# Patient Record
Sex: MALE | Race: WHITE | Employment: OTHER | URBAN - METROPOLITAN AREA
[De-identification: names, ages, dates, MRNs, and addresses within clinical notes are randomized per-mention and may not be internally consistent; named-entity substitution may affect disease eponyms.]

---

## 2017-04-13 ENCOUNTER — ANESTHESIA (OUTPATIENT)
Dept: OPERATING ROOM | Age: 67
End: 2017-04-13
Payer: MEDICARE

## 2017-04-13 ENCOUNTER — ANESTHESIA EVENT (OUTPATIENT)
Dept: OPERATING ROOM | Age: 67
End: 2017-04-13
Payer: MEDICARE

## 2017-04-13 ENCOUNTER — HOSPITAL ENCOUNTER (OUTPATIENT)
Age: 67
Setting detail: OUTPATIENT SURGERY
Discharge: HOME | End: 2017-04-13
Attending: SURGERY | Admitting: SURGERY
Payer: MEDICARE

## 2017-04-13 VITALS — SYSTOLIC BLOOD PRESSURE: 149 MMHG | DIASTOLIC BLOOD PRESSURE: 64 MMHG | OXYGEN SATURATION: 98 %

## 2017-04-13 VITALS
HEART RATE: 64 BPM | HEIGHT: 70 IN | OXYGEN SATURATION: 98 % | TEMPERATURE: 98 F | BODY MASS INDEX: 26.63 KG/M2 | WEIGHT: 186 LBS | RESPIRATION RATE: 16 BRPM | DIASTOLIC BLOOD PRESSURE: 86 MMHG | SYSTOLIC BLOOD PRESSURE: 148 MMHG

## 2017-04-13 LAB
ABSOLUTE EOS #: 0.2 K/UL (ref 0–0.4)
ABSOLUTE LYMPH #: 2.3 K/UL (ref 1–4.8)
ABSOLUTE MONO #: 0.8 K/UL (ref 0.1–1.3)
ANION GAP SERPL CALCULATED.3IONS-SCNC: 12 MMOL/L (ref 9–17)
BASOPHILS # BLD: 1 % (ref 0–2)
BASOPHILS ABSOLUTE: 0 K/UL (ref 0–0.2)
BUN BLDV-MCNC: 11 MG/DL (ref 8–23)
BUN BLDV-MCNC: 11 MG/DL (ref 8–23)
BUN/CREAT BLD: ABNORMAL (ref 9–20)
CALCIUM SERPL-MCNC: 8.8 MG/DL (ref 8.6–10.4)
CHLORIDE BLD-SCNC: 103 MMOL/L (ref 98–107)
CO2: 26 MMOL/L (ref 20–31)
CREAT SERPL-MCNC: 0.63 MG/DL (ref 0.7–1.2)
CREAT SERPL-MCNC: 0.67 MG/DL (ref 0.7–1.2)
DIFFERENTIAL TYPE: ABNORMAL
EOSINOPHILS RELATIVE PERCENT: 3 % (ref 0–4)
GFR AFRICAN AMERICAN: >60 ML/MIN
GFR AFRICAN AMERICAN: >60 ML/MIN
GFR NON-AFRICAN AMERICAN: >60 ML/MIN
GFR NON-AFRICAN AMERICAN: >60 ML/MIN
GFR SERPL CREATININE-BSD FRML MDRD: ABNORMAL ML/MIN/{1.73_M2}
GLUCOSE BLD-MCNC: 112 MG/DL (ref 70–99)
GLUCOSE BLD-MCNC: 117 MG/DL (ref 75–110)
HCT VFR BLD CALC: 48.8 % (ref 41–53)
HEMOGLOBIN: 16.6 G/DL (ref 13.5–17.5)
LYMPHOCYTES # BLD: 35 % (ref 24–44)
MCH RBC QN AUTO: 32.2 PG (ref 26–34)
MCHC RBC AUTO-ENTMCNC: 34 G/DL (ref 31–37)
MCV RBC AUTO: 94.9 FL (ref 80–100)
MONOCYTES # BLD: 11 % (ref 1–7)
PDW BLD-RTO: 12.2 % (ref 11.5–14.9)
PLATELET # BLD: 184 K/UL (ref 150–450)
PLATELET ESTIMATE: ABNORMAL
PMV BLD AUTO: 8.5 FL (ref 6–12)
POTASSIUM SERPL-SCNC: 3.9 MMOL/L (ref 3.7–5.3)
RBC # BLD: 5.15 M/UL (ref 4.5–5.9)
RBC # BLD: ABNORMAL 10*6/UL
SEG NEUTROPHILS: 50 % (ref 36–66)
SEGMENTED NEUTROPHILS ABSOLUTE COUNT: 3.3 K/UL (ref 1.3–9.1)
SODIUM BLD-SCNC: 141 MMOL/L (ref 135–144)
WBC # BLD: 6.6 K/UL (ref 3.5–11)
WBC # BLD: ABNORMAL 10*3/UL

## 2017-04-13 PROCEDURE — 82947 ASSAY GLUCOSE BLOOD QUANT: CPT

## 2017-04-13 PROCEDURE — 7100000001 HC PACU RECOVERY - ADDTL 15 MIN: Performed by: SURGERY

## 2017-04-13 PROCEDURE — 3700000000 HC ANESTHESIA ATTENDED CARE: Performed by: SURGERY

## 2017-04-13 PROCEDURE — 82565 ASSAY OF CREATININE: CPT

## 2017-04-13 PROCEDURE — 7100000031 HC ASPR PHASE II RECOVERY - ADDTL 15 MIN: Performed by: SURGERY

## 2017-04-13 PROCEDURE — 3700000001 HC ADD 15 MINUTES (ANESTHESIA): Performed by: SURGERY

## 2017-04-13 PROCEDURE — 80048 BASIC METABOLIC PNL TOTAL CA: CPT

## 2017-04-13 PROCEDURE — 2580000003 HC RX 258: Performed by: SURGERY

## 2017-04-13 PROCEDURE — 7100000000 HC PACU RECOVERY - FIRST 15 MIN: Performed by: SURGERY

## 2017-04-13 PROCEDURE — 7100000030 HC ASPR PHASE II RECOVERY - FIRST 15 MIN: Performed by: SURGERY

## 2017-04-13 PROCEDURE — 84520 ASSAY OF UREA NITROGEN: CPT

## 2017-04-13 PROCEDURE — 3609027000 HC COLONOSCOPY: Performed by: SURGERY

## 2017-04-13 PROCEDURE — 36415 COLL VENOUS BLD VENIPUNCTURE: CPT

## 2017-04-13 PROCEDURE — 85025 COMPLETE CBC W/AUTO DIFF WBC: CPT

## 2017-04-13 PROCEDURE — 6360000002 HC RX W HCPCS: Performed by: NURSE ANESTHETIST, CERTIFIED REGISTERED

## 2017-04-13 RX ORDER — PROPOFOL 10 MG/ML
INJECTION, EMULSION INTRAVENOUS PRN
Status: DISCONTINUED | OUTPATIENT
Start: 2017-04-13 | End: 2017-04-13 | Stop reason: SDUPTHER

## 2017-04-13 RX ORDER — FENTANYL CITRATE 50 UG/ML
INJECTION, SOLUTION INTRAMUSCULAR; INTRAVENOUS PRN
Status: DISCONTINUED | OUTPATIENT
Start: 2017-04-13 | End: 2017-04-13 | Stop reason: SDUPTHER

## 2017-04-13 RX ORDER — SODIUM CHLORIDE, SODIUM LACTATE, POTASSIUM CHLORIDE, CALCIUM CHLORIDE 600; 310; 30; 20 MG/100ML; MG/100ML; MG/100ML; MG/100ML
INJECTION, SOLUTION INTRAVENOUS CONTINUOUS PRN
Status: DISCONTINUED | OUTPATIENT
Start: 2017-04-13 | End: 2017-04-13

## 2017-04-13 RX ORDER — SODIUM CHLORIDE, SODIUM LACTATE, POTASSIUM CHLORIDE, CALCIUM CHLORIDE 600; 310; 30; 20 MG/100ML; MG/100ML; MG/100ML; MG/100ML
INJECTION, SOLUTION INTRAVENOUS CONTINUOUS
Status: DISCONTINUED | OUTPATIENT
Start: 2017-04-13 | End: 2017-04-13 | Stop reason: HOSPADM

## 2017-04-13 RX ORDER — MIDAZOLAM HYDROCHLORIDE 1 MG/ML
INJECTION INTRAMUSCULAR; INTRAVENOUS PRN
Status: DISCONTINUED | OUTPATIENT
Start: 2017-04-13 | End: 2017-04-13 | Stop reason: SDUPTHER

## 2017-04-13 RX ORDER — SODIUM CHLORIDE 9 MG/ML
INJECTION, SOLUTION INTRAVENOUS CONTINUOUS
Status: DISCONTINUED | OUTPATIENT
Start: 2017-04-13 | End: 2017-04-13

## 2017-04-13 RX ORDER — SODIUM CHLORIDE 0.9 % (FLUSH) 0.9 %
10 SYRINGE (ML) INJECTION PRN
Status: DISCONTINUED | OUTPATIENT
Start: 2017-04-13 | End: 2017-04-13 | Stop reason: HOSPADM

## 2017-04-13 RX ORDER — LABETALOL HYDROCHLORIDE 5 MG/ML
5 INJECTION, SOLUTION INTRAVENOUS EVERY 10 MIN PRN
Status: DISCONTINUED | OUTPATIENT
Start: 2017-04-13 | End: 2017-04-13 | Stop reason: HOSPADM

## 2017-04-13 RX ORDER — SODIUM CHLORIDE 0.9 % (FLUSH) 0.9 %
10 SYRINGE (ML) INJECTION EVERY 12 HOURS SCHEDULED
Status: DISCONTINUED | OUTPATIENT
Start: 2017-04-13 | End: 2017-04-13 | Stop reason: HOSPADM

## 2017-04-13 RX ORDER — DIPHENHYDRAMINE HYDROCHLORIDE 50 MG/ML
12.5 INJECTION INTRAMUSCULAR; INTRAVENOUS
Status: DISCONTINUED | OUTPATIENT
Start: 2017-04-13 | End: 2017-04-13 | Stop reason: HOSPADM

## 2017-04-13 RX ORDER — ONDANSETRON 2 MG/ML
4 INJECTION INTRAMUSCULAR; INTRAVENOUS
Status: DISCONTINUED | OUTPATIENT
Start: 2017-04-13 | End: 2017-04-13 | Stop reason: HOSPADM

## 2017-04-13 RX ORDER — LIDOCAINE HYDROCHLORIDE 10 MG/ML
5 INJECTION, SOLUTION INFILTRATION; PERINEURAL ONCE
Status: DISCONTINUED | OUTPATIENT
Start: 2017-04-13 | End: 2017-04-13 | Stop reason: HOSPADM

## 2017-04-13 RX ADMIN — MIDAZOLAM HYDROCHLORIDE 2 MG: 1 INJECTION, SOLUTION INTRAMUSCULAR; INTRAVENOUS at 11:10

## 2017-04-13 RX ADMIN — PROPOFOL 100 MG: 10 INJECTION, EMULSION INTRAVENOUS at 11:32

## 2017-04-13 RX ADMIN — PROPOFOL 50 MG: 10 INJECTION, EMULSION INTRAVENOUS at 11:10

## 2017-04-13 RX ADMIN — SODIUM CHLORIDE, POTASSIUM CHLORIDE, SODIUM LACTATE AND CALCIUM CHLORIDE: 600; 310; 30; 20 INJECTION, SOLUTION INTRAVENOUS at 11:08

## 2017-04-13 RX ADMIN — PROPOFOL 20 MG: 10 INJECTION, EMULSION INTRAVENOUS at 11:20

## 2017-04-13 RX ADMIN — SODIUM CHLORIDE, POTASSIUM CHLORIDE, SODIUM LACTATE AND CALCIUM CHLORIDE: 600; 310; 30; 20 INJECTION, SOLUTION INTRAVENOUS at 09:46

## 2017-04-13 RX ADMIN — FENTANYL CITRATE 100 MCG: 50 INJECTION, SOLUTION INTRAMUSCULAR; INTRAVENOUS at 11:10

## 2017-04-13 RX ADMIN — PROPOFOL 30 MG: 10 INJECTION, EMULSION INTRAVENOUS at 11:15

## 2017-04-13 RX ADMIN — PROPOFOL 20 MG: 10 INJECTION, EMULSION INTRAVENOUS at 11:35

## 2017-04-13 RX ADMIN — PROPOFOL 100 MG: 10 INJECTION, EMULSION INTRAVENOUS at 11:28

## 2017-04-13 ASSESSMENT — PAIN DESCRIPTION - PAIN TYPE: TYPE: SURGICAL PAIN

## 2017-04-13 ASSESSMENT — PAIN DESCRIPTION - DESCRIPTORS: DESCRIPTORS: CRAMPING

## 2017-04-13 ASSESSMENT — PAIN SCALES - GENERAL
PAINLEVEL_OUTOF10: 0
PAINLEVEL_OUTOF10: 5
PAINLEVEL_OUTOF10: 0

## 2017-04-13 ASSESSMENT — PAIN DESCRIPTION - LOCATION: LOCATION: ABDOMEN

## 2017-04-13 ASSESSMENT — PAIN - FUNCTIONAL ASSESSMENT: PAIN_FUNCTIONAL_ASSESSMENT: 0-10

## 2017-05-01 ENCOUNTER — OFFICE VISIT (OUTPATIENT)
Dept: FAMILY MEDICINE CLINIC | Age: 67
End: 2017-05-01
Payer: MEDICARE

## 2017-05-01 VITALS
TEMPERATURE: 98.3 F | HEIGHT: 70 IN | SYSTOLIC BLOOD PRESSURE: 110 MMHG | OXYGEN SATURATION: 97 % | DIASTOLIC BLOOD PRESSURE: 78 MMHG | BODY MASS INDEX: 27.49 KG/M2 | HEART RATE: 68 BPM | WEIGHT: 192 LBS

## 2017-05-01 DIAGNOSIS — E78.5 DYSLIPIDEMIA: ICD-10-CM

## 2017-05-01 DIAGNOSIS — I10 ESSENTIAL HYPERTENSION: Primary | ICD-10-CM

## 2017-05-01 DIAGNOSIS — K21.9 GASTROESOPHAGEAL REFLUX DISEASE WITHOUT ESOPHAGITIS: ICD-10-CM

## 2017-05-01 DIAGNOSIS — R25.1 TREMOR OF BOTH HANDS: ICD-10-CM

## 2017-05-01 PROCEDURE — G8420 CALC BMI NORM PARAMETERS: HCPCS | Performed by: FAMILY MEDICINE

## 2017-05-01 PROCEDURE — 4004F PT TOBACCO SCREEN RCVD TLK: CPT | Performed by: FAMILY MEDICINE

## 2017-05-01 PROCEDURE — G8427 DOCREV CUR MEDS BY ELIG CLIN: HCPCS | Performed by: FAMILY MEDICINE

## 2017-05-01 PROCEDURE — 1123F ACP DISCUSS/DSCN MKR DOCD: CPT | Performed by: FAMILY MEDICINE

## 2017-05-01 PROCEDURE — 4040F PNEUMOC VAC/ADMIN/RCVD: CPT | Performed by: FAMILY MEDICINE

## 2017-05-01 PROCEDURE — 3017F COLORECTAL CA SCREEN DOC REV: CPT | Performed by: FAMILY MEDICINE

## 2017-05-01 PROCEDURE — 99214 OFFICE O/P EST MOD 30 MIN: CPT | Performed by: FAMILY MEDICINE

## 2017-05-01 RX ORDER — AMLODIPINE BESYLATE 10 MG/1
10 TABLET ORAL DAILY
Qty: 30 TABLET | Refills: 3 | Status: SHIPPED | OUTPATIENT
Start: 2017-05-01 | End: 2017-05-16 | Stop reason: ALTCHOICE

## 2017-05-01 ASSESSMENT — ENCOUNTER SYMPTOMS
CONSTIPATION: 0
COUGH: 0
ABDOMINAL PAIN: 0
NAUSEA: 0
SHORTNESS OF BREATH: 0
SORE THROAT: 0

## 2017-05-08 ENCOUNTER — HOSPITAL ENCOUNTER (OUTPATIENT)
Age: 67
Discharge: HOME | End: 2017-05-08
Payer: MEDICARE

## 2017-05-08 DIAGNOSIS — E78.5 DYSLIPIDEMIA: ICD-10-CM

## 2017-05-08 DIAGNOSIS — I10 ESSENTIAL HYPERTENSION: ICD-10-CM

## 2017-05-08 LAB
ALT SERPL-CCNC: 34 U/L (ref 5–41)
ANION GAP SERPL CALCULATED.3IONS-SCNC: 15 MMOL/L (ref 9–17)
AST SERPL-CCNC: 38 U/L
BUN BLDV-MCNC: 13 MG/DL (ref 8–23)
BUN/CREAT BLD: ABNORMAL (ref 9–20)
CALCIUM SERPL-MCNC: 9.2 MG/DL (ref 8.6–10.4)
CHLORIDE BLD-SCNC: 102 MMOL/L (ref 98–107)
CHOLESTEROL/HDL RATIO: 4.4
CHOLESTEROL: 218 MG/DL
CO2: 23 MMOL/L (ref 20–31)
CREAT SERPL-MCNC: 0.66 MG/DL (ref 0.7–1.2)
GFR AFRICAN AMERICAN: >60 ML/MIN
GFR NON-AFRICAN AMERICAN: >60 ML/MIN
GFR SERPL CREATININE-BSD FRML MDRD: ABNORMAL ML/MIN/{1.73_M2}
GFR SERPL CREATININE-BSD FRML MDRD: ABNORMAL ML/MIN/{1.73_M2}
GLUCOSE BLD-MCNC: 103 MG/DL (ref 70–99)
HDLC SERPL-MCNC: 49 MG/DL
LDL CHOLESTEROL: 123 MG/DL (ref 0–130)
POTASSIUM SERPL-SCNC: 4.1 MMOL/L (ref 3.7–5.3)
SODIUM BLD-SCNC: 140 MMOL/L (ref 135–144)
TRIGL SERPL-MCNC: 231 MG/DL
VLDLC SERPL CALC-MCNC: ABNORMAL MG/DL (ref 1–30)

## 2017-05-08 PROCEDURE — 80061 LIPID PANEL: CPT

## 2017-05-08 PROCEDURE — 84450 TRANSFERASE (AST) (SGOT): CPT

## 2017-05-08 PROCEDURE — 36415 COLL VENOUS BLD VENIPUNCTURE: CPT

## 2017-05-08 PROCEDURE — 80048 BASIC METABOLIC PNL TOTAL CA: CPT

## 2017-05-08 PROCEDURE — 84460 ALANINE AMINO (ALT) (SGPT): CPT

## 2017-05-10 ENCOUNTER — TELEPHONE (OUTPATIENT)
Dept: FAMILY MEDICINE CLINIC | Age: 67
End: 2017-05-10

## 2017-05-16 ENCOUNTER — OFFICE VISIT (OUTPATIENT)
Dept: FAMILY MEDICINE CLINIC | Age: 67
End: 2017-05-16
Payer: MEDICARE

## 2017-05-16 VITALS
BODY MASS INDEX: 25.97 KG/M2 | HEART RATE: 89 BPM | WEIGHT: 181.4 LBS | SYSTOLIC BLOOD PRESSURE: 136 MMHG | DIASTOLIC BLOOD PRESSURE: 78 MMHG | HEIGHT: 70 IN | TEMPERATURE: 98.6 F

## 2017-05-16 DIAGNOSIS — G25.0 ESSENTIAL TREMOR: ICD-10-CM

## 2017-05-16 DIAGNOSIS — E78.5 HYPERLIPIDEMIA, UNSPECIFIED HYPERLIPIDEMIA TYPE: ICD-10-CM

## 2017-05-16 DIAGNOSIS — I10 ESSENTIAL HYPERTENSION: Primary | ICD-10-CM

## 2017-05-16 DIAGNOSIS — K21.9 GASTROESOPHAGEAL REFLUX DISEASE WITHOUT ESOPHAGITIS: ICD-10-CM

## 2017-05-16 PROCEDURE — 4040F PNEUMOC VAC/ADMIN/RCVD: CPT | Performed by: FAMILY MEDICINE

## 2017-05-16 PROCEDURE — 3017F COLORECTAL CA SCREEN DOC REV: CPT | Performed by: FAMILY MEDICINE

## 2017-05-16 PROCEDURE — G8427 DOCREV CUR MEDS BY ELIG CLIN: HCPCS | Performed by: FAMILY MEDICINE

## 2017-05-16 PROCEDURE — 99213 OFFICE O/P EST LOW 20 MIN: CPT | Performed by: FAMILY MEDICINE

## 2017-05-16 PROCEDURE — 1123F ACP DISCUSS/DSCN MKR DOCD: CPT | Performed by: FAMILY MEDICINE

## 2017-05-16 PROCEDURE — G8420 CALC BMI NORM PARAMETERS: HCPCS | Performed by: FAMILY MEDICINE

## 2017-05-16 PROCEDURE — 4004F PT TOBACCO SCREEN RCVD TLK: CPT | Performed by: FAMILY MEDICINE

## 2017-05-16 RX ORDER — ATORVASTATIN CALCIUM 20 MG/1
20 TABLET, FILM COATED ORAL DAILY
Qty: 30 TABLET | Refills: 3 | Status: SHIPPED | OUTPATIENT
Start: 2017-05-16 | End: 2017-08-21 | Stop reason: ALTCHOICE

## 2017-05-16 RX ORDER — NEOMYCIN SULFATE, POLYMYXIN B SULFATE, AND DEXAMETHASONE 3.5; 10000; 1 MG/G; [USP'U]/G; MG/G
OINTMENT OPHTHALMIC
Refills: 1 | COMMUNITY
Start: 2017-04-06 | End: 2017-08-21 | Stop reason: ALTCHOICE

## 2017-05-16 RX ORDER — BISACODYL 5 MG
TABLET, DELAYED RELEASE (ENTERIC COATED) ORAL
Refills: 0 | COMMUNITY
Start: 2017-03-14 | End: 2018-02-13 | Stop reason: ALTCHOICE

## 2017-05-16 RX ORDER — PNEUMOCOCCAL 13-VALENT CONJUGATE VACCINE 2.2; 2.2; 2.2; 2.2; 2.2; 4.4; 2.2; 2.2; 2.2; 2.2; 2.2; 2.2; 2.2 UG/.5ML; UG/.5ML; UG/.5ML; UG/.5ML; UG/.5ML; UG/.5ML; UG/.5ML; UG/.5ML; UG/.5ML; UG/.5ML; UG/.5ML; UG/.5ML; UG/.5ML
INJECTION, SUSPENSION INTRAMUSCULAR
Refills: 0 | COMMUNITY
Start: 2017-02-28 | End: 2017-05-16 | Stop reason: ALTCHOICE

## 2017-05-16 RX ORDER — PROPRANOLOL HYDROCHLORIDE 20 MG/1
20 TABLET ORAL DAILY
Qty: 30 TABLET | Refills: 1 | Status: SHIPPED | OUTPATIENT
Start: 2017-05-16 | End: 2017-06-27 | Stop reason: SDUPTHER

## 2017-05-16 ASSESSMENT — PATIENT HEALTH QUESTIONNAIRE - PHQ9
2. FEELING DOWN, DEPRESSED OR HOPELESS: 0
SUM OF ALL RESPONSES TO PHQ9 QUESTIONS 1 & 2: 0
1. LITTLE INTEREST OR PLEASURE IN DOING THINGS: 0
SUM OF ALL RESPONSES TO PHQ QUESTIONS 1-9: 0

## 2017-05-16 ASSESSMENT — ENCOUNTER SYMPTOMS
SHORTNESS OF BREATH: 0
NAUSEA: 0
SORE THROAT: 0
ABDOMINAL PAIN: 0
CONSTIPATION: 0

## 2017-05-26 ENCOUNTER — NURSE ONLY (OUTPATIENT)
Dept: FAMILY MEDICINE CLINIC | Age: 67
End: 2017-05-26

## 2017-05-26 VITALS — DIASTOLIC BLOOD PRESSURE: 66 MMHG | SYSTOLIC BLOOD PRESSURE: 134 MMHG | HEART RATE: 66 BPM

## 2017-06-27 ENCOUNTER — OFFICE VISIT (OUTPATIENT)
Dept: FAMILY MEDICINE CLINIC | Age: 67
End: 2017-06-27
Payer: MEDICARE

## 2017-06-27 VITALS
RESPIRATION RATE: 18 BRPM | SYSTOLIC BLOOD PRESSURE: 124 MMHG | WEIGHT: 180 LBS | OXYGEN SATURATION: 99 % | DIASTOLIC BLOOD PRESSURE: 84 MMHG | HEART RATE: 70 BPM | TEMPERATURE: 98.3 F | BODY MASS INDEX: 26.66 KG/M2 | HEIGHT: 69 IN

## 2017-06-27 DIAGNOSIS — R73.9 HYPERGLYCEMIA: ICD-10-CM

## 2017-06-27 DIAGNOSIS — I10 ESSENTIAL HYPERTENSION: Primary | ICD-10-CM

## 2017-06-27 DIAGNOSIS — E78.5 HYPERLIPIDEMIA, UNSPECIFIED HYPERLIPIDEMIA TYPE: ICD-10-CM

## 2017-06-27 DIAGNOSIS — K21.9 GASTROESOPHAGEAL REFLUX DISEASE WITHOUT ESOPHAGITIS: ICD-10-CM

## 2017-06-27 LAB — HBA1C MFR BLD: 5.3 %

## 2017-06-27 PROCEDURE — 99214 OFFICE O/P EST MOD 30 MIN: CPT | Performed by: FAMILY MEDICINE

## 2017-06-27 PROCEDURE — 1123F ACP DISCUSS/DSCN MKR DOCD: CPT | Performed by: FAMILY MEDICINE

## 2017-06-27 PROCEDURE — 83036 HEMOGLOBIN GLYCOSYLATED A1C: CPT | Performed by: FAMILY MEDICINE

## 2017-06-27 PROCEDURE — 4040F PNEUMOC VAC/ADMIN/RCVD: CPT | Performed by: FAMILY MEDICINE

## 2017-06-27 PROCEDURE — 4004F PT TOBACCO SCREEN RCVD TLK: CPT | Performed by: FAMILY MEDICINE

## 2017-06-27 PROCEDURE — G8419 CALC BMI OUT NRM PARAM NOF/U: HCPCS | Performed by: FAMILY MEDICINE

## 2017-06-27 PROCEDURE — 3017F COLORECTAL CA SCREEN DOC REV: CPT | Performed by: FAMILY MEDICINE

## 2017-06-27 PROCEDURE — G8427 DOCREV CUR MEDS BY ELIG CLIN: HCPCS | Performed by: FAMILY MEDICINE

## 2017-06-27 RX ORDER — PROPRANOLOL HYDROCHLORIDE 20 MG/1
20 TABLET ORAL DAILY
Qty: 90 TABLET | Refills: 1 | Status: SHIPPED | OUTPATIENT
Start: 2017-06-27 | End: 2017-08-21 | Stop reason: ALTCHOICE

## 2017-06-27 RX ORDER — M-VIT,TX,IRON,MINS/CALC/FOLIC 27MG-0.4MG
1 TABLET ORAL DAILY
COMMUNITY
End: 2018-03-16 | Stop reason: SDUPTHER

## 2017-06-27 RX ORDER — CLONAZEPAM 0.5 MG/1
TABLET ORAL
Refills: 3 | COMMUNITY
Start: 2017-05-19 | End: 2017-09-21 | Stop reason: ALTCHOICE

## 2017-06-27 ASSESSMENT — ENCOUNTER SYMPTOMS
ABDOMINAL PAIN: 0
SORE THROAT: 0
SHORTNESS OF BREATH: 0
NAUSEA: 0

## 2017-08-17 ENCOUNTER — HOSPITAL ENCOUNTER (OUTPATIENT)
Age: 67
Discharge: HOME | End: 2017-08-17
Payer: MEDICARE

## 2017-08-17 LAB
THYROXINE, FREE: 1.18 NG/DL (ref 0.93–1.7)
TSH SERPL DL<=0.05 MIU/L-ACNC: 1.22 MIU/L (ref 0.3–5)

## 2017-08-17 PROCEDURE — 93005 ELECTROCARDIOGRAM TRACING: CPT

## 2017-08-17 PROCEDURE — 84439 ASSAY OF FREE THYROXINE: CPT

## 2017-08-17 PROCEDURE — 84443 ASSAY THYROID STIM HORMONE: CPT

## 2017-08-17 PROCEDURE — 36415 COLL VENOUS BLD VENIPUNCTURE: CPT

## 2017-08-21 ENCOUNTER — OFFICE VISIT (OUTPATIENT)
Dept: FAMILY MEDICINE CLINIC | Age: 67
End: 2017-08-21
Payer: MEDICARE

## 2017-08-21 VITALS
HEIGHT: 69 IN | HEART RATE: 62 BPM | SYSTOLIC BLOOD PRESSURE: 162 MMHG | BODY MASS INDEX: 27.25 KG/M2 | WEIGHT: 184 LBS | DIASTOLIC BLOOD PRESSURE: 88 MMHG | OXYGEN SATURATION: 98 % | TEMPERATURE: 98.5 F

## 2017-08-21 DIAGNOSIS — I10 ESSENTIAL HYPERTENSION: Primary | ICD-10-CM

## 2017-08-21 DIAGNOSIS — R10.32 LEFT LOWER QUADRANT PAIN: ICD-10-CM

## 2017-08-21 PROCEDURE — 99213 OFFICE O/P EST LOW 20 MIN: CPT | Performed by: FAMILY MEDICINE

## 2017-08-21 PROCEDURE — 3017F COLORECTAL CA SCREEN DOC REV: CPT | Performed by: FAMILY MEDICINE

## 2017-08-21 PROCEDURE — 4040F PNEUMOC VAC/ADMIN/RCVD: CPT | Performed by: FAMILY MEDICINE

## 2017-08-21 PROCEDURE — 1123F ACP DISCUSS/DSCN MKR DOCD: CPT | Performed by: FAMILY MEDICINE

## 2017-08-21 PROCEDURE — 4004F PT TOBACCO SCREEN RCVD TLK: CPT | Performed by: FAMILY MEDICINE

## 2017-08-21 PROCEDURE — G8419 CALC BMI OUT NRM PARAM NOF/U: HCPCS | Performed by: FAMILY MEDICINE

## 2017-08-21 PROCEDURE — G8427 DOCREV CUR MEDS BY ELIG CLIN: HCPCS | Performed by: FAMILY MEDICINE

## 2017-08-21 RX ORDER — LISINOPRIL 10 MG/1
10 TABLET ORAL DAILY
Qty: 30 TABLET | Refills: 3 | Status: SHIPPED | OUTPATIENT
Start: 2017-08-21 | End: 2017-09-21 | Stop reason: SDUPTHER

## 2017-08-21 ASSESSMENT — ENCOUNTER SYMPTOMS
CONSTIPATION: 0
ABDOMINAL PAIN: 1
NAUSEA: 0
SHORTNESS OF BREATH: 0
SORE THROAT: 0

## 2017-08-23 LAB
EKG ATRIAL RATE: 53 BPM
EKG P AXIS: 45 DEGREES
EKG P-R INTERVAL: 162 MS
EKG Q-T INTERVAL: 422 MS
EKG QRS DURATION: 80 MS
EKG QTC CALCULATION (BAZETT): 395 MS
EKG R AXIS: 61 DEGREES
EKG T AXIS: 52 DEGREES
EKG VENTRICULAR RATE: 53 BPM

## 2017-09-14 ENCOUNTER — ANESTHESIA EVENT (OUTPATIENT)
Dept: OPERATING ROOM | Age: 67
End: 2017-09-14
Payer: MEDICARE

## 2017-09-15 ENCOUNTER — ANESTHESIA (OUTPATIENT)
Dept: OPERATING ROOM | Age: 67
End: 2017-09-15
Payer: MEDICARE

## 2017-09-15 ENCOUNTER — HOSPITAL ENCOUNTER (OUTPATIENT)
Age: 67
Setting detail: OUTPATIENT SURGERY
Discharge: HOME | End: 2017-09-15
Attending: SURGERY | Admitting: SURGERY
Payer: MEDICARE

## 2017-09-15 VITALS
HEIGHT: 69 IN | DIASTOLIC BLOOD PRESSURE: 84 MMHG | RESPIRATION RATE: 20 BRPM | WEIGHT: 184 LBS | SYSTOLIC BLOOD PRESSURE: 143 MMHG | BODY MASS INDEX: 27.25 KG/M2 | OXYGEN SATURATION: 96 % | HEART RATE: 54 BPM | TEMPERATURE: 97.7 F

## 2017-09-15 VITALS — SYSTOLIC BLOOD PRESSURE: 130 MMHG | DIASTOLIC BLOOD PRESSURE: 69 MMHG | OXYGEN SATURATION: 97 %

## 2017-09-15 PROCEDURE — 2500000003 HC RX 250 WO HCPCS

## 2017-09-15 PROCEDURE — 3600000002 HC SURGERY LEVEL 2 BASE: Performed by: SURGERY

## 2017-09-15 PROCEDURE — 7100000001 HC PACU RECOVERY - ADDTL 15 MIN: Performed by: SURGERY

## 2017-09-15 PROCEDURE — 6360000002 HC RX W HCPCS

## 2017-09-15 PROCEDURE — 3700000000 HC ANESTHESIA ATTENDED CARE: Performed by: SURGERY

## 2017-09-15 PROCEDURE — 3600000012 HC SURGERY LEVEL 2 ADDTL 15MIN: Performed by: SURGERY

## 2017-09-15 PROCEDURE — 88304 TISSUE EXAM BY PATHOLOGIST: CPT

## 2017-09-15 PROCEDURE — 7100000030 HC ASPR PHASE II RECOVERY - FIRST 15 MIN: Performed by: SURGERY

## 2017-09-15 PROCEDURE — 3700000001 HC ADD 15 MINUTES (ANESTHESIA): Performed by: SURGERY

## 2017-09-15 PROCEDURE — 2580000003 HC RX 258: Performed by: ANESTHESIOLOGY

## 2017-09-15 PROCEDURE — 2500000003 HC RX 250 WO HCPCS: Performed by: SURGERY

## 2017-09-15 PROCEDURE — 88305 TISSUE EXAM BY PATHOLOGIST: CPT

## 2017-09-15 PROCEDURE — 7100000000 HC PACU RECOVERY - FIRST 15 MIN: Performed by: SURGERY

## 2017-09-15 PROCEDURE — A6257 TRANSPARENT FILM <= 16 SQ IN: HCPCS | Performed by: SURGERY

## 2017-09-15 PROCEDURE — 7100000010 HC PHASE II RECOVERY - FIRST 15 MIN: Performed by: SURGERY

## 2017-09-15 PROCEDURE — 7100000011 HC PHASE II RECOVERY - ADDTL 15 MIN: Performed by: SURGERY

## 2017-09-15 PROCEDURE — 7100000031 HC ASPR PHASE II RECOVERY - ADDTL 15 MIN: Performed by: SURGERY

## 2017-09-15 RX ORDER — LIDOCAINE HYDROCHLORIDE AND EPINEPHRINE 10; 10 MG/ML; UG/ML
INJECTION, SOLUTION INFILTRATION; PERINEURAL PRN
Status: DISCONTINUED | OUTPATIENT
Start: 2017-09-15 | End: 2017-09-15 | Stop reason: HOSPADM

## 2017-09-15 RX ORDER — KETAMINE HYDROCHLORIDE 50 MG/ML
INJECTION, SOLUTION, CONCENTRATE INTRAMUSCULAR; INTRAVENOUS PRN
Status: DISCONTINUED | OUTPATIENT
Start: 2017-09-15 | End: 2017-09-15 | Stop reason: SDUPTHER

## 2017-09-15 RX ORDER — MIDAZOLAM HYDROCHLORIDE 1 MG/ML
INJECTION INTRAMUSCULAR; INTRAVENOUS PRN
Status: DISCONTINUED | OUTPATIENT
Start: 2017-09-15 | End: 2017-09-15 | Stop reason: SDUPTHER

## 2017-09-15 RX ORDER — OXYCODONE HYDROCHLORIDE AND ACETAMINOPHEN 5; 325 MG/1; MG/1
1 TABLET ORAL EVERY 6 HOURS PRN
Qty: 30 TABLET | Refills: 0 | Status: SHIPPED | OUTPATIENT
Start: 2017-09-15 | End: 2017-09-21

## 2017-09-15 RX ORDER — SODIUM CHLORIDE, SODIUM LACTATE, POTASSIUM CHLORIDE, CALCIUM CHLORIDE 600; 310; 30; 20 MG/100ML; MG/100ML; MG/100ML; MG/100ML
INJECTION, SOLUTION INTRAVENOUS CONTINUOUS
Status: DISCONTINUED | OUTPATIENT
Start: 2017-09-15 | End: 2017-09-15 | Stop reason: HOSPADM

## 2017-09-15 RX ORDER — PROMETHAZINE HYDROCHLORIDE 25 MG/ML
6.25 INJECTION, SOLUTION INTRAMUSCULAR; INTRAVENOUS
Status: DISCONTINUED | OUTPATIENT
Start: 2017-09-15 | End: 2017-09-15 | Stop reason: HOSPADM

## 2017-09-15 RX ORDER — OXYCODONE HYDROCHLORIDE AND ACETAMINOPHEN 5; 325 MG/1; MG/1
1 TABLET ORAL PRN
Status: DISCONTINUED | OUTPATIENT
Start: 2017-09-15 | End: 2017-09-15 | Stop reason: HOSPADM

## 2017-09-15 RX ORDER — MEPERIDINE HYDROCHLORIDE 25 MG/ML
12.5 INJECTION INTRAMUSCULAR; INTRAVENOUS; SUBCUTANEOUS EVERY 5 MIN PRN
Status: DISCONTINUED | OUTPATIENT
Start: 2017-09-15 | End: 2017-09-15 | Stop reason: HOSPADM

## 2017-09-15 RX ORDER — FENTANYL CITRATE 50 UG/ML
25 INJECTION, SOLUTION INTRAMUSCULAR; INTRAVENOUS EVERY 5 MIN PRN
Status: DISCONTINUED | OUTPATIENT
Start: 2017-09-15 | End: 2017-09-15 | Stop reason: HOSPADM

## 2017-09-15 RX ORDER — SODIUM CHLORIDE 0.9 % (FLUSH) 0.9 %
10 SYRINGE (ML) INJECTION PRN
Status: DISCONTINUED | OUTPATIENT
Start: 2017-09-15 | End: 2017-09-15 | Stop reason: HOSPADM

## 2017-09-15 RX ORDER — PROPOFOL 10 MG/ML
INJECTION, EMULSION INTRAVENOUS CONTINUOUS PRN
Status: DISCONTINUED | OUTPATIENT
Start: 2017-09-15 | End: 2017-09-15 | Stop reason: SDUPTHER

## 2017-09-15 RX ORDER — SODIUM CHLORIDE 0.9 % (FLUSH) 0.9 %
10 SYRINGE (ML) INJECTION EVERY 12 HOURS SCHEDULED
Status: DISCONTINUED | OUTPATIENT
Start: 2017-09-15 | End: 2017-09-15 | Stop reason: HOSPADM

## 2017-09-15 RX ORDER — LIDOCAINE HYDROCHLORIDE 10 MG/ML
1 INJECTION, SOLUTION EPIDURAL; INFILTRATION; INTRACAUDAL; PERINEURAL
Status: DISCONTINUED | OUTPATIENT
Start: 2017-09-15 | End: 2017-09-15 | Stop reason: HOSPADM

## 2017-09-15 RX ORDER — OXYCODONE HYDROCHLORIDE AND ACETAMINOPHEN 5; 325 MG/1; MG/1
2 TABLET ORAL PRN
Status: DISCONTINUED | OUTPATIENT
Start: 2017-09-15 | End: 2017-09-15 | Stop reason: HOSPADM

## 2017-09-15 RX ORDER — DIPHENHYDRAMINE HYDROCHLORIDE 50 MG/ML
12.5 INJECTION INTRAMUSCULAR; INTRAVENOUS
Status: DISCONTINUED | OUTPATIENT
Start: 2017-09-15 | End: 2017-09-15 | Stop reason: HOSPADM

## 2017-09-15 RX ADMIN — SODIUM CHLORIDE, POTASSIUM CHLORIDE, SODIUM LACTATE AND CALCIUM CHLORIDE: 600; 310; 30; 20 INJECTION, SOLUTION INTRAVENOUS at 10:35

## 2017-09-15 RX ADMIN — KETAMINE HYDROCHLORIDE 30 MG: 50 INJECTION, SOLUTION INTRAMUSCULAR; INTRAVENOUS at 13:23

## 2017-09-15 RX ADMIN — MIDAZOLAM 4 MG: 1 INJECTION INTRAMUSCULAR; INTRAVENOUS at 13:05

## 2017-09-15 RX ADMIN — PROPOFOL 150 MCG/KG/MIN: 10 INJECTION, EMULSION INTRAVENOUS at 13:12

## 2017-09-15 ASSESSMENT — PAIN SCALES - GENERAL
PAINLEVEL_OUTOF10: 0
PAINLEVEL_OUTOF10: 0

## 2017-09-15 ASSESSMENT — COPD QUESTIONNAIRES: CAT_SEVERITY: NO INTERVAL CHANGE

## 2017-09-15 ASSESSMENT — PAIN - FUNCTIONAL ASSESSMENT: PAIN_FUNCTIONAL_ASSESSMENT: 0-10

## 2017-09-19 LAB — SURGICAL PATHOLOGY REPORT: NORMAL

## 2017-09-21 ENCOUNTER — OFFICE VISIT (OUTPATIENT)
Dept: FAMILY MEDICINE CLINIC | Age: 67
End: 2017-09-21
Payer: MEDICARE

## 2017-09-21 VITALS
HEART RATE: 67 BPM | DIASTOLIC BLOOD PRESSURE: 82 MMHG | OXYGEN SATURATION: 98 % | TEMPERATURE: 98.5 F | WEIGHT: 188.2 LBS | HEIGHT: 69 IN | SYSTOLIC BLOOD PRESSURE: 128 MMHG | BODY MASS INDEX: 27.88 KG/M2

## 2017-09-21 DIAGNOSIS — Z23 NEED FOR INFLUENZA VACCINATION: ICD-10-CM

## 2017-09-21 DIAGNOSIS — I10 ESSENTIAL HYPERTENSION: Primary | ICD-10-CM

## 2017-09-21 DIAGNOSIS — E78.5 HYPERLIPIDEMIA, UNSPECIFIED HYPERLIPIDEMIA TYPE: ICD-10-CM

## 2017-09-21 DIAGNOSIS — F41.9 ANXIETY: ICD-10-CM

## 2017-09-21 PROCEDURE — 99214 OFFICE O/P EST MOD 30 MIN: CPT | Performed by: FAMILY MEDICINE

## 2017-09-21 PROCEDURE — G8417 CALC BMI ABV UP PARAM F/U: HCPCS | Performed by: FAMILY MEDICINE

## 2017-09-21 PROCEDURE — 3017F COLORECTAL CA SCREEN DOC REV: CPT | Performed by: FAMILY MEDICINE

## 2017-09-21 PROCEDURE — 90688 IIV4 VACCINE SPLT 0.5 ML IM: CPT | Performed by: FAMILY MEDICINE

## 2017-09-21 PROCEDURE — G0008 ADMIN INFLUENZA VIRUS VAC: HCPCS | Performed by: FAMILY MEDICINE

## 2017-09-21 PROCEDURE — 4004F PT TOBACCO SCREEN RCVD TLK: CPT | Performed by: FAMILY MEDICINE

## 2017-09-21 PROCEDURE — 4040F PNEUMOC VAC/ADMIN/RCVD: CPT | Performed by: FAMILY MEDICINE

## 2017-09-21 PROCEDURE — 1123F ACP DISCUSS/DSCN MKR DOCD: CPT | Performed by: FAMILY MEDICINE

## 2017-09-21 PROCEDURE — G8427 DOCREV CUR MEDS BY ELIG CLIN: HCPCS | Performed by: FAMILY MEDICINE

## 2017-09-21 RX ORDER — BUSPIRONE HYDROCHLORIDE 5 MG/1
5 TABLET ORAL 2 TIMES DAILY
Qty: 30 TABLET | Refills: 1 | Status: SHIPPED | OUTPATIENT
Start: 2017-09-21 | End: 2017-10-02 | Stop reason: ALTCHOICE

## 2017-09-21 RX ORDER — ATORVASTATIN CALCIUM 20 MG/1
20 TABLET, FILM COATED ORAL DAILY
Qty: 90 TABLET | Refills: 1 | Status: SHIPPED | OUTPATIENT
Start: 2017-09-21 | End: 2018-02-13 | Stop reason: ALTCHOICE

## 2017-09-21 RX ORDER — LISINOPRIL 10 MG/1
10 TABLET ORAL DAILY
Qty: 90 TABLET | Refills: 1 | Status: SHIPPED | OUTPATIENT
Start: 2017-09-21 | End: 2017-10-06 | Stop reason: DRUGHIGH

## 2017-09-21 RX ORDER — BUSPIRONE HYDROCHLORIDE 5 MG/1
5 TABLET ORAL 2 TIMES DAILY
Qty: 30 TABLET | Refills: 0 | Status: SHIPPED | OUTPATIENT
Start: 2017-09-21 | End: 2017-09-21 | Stop reason: SDUPTHER

## 2017-09-21 ASSESSMENT — ENCOUNTER SYMPTOMS
SORE THROAT: 0
NAUSEA: 0
SHORTNESS OF BREATH: 0
ABDOMINAL PAIN: 0
CONSTIPATION: 0

## 2017-10-02 ENCOUNTER — OFFICE VISIT (OUTPATIENT)
Dept: FAMILY MEDICINE CLINIC | Age: 67
End: 2017-10-02
Payer: MEDICARE

## 2017-10-02 VITALS
HEART RATE: 66 BPM | BODY MASS INDEX: 27.37 KG/M2 | SYSTOLIC BLOOD PRESSURE: 142 MMHG | TEMPERATURE: 97.6 F | OXYGEN SATURATION: 89 % | DIASTOLIC BLOOD PRESSURE: 88 MMHG | WEIGHT: 184.8 LBS | HEIGHT: 69 IN

## 2017-10-02 DIAGNOSIS — R00.1 BRADYCARDIA: ICD-10-CM

## 2017-10-02 DIAGNOSIS — I10 ESSENTIAL HYPERTENSION: ICD-10-CM

## 2017-10-02 DIAGNOSIS — J02.9 ACUTE PHARYNGITIS, UNSPECIFIED ETIOLOGY: Primary | ICD-10-CM

## 2017-10-02 PROCEDURE — 4040F PNEUMOC VAC/ADMIN/RCVD: CPT | Performed by: FAMILY MEDICINE

## 2017-10-02 PROCEDURE — 99213 OFFICE O/P EST LOW 20 MIN: CPT | Performed by: FAMILY MEDICINE

## 2017-10-02 PROCEDURE — 1123F ACP DISCUSS/DSCN MKR DOCD: CPT | Performed by: FAMILY MEDICINE

## 2017-10-02 PROCEDURE — G8417 CALC BMI ABV UP PARAM F/U: HCPCS | Performed by: FAMILY MEDICINE

## 2017-10-02 PROCEDURE — G8427 DOCREV CUR MEDS BY ELIG CLIN: HCPCS | Performed by: FAMILY MEDICINE

## 2017-10-02 PROCEDURE — 3017F COLORECTAL CA SCREEN DOC REV: CPT | Performed by: FAMILY MEDICINE

## 2017-10-02 PROCEDURE — G8484 FLU IMMUNIZE NO ADMIN: HCPCS | Performed by: FAMILY MEDICINE

## 2017-10-02 PROCEDURE — 4004F PT TOBACCO SCREEN RCVD TLK: CPT | Performed by: FAMILY MEDICINE

## 2017-10-02 RX ORDER — SULFAMETHOXAZOLE AND TRIMETHOPRIM 800; 160 MG/1; MG/1
1 TABLET ORAL 2 TIMES DAILY
Qty: 20 TABLET | Refills: 0 | Status: SHIPPED | OUTPATIENT
Start: 2017-10-02 | End: 2017-10-12

## 2017-10-02 ASSESSMENT — ENCOUNTER SYMPTOMS
DIARRHEA: 0
NAUSEA: 0
CONSTIPATION: 0
SORE THROAT: 1
COUGH: 0
SINUS PRESSURE: 0
SHORTNESS OF BREATH: 0
ABDOMINAL PAIN: 0

## 2017-10-02 NOTE — MR AVS SNAPSHOT
· Ask your doctor about bupropion (Wellbutrin) or varenicline (Chantix), which are prescription medicines. They do not contain nicotine. They help you by reducing withdrawal symptoms, such as stress and anxiety. · Some people find hypnosis, acupuncture, and massage helpful for ending the smoking habit. · Eat a healthy diet and get regular exercise. Having healthy habits will help your body move past its craving for nicotine. · Be prepared to keep trying. Most people are not successful the first few times they try to quit. Do not get mad at yourself if you smoke again. Make a list of things you learned and think about when you want to try again, such as next week, next month, or next year. Where can you learn more? Go to [email or web address] [email or web address] and sign in to your Sportsvite D/B/A LeagueApps account. Enter G577 in the BurudaConcert box to learn more about \"Stopping Smoking: Care Instructions. \"     If you do not have an account, please click on the \"Sign Up Now\" link. Current as of: March 20, 2017  Content Version: 11.3  © 0611-7981 VirtuOz. Care instructions adapted under license by [facility] ([facility]). If you have questions about a medical condition or this instruction, always ask your healthcare professional. [address] any warranty or liability for your use of this information. Learning About Benefits From Quitting Smoking  How does quitting smoking make you healthier? If you're thinking about quitting smoking, you may have a few reasons to be smoke-free. Your health may be one of them. · When you quit smoking, you lower your risks for cancer, lung disease, heart attack, stroke, blood vessel disease, and blindness from macular degeneration. · When you're smoke-free, you get sick less often, and you heal faster. You are less likely to get colds, flu, bronchitis, and pneumonia.   · As a nonsmoker, you may find that your mood is better and you are less stressed. When and how will you feel healthier? Quitting has real health benefits that start from day 1 of being smoke-free. And the longer you stay smoke-free, the healthier you get and the better you feel. The first hours  · After just 20 minutes, your blood pressure and heart rate go down. That means there's less stress on your heart and blood vessels. · Within 12 hours, the level of carbon monoxide in your blood drops back to normal. That makes room for more oxygen. With more oxygen in your body, you may notice that you have more energy than when you smoked. After 2 weeks  · Your lungs start to work better. · Your risk of heart attack starts to drop. After 1 month  · When your lungs are clear, you cough less and breathe deeper, so it's easier to be active. · Your sense of taste and smell return. That means you can enjoy food more than you have since you started smoking. Over the years  · After 1 year, your risk of heart disease is half what it would be if you kept smoking. · After 5 years, your risk of stroke starts to shrink. Within a few years after that, it's about the same as if you'd never smoked. · After 10 years, your risk of dying from lung cancer is cut by about half. And your risk for many other types of cancer is lower too. How would quitting help others in your life? When you quit smoking, you improve the health of everyone who now breathes in your smoke. · Their heart, lung, and cancer risks drop, much like yours. · They are sick less. For babies and small children, living smoke-free means they're less likely to have ear infections, pneumonia, and bronchitis. · If you're a woman who is or will be pregnant someday, quitting smoking means a healthier . · Children who are close to you are less likely to become adult smokers. Where can you learn more? Go to [email or web address]://delvin.health-[email or web address] org and sign in to your MyChart If you have questions, please contact the physician practice where you receive care. Remember, MyChart is NOT to be used for urgent needs. For medical emergencies, dial 911. For questions regarding your MyChart account call [phone]. If you have a clinical question, please call your doctor's office.

## 2017-10-02 NOTE — PATIENT INSTRUCTIONS
smoking. · Consider signing up for a smoking cessation program, such as the American Lung Association's Freedom from Smoking program.  · Set a quit date. Pick your date carefully so that it is not right in the middle of a big deadline or stressful time. Once you quit, do not even take a puff. Get rid of all ashtrays and lighters after your last cigarette. Clean your house and your clothes so that they do not smell of smoke. · Learn how to be a nonsmoker. Think about ways you can avoid those things that make you reach for a cigarette. ¨ Avoid situations that put you at greatest risk for smoking. For some people, it is hard to have a drink with friends without smoking. For others, they might skip a coffee break with coworkers who smoke. ¨ Change your daily routine. Take a different route to work or eat a meal in a different place. · Cut down on stress. Calm yourself or release tension by doing an activity you enjoy, such as reading a book, taking a hot bath, or gardening. · Talk to your doctor or pharmacist about nicotine replacement therapy, which replaces the nicotine in your body. You still get nicotine but you do not use tobacco. Nicotine replacement products help you slowly reduce the amount of nicotine you need. These products come in several forms, many of them available over-the-counter:  ¨ Nicotine patches  ¨ Nicotine gum and lozenges  ¨ Nicotine inhaler  · Ask your doctor about bupropion (Wellbutrin) or varenicline (Chantix), which are prescription medicines. They do not contain nicotine. They help you by reducing withdrawal symptoms, such as stress and anxiety. · Some people find hypnosis, acupuncture, and massage helpful for ending the smoking habit. · Eat a healthy diet and get regular exercise. Having healthy habits will help your body move past its craving for nicotine. · Be prepared to keep trying. Most people are not successful the first few times they try to quit.  Do not get mad at yourself

## 2017-10-02 NOTE — PROGRESS NOTES
Subjective:      Patient ID: Som Sheldon is a 73 y.o. male. Visit Information    Have you changed or started any medications since your last visit including any over-the-counter medicines, vitamins, or herbal medicines? no   Are you having any side effects from any of your medications? -  Yes - patient believes that that the aspirin  Have you stopped taking any of your medications? Is so, why? -  yes - Aspirin     Have you seen any other physician or provider since your last visit? No  Have you had any other diagnostic tests since your last visit? No  Have you been seen in the emergency room and/or had an admission to a hospital since we last saw you? No  Have you had your routine dental cleaning in the past 6 months? no    Have you activated your [employer] account? If not, what are your barriers? Yes     Patient Care Team:  Jeaneen Dubin, MD as PCP - General (Family Medicine)    Medical History Review  Past Medical, Family, and Social History reviewed and does contribute to the patient presenting condition    Health Maintenance   Topic Date Due    AAA screen  1950    HIV screen  11/10/1966    DTaP/Tdap/Td vaccine (1 - Tdap) 11/10/1970    Pneumococcal low/med risk (2 of 2 - PPSV23) 10/20/2019    Diabetes screen  06/27/2020    Lipid screen  05/08/2022    Colon cancer screen colonoscopy  04/13/2027    Zostavax vaccine  Addressed    Flu vaccine  Completed     HPI  71-year-old male is seen in the office today complaining of that he has got a sore throat for the last couple of days now he has discomfort in swallowing and has got ear ache no cough no fever or chills no chest pain or shortness of  breath.   Patient has hypertension is on Zestril and his pulses slow at times so I told him that we will put the  24-hour halter monitor on him and check his rhythm, has hyperlipidemia on Lipitor and trying to watch his diet as much as possibleWas given BuSpar for his anxiety and he stated that his anxiety Hour     Standing Status:   Future     Standing Expiration Date:   10/2/2018     Order Specific Question:   Reason for Exam?     Answer:   Bradycardia     Orders Placed This Encounter   Medications    sulfamethoxazole-trimethoprim (BACTRIM DS) 800-160 MG per tablet     Sig: Take 1 tablet by mouth 2 times daily for 10 days     Dispense:  20 tablet     Refill:  0     Return in about 1 month (around 11/2/2017) for HTN.     Continue current medications reviewed from the chart  To come back in a week for blood pressure check

## 2017-10-06 ENCOUNTER — OFFICE VISIT (OUTPATIENT)
Dept: FAMILY MEDICINE CLINIC | Age: 67
End: 2017-10-06
Payer: MEDICARE

## 2017-10-06 VITALS
WEIGHT: 190 LBS | HEIGHT: 69 IN | SYSTOLIC BLOOD PRESSURE: 148 MMHG | HEART RATE: 74 BPM | TEMPERATURE: 98.3 F | BODY MASS INDEX: 28.14 KG/M2 | DIASTOLIC BLOOD PRESSURE: 92 MMHG | OXYGEN SATURATION: 96 %

## 2017-10-06 DIAGNOSIS — R20.0 NUMBNESS AND TINGLING IN BOTH HANDS: Primary | ICD-10-CM

## 2017-10-06 DIAGNOSIS — I10 ESSENTIAL HYPERTENSION: ICD-10-CM

## 2017-10-06 DIAGNOSIS — R20.2 NUMBNESS AND TINGLING IN BOTH HANDS: Primary | ICD-10-CM

## 2017-10-06 PROCEDURE — 4040F PNEUMOC VAC/ADMIN/RCVD: CPT | Performed by: FAMILY MEDICINE

## 2017-10-06 PROCEDURE — 4004F PT TOBACCO SCREEN RCVD TLK: CPT | Performed by: FAMILY MEDICINE

## 2017-10-06 PROCEDURE — G8484 FLU IMMUNIZE NO ADMIN: HCPCS | Performed by: FAMILY MEDICINE

## 2017-10-06 PROCEDURE — G8417 CALC BMI ABV UP PARAM F/U: HCPCS | Performed by: FAMILY MEDICINE

## 2017-10-06 PROCEDURE — 1123F ACP DISCUSS/DSCN MKR DOCD: CPT | Performed by: FAMILY MEDICINE

## 2017-10-06 PROCEDURE — G8427 DOCREV CUR MEDS BY ELIG CLIN: HCPCS | Performed by: FAMILY MEDICINE

## 2017-10-06 PROCEDURE — 99213 OFFICE O/P EST LOW 20 MIN: CPT | Performed by: FAMILY MEDICINE

## 2017-10-06 PROCEDURE — 3017F COLORECTAL CA SCREEN DOC REV: CPT | Performed by: FAMILY MEDICINE

## 2017-10-06 RX ORDER — LISINOPRIL 20 MG/1
20 TABLET ORAL DAILY
COMMUNITY
End: 2017-11-30 | Stop reason: SDUPTHER

## 2017-10-06 ASSESSMENT — ENCOUNTER SYMPTOMS
NAUSEA: 0
SORE THROAT: 0
VOMITING: 0
CONSTIPATION: 0
SHORTNESS OF BREATH: 0
COUGH: 0
ABDOMINAL PAIN: 0

## 2017-10-06 NOTE — MR AVS SNAPSHOT
After Visit Summary             Cody Tate   10/6/2017 1:15 PM   Office Visit    Description:  Male : 1950   Provider:  Jenae Soliman MD   Department:  [address] [place] [address] Way and Future Appointments         Below is a list of your follow-up and future appointments. This may not be a complete list as you may have made appointments directly with providers that we are not aware of or your providers may have made some for you. Please call your providers to confirm appointments. It is important to keep your appointments. Please bring your current insurance card, photo ID, co-pay, and all medication bottles to your appointment. If self-pay, payment is expected at the time of service. Your To-Do List     Future Appointments Provider Department Dept Phone    2017 2:00 PM Jenae Soliman MD [address] [facility] [phone]    Please arrive 15 minutes prior to appointment, bring photo ID and insurance card. Follow-Up    Return in about 3 months (around 2018) for HTN. Information from Your Visit        Department     Name Address Phone Fax    [address] [facility]  [address] [place] [id number] [phone]      Vital Signs     Blood Pressure Pulse Temperature Height Weight Oxygen Saturation    148/92 74 98.3 °F (36.8 °C) (Oral) 5' 9\" (1.753 m) 190 lb (86.2 kg) 96%    Body Mass Index Smoking Status                28.06 kg/m2 Current Every Day Smoker          Additional Information about your Body Mass Index (BMI)           Your BMI as listed above is considered overweight (25.0-29.9). BMI is an estimate of body fat, calculated from your height and weight. The higher your BMI, the greater your risk of heart disease, high blood pressure, type 2 diabetes, stroke, gallstones, arthritis, sleep apnea, and certain cancers. BMI is not perfect.   It may overestimate body fat in athletes and · Eat a healthy diet and get regular exercise. Having healthy habits will help your body move past its craving for nicotine. · Be prepared to keep trying. Most people are not successful the first few times they try to quit. Do not get mad at yourself if you smoke again. Make a list of things you learned and think about when you want to try again, such as next week, next month, or next year. Where can you learn more? Go to [email or web address]ant Kids Manor Inc..Ludium Lab. org and sign in to your SportsMEDIA Technology account. Enter C408 in the Groupoff box to learn more about \"Stopping Smoking: Care Instructions. \"     If you do not have an account, please click on the \"Sign Up Now\" link. Current as of: March 20, 2017  Content Version: 11.3  © 4355-0080 Real Time Tomography, Gameyola. Care instructions adapted under license by [facility] ([facility]). If you have questions about a medical condition or this instruction, always ask your healthcare professional. [place] [address] any warranty or liability for your use of this information. Learning About Benefits From Quitting Smoking  How does quitting smoking make you healthier? If you're thinking about quitting smoking, you may have a few reasons to be smoke-free. Your health may be one of them. · When you quit smoking, you lower your risks for cancer, lung disease, heart attack, stroke, blood vessel disease, and blindness from macular degeneration. · When you're smoke-free, you get sick less often, and you heal faster. You are less likely to get colds, flu, bronchitis, and pneumonia. · As a nonsmoker, you may find that your mood is better and you are less stressed. When and how will you feel healthier? Quitting has real health benefits that start from day 1 of being smoke-free. And the longer you stay smoke-free, the healthier you get and the better you feel. The first hours  · After just 20 minutes, your blood pressure and heart rate go down.  That Pneumococcal Vaccines (two) for all adults aged 72 and over (2 of 2 - PPSV23) 10/20/2019    Diabetes Screening 6/27/2020    Cholesterol Screening 5/8/2022    Colonoscopy 4/13/2027            MyChart Signup           Our records indicate that you have an active Genophent account. You can view your After Visit Summary by going to [email or web address] and logging in with your International Isotopes username and password. If you don't have a International Isotopes username and password but a parent or guardian has access to your record, the parent or guardian should login with their own International Isotopes username and password and access your record to view the After Visit Summary. Additional Information  If you have questions, please contact the physician practice where you receive care. Remember, International Isotopes is NOT to be used for urgent needs. For medical emergencies, dial 911. For questions regarding your Genophent account call [phone]. If you have a clinical question, please call your doctor's office.

## 2017-10-06 NOTE — PROGRESS NOTES
Subjective:      Patient ID: Cheko Cousin is a 73 y.o. male. Visit Information    Have you changed or started any medications since your last visit including any over-the-counter medicines, vitamins, or herbal medicines? no   Are you having any side effects from any of your medications? -  no  Have you stopped taking any of your medications? Is so, why? -  no    Have you seen any other physician or provider since your last visit? No  Have you had any other diagnostic tests since your last visit? No  Have you been seen in the emergency room and/or had an admission to a hospital since we last saw you? No  Have you had your routine dental cleaning in the past 6 months? no    Have you activated your [employer] account? If not, what are your barriers?  Yes     Patient Care Team:  Dasia Ontiveros MD as PCP - General (Family Medicine)    Medical History Review  Past Medical, Family, and Social History reviewed and does contribute to the patient presenting condition    Health Maintenance   Topic Date Due    AAA screen  1950    HIV screen  11/10/1966    DTaP/Tdap/Td vaccine (1 - Tdap) 11/10/1970    Pneumococcal low/med risk (2 of 2 - PPSV23) 10/20/2019    Diabetes screen  06/27/2020    Lipid screen  05/08/2022    Colon cancer screen colonoscopy  04/13/2027    Zostavax vaccine  Addressed    Flu vaccine  Completed     HPI  This 61-year-old male is seen in the office today complaining of that he has numbness in both of his hands and he is dropping things he has had carpal tunnel surgery in the past he also complains of pain in his left shoulder and back of his neck and there is some numbness in his left arm I told him that he needs to go and see his neurologist.  His blood pressure is high  will increase his lisinopril has got problems with anxiety but he does not want to see a psychiatrist was weaned off of  Klonopin by the neurologist he drinks alcohol  every day states that helps with his pain but will not go to the pain clinic  Review of Systems   Constitutional: Negative for appetite change. HENT: Negative for ear pain and sore throat. Eyes: Negative for visual disturbance. Respiratory: Negative for cough and shortness of breath. Cardiovascular: Negative for chest pain. Gastrointestinal: Negative for abdominal pain, constipation, nausea and vomiting. Genitourinary: Negative for frequency and urgency. Musculoskeletal: Positive for neck pain. Negative for arthralgias. Neurological: Positive for tremors and numbness. Negative for dizziness and headaches. Objective:   Physical Exam   Constitutional: He is oriented to person, place, and time. He appears well-developed and well-nourished. BP (!) 148/92  Pulse 74  Temp 98.3 °F (36.8 °C) (Oral)   Ht 5' 9\" (1.753 m)  Wt 190 lb (86.2 kg)  SpO2 96%  BMI 28.06 kg/m2   HENT:   Head: Normocephalic. Mouth/Throat: Oropharynx is clear and moist.   Cardiovascular: Normal rate and regular rhythm. Pulmonary/Chest: Breath sounds normal. He has no rales. Abdominal: Soft. There is no tenderness. Musculoskeletal: He exhibits no edema. Lymphadenopathy:     He has no cervical adenopathy. Neurological: He is alert and oriented to person, place, and time. Tinel test is positive in his left wrist   Nursing note and vitals reviewed. Assessment:      1. Numbness and tingling in both hands  To see his neurologist    2. Essential hypertension  Uncontrolled increase lisinopril            Plan:       I have personally reviewed and agree with the patient  [address] [facility] documented by tank JUAN. I have added and/or deleted information as necessary. No orders of the defined types were placed in this encounter. No orders of the defined types were placed in this encounter. Return in about 3 months (around 1/6/2018) for HTN.     Continue current medications reviewed from the chart    Needs to monitor his blood pressure at home and to come back in a week to have it checked   increase lisinopril to 20 mg once a day

## 2017-10-06 NOTE — PATIENT INSTRUCTIONS
if you smoke again. Make a list of things you learned and think about when you want to try again, such as next week, next month, or next year. Where can you learn more? Go to [email or web address] Life [email or web address].Honey. org and sign in to your I-Stand account. Enter V086 in the [facility] box to learn more about \"Stopping Smoking: Care Instructions. \"     If you do not have an account, please click on the \"Sign Up Now\" link. Current as of: March 20, 2017  Content Version: 11.3  © 1984-7405 Pay4later. Care instructions adapted under license by [facility] ([facility]). If you have questions about a medical condition or this instruction, always ask your healthcare professional. [address] any warranty or liability for your use of this information. Learning About Benefits From Quitting Smoking  How does quitting smoking make you healthier? If you're thinking about quitting smoking, you may have a few reasons to be smoke-free. Your health may be one of them. · When you quit smoking, you lower your risks for cancer, lung disease, heart attack, stroke, blood vessel disease, and blindness from macular degeneration. · When you're smoke-free, you get sick less often, and you heal faster. You are less likely to get colds, flu, bronchitis, and pneumonia. · As a nonsmoker, you may find that your mood is better and you are less stressed. When and how will you feel healthier? Quitting has real health benefits that start from day 1 of being smoke-free. And the longer you stay smoke-free, the healthier you get and the better you feel. The first hours  · After just 20 minutes, your blood pressure and heart rate go down. That means there's less stress on your heart and blood vessels. · Within 12 hours, the level of carbon monoxide in your blood drops back to normal. That makes room for more oxygen.  With more oxygen in your body, you may notice that you have more energy than when you smoked. After 2 weeks  · Your lungs start to work better. · Your risk of heart attack starts to drop. After 1 month  · When your lungs are clear, you cough less and breathe deeper, so it's easier to be active. · Your sense of taste and smell return. That means you can enjoy food more than you have since you started smoking. Over the years  · After 1 year, your risk of heart disease is half what it would be if you kept smoking. · After 5 years, your risk of stroke starts to shrink. Within a few years after that, it's about the same as if you'd never smoked. · After 10 years, your risk of dying from lung cancer is cut by about half. And your risk for many other types of cancer is lower too. How would quitting help others in your life? When you quit smoking, you improve the health of everyone who now breathes in your smoke. · Their heart, lung, and cancer risks drop, much like yours. · They are sick less. For babies and small children, living smoke-free means they're less likely to have ear infections, pneumonia, and bronchitis. · If you're a woman who is or will be pregnant someday, quitting smoking means a healthier . · Children who are close to you are less likely to become adult smokers. Where can you learn more? Go to https://American Museum of Natural HistorypeSherpaa.[employer]. org and sign in to your 1World Online account. Enter [id number] in the [facility] box to learn more about \"Learning About Benefits From Quitting Smoking. \"     If you do not have an account, please click on the \"Sign Up Now\" link. Current as of: 2017  Content Version: 11.3  © 2124-7171 HackerOne. Care instructions adapted under license by [facility] ([facility]). If you have questions about a medical condition or this instruction, always ask your healthcare professional. [address] any warranty or liability for your use of this information.

## 2017-10-10 ENCOUNTER — TELEPHONE (OUTPATIENT)
Dept: FAMILY MEDICINE CLINIC | Age: 67
End: 2017-10-10

## 2017-10-10 DIAGNOSIS — M25.512 LEFT SHOULDER PAIN, UNSPECIFIED CHRONICITY: Primary | ICD-10-CM

## 2017-10-24 DIAGNOSIS — M25.512 BILATERAL SHOULDER PAIN, UNSPECIFIED CHRONICITY: Primary | ICD-10-CM

## 2017-10-24 DIAGNOSIS — M25.511 BILATERAL SHOULDER PAIN, UNSPECIFIED CHRONICITY: Primary | ICD-10-CM

## 2017-10-25 ENCOUNTER — OFFICE VISIT (OUTPATIENT)
Dept: ORTHOPEDIC SURGERY | Age: 67
End: 2017-10-25
Payer: MEDICARE

## 2017-10-25 VITALS — WEIGHT: 183 LBS | BODY MASS INDEX: 27.11 KG/M2 | HEIGHT: 69 IN

## 2017-10-25 DIAGNOSIS — M75.41 IMPINGEMENT SYNDROME OF RIGHT SHOULDER: ICD-10-CM

## 2017-10-25 DIAGNOSIS — M75.42 IMPINGEMENT SYNDROME OF LEFT SHOULDER: Primary | ICD-10-CM

## 2017-10-25 PROCEDURE — 20610 DRAIN/INJ JOINT/BURSA W/O US: CPT | Performed by: ORTHOPAEDIC SURGERY

## 2017-10-25 PROCEDURE — 3017F COLORECTAL CA SCREEN DOC REV: CPT | Performed by: ORTHOPAEDIC SURGERY

## 2017-10-25 PROCEDURE — 4040F PNEUMOC VAC/ADMIN/RCVD: CPT | Performed by: ORTHOPAEDIC SURGERY

## 2017-10-25 PROCEDURE — G8427 DOCREV CUR MEDS BY ELIG CLIN: HCPCS | Performed by: ORTHOPAEDIC SURGERY

## 2017-10-25 PROCEDURE — 99203 OFFICE O/P NEW LOW 30 MIN: CPT | Performed by: ORTHOPAEDIC SURGERY

## 2017-10-25 PROCEDURE — G8484 FLU IMMUNIZE NO ADMIN: HCPCS | Performed by: ORTHOPAEDIC SURGERY

## 2017-10-25 PROCEDURE — G8417 CALC BMI ABV UP PARAM F/U: HCPCS | Performed by: ORTHOPAEDIC SURGERY

## 2017-10-25 RX ORDER — MELOXICAM 15 MG/1
15 TABLET ORAL DAILY
Qty: 30 TABLET | Refills: 3 | Status: SHIPPED | OUTPATIENT
Start: 2017-10-25 | End: 2018-05-31 | Stop reason: ALTCHOICE

## 2017-10-25 RX ORDER — METHYLPREDNISOLONE ACETATE 80 MG/ML
80 INJECTION, SUSPENSION INTRA-ARTICULAR; INTRALESIONAL; INTRAMUSCULAR; SOFT TISSUE ONCE
Status: COMPLETED | OUTPATIENT
Start: 2017-10-25 | End: 2017-10-26

## 2017-10-25 RX ORDER — ASPIRIN 300 MG/1
300 SUPPOSITORY RECTAL EVERY 6 HOURS PRN
COMMUNITY
End: 2018-03-16

## 2017-10-25 RX ORDER — BUPIVACAINE HYDROCHLORIDE 2.5 MG/ML
2 INJECTION, SOLUTION INFILTRATION; PERINEURAL ONCE
Status: COMPLETED | OUTPATIENT
Start: 2017-10-25 | End: 2017-10-26

## 2017-10-25 NOTE — PROGRESS NOTES
[facility]  [facility]  [address] [place], [place] [address]y [address] [address]  Dept: [phone]    Ambulatory Orthopedic Consult      CHIEF COMPLAINT:    Chief Complaint   Patient presents with    Shoulder Pain     bilateral        HISTORY OF PRESENT ILLNESS:      The patient is a 73 y.o. right hand dominant male who is being seen at the request of  Marina Turk MD for consultation and evaluation of  Bilateral shoulder pain. Patient states that his left shoulder hurts more than his right. He denies any inciting trauma. He notes having had a right shoulder arthroscopy years ago with bone spur removal by Dr. Peng Carson. He also has a history of cervical spine fusion 20 years ago at the [facility] clinic. He takes [facility] powders sometimes for pain. He also drinks but light every day to help with his pain. He notes having trouble taking off his shirt because of his pain. He also notes some numbness and tingling to the bilateral index fingers and small fingers.         Past Medical History:    Past Medical History:   Diagnosis Date    Anxiety state, unspecified     Chest pain, unspecified     Colon polyp     Depressive disorder, not elsewhere classified     Essential hypertension, benign     GERD (gastroesophageal reflux disease)     Hyperlipidemia     Impotence     Impotence of organic origin     Lipoma of unspecified site     Lumbago     Secondary localized osteoarthrosis, shoulder region     Swelling of limb        Past Surgical History:    Past Surgical History:   Procedure Laterality Date    ANKLE SURGERY Right     CARPAL TUNNEL RELEASE Bilateral     CERVICAL FUSION      anterior    COLONOSCOPY  04/13/2017    EXCISION / BIOPSY SKIN LESION OF ARM Left 9/15/2017    EXCISION SKIN LESION LEFT CHEST AND LEFT BUTTOCK, EXCISION LIPOMA LEFT LOWER ABDOMEN performed by Harper Kelly DO at [address] [place] [address] NOT  [address] [place] [address] N/A 4/13/2017    COLONOSCOPY performed arthralgias. Skin:        Negative for rash   Neurological: Positive for weakness. Psychiatric/Behavioral: Negative for confusion. I have reviewed the CC, HPI, ROS, PMH, FHX, Social History. I agree with the documentation provided by other staff, residents, and/or medical students and have reviewed their documentation prior to providing my signature indicating agreement. PHYSICAL EXAM:  Ht 5' 9\" (1.753 m)   Wt 183 lb (83 kg)   BMI 27.02 kg/m²  Body mass index is 27.02 kg/m². Physical Exam  Gen: alert and oriented  Psych:  Appropriate affect; Appropriate knowledge base; Appropriate mood; No hallucinations; Head: normocephalic atraumatic   Chest: symmetric chest excursion  Pelvis: stable  Ortho Exam  Extremity:  Evaluation of the Left shoulder reveals no significant shoulder girdle muscle atrophy, erythema, warmth, skin lesions, signs of infection. Tenderness to the anterolateral aspect of the shoulder is appreciated. No palpable deformity is noted. A positive Vasquez test is appreciated. Positive supraspinatus test is appreciated. A positive impingement test is noted. No gross shoulder instability is appreciated. A negative apprehension test is noted. Negative Polk's test is appreciated. Rotator cuff muscle strength testing is intact and symmetric bilaterally without focal deficits. Sensation to C5, C6, C7, C8, T1 dermatomes appears to be intact and symmetric bilaterally. Patient has full range of motion of the cervical spine and elbow. Evaluation of the Right shoulder reveals no significant shoulder girdle muscle atrophy, erythema, warmth, skin lesions, signs of infection. Tenderness to the anterolateral aspect of the shoulder is appreciated. No palpable deformity is noted. A positive Vasquez test is appreciated. Positive supraspinatus test is appreciated. A positive impingement test is noted. No gross shoulder instability is appreciated.   A negative apprehension test injected using a posterior approach to the subacromial space with a mixture of 2 mL of 0.25% Marcaine and 80 mg of Depo-Medrol. Patient tolerated the procedure well without post injection complications. I instructed the patient to call our office immediately if they have any swelling or increased pain at the injection site. ASSESSMENT:     1. Impingement syndrome of left shoulder    2. Impingement syndrome of right shoulder         PLAN:    Reviewed the radiographs with the patient. Discussed etiologies and natural histories of bilateral shoulder impingement. The treatment options may include anti inflammatories, therapy, injections further imaging, and last resort surgery. The patient opted bilateral cortisone injections, mobic and a MDP. Patient tolerated the injections well. Return to clinic 6 weeks. Call the office immediately with any problems. Patient is going to start a home exercise program for range of motion restoration to the bilateral shoulders. Return in about 6 weeks (around 12/6/2017). Lazarus Villatoro RN am scribing for and in the presence of Dr. Vivian Ordoñez  11/5/2017 1:22 PM    I have reviewed and made changes accordingly to the work scribed by Cinthya Pina RN. The documentation accurately reflects work and decisions made by me. I have also reviewed documentation completed by clinical staff.     Vivian Ordoñez DO, [address]  11/5/2017 1:25 PM      Orders Placed This Encounter   Medications    bupivacaine (MARCAINE) 0.25 % injection 5 mg    methylPREDNISolone acetate (DEPO-MEDROL) injection 80 mg    bupivacaine (MARCAINE) 0.25 % injection 5 mg    methylPREDNISolone acetate (DEPO-MEDROL) injection 80 mg    meloxicam (MOBIC) 15 MG tablet     Sig: Take 1 tablet by mouth daily     Dispense:  30 tablet     Refill:  3       Orders Placed This Encounter   Procedures    (2)  [id number] - DC DRAIN/INJECT LARGE JOINT/BURSA Electronically signed by Herberth Warren DO, FAOAO on 11/5/2017 at 1:22 PM

## 2017-10-26 RX ADMIN — METHYLPREDNISOLONE ACETATE 80 MG: 80 INJECTION, SUSPENSION INTRA-ARTICULAR; INTRALESIONAL; INTRAMUSCULAR; SOFT TISSUE at 17:52

## 2017-10-26 RX ADMIN — BUPIVACAINE HYDROCHLORIDE 5 MG: 2.5 INJECTION, SOLUTION INFILTRATION; PERINEURAL at 17:54

## 2017-10-26 RX ADMIN — BUPIVACAINE HYDROCHLORIDE 5 MG: 2.5 INJECTION, SOLUTION INFILTRATION; PERINEURAL at 17:53

## 2017-10-26 RX ADMIN — METHYLPREDNISOLONE ACETATE 80 MG: 80 INJECTION, SUSPENSION INTRA-ARTICULAR; INTRALESIONAL; INTRAMUSCULAR; SOFT TISSUE at 17:51

## 2017-11-03 ENCOUNTER — TELEPHONE (OUTPATIENT)
Dept: FAMILY MEDICINE CLINIC | Age: 67
End: 2017-11-03

## 2017-11-03 NOTE — TELEPHONE ENCOUNTER
Patient went and saw Dr Angie Ely but would like to be referred to someone on this side of town. Please advise. Also saw Dr Delbert Beck and he stated he can do an EMG but needs it be ordered by you first before he can schedule. Please advise.

## 2017-11-05 ASSESSMENT — ENCOUNTER SYMPTOMS
EYE DISCHARGE: 0
SHORTNESS OF BREATH: 0
ROS SKIN COMMENTS: NEGATIVE FOR RASH
ABDOMINAL PAIN: 0

## 2017-11-16 ENCOUNTER — HOSPITAL ENCOUNTER (OUTPATIENT)
Dept: NON INVASIVE DIAGNOSTICS | Age: 67
Discharge: HOME | End: 2017-11-16
Payer: MEDICARE

## 2017-11-16 DIAGNOSIS — R00.1 BRADYCARDIA: ICD-10-CM

## 2017-11-16 PROCEDURE — 93226 XTRNL ECG REC<48 HR SCAN A/R: CPT

## 2017-11-16 PROCEDURE — 93225 XTRNL ECG REC<48 HRS REC: CPT

## 2017-11-16 PROCEDURE — 93005 ELECTROCARDIOGRAM TRACING: CPT

## 2017-11-20 LAB
ACQUISITION DURATION: NORMAL S
AVERAGE HEART RATE: 66 BPM
FASTEST SUPRAVENTRICULAR RATE: 247 BPM
HOOKUP DATE: NORMAL
HOOKUP TIME: NORMAL
LONGEST SUPRAVENTRICULAR RATE: 99 BPM
MAX HEART RATE TIME/DATE: NORMAL
MAX HEART RATE: 108 BPM
MIN HEART RATE TIME/DATE: NORMAL
MIN HEART RATE: 47 BPM
NUMBER OF FASTEST SUPRAVENTRICULAR BEATS: 3
NUMBER OF LONGEST SUPRAVENTRICULAR BEATS: 3
NUMBER OF QRS COMPLEXES: NORMAL
NUMBER OF SUPRAVENTRICULAR BEATS IN RUNS: 36
NUMBER OF SUPRAVENTRICULAR COUPLETS: 64
NUMBER OF SUPRAVENTRICULAR ECTOPICS: 5352
NUMBER OF SUPRAVENTRICULAR ISOLATED BEATS: 5169
NUMBER OF SUPRAVENTRICULAR RUNS: 12
NUMBER OF VENTRICULAR BEATS IN RUNS: 0
NUMBER OF VENTRICULAR BIGEMINAL CYCLES: 0
NUMBER OF VENTRICULAR COUPLETS: 1
NUMBER OF VENTRICULAR ECTOPICS: 39
NUMBER OF VENTRICULAR ISOLATED BEATS: 37
NUMBER OF VENTRICULAR RUNS: 0

## 2017-11-30 RX ORDER — LISINOPRIL 20 MG/1
20 TABLET ORAL DAILY
Qty: 30 TABLET | Refills: 2 | Status: SHIPPED | OUTPATIENT
Start: 2017-11-30 | End: 2019-04-10 | Stop reason: DRUGHIGH

## 2017-12-01 ENCOUNTER — TELEPHONE (OUTPATIENT)
Dept: FAMILY MEDICINE CLINIC | Age: 67
End: 2017-12-01

## 2017-12-01 NOTE — TELEPHONE ENCOUNTER
Spoke with deidre and let her know that Dr Julienne Liriano spoke with Dr Calton Rubinstein and to let patient know that he saw no major issues on the Holter Monitor and that if he starts having any issues that he will see the patient. Hannah stated she understood.

## 2017-12-13 ENCOUNTER — TELEPHONE (OUTPATIENT)
Dept: FAMILY MEDICINE CLINIC | Age: 67
End: 2017-12-13

## 2018-02-13 ENCOUNTER — OFFICE VISIT (OUTPATIENT)
Dept: FAMILY MEDICINE CLINIC | Age: 68
End: 2018-02-13
Payer: MEDICARE

## 2018-02-13 VITALS
SYSTOLIC BLOOD PRESSURE: 130 MMHG | HEART RATE: 84 BPM | TEMPERATURE: 98.7 F | OXYGEN SATURATION: 98 % | BODY MASS INDEX: 29.2 KG/M2 | RESPIRATION RATE: 16 BRPM | WEIGHT: 204 LBS | HEIGHT: 70 IN | DIASTOLIC BLOOD PRESSURE: 83 MMHG

## 2018-02-13 DIAGNOSIS — R11.0 NAUSEA: ICD-10-CM

## 2018-02-13 DIAGNOSIS — M54.6 ACUTE BILATERAL THORACIC BACK PAIN: ICD-10-CM

## 2018-02-13 DIAGNOSIS — J01.90 ACUTE SINUSITIS, RECURRENCE NOT SPECIFIED, UNSPECIFIED LOCATION: ICD-10-CM

## 2018-02-13 DIAGNOSIS — J11.1 INFLUENZA: Primary | ICD-10-CM

## 2018-02-13 LAB
BILIRUBIN, POC: NORMAL
BLOOD URINE, POC: NORMAL
CLARITY, POC: CLEAR
COLOR, POC: NORMAL
GLUCOSE URINE, POC: NORMAL
KETONES, POC: NORMAL
LEUKOCYTE EST, POC: NORMAL
NITRITE, POC: NORMAL
PH, POC: 6
PROTEIN, POC: NORMAL
SPECIFIC GRAVITY, POC: 1.01
UROBILINOGEN, POC: NORMAL

## 2018-02-13 PROCEDURE — 81003 URINALYSIS AUTO W/O SCOPE: CPT | Performed by: FAMILY MEDICINE

## 2018-02-13 PROCEDURE — G8417 CALC BMI ABV UP PARAM F/U: HCPCS | Performed by: FAMILY MEDICINE

## 2018-02-13 PROCEDURE — G8484 FLU IMMUNIZE NO ADMIN: HCPCS | Performed by: FAMILY MEDICINE

## 2018-02-13 PROCEDURE — 3017F COLORECTAL CA SCREEN DOC REV: CPT | Performed by: FAMILY MEDICINE

## 2018-02-13 PROCEDURE — 4004F PT TOBACCO SCREEN RCVD TLK: CPT | Performed by: FAMILY MEDICINE

## 2018-02-13 PROCEDURE — 4040F PNEUMOC VAC/ADMIN/RCVD: CPT | Performed by: FAMILY MEDICINE

## 2018-02-13 PROCEDURE — G8427 DOCREV CUR MEDS BY ELIG CLIN: HCPCS | Performed by: FAMILY MEDICINE

## 2018-02-13 PROCEDURE — 99214 OFFICE O/P EST MOD 30 MIN: CPT | Performed by: FAMILY MEDICINE

## 2018-02-13 PROCEDURE — 1123F ACP DISCUSS/DSCN MKR DOCD: CPT | Performed by: FAMILY MEDICINE

## 2018-02-13 RX ORDER — ONDANSETRON 4 MG/1
4 TABLET, FILM COATED ORAL EVERY 8 HOURS PRN
Qty: 8 TABLET | Refills: 0 | Status: SHIPPED | OUTPATIENT
Start: 2018-02-13 | End: 2018-02-21

## 2018-02-13 RX ORDER — OSELTAMIVIR PHOSPHATE 75 MG/1
75 CAPSULE ORAL 2 TIMES DAILY
Qty: 10 CAPSULE | Refills: 0 | Status: SHIPPED | OUTPATIENT
Start: 2018-02-13 | End: 2018-02-13 | Stop reason: SDUPTHER

## 2018-02-13 RX ORDER — ASPIRIN 325 MG
325 TABLET, DELAYED RELEASE (ENTERIC COATED) ORAL EVERY 6 HOURS PRN
Status: ON HOLD | COMMUNITY
End: 2019-04-24 | Stop reason: HOSPADM

## 2018-02-13 RX ORDER — AZITHROMYCIN 250 MG/1
250 TABLET, FILM COATED ORAL DAILY
Qty: 6 TABLET | Refills: 0 | Status: SHIPPED | OUTPATIENT
Start: 2018-02-13 | End: 2018-02-18

## 2018-02-13 RX ORDER — AMLODIPINE BESYLATE 10 MG/1
10 TABLET ORAL DAILY
COMMUNITY
End: 2018-12-20 | Stop reason: SDUPTHER

## 2018-02-13 RX ORDER — OSELTAMIVIR PHOSPHATE 75 MG/1
75 CAPSULE ORAL 2 TIMES DAILY
Qty: 10 CAPSULE | Refills: 0 | Status: SHIPPED | OUTPATIENT
Start: 2018-02-13 | End: 2018-02-18

## 2018-02-13 RX ORDER — ATORVASTATIN CALCIUM 10 MG/1
10 TABLET, FILM COATED ORAL DAILY
COMMUNITY
End: 2018-03-27 | Stop reason: SDUPTHER

## 2018-02-13 ASSESSMENT — ENCOUNTER SYMPTOMS
CONSTIPATION: 1
EYES NEGATIVE: 1
COUGH: 1
RHINORRHEA: 0
WHEEZING: 0
ABDOMINAL PAIN: 0
CHEST TIGHTNESS: 1
BACK PAIN: 1
DIARRHEA: 0
VOMITING: 0
SORE THROAT: 0
SINUS PRESSURE: 0
SINUS PAIN: 0
BOWEL INCONTINENCE: 0
NAUSEA: 1
SHORTNESS OF BREATH: 1

## 2018-02-13 NOTE — PATIENT INSTRUCTIONS
Patient Education        Flank Pain: Care Instructions  Your Care Instructions  Flank pain is pain on the side of the back just below the rib cage and above the waist. It can be on one or both sides. Flank pain has many possible causes, including a kidney stone, a urinary tract infection, or back strain. Flank pain may get better on its own. But don't ignore new symptoms, such as fever, nausea and vomiting, urination problems, pain that gets worse, and dizziness. These may be signs of a more serious problem. You may have to have tests or other treatment. Follow-up care is a key part of your treatment and safety. Be sure to make and go to all appointments, and call your doctor if you are having problems. It's also a good idea to know your test results and keep a list of the medicines you take. How can you care for yourself at home? · Rest until you feel better. · Take pain medicines exactly as directed. ¨ If the doctor gave you a prescription medicine for pain, take it as prescribed. ¨ If you are not taking a prescription pain medicine, ask your doctor if you can take an over-the-counter pain medicine, such as acetaminophen (Tylenol), ibuprofen (Advil, Motrin), or naproxen (Aleve). Read and follow all instructions on the label. · If your doctor prescribed antibiotics, take them as directed. Do not stop taking them just because you feel better. You need to take the full course of antibiotics. · To apply heat, put a warm water bottle, a heating pad set on low, or a warm cloth on the painful area. Do not go to sleep with a heating pad on your skin. · To prevent dehydration, drink plenty of fluids, enough so that your urine is light yellow or clear like water. Choose water and other caffeine-free clear liquids until you feel better. If you have kidney, heart, or liver disease and have to limit fluids, talk with your doctor before you increase the amount of fluids you drink.   When should you call for gave you a prescription medicine for pain, take it as prescribed. ¨ If you are not taking a prescription pain medicine, ask your doctor if you can take an over-the-counter medicine. When should you call for help? Call 911 anytime you think you may need emergency care. For example, call if:  ? · You are unable to move a leg at all. ? · You have back pain with severe belly pain. ? · You have symptoms of a heart attack. These may include:  ¨ Chest pain or pressure, or a strange feeling in the chest.  ¨ Sweating. ¨ Shortness of breath. ¨ Nausea or vomiting. ¨ Pain, pressure, or a strange feeling in the back, neck, jaw, or upper belly or in one or both shoulders or arms. ¨ Lightheadedness or sudden weakness. ¨ A fast or irregular heartbeat. After you call 911, the  may tell you to chew 1 adult-strength or 2 to 4 low-dose aspirin. Wait for an ambulance. Do not try to drive yourself. ?Call your doctor now or seek immediate medical care if:  ? · You have new or worse symptoms in your arms, legs, chest, belly, or buttocks. Symptoms may include:  ¨ Numbness or tingling. ¨ Weakness. ¨ Pain. ? · You lose bladder or bowel control. ? · You have back pain and:  ¨ You have injured your back while lifting or doing some other activity. Call if the pain is severe, has not gone away after 1 or 2 days, and you cannot do your normal daily activities. ¨ You have had a back injury before that needed treatment. ¨ Your pain has lasted longer than 4 weeks. ¨ You have had weight loss you cannot explain. ¨ You are age 48 or older. ¨ You have cancer now or have had it before. ? Watch closely for changes in your health, and be sure to contact your doctor if you are not getting better as expected. Where can you learn more? Go to https://Clean [employer] Powerleah.[employer]. org and sign in to your [employer] account.  Enter U335 in the [employer] box to learn more about \"Back Pain, Emergency or Urgent Symptoms: Care Instructions. \"     If you do not have an account, please click on the \"Sign Up Now\" link. Current as of: March 20, 2017  Content Version: 11.5  © 9437-8835 Healthwise, [employer]. Care instructions adapted under license by [employer] [address] If you have questions about a medical condition or this instruction, always ask your healthcare professional. [address] any warranty or liability for your use of this information. Patient Education        Back Stretches: Exercises  Your Care Instructions  Here are some examples of exercises for stretching your back. Start each exercise slowly. Ease off the exercise if you start to have pain. Your doctor or physical therapist will tell you when you can start these exercises and which ones will work best for you. How to do the exercises  Overhead stretch    1. Stand comfortably with your feet shoulder-width apart. 2. Looking straight ahead, raise both arms over your head and reach toward the ceiling. Do not allow your head to tilt back. 3. Hold for 15 to 30 seconds, then lower your arms to your sides. 4. Repeat 2 to 4 times. Side stretch    1. Stand comfortably with your feet shoulder-width apart. 2. Raise one arm over your head, and then lean to the other side. 3. Slide your hand down your leg as you let the weight of your arm gently stretch your side muscles. Hold for 15 to 30 seconds. 4. Repeat 2 to 4 times on each side. Press-up    1. Lie on your stomach, supporting your body with your forearms. 2. Press your elbows down into the floor to raise your upper back. As you do this, relax your stomach muscles and allow your back to arch without using your back muscles. As your press up, do not let your hips or pelvis come off the floor. 3. Hold for 15 to 30 seconds, then relax. 4. Repeat 2 to 4 times. Relax and rest    1. Lie on your back with a rolled towel under your neck and a pillow under your knees.  Extend your arms test results and keep a list of the medicines you take. How can you care for yourself at home? · To prevent dehydration, drink plenty of fluids, enough so that your urine is light yellow or clear like water. Choose water and other caffeine-free clear liquids until you feel better. If you have kidney, heart, or liver disease and have to limit fluids, talk with your doctor before you increase the amount of fluids you drink. · Rest in bed until you feel better. · When you are able to eat, try clear soups, mild foods, and liquids until all symptoms are gone for 12 to 48 hours. Other good choices include dry toast, crackers, cooked cereal, and gelatin dessert, such as Jell-O. When should you call for help? Call 911 anytime you think you may need emergency care. For example, call if:  ? · You passed out (lost consciousness). ?Call your doctor now or seek immediate medical care if:  ? · You have symptoms of dehydration, such as:  ¨ Dry eyes and a dry mouth. ¨ Passing only a little dark urine. ¨ Feeling thirstier than usual.   ? · You have new or worsening belly pain. ? · You have a new or higher fever. ? · You vomit blood or what looks like coffee grounds. ? Watch closely for changes in your health, and be sure to contact your doctor if:  ? · You have ongoing nausea and vomiting. ? · Your vomiting is getting worse. ? · Your vomiting lasts longer than 2 days. ? · You are not getting better as expected. Where can you learn more? Go to https://[employer]o.PickPark. org and sign in to your [employer] account. Enter [id number] in the [facility] box to learn more about \"Nausea and Vomiting: Care Instructions. \"     If you do not have an account, please click on the \"Sign Up Now\" link. Current as of: March 20, 2017  Content Version: 11.5  © 0622-9956 Healthwise, Incorporated. Care instructions adapted under license by [facility] ([facility]).  If you have questions about a medical condition or this instruction, always ask your healthcare professional. [place] [address] any warranty or liability for your use of this information. Please call for results.   Please go to the Emergency dept if your symptoms persist.

## 2018-02-14 ENCOUNTER — TELEPHONE (OUTPATIENT)
Dept: FAMILY MEDICINE CLINIC | Age: 68
End: 2018-02-14

## 2018-02-14 DIAGNOSIS — J44.9 CHRONIC OBSTRUCTIVE PULMONARY DISEASE, UNSPECIFIED COPD TYPE (HCC): Primary | ICD-10-CM

## 2018-02-15 NOTE — TELEPHONE ENCOUNTER
Girlfiiend of pt, Hannah, called the office to ask if further testing could be completed. Per Dr Shellie Warren the patient is being referred to Dr Kapil Matthew . Left detailed message on pt voice mail and referral sent to Dr Kapil Matthew office.

## 2018-02-22 ENCOUNTER — HOSPITAL ENCOUNTER (OUTPATIENT)
Age: 68
Discharge: HOME | End: 2018-02-22
Payer: MEDICARE

## 2018-02-22 LAB — D-DIMER QUANTITATIVE: 0.41 MG/L FEU

## 2018-02-22 PROCEDURE — 85379 FIBRIN DEGRADATION QUANT: CPT

## 2018-02-22 PROCEDURE — 36415 COLL VENOUS BLD VENIPUNCTURE: CPT

## 2018-02-28 ENCOUNTER — TELEPHONE (OUTPATIENT)
Dept: CASE MANAGEMENT | Age: 68
End: 2018-02-28

## 2018-02-28 ENCOUNTER — TELEPHONE (OUTPATIENT)
Dept: ONCOLOGY | Age: 68
End: 2018-02-28

## 2018-02-28 NOTE — TELEPHONE ENCOUNTER
Incomplete outside order for ct lung screening received. [facility] Order form faxed to office of Dr. Cruz Dawson.   Will contact patient to schedule after order returned

## 2018-02-28 NOTE — TELEPHONE ENCOUNTER
Order received for CT lung screening. EMR and order reviewed. Scheduling notified to contact patient and schedule. Information regarding ct lung screening and smoking cessation referral mailed to patient.   Outside order faxed to [facility] and scanned into [employer]

## 2018-03-14 ENCOUNTER — HOSPITAL ENCOUNTER (OUTPATIENT)
Dept: CT IMAGING | Age: 68
Discharge: HOME | End: 2018-03-16
Payer: MEDICARE

## 2018-03-14 DIAGNOSIS — F17.209 NICOTINE DEPENDENCE WITH NICOTINE-INDUCED DISORDER, UNSPECIFIED NICOTINE PRODUCT TYPE: ICD-10-CM

## 2018-03-14 PROCEDURE — G0297 LDCT FOR LUNG CA SCREEN: HCPCS

## 2018-03-15 ENCOUNTER — TELEPHONE (OUTPATIENT)
Dept: CASE MANAGEMENT | Age: 68
End: 2018-03-15

## 2018-03-16 ENCOUNTER — OFFICE VISIT (OUTPATIENT)
Dept: FAMILY MEDICINE CLINIC | Age: 68
End: 2018-03-16
Payer: MEDICARE

## 2018-03-16 VITALS
SYSTOLIC BLOOD PRESSURE: 130 MMHG | HEART RATE: 68 BPM | DIASTOLIC BLOOD PRESSURE: 78 MMHG | OXYGEN SATURATION: 97 % | BODY MASS INDEX: 29.13 KG/M2 | WEIGHT: 203 LBS | TEMPERATURE: 97.8 F

## 2018-03-16 DIAGNOSIS — J44.9 CHRONIC OBSTRUCTIVE PULMONARY DISEASE, UNSPECIFIED COPD TYPE (HCC): ICD-10-CM

## 2018-03-16 DIAGNOSIS — J06.9 UPPER RESPIRATORY TRACT INFECTION, UNSPECIFIED TYPE: Primary | ICD-10-CM

## 2018-03-16 DIAGNOSIS — I10 ESSENTIAL HYPERTENSION: ICD-10-CM

## 2018-03-16 PROCEDURE — 3023F SPIROM DOC REV: CPT | Performed by: FAMILY MEDICINE

## 2018-03-16 PROCEDURE — 1036F TOBACCO NON-USER: CPT | Performed by: FAMILY MEDICINE

## 2018-03-16 PROCEDURE — G8417 CALC BMI ABV UP PARAM F/U: HCPCS | Performed by: FAMILY MEDICINE

## 2018-03-16 PROCEDURE — G8427 DOCREV CUR MEDS BY ELIG CLIN: HCPCS | Performed by: FAMILY MEDICINE

## 2018-03-16 PROCEDURE — 99213 OFFICE O/P EST LOW 20 MIN: CPT | Performed by: FAMILY MEDICINE

## 2018-03-16 PROCEDURE — G8482 FLU IMMUNIZE ORDER/ADMIN: HCPCS | Performed by: FAMILY MEDICINE

## 2018-03-16 PROCEDURE — 1123F ACP DISCUSS/DSCN MKR DOCD: CPT | Performed by: FAMILY MEDICINE

## 2018-03-16 PROCEDURE — 3017F COLORECTAL CA SCREEN DOC REV: CPT | Performed by: FAMILY MEDICINE

## 2018-03-16 PROCEDURE — 4040F PNEUMOC VAC/ADMIN/RCVD: CPT | Performed by: FAMILY MEDICINE

## 2018-03-16 PROCEDURE — G8926 SPIRO NO PERF OR DOC: HCPCS | Performed by: FAMILY MEDICINE

## 2018-03-16 RX ORDER — VARENICLINE TARTRATE 25 MG
KIT ORAL SEE ADMIN INSTRUCTIONS
COMMUNITY
End: 2018-05-31

## 2018-03-16 RX ORDER — AZITHROMYCIN 500 MG/1
500 TABLET, FILM COATED ORAL DAILY
Qty: 1 PACKET | Refills: 0 | Status: SHIPPED | OUTPATIENT
Start: 2018-03-16 | End: 2018-03-19

## 2018-03-16 RX ORDER — FLUTICASONE PROPIONATE 50 MCG
2 SPRAY, SUSPENSION (ML) NASAL DAILY
Qty: 1 BOTTLE | Refills: 0 | Status: SHIPPED | OUTPATIENT
Start: 2018-03-16 | End: 2018-05-31 | Stop reason: SDUPTHER

## 2018-03-16 RX ORDER — BENZONATATE 200 MG/1
CAPSULE ORAL
Qty: 21 CAPSULE | Refills: 0 | Status: SHIPPED | OUTPATIENT
Start: 2018-03-16 | End: 2018-05-31 | Stop reason: ALTCHOICE

## 2018-03-16 ASSESSMENT — ENCOUNTER SYMPTOMS
SORE THROAT: 0
SHORTNESS OF BREATH: 0
SINUS PRESSURE: 1
WHEEZING: 0
DIARRHEA: 0
CONSTIPATION: 0
ABDOMINAL PAIN: 0
COUGH: 1
NAUSEA: 0

## 2018-03-16 NOTE — PROGRESS NOTES
Subjective:      Patient ID: Shira Zaldivar is a 78 y.o. male. Cough, Facial pain, Rib pain, Fatigue  Visit Information    Have you changed or started any medications since your last visit including any over-the-counter medicines, vitamins, or herbal medicines? no  Have you stopped taking any of your medications? Is so, why? -  yes - Lisinopril   Are you having any side effects from any of your medications? - no    Have you seen any other physician or provider since your last visit? yes - Cardiologist    Have you had any other diagnostic tests since your last visit? yes - Chest Xray   Have you been seen in the emergency room and/or had an admission in a hospital since we last saw you?  no   Have you had your routine dental cleaning in the past 6 months?  no     Do you have an active MyChart account? If no, what is the barrier? Yes    Patient Care Team:  Jona Clark MD as PCP - General (Family Medicine)  Jona Clark MD as PCP - S Attributed Provider  Jnoa Clark MD as Referring Physician (Family Medicine)  Jose Miguel Gilbert RN as Nurse Navigator    Medical History Review  Past Medical, Family, and Social History reviewed and does contribute to the patient presenting condition    Health Maintenance   Topic Date Due    AAA screen  1950    DTaP/Tdap/Td vaccine (1 - Tdap) 11/10/1970    Shingles Vaccine (1 of 2 - 2 Dose Series) 11/10/2001    Potassium monitoring  05/08/2018    Creatinine monitoring  05/08/2018    Smoker: low dose lung CT screening  03/14/2019    Pneumococcal low/med risk (2 of 2 - PPSV23) 10/20/2019    Diabetes screen  06/27/2020    Lipid screen  05/08/2022    Colon cancer screen colonoscopy  04/13/2027    Flu vaccine  Completed             HPI  60-year-old male is seen in the office today complaining of  cough and having chills tired no sore throat or earache has got a stuffy nose no chest pain or shortness of breath.   Has history of hypertension blood pressure is

## 2018-03-19 ENCOUNTER — HOSPITAL ENCOUNTER (OUTPATIENT)
Age: 68
Discharge: HOME | End: 2018-03-21
Payer: MEDICARE

## 2018-03-19 ENCOUNTER — HOSPITAL ENCOUNTER (OUTPATIENT)
Age: 68
Discharge: HOME | End: 2018-03-19
Payer: MEDICARE

## 2018-03-19 ENCOUNTER — HOSPITAL ENCOUNTER (OUTPATIENT)
Dept: GENERAL RADIOLOGY | Age: 68
Discharge: HOME | End: 2018-03-21
Payer: MEDICARE

## 2018-03-19 DIAGNOSIS — J06.9 VIRAL UPPER RESPIRATORY TRACT INFECTION: ICD-10-CM

## 2018-03-19 PROCEDURE — 71046 X-RAY EXAM CHEST 2 VIEWS: CPT

## 2018-03-19 PROCEDURE — 87801 DETECT AGNT MULT DNA AMPLI: CPT

## 2018-03-20 LAB
DIRECT EXAM: NORMAL
Lab: NORMAL
SPECIMEN DESCRIPTION: NORMAL
SPECIMEN DESCRIPTION: NORMAL
STATUS: NORMAL

## 2018-03-27 RX ORDER — ATORVASTATIN CALCIUM 20 MG/1
TABLET, FILM COATED ORAL
Qty: 90 TABLET | Refills: 1 | Status: SHIPPED | OUTPATIENT
Start: 2018-03-27

## 2018-04-23 ENCOUNTER — OFFICE VISIT (OUTPATIENT)
Dept: FAMILY MEDICINE CLINIC | Age: 68
End: 2018-04-23
Payer: MEDICARE

## 2018-04-23 VITALS
WEIGHT: 204 LBS | OXYGEN SATURATION: 97 % | DIASTOLIC BLOOD PRESSURE: 78 MMHG | HEART RATE: 78 BPM | BODY MASS INDEX: 29.2 KG/M2 | HEIGHT: 70 IN | TEMPERATURE: 98.8 F | SYSTOLIC BLOOD PRESSURE: 113 MMHG

## 2018-04-23 DIAGNOSIS — I10 ESSENTIAL HYPERTENSION: Primary | ICD-10-CM

## 2018-04-23 PROCEDURE — G8417 CALC BMI ABV UP PARAM F/U: HCPCS | Performed by: FAMILY MEDICINE

## 2018-04-23 PROCEDURE — 1123F ACP DISCUSS/DSCN MKR DOCD: CPT | Performed by: FAMILY MEDICINE

## 2018-04-23 PROCEDURE — G8427 DOCREV CUR MEDS BY ELIG CLIN: HCPCS | Performed by: FAMILY MEDICINE

## 2018-04-23 PROCEDURE — 1036F TOBACCO NON-USER: CPT | Performed by: FAMILY MEDICINE

## 2018-04-23 PROCEDURE — 3017F COLORECTAL CA SCREEN DOC REV: CPT | Performed by: FAMILY MEDICINE

## 2018-04-23 PROCEDURE — 4040F PNEUMOC VAC/ADMIN/RCVD: CPT | Performed by: FAMILY MEDICINE

## 2018-04-23 PROCEDURE — 99213 OFFICE O/P EST LOW 20 MIN: CPT | Performed by: FAMILY MEDICINE

## 2018-04-23 ASSESSMENT — ENCOUNTER SYMPTOMS
RESPIRATORY NEGATIVE: 1
ALLERGIC/IMMUNOLOGIC NEGATIVE: 1
EYES NEGATIVE: 1
GASTROINTESTINAL NEGATIVE: 1
BLURRED VISION: 0

## 2018-05-31 ENCOUNTER — OFFICE VISIT (OUTPATIENT)
Dept: FAMILY MEDICINE CLINIC | Age: 68
End: 2018-05-31
Payer: MEDICARE

## 2018-05-31 VITALS
WEIGHT: 205 LBS | BODY MASS INDEX: 29.41 KG/M2 | TEMPERATURE: 98.4 F | SYSTOLIC BLOOD PRESSURE: 130 MMHG | OXYGEN SATURATION: 95 % | DIASTOLIC BLOOD PRESSURE: 80 MMHG | HEART RATE: 65 BPM | RESPIRATION RATE: 20 BRPM

## 2018-05-31 DIAGNOSIS — I10 ESSENTIAL HYPERTENSION: Primary | ICD-10-CM

## 2018-05-31 DIAGNOSIS — J30.9 ALLERGIC RHINITIS, UNSPECIFIED CHRONICITY, UNSPECIFIED SEASONALITY, UNSPECIFIED TRIGGER: ICD-10-CM

## 2018-05-31 PROCEDURE — G8427 DOCREV CUR MEDS BY ELIG CLIN: HCPCS | Performed by: FAMILY MEDICINE

## 2018-05-31 PROCEDURE — 1123F ACP DISCUSS/DSCN MKR DOCD: CPT | Performed by: FAMILY MEDICINE

## 2018-05-31 PROCEDURE — 99213 OFFICE O/P EST LOW 20 MIN: CPT | Performed by: FAMILY MEDICINE

## 2018-05-31 PROCEDURE — G8417 CALC BMI ABV UP PARAM F/U: HCPCS | Performed by: FAMILY MEDICINE

## 2018-05-31 PROCEDURE — 4040F PNEUMOC VAC/ADMIN/RCVD: CPT | Performed by: FAMILY MEDICINE

## 2018-05-31 PROCEDURE — 3017F COLORECTAL CA SCREEN DOC REV: CPT | Performed by: FAMILY MEDICINE

## 2018-05-31 PROCEDURE — 1036F TOBACCO NON-USER: CPT | Performed by: FAMILY MEDICINE

## 2018-05-31 RX ORDER — PROPRANOLOL HYDROCHLORIDE 10 MG/1
10 TABLET ORAL 3 TIMES DAILY
COMMUNITY
End: 2018-12-10 | Stop reason: ALTCHOICE

## 2018-05-31 RX ORDER — FLUTICASONE PROPIONATE 50 MCG
2 SPRAY, SUSPENSION (ML) NASAL DAILY
Qty: 1 BOTTLE | Refills: 0 | Status: SHIPPED | OUTPATIENT
Start: 2018-05-31 | End: 2018-07-02 | Stop reason: SDUPTHER

## 2018-05-31 ASSESSMENT — PATIENT HEALTH QUESTIONNAIRE - PHQ9
1. LITTLE INTEREST OR PLEASURE IN DOING THINGS: 0
SUM OF ALL RESPONSES TO PHQ9 QUESTIONS 1 & 2: 0
SUM OF ALL RESPONSES TO PHQ QUESTIONS 1-9: 0
2. FEELING DOWN, DEPRESSED OR HOPELESS: 0

## 2018-05-31 ASSESSMENT — ENCOUNTER SYMPTOMS
SHORTNESS OF BREATH: 0
BACK PAIN: 0
DIARRHEA: 0
SORE THROAT: 1
COUGH: 0
CONSTIPATION: 0
ABDOMINAL PAIN: 0
NAUSEA: 0

## 2018-07-02 RX ORDER — FLUTICASONE PROPIONATE 50 MCG
2 SPRAY, SUSPENSION (ML) NASAL DAILY
Qty: 1 BOTTLE | Refills: 0 | Status: SHIPPED | OUTPATIENT
Start: 2018-07-02

## 2018-08-22 ENCOUNTER — APPOINTMENT (OUTPATIENT)
Dept: GENERAL RADIOLOGY | Age: 68
End: 2018-08-22
Payer: MEDICARE

## 2018-08-22 ENCOUNTER — HOSPITAL ENCOUNTER (OUTPATIENT)
Age: 68
Setting detail: OBSERVATION
Discharge: HOME | End: 2018-08-23
Attending: EMERGENCY MEDICINE | Admitting: INTERNAL MEDICINE
Payer: MEDICARE

## 2018-08-22 DIAGNOSIS — R07.9 CHEST PAIN, UNSPECIFIED TYPE: ICD-10-CM

## 2018-08-22 DIAGNOSIS — J44.1 CHRONIC OBSTRUCTIVE PULMONARY DISEASE WITH ACUTE EXACERBATION (HCC): ICD-10-CM

## 2018-08-22 DIAGNOSIS — R06.02 SHORTNESS OF BREATH: Primary | ICD-10-CM

## 2018-08-22 LAB
EKG ATRIAL RATE: 62 BPM
EKG P AXIS: 39 DEGREES
EKG P-R INTERVAL: 158 MS
EKG Q-T INTERVAL: 428 MS
EKG QRS DURATION: 80 MS
EKG QTC CALCULATION (BAZETT): 434 MS
EKG R AXIS: 39 DEGREES
EKG T AXIS: 49 DEGREES
EKG VENTRICULAR RATE: 62 BPM

## 2018-08-22 PROCEDURE — 71046 X-RAY EXAM CHEST 2 VIEWS: CPT

## 2018-08-22 PROCEDURE — 6370000000 HC RX 637 (ALT 250 FOR IP): Performed by: STUDENT IN AN ORGANIZED HEALTH CARE EDUCATION/TRAINING PROGRAM

## 2018-08-22 PROCEDURE — 80048 BASIC METABOLIC PNL TOTAL CA: CPT

## 2018-08-22 PROCEDURE — 85025 COMPLETE CBC W/AUTO DIFF WBC: CPT

## 2018-08-22 PROCEDURE — 36415 COLL VENOUS BLD VENIPUNCTURE: CPT

## 2018-08-22 PROCEDURE — 99285 EMERGENCY DEPT VISIT HI MDM: CPT

## 2018-08-22 PROCEDURE — 84484 ASSAY OF TROPONIN QUANT: CPT

## 2018-08-22 RX ORDER — ASPIRIN 81 MG/1
324 TABLET, CHEWABLE ORAL ONCE
Status: COMPLETED | OUTPATIENT
Start: 2018-08-22 | End: 2018-08-22

## 2018-08-22 RX ADMIN — ASPIRIN 81 MG 324 MG: 81 TABLET ORAL at 23:46

## 2018-08-22 ASSESSMENT — PAIN SCALES - GENERAL: PAINLEVEL_OUTOF10: 5

## 2018-08-23 ENCOUNTER — APPOINTMENT (OUTPATIENT)
Dept: NUCLEAR MEDICINE | Age: 68
End: 2018-08-23
Payer: MEDICARE

## 2018-08-23 VITALS
RESPIRATION RATE: 16 BRPM | HEART RATE: 58 BPM | HEIGHT: 69 IN | SYSTOLIC BLOOD PRESSURE: 166 MMHG | BODY MASS INDEX: 29.22 KG/M2 | DIASTOLIC BLOOD PRESSURE: 86 MMHG | WEIGHT: 197.31 LBS | OXYGEN SATURATION: 96 % | TEMPERATURE: 97.7 F

## 2018-08-23 PROBLEM — R07.9 CHEST PAIN: Status: ACTIVE | Noted: 2018-08-23

## 2018-08-23 PROBLEM — Z72.0 TOBACCO ABUSE: Status: ACTIVE | Noted: 2018-08-23

## 2018-08-23 LAB
-: NORMAL
ABSOLUTE EOS #: 0.3 K/UL (ref 0–0.4)
ABSOLUTE IMMATURE GRANULOCYTE: ABNORMAL K/UL (ref 0–0.3)
ABSOLUTE LYMPH #: 3.1 K/UL (ref 1–4.8)
ABSOLUTE MONO #: 0.9 K/UL (ref 0.1–1.3)
ALBUMIN SERPL-MCNC: 3.8 G/DL (ref 3.5–5.2)
ALBUMIN/GLOBULIN RATIO: ABNORMAL (ref 1–2.5)
ALP BLD-CCNC: 71 U/L (ref 40–129)
ALT SERPL-CCNC: 41 U/L (ref 5–41)
ANION GAP SERPL CALCULATED.3IONS-SCNC: 9 MMOL/L (ref 9–17)
ANION GAP SERPL CALCULATED.3IONS-SCNC: 9 MMOL/L (ref 9–17)
AST SERPL-CCNC: 32 U/L
BASOPHILS # BLD: 1 % (ref 0–2)
BASOPHILS ABSOLUTE: 0.1 K/UL (ref 0–0.2)
BILIRUB SERPL-MCNC: 0.47 MG/DL (ref 0.3–1.2)
BNP INTERPRETATION: NORMAL
BUN BLDV-MCNC: 13 MG/DL (ref 8–23)
BUN BLDV-MCNC: 13 MG/DL (ref 8–23)
BUN/CREAT BLD: ABNORMAL (ref 9–20)
BUN/CREAT BLD: ABNORMAL (ref 9–20)
CALCIUM SERPL-MCNC: 8.7 MG/DL (ref 8.6–10.4)
CALCIUM SERPL-MCNC: 8.8 MG/DL (ref 8.6–10.4)
CHLORIDE BLD-SCNC: 106 MMOL/L (ref 98–107)
CHLORIDE BLD-SCNC: 107 MMOL/L (ref 98–107)
CHOLESTEROL/HDL RATIO: 4.1
CHOLESTEROL: 158 MG/DL
CO2: 25 MMOL/L (ref 20–31)
CO2: 27 MMOL/L (ref 20–31)
CREAT SERPL-MCNC: 0.65 MG/DL (ref 0.7–1.2)
CREAT SERPL-MCNC: 0.68 MG/DL (ref 0.7–1.2)
DIFFERENTIAL TYPE: ABNORMAL
EOSINOPHILS RELATIVE PERCENT: 4 % (ref 0–4)
ESTIMATED AVERAGE GLUCOSE: 111 MG/DL
GFR AFRICAN AMERICAN: >60 ML/MIN
GFR AFRICAN AMERICAN: >60 ML/MIN
GFR NON-AFRICAN AMERICAN: >60 ML/MIN
GFR NON-AFRICAN AMERICAN: >60 ML/MIN
GFR SERPL CREATININE-BSD FRML MDRD: ABNORMAL ML/MIN/{1.73_M2}
GLUCOSE BLD-MCNC: 114 MG/DL (ref 70–99)
GLUCOSE BLD-MCNC: 98 MG/DL (ref 70–99)
HBA1C MFR BLD: 5.5 % (ref 4–6)
HCT VFR BLD CALC: 47.3 % (ref 41–53)
HCT VFR BLD CALC: 48.2 % (ref 41–53)
HDLC SERPL-MCNC: 39 MG/DL
HEMOGLOBIN: 16.2 G/DL (ref 13.5–17.5)
HEMOGLOBIN: 16.6 G/DL (ref 13.5–17.5)
IMMATURE GRANULOCYTES: ABNORMAL %
LDL CHOLESTEROL: 81 MG/DL (ref 0–130)
LV EF: 50 %
LV EF: 63 %
LVEF MODALITY: NORMAL
LVEF MODALITY: NORMAL
LYMPHOCYTES # BLD: 38 % (ref 24–44)
MAGNESIUM: 2.4 MG/DL (ref 1.6–2.6)
MCH RBC QN AUTO: 32.8 PG (ref 26–34)
MCH RBC QN AUTO: 33.2 PG (ref 26–34)
MCHC RBC AUTO-ENTMCNC: 34.2 G/DL (ref 31–37)
MCHC RBC AUTO-ENTMCNC: 34.4 G/DL (ref 31–37)
MCV RBC AUTO: 95.9 FL (ref 80–100)
MCV RBC AUTO: 96.5 FL (ref 80–100)
MONOCYTES # BLD: 12 % (ref 1–7)
NRBC AUTOMATED: ABNORMAL PER 100 WBC
NRBC AUTOMATED: NORMAL PER 100 WBC
PDW BLD-RTO: 12.6 % (ref 11.5–14.9)
PDW BLD-RTO: 12.7 % (ref 11.5–14.9)
PLATELET # BLD: 154 K/UL (ref 150–450)
PLATELET # BLD: 161 K/UL (ref 150–450)
PLATELET ESTIMATE: ABNORMAL
PMV BLD AUTO: 8.9 FL (ref 6–12)
PMV BLD AUTO: 9.1 FL (ref 6–12)
POTASSIUM SERPL-SCNC: 4.2 MMOL/L (ref 3.7–5.3)
POTASSIUM SERPL-SCNC: 4.3 MMOL/L (ref 3.7–5.3)
PRO-BNP: 84 PG/ML
RBC # BLD: 4.93 M/UL (ref 4.5–5.9)
RBC # BLD: 4.99 M/UL (ref 4.5–5.9)
RBC # BLD: ABNORMAL 10*6/UL
REASON FOR REJECTION: NORMAL
SEG NEUTROPHILS: 45 % (ref 36–66)
SEGMENTED NEUTROPHILS ABSOLUTE COUNT: 3.6 K/UL (ref 1.3–9.1)
SODIUM BLD-SCNC: 140 MMOL/L (ref 135–144)
SODIUM BLD-SCNC: 143 MMOL/L (ref 135–144)
TOTAL PROTEIN: 6.8 G/DL (ref 6.4–8.3)
TRIGL SERPL-MCNC: 189 MG/DL
TROPONIN INTERP: NORMAL
TROPONIN INTERP: NORMAL
TROPONIN T: <0.03 NG/ML
TROPONIN T: <0.03 NG/ML
TSH SERPL DL<=0.05 MIU/L-ACNC: 1.75 MIU/L (ref 0.3–5)
VLDLC SERPL CALC-MCNC: ABNORMAL MG/DL (ref 1–30)
WBC # BLD: 7.9 K/UL (ref 3.5–11)
WBC # BLD: 8.1 K/UL (ref 3.5–11)
WBC # BLD: ABNORMAL 10*3/UL
ZZ NTE CLEAN UP: ORDERED TEST: NORMAL
ZZ NTE WITH NAME CLEAN UP: SPECIMEN SOURCE: NORMAL

## 2018-08-23 PROCEDURE — 84443 ASSAY THYROID STIM HORMONE: CPT

## 2018-08-23 PROCEDURE — 93017 CV STRESS TEST TRACING ONLY: CPT

## 2018-08-23 PROCEDURE — 94640 AIRWAY INHALATION TREATMENT: CPT

## 2018-08-23 PROCEDURE — 80048 BASIC METABOLIC PNL TOTAL CA: CPT

## 2018-08-23 PROCEDURE — 80061 LIPID PANEL: CPT

## 2018-08-23 PROCEDURE — 2580000003 HC RX 258: Performed by: NURSE PRACTITIONER

## 2018-08-23 PROCEDURE — 3430000000 HC RX DIAGNOSTIC RADIOPHARMACEUTICAL: Performed by: NURSE PRACTITIONER

## 2018-08-23 PROCEDURE — 93971 EXTREMITY STUDY: CPT

## 2018-08-23 PROCEDURE — G0378 HOSPITAL OBSERVATION PER HR: HCPCS

## 2018-08-23 PROCEDURE — 96372 THER/PROPH/DIAG INJ SC/IM: CPT

## 2018-08-23 PROCEDURE — 6370000000 HC RX 637 (ALT 250 FOR IP): Performed by: NURSE PRACTITIONER

## 2018-08-23 PROCEDURE — 84484 ASSAY OF TROPONIN QUANT: CPT

## 2018-08-23 PROCEDURE — A9500 TC99M SESTAMIBI: HCPCS | Performed by: NURSE PRACTITIONER

## 2018-08-23 PROCEDURE — 99220 PR INITIAL OBSERVATION CARE/DAY 70 MINUTES: CPT | Performed by: INTERNAL MEDICINE

## 2018-08-23 PROCEDURE — 93005 ELECTROCARDIOGRAM TRACING: CPT

## 2018-08-23 PROCEDURE — 80053 COMPREHEN METABOLIC PANEL: CPT

## 2018-08-23 PROCEDURE — 6360000002 HC RX W HCPCS: Performed by: INTERNAL MEDICINE

## 2018-08-23 PROCEDURE — 85027 COMPLETE CBC AUTOMATED: CPT

## 2018-08-23 PROCEDURE — 6360000002 HC RX W HCPCS: Performed by: NURSE PRACTITIONER

## 2018-08-23 PROCEDURE — 6370000000 HC RX 637 (ALT 250 FOR IP): Performed by: STUDENT IN AN ORGANIZED HEALTH CARE EDUCATION/TRAINING PROGRAM

## 2018-08-23 PROCEDURE — 96374 THER/PROPH/DIAG INJ IV PUSH: CPT

## 2018-08-23 PROCEDURE — 36415 COLL VENOUS BLD VENIPUNCTURE: CPT

## 2018-08-23 PROCEDURE — 78452 HT MUSCLE IMAGE SPECT MULT: CPT

## 2018-08-23 PROCEDURE — 83036 HEMOGLOBIN GLYCOSYLATED A1C: CPT

## 2018-08-23 PROCEDURE — 83880 ASSAY OF NATRIURETIC PEPTIDE: CPT

## 2018-08-23 PROCEDURE — 93306 TTE W/DOPPLER COMPLETE: CPT

## 2018-08-23 PROCEDURE — 83735 ASSAY OF MAGNESIUM: CPT

## 2018-08-23 RX ORDER — 0.9 % SODIUM CHLORIDE 0.9 %
250 INTRAVENOUS SOLUTION INTRAVENOUS ONCE
Status: DISCONTINUED | OUTPATIENT
Start: 2018-08-23 | End: 2018-08-23 | Stop reason: HOSPADM

## 2018-08-23 RX ORDER — AMLODIPINE BESYLATE 10 MG/1
10 TABLET ORAL DAILY
Status: DISCONTINUED | OUTPATIENT
Start: 2018-08-23 | End: 2018-08-23 | Stop reason: HOSPADM

## 2018-08-23 RX ORDER — PANTOPRAZOLE SODIUM 40 MG/1
40 TABLET, DELAYED RELEASE ORAL DAILY
Status: DISCONTINUED | OUTPATIENT
Start: 2018-08-23 | End: 2018-08-23 | Stop reason: HOSPADM

## 2018-08-23 RX ORDER — PREDNISONE 20 MG/1
20 TABLET ORAL DAILY
Qty: 5 TABLET | Refills: 0 | Status: SHIPPED | OUTPATIENT
Start: 2018-08-23 | End: 2018-08-28

## 2018-08-23 RX ORDER — POTASSIUM CHLORIDE 7.45 MG/ML
10 INJECTION INTRAVENOUS PRN
Status: DISCONTINUED | OUTPATIENT
Start: 2018-08-23 | End: 2018-08-23 | Stop reason: HOSPADM

## 2018-08-23 RX ORDER — GUAIFENESIN 600 MG/1
600 TABLET, EXTENDED RELEASE ORAL 2 TIMES DAILY
Status: DISCONTINUED | OUTPATIENT
Start: 2018-08-23 | End: 2018-08-23 | Stop reason: HOSPADM

## 2018-08-23 RX ORDER — ONDANSETRON 2 MG/ML
4 INJECTION INTRAMUSCULAR; INTRAVENOUS EVERY 6 HOURS PRN
Status: DISCONTINUED | OUTPATIENT
Start: 2018-08-23 | End: 2018-08-23 | Stop reason: HOSPADM

## 2018-08-23 RX ORDER — SODIUM CHLORIDE 0.9 % (FLUSH) 0.9 %
10 SYRINGE (ML) INJECTION PRN
Status: DISCONTINUED | OUTPATIENT
Start: 2018-08-23 | End: 2018-08-23 | Stop reason: HOSPADM

## 2018-08-23 RX ORDER — ALBUTEROL SULFATE 2.5 MG/3ML
2.5 SOLUTION RESPIRATORY (INHALATION) 4 TIMES DAILY
Status: DISCONTINUED | OUTPATIENT
Start: 2018-08-23 | End: 2018-08-23 | Stop reason: HOSPADM

## 2018-08-23 RX ORDER — ALBUTEROL SULFATE 2.5 MG/3ML
2.5 SOLUTION RESPIRATORY (INHALATION) EVERY 6 HOURS PRN
Status: DISCONTINUED | OUTPATIENT
Start: 2018-08-23 | End: 2018-08-23 | Stop reason: HOSPADM

## 2018-08-23 RX ORDER — ATORVASTATIN CALCIUM 20 MG/1
20 TABLET, FILM COATED ORAL DAILY
Status: DISCONTINUED | OUTPATIENT
Start: 2018-08-23 | End: 2018-08-23 | Stop reason: HOSPADM

## 2018-08-23 RX ORDER — POTASSIUM CHLORIDE 20MEQ/15ML
40 LIQUID (ML) ORAL PRN
Status: DISCONTINUED | OUTPATIENT
Start: 2018-08-23 | End: 2018-08-23 | Stop reason: HOSPADM

## 2018-08-23 RX ORDER — ALBUTEROL SULFATE 2.5 MG/3ML
2.5 SOLUTION RESPIRATORY (INHALATION) 4 TIMES DAILY
Qty: 120 EACH | Refills: 3 | Status: SHIPPED | OUTPATIENT
Start: 2018-08-23 | End: 2019-04-10 | Stop reason: ALTCHOICE

## 2018-08-23 RX ORDER — NITROGLYCERIN 0.4 MG/1
0.4 TABLET SUBLINGUAL EVERY 5 MIN PRN
Status: DISCONTINUED | OUTPATIENT
Start: 2018-08-23 | End: 2018-08-23 | Stop reason: HOSPADM

## 2018-08-23 RX ORDER — CAFFEINE CITRATE 20 MG/ML
60 SOLUTION INTRAVENOUS
Status: DISCONTINUED | OUTPATIENT
Start: 2018-08-23 | End: 2018-08-23 | Stop reason: HOSPADM

## 2018-08-23 RX ORDER — SODIUM CHLORIDE 9 MG/ML
INJECTION, SOLUTION INTRAVENOUS CONTINUOUS
Status: DISCONTINUED | OUTPATIENT
Start: 2018-08-23 | End: 2018-08-23 | Stop reason: HOSPADM

## 2018-08-23 RX ORDER — AZITHROMYCIN 250 MG/1
TABLET, FILM COATED ORAL
Qty: 1 PACKET | Refills: 0 | Status: SHIPPED | OUTPATIENT
Start: 2018-08-23 | End: 2018-12-03 | Stop reason: ALTCHOICE

## 2018-08-23 RX ORDER — PROPRANOLOL HYDROCHLORIDE 10 MG/1
10 TABLET ORAL 3 TIMES DAILY
Status: DISCONTINUED | OUTPATIENT
Start: 2018-08-23 | End: 2018-08-23 | Stop reason: HOSPADM

## 2018-08-23 RX ORDER — M-VIT,TX,IRON,MINS/CALC/FOLIC 27MG-0.4MG
1 TABLET ORAL DAILY
Status: DISCONTINUED | OUTPATIENT
Start: 2018-08-23 | End: 2018-08-23 | Stop reason: HOSPADM

## 2018-08-23 RX ORDER — BISACODYL 10 MG
10 SUPPOSITORY, RECTAL RECTAL DAILY PRN
Status: DISCONTINUED | OUTPATIENT
Start: 2018-08-23 | End: 2018-08-23 | Stop reason: HOSPADM

## 2018-08-23 RX ORDER — FENTANYL CITRATE 50 UG/ML
50 INJECTION, SOLUTION INTRAMUSCULAR; INTRAVENOUS ONCE
Status: COMPLETED | OUTPATIENT
Start: 2018-08-23 | End: 2018-08-23

## 2018-08-23 RX ORDER — AMINOPHYLLINE DIHYDRATE 25 MG/ML
100 INJECTION, SOLUTION INTRAVENOUS
Status: DISCONTINUED | OUTPATIENT
Start: 2018-08-23 | End: 2018-08-23

## 2018-08-23 RX ORDER — POTASSIUM CHLORIDE 20 MEQ/1
40 TABLET, EXTENDED RELEASE ORAL PRN
Status: DISCONTINUED | OUTPATIENT
Start: 2018-08-23 | End: 2018-08-23 | Stop reason: HOSPADM

## 2018-08-23 RX ORDER — ACETAMINOPHEN 325 MG/1
650 TABLET ORAL EVERY 4 HOURS PRN
Status: DISCONTINUED | OUTPATIENT
Start: 2018-08-23 | End: 2018-08-23 | Stop reason: HOSPADM

## 2018-08-23 RX ORDER — METHYLPREDNISOLONE SODIUM SUCCINATE 125 MG/2ML
125 INJECTION, POWDER, LYOPHILIZED, FOR SOLUTION INTRAMUSCULAR; INTRAVENOUS ONCE
Status: COMPLETED | OUTPATIENT
Start: 2018-08-23 | End: 2018-08-23

## 2018-08-23 RX ORDER — DOCUSATE SODIUM 100 MG/1
100 CAPSULE, LIQUID FILLED ORAL 2 TIMES DAILY
Status: DISCONTINUED | OUTPATIENT
Start: 2018-08-23 | End: 2018-08-23 | Stop reason: HOSPADM

## 2018-08-23 RX ORDER — FLUTICASONE PROPIONATE 50 MCG
2 SPRAY, SUSPENSION (ML) NASAL DAILY
Status: DISCONTINUED | OUTPATIENT
Start: 2018-08-23 | End: 2018-08-23 | Stop reason: HOSPADM

## 2018-08-23 RX ORDER — SODIUM CHLORIDE 0.9 % (FLUSH) 0.9 %
10 SYRINGE (ML) INJECTION EVERY 12 HOURS SCHEDULED
Status: DISCONTINUED | OUTPATIENT
Start: 2018-08-23 | End: 2018-08-23 | Stop reason: HOSPADM

## 2018-08-23 RX ORDER — METOPROLOL TARTRATE 5 MG/5ML
2.5 INJECTION INTRAVENOUS PRN
Status: DISCONTINUED | OUTPATIENT
Start: 2018-08-23 | End: 2018-08-23 | Stop reason: HOSPADM

## 2018-08-23 RX ORDER — MAGNESIUM SULFATE 1 G/100ML
1 INJECTION INTRAVENOUS PRN
Status: DISCONTINUED | OUTPATIENT
Start: 2018-08-23 | End: 2018-08-23 | Stop reason: HOSPADM

## 2018-08-23 RX ADMIN — ALBUTEROL SULFATE 2.5 MG: 2.5 SOLUTION RESPIRATORY (INHALATION) at 11:37

## 2018-08-23 RX ADMIN — Medication 10 ML: at 07:26

## 2018-08-23 RX ADMIN — Medication 1 LOZENGE: at 06:18

## 2018-08-23 RX ADMIN — ALBUTEROL SULFATE 2.5 MG: 2.5 SOLUTION RESPIRATORY (INHALATION) at 06:41

## 2018-08-23 RX ADMIN — ASPIRIN 325 MG: 325 TABLET, DELAYED RELEASE ORAL at 05:00

## 2018-08-23 RX ADMIN — Medication 10 ML: at 10:31

## 2018-08-23 RX ADMIN — GUAIFENESIN 600 MG: 600 TABLET, EXTENDED RELEASE ORAL at 10:30

## 2018-08-23 RX ADMIN — ACETAMINOPHEN 650 MG: 325 TABLET, FILM COATED ORAL at 14:42

## 2018-08-23 RX ADMIN — TIOTROPIUM BROMIDE 18 MCG: 18 CAPSULE ORAL; RESPIRATORY (INHALATION) at 06:18

## 2018-08-23 RX ADMIN — SODIUM CHLORIDE: 9 INJECTION, SOLUTION INTRAVENOUS at 05:01

## 2018-08-23 RX ADMIN — ENOXAPARIN SODIUM 40 MG: 40 INJECTION SUBCUTANEOUS at 10:30

## 2018-08-23 RX ADMIN — TETRAKIS(2-METHOXYISOBUTYLISOCYANIDE)COPPER(I) TETRAFLUOROBORATE 34.6 MILLICURIE: 1 INJECTION, POWDER, LYOPHILIZED, FOR SOLUTION INTRAVENOUS at 09:05

## 2018-08-23 RX ADMIN — ALBUTEROL SULFATE 2.5 MG: 2.5 SOLUTION RESPIRATORY (INHALATION) at 16:16

## 2018-08-23 RX ADMIN — FENTANYL CITRATE 50 MCG: 50 INJECTION INTRAMUSCULAR; INTRAVENOUS at 06:18

## 2018-08-23 RX ADMIN — TETRAKIS(2-METHOXYISOBUTYLISOCYANIDE)COPPER(I) TETRAFLUOROBORATE 12.7 MILLICURIE: 1 INJECTION, POWDER, LYOPHILIZED, FOR SOLUTION INTRAVENOUS at 07:25

## 2018-08-23 RX ADMIN — Medication 10 ML: at 09:02

## 2018-08-23 RX ADMIN — METHYLPREDNISOLONE SODIUM SUCCINATE 125 MG: 125 INJECTION, POWDER, FOR SOLUTION INTRAMUSCULAR; INTRAVENOUS at 15:50

## 2018-08-23 RX ADMIN — REGADENOSON 0.4 MG: 0.08 INJECTION, SOLUTION INTRAVENOUS at 09:01

## 2018-08-23 ASSESSMENT — ENCOUNTER SYMPTOMS
DOUBLE VISION: 0
DIARRHEA: 0
CONSTIPATION: 0
NAUSEA: 0
COUGH: 1
RHINORRHEA: 0
PHOTOPHOBIA: 0
BLURRED VISION: 0
VOMITING: 0
SHORTNESS OF BREATH: 1
ABDOMINAL PAIN: 0
BACK PAIN: 0
SORE THROAT: 0
ORTHOPNEA: 0
SPUTUM PRODUCTION: 0
WHEEZING: 0
CHEST TIGHTNESS: 1

## 2018-08-23 ASSESSMENT — PAIN SCALES - GENERAL
PAINLEVEL_OUTOF10: 8
PAINLEVEL_OUTOF10: 4
PAINLEVEL_OUTOF10: 9

## 2018-08-23 ASSESSMENT — HEART SCORE: ECG: 1

## 2018-08-23 ASSESSMENT — PAIN DESCRIPTION - PAIN TYPE: TYPE: ACUTE PAIN

## 2018-08-23 ASSESSMENT — PAIN DESCRIPTION - LOCATION: LOCATION: HEAD;SHOULDER

## 2018-08-23 NOTE — PROCEDURES
[address] [facility]                      [address] [facility]. [address]                                CARDIAC STRESS TEST    PATIENT NAME: Pramod Wilkerson                  :        1950  MED REC NO:   [id number]                              ROOM:       2083  ACCOUNT NO:   [de-identified]                           ADMIT DATE: 2018  PROVIDER:     Meghan Chin    DATE OF STUDY:  2018    ORDERING PROVIDER:  Zeyad Krueger MD    INTERPRETING PHYSICIAN: ANDERSON PHILLIPS MD    LEXISCAN STRESS TEST    INDICATION:  CHEST PAIN    INTERPRETATION:  Resting heart rate: 54 bpm.  Resting blood pressure:  136/86 mmhg. Resting Ekg:  Normal.  Stress heart response:  Normal response. Blood pressure response:  Appropriate. Stress EKGs:  Normal.  No ischemic Ekg changes. IMPRESISON:  NEGATIVE LEXISCAN STRESS TEST. THE NUCLEAR SCAN TO FOLLOW.           BRUNO PHILLIPS    D: 2018 10:56:46       T: 2018 11:36:15     [place]  Job#: [id number]     Doc#: Unknown    CC:    (Retain this field even if not dictated or not decipherable)

## 2018-08-23 NOTE — H&P
[facility] [address] [place], [address] [place] [address]. Phone [phone]     IM Attending    Pt seen and examined today   I have discussed the care of pt, including pertinent history and exam findings,  with the NP Adilson Ballard . I have reviewed the key elements of all parts of the encounter with Adilson Ballard.   I agree with the assessment, plan and orders as documented by Adilson Ballard    Electronically signed by Lisa Petty MD on 8/23/2018 at 3:15 PM

## 2018-08-23 NOTE — DISCHARGE SUMMARY
Internal Medicine   Discharge Summary         Patient Identification:  Gurpreet Urbina is a 78 y.o. male. :  1950  MRN: [id number]     Acct: [de-identified]   Admit Date:  2018  Discharge date and time: No discharge date for patient encounter. Attending Provider: Betty Cisneros MD                                       Reason for Admission: sob     Discharge Diagnoses:   Patient Active Problem List   Diagnosis    Colon polyps    Viral hepatitis C without hepatic coma    GERD (gastroesophageal reflux disease)    Anxiety    COPD [facility]ronic obstructive pulmonary disease) ([facility]    Hyperlipidemia    Essential hypertension    Chest pain    Tobacco abuse          Consults:      Procedures:     Hospital Course:     Sob atypical chest pain stress negative  Has copd exacerbation     Disposition:   Home    Discharged Condition:  Stable     Discharge Medications:       Kailee Johnson   [place] Medication Instructions SQU:[id number]    Printed on:18 1520   Medication Information                      albuterol (PROVENTIL) (2.5 MG/3ML) 0.083% nebulizer solution  Take 3 mLs by nebulization 4 times daily             amLODIPine (NORVASC) 10 MG tablet  Take 10 mg by mouth daily             aspirin 325 MG EC tablet  Take 325 mg by mouth every 6 hours as needed for Pain             atorvastatin (LIPITOR) 20 MG tablet  TAKE 1 TABLET DAILY             azithromycin (ZITHROMAX) 250 MG tablet  Take 2 tabs (500 mg) on Day 1, and take 1 tab (250 mg) on days 2 through 5.              Esomeprazole Magnesium (NEXIUM PO)  Take by mouth             fluticasone (FLONASE) 50 MCG/ACT nasal spray  2 sprays by Nasal route daily             lisinopril (PRINIVIL;ZESTRIL) 20 MG tablet  Take 1 tablet by mouth daily             Multiple Vitamins-Minerals (CENTRUM SILVER 50+MEN PO)  Take 1 tablet by mouth daily             predniSONE (DELTASONE) 20 MG tablet  Take 1 tablet by mouth daily for 5 days             propranolol (INDERAL) 10 MG tablet  Take 10 mg by mouth 3 times daily             tiotropium (SPIRIVA) 18 MCG inhalation capsule  Inhale 1 capsule into the lungs daily                 Discharge Instructions: Follow up with No primary care provider on file. in 2 weeks.       Hospital acquired Infections: None    Time spent for dc more than 30 min    Lydia CHU attending

## 2018-08-23 NOTE — PROGRESS NOTES
Report obtained from [facility] in ED at 0200. Pt arrived to floor at 0210. Assessment, admission, and vitals obtained. Bed in lowest position with wheels locked and side rails up x2.

## 2018-08-23 NOTE — ED PROVIDER NOTES
[address]  Emergency Department Encounter  Emergency Medicine Resident     Pt Name: Marielena Montano  MRN: [id number]  [place] 1950  Date of evaluation: 8/22/18  PCP:  No primary care provider on file. CHIEF COMPLAINT       Chief Complaint   Patient presents with    Shortness of Breath       HISTORY OF PRESENT ILLNESS  (Location/Symptom, Timing/Onset, Context/Setting, Quality, Duration, Modifying Factors, Severity.)      Marielena Montano is a 78 y.o. male who presents with Acute onset of shortness of breath and chest pain that began this evening. Patient reports that he went to bed approximately 7:30 PM after dinner, woke up about 1 hours prior to arrival with midsternal chest pain that felt like \"something was sitting on my chest.  Patient reports he took an old Spiriva, which somewhat improved his pain, but states that his pain still present. He also reports diaphoresis associated with this episode. He does report that he had an echo performed approximately 7 months ago. He does smoke daily. He does have a history of hypertension. He states he only takes hypertensive meds every day. PAST MEDICAL / SURGICAL / SOCIAL / FAMILY HISTORY      has a past medical history of Anxiety state, unspecified; Chest pain, unspecified; Colon polyp; Depressive disorder, not elsewhere classified; Essential hypertension, benign; GERD (gastroesophageal reflux disease); Hyperlipidemia; Impotence; Impotence of organic origin; Lipoma of unspecified site; Lumbago; Secondary localized osteoarthrosis, shoulder region; and Swelling of limb.    has a past surgical history that includes Carpal tunnel release (Bilateral); Ankle surgery (Right); shoulder surgery (Right); Colonoscopy (04/13/2017); pr colon ca scrn not hi rsk ind (N/A, 4/13/2017); cervical fusion; lumbar fusion; and EXCISION / BIOPSY SKIN LESION OF ARM (Left, 9/15/2017). Social History     Social History    Marital status:       Spouse was dissection but patient is mildly HTN with no back pain, equal pulses no mediastinal widening     DIAGNOSTIC RESULTS / EMERGENCY DEPARTMENT COURSE / MDM     LABS:  Results for orders placed or performed during the hospital encounter of 08/22/18   CBC Auto Differential   Result Value Ref Range    WBC 7.9 3.5 - 11.0 k/uL    RBC 4.99 4.5 - 5.9 m/uL    Hemoglobin 16.6 13.5 - 17.5 g/dL    Hematocrit 48.2 41 - 53 %    MCV 96.5 80 - 100 fL    MCH 33.2 26 - 34 pg    MCHC 34.4 31 - 37 g/dL    RDW 12.7 11.5 - 14.9 %    Platelets 726 686 - 851 k/uL    MPV 9.1 6.0 - 12.0 fL    NRBC Automated NOT REPORTED per 100 WBC    Differential Type NOT REPORTED     Seg Neutrophils 45 36 - 66 %    Lymphocytes 38 24 - 44 %    Monocytes 12 (H) 1 - 7 %    Eosinophils % 4 0 - 4 %    Basophils 1 0 - 2 %    Immature Granulocytes NOT REPORTED 0 %    Segs Absolute 3.60 1.3 - 9.1 k/uL    Absolute Lymph # 3.10 1.0 - 4.8 k/uL    Absolute Mono # 0.90 0.1 - 1.3 k/uL    Absolute Eos # 0.30 0.0 - 0.4 k/uL    Basophils # 0.10 0.0 - 0.2 k/uL    Absolute Immature Granulocyte NOT REPORTED 0.00 - 0.30 k/uL    WBC Morphology NOT REPORTED     RBC Morphology NOT REPORTED     Platelet Estimate NOT REPORTED    SPECIMEN REJECTION   Result Value Ref Range    Specimen Source . BLOOD     Ordered Test BMP TROPI     Reason for Rejection Unable to perform testing: Specimen hemolyzed.      - NOT REPORTED    Basic Metabolic Panel   Result Value Ref Range    Glucose 114 (H) 70 - 99 mg/dL    BUN 13 8 - 23 mg/dL    CREATININE 0.65 (L) 0.70 - 1.20 mg/dL    Bun/Cre Ratio NOT REPORTED 9 - 20    Calcium 8.8 8.6 - 10.4 mg/dL    Sodium 140 135 - 144 mmol/L    Potassium 4.3 3.7 - 5.3 mmol/L    Chloride 106 98 - 107 mmol/L    CO2 25 20 - 31 mmol/L    Anion Gap 9 9 - 17 mmol/L    GFR Non-African American >60 >60 mL/min    GFR African American >60 >60 mL/min    GFR Comment          GFR Staging NOT REPORTED    Troponin   Result Value Ref Range    Troponin T <0.03 <0.03 ng/mL    Troponin

## 2018-08-24 NOTE — CARE COORDINATION
CASE MANAGEMENT NOTE:    Admission Date:  8/22/2018 Anthony Cannon is a 78 y.o.  male    Admitted for : Chest pain [R07.9]    Met with:  Patient    PCP:  Dr Raymond Ink:  Medicare      Current Residence/ Living Arrangements:  independently at home- with GF in 1 level home, no steps , has ramp             Current Services PTA:  No    Is patient agreeable to VNS: No    Freedom of choice provided: Yes    List of [address] provided: No    VNS chosen:  No    DME:  none    Home Oxygen: No    Nebulizer: No    CPAP/BIPAP: No    Supplier: N/A    Potential Assistance Needed: No    SNF needed: No    [facility] [address]       Does Patient want to use MEDS to BEDS? No    Family Members/Caregivers that pt would like involved in their care:    No    If yes, list name here:      Transportation Provider:  Patient                      Discharge Plan:         8/23/18 Medicare, home with GF in 1 level home, no steps, does have ramp, No DME, here with SOB and chest pain, states has no needs, will follow       Electronically signed by:  Ariana Carter RN on 8/23/2018 at 11:58 AM
DISCHARGE PLANNING NOTE:    I received a call from [facility] in [address] stating that she received a call from [facility] and they are unable to process this patients nebulizer order as they do not accept his insurance. I then forwarded the patients facesheet, DME order for nebulizer and face to face to [facility] via e-fax/routing. I then received a call from Krystle at [facility] stating that she received everything but needed a discharge med list as the patient is Medicare and that is required as well to process the order. I faxed the discharge med list to Mary Ball at [facility] at [phone]. Will continue to follow.      Electronically signed by Burma Sicard, RN on 8/24/2018 at 9:43 AM
Order Date:  Aug 23, 2018  [id number]                                          Patient Information      Name:  Jyoti Negron  :  1950  Age:  78 y.o. Address:  [address] [place] [address]   Zip:  [address]  PCP: No primary care provider on file. Sex:  Yusef East: [id number]  Home Phone: [phone]  Work Phone:  No phone on record  Patient MRN:  [id number]    Alt Patient ID:  [id number]  PCP Phone: None       Authorizing Provider Information       AUTHORIZING PROVIDER: Heather Castillo MD  Physician ID: [id number]  NPI:  [id number]  Site:   Address: [address] [place] [address]  Phone: [phone]  Fax: [phone]       EQUIPMENT ORDERED  DME Order for Nebulizer as OP [FJV107] (ORD   #:   [id number]) Priority  Routine Class  Hospital Performed        Associated Diagnosis:  Chronic obstructive pulmonary disease with acute exacerbation ([facility] [address] (J44.1 [ICD-10-CM] 491.21 [ICD-9-CM])        Comments: You must complete the order parameters below and add the medical necessity documentation for this DME in a separate note.     Nebulizer with compressor  Disposable Med Nebs 2 per month  Reusable Med Nebs 1 per 6 months  Aerosol Mask 1 per month  Replacement Filters 2 per month  All other related supplies as needed per month     Medications being used: Albuterol . 083 unit dose vial     Frequency:  Twice daily     Length of Need: 12 months            Scheduling Instructions:                                 Specimen Source             Collection Date    Collection Time    Order Status    Expected Date                Electronically Signed By  Heather Castillo MD Date  Aug 23, 2018           Responsible Party Yudith Stephenson-ActID   Relationship Account Type Home Phone   Advanovacece G - [phone] [facility] / [address] Self P/F [phone]   Employer   Work Phone   [address] [place] [address]

## 2018-09-18 RX ORDER — ALBUTEROL SULFATE 2.5 MG/3ML
SOLUTION RESPIRATORY (INHALATION)
Qty: 180 ML | OUTPATIENT
Start: 2018-09-18

## 2018-09-18 RX ORDER — TIOTROPIUM BROMIDE 18 UG/1
CAPSULE ORAL; RESPIRATORY (INHALATION)
Qty: 30 CAPSULE | OUTPATIENT
Start: 2018-09-18

## 2018-09-19 RX ORDER — TIOTROPIUM BROMIDE 18 UG/1
CAPSULE ORAL; RESPIRATORY (INHALATION)
Qty: 30 CAPSULE | OUTPATIENT
Start: 2018-09-19

## 2018-10-07 ENCOUNTER — HOSPITAL ENCOUNTER (OUTPATIENT)
Age: 68
Discharge: HOME | End: 2018-10-09
Payer: MEDICARE

## 2018-10-07 ENCOUNTER — HOSPITAL ENCOUNTER (OUTPATIENT)
Dept: GENERAL RADIOLOGY | Age: 68
Discharge: HOME | End: 2018-10-09
Payer: MEDICARE

## 2018-10-07 ENCOUNTER — HOSPITAL ENCOUNTER (OUTPATIENT)
Age: 68
Discharge: HOME | End: 2018-10-07
Payer: MEDICARE

## 2018-10-07 DIAGNOSIS — I10 HYPERTENSION, UNSPECIFIED TYPE: ICD-10-CM

## 2018-10-07 LAB
ABSOLUTE EOS #: 0.1 K/UL (ref 0–0.4)
ABSOLUTE IMMATURE GRANULOCYTE: ABNORMAL K/UL (ref 0–0.3)
ABSOLUTE LYMPH #: 3.1 K/UL (ref 1–4.8)
ABSOLUTE MONO #: 0.7 K/UL (ref 0.1–1.3)
ALBUMIN SERPL-MCNC: 4.3 G/DL (ref 3.5–5.2)
ALBUMIN/GLOBULIN RATIO: ABNORMAL (ref 1–2.5)
ALP BLD-CCNC: 84 U/L (ref 40–129)
ALT SERPL-CCNC: 49 U/L (ref 5–41)
ANION GAP SERPL CALCULATED.3IONS-SCNC: 11 MMOL/L (ref 9–17)
AST SERPL-CCNC: 40 U/L
BASOPHILS # BLD: 1 % (ref 0–2)
BASOPHILS ABSOLUTE: 0 K/UL (ref 0–0.2)
BILIRUB SERPL-MCNC: 0.5 MG/DL (ref 0.3–1.2)
BUN BLDV-MCNC: 15 MG/DL (ref 8–23)
BUN/CREAT BLD: ABNORMAL (ref 9–20)
CALCIUM SERPL-MCNC: 9.4 MG/DL (ref 8.6–10.4)
CHLORIDE BLD-SCNC: 100 MMOL/L (ref 98–107)
CHOLESTEROL/HDL RATIO: 3.1
CHOLESTEROL: 181 MG/DL
CO2: 27 MMOL/L (ref 20–31)
CREAT SERPL-MCNC: 0.8 MG/DL (ref 0.7–1.2)
DIFFERENTIAL TYPE: ABNORMAL
EOSINOPHILS RELATIVE PERCENT: 1 % (ref 0–4)
GFR AFRICAN AMERICAN: >60 ML/MIN
GFR NON-AFRICAN AMERICAN: >60 ML/MIN
GFR SERPL CREATININE-BSD FRML MDRD: ABNORMAL ML/MIN/{1.73_M2}
GFR SERPL CREATININE-BSD FRML MDRD: ABNORMAL ML/MIN/{1.73_M2}
GLUCOSE BLD-MCNC: 107 MG/DL (ref 70–99)
HCT VFR BLD CALC: 52.9 % (ref 41–53)
HDLC SERPL-MCNC: 58 MG/DL
HEMOGLOBIN: 18.2 G/DL (ref 13.5–17.5)
IMMATURE GRANULOCYTES: ABNORMAL %
LDL CHOLESTEROL: 89 MG/DL (ref 0–130)
LYMPHOCYTES # BLD: 32 % (ref 24–44)
MCH RBC QN AUTO: 33.1 PG (ref 26–34)
MCHC RBC AUTO-ENTMCNC: 34.5 G/DL (ref 31–37)
MCV RBC AUTO: 95.9 FL (ref 80–100)
MONOCYTES # BLD: 7 % (ref 1–7)
NRBC AUTOMATED: ABNORMAL PER 100 WBC
PDW BLD-RTO: 12.8 % (ref 11.5–14.9)
PLATELET # BLD: 205 K/UL (ref 150–450)
PLATELET ESTIMATE: ABNORMAL
PMV BLD AUTO: 8.6 FL (ref 6–12)
POTASSIUM SERPL-SCNC: 4.6 MMOL/L (ref 3.7–5.3)
PROSTATE SPECIFIC ANTIGEN: 4.96 UG/L
RBC # BLD: 5.51 M/UL (ref 4.5–5.9)
RBC # BLD: ABNORMAL 10*6/UL
SEG NEUTROPHILS: 59 % (ref 36–66)
SEGMENTED NEUTROPHILS ABSOLUTE COUNT: 5.9 K/UL (ref 1.3–9.1)
SODIUM BLD-SCNC: 138 MMOL/L (ref 135–144)
THYROXINE, FREE: 1.24 NG/DL (ref 0.93–1.7)
TOTAL PROTEIN: 7.5 G/DL (ref 6.4–8.3)
TRIGL SERPL-MCNC: 171 MG/DL
TSH SERPL DL<=0.05 MIU/L-ACNC: 1.65 MIU/L (ref 0.3–5)
VITAMIN B-12: 598 PG/ML (ref 232–1245)
VITAMIN D 25-HYDROXY: 45.6 NG/ML (ref 30–100)
VLDLC SERPL CALC-MCNC: ABNORMAL MG/DL (ref 1–30)
WBC # BLD: 9.8 K/UL (ref 3.5–11)
WBC # BLD: ABNORMAL 10*3/UL

## 2018-10-07 PROCEDURE — 80061 LIPID PANEL: CPT

## 2018-10-07 PROCEDURE — 84439 ASSAY OF FREE THYROXINE: CPT

## 2018-10-07 PROCEDURE — 80053 COMPREHEN METABOLIC PANEL: CPT

## 2018-10-07 PROCEDURE — 85025 COMPLETE CBC W/AUTO DIFF WBC: CPT

## 2018-10-07 PROCEDURE — 36415 COLL VENOUS BLD VENIPUNCTURE: CPT

## 2018-10-07 PROCEDURE — 71046 X-RAY EXAM CHEST 2 VIEWS: CPT

## 2018-10-07 PROCEDURE — 84443 ASSAY THYROID STIM HORMONE: CPT

## 2018-10-07 PROCEDURE — 82306 VITAMIN D 25 HYDROXY: CPT

## 2018-10-07 PROCEDURE — G0103 PSA SCREENING: HCPCS

## 2018-10-07 PROCEDURE — 82607 VITAMIN B-12: CPT

## 2018-10-19 ENCOUNTER — HOSPITAL ENCOUNTER (OUTPATIENT)
Dept: VASCULAR LAB | Age: 68
Discharge: HOME | End: 2018-10-19
Payer: MEDICARE

## 2018-10-19 ENCOUNTER — HOSPITAL ENCOUNTER (OUTPATIENT)
Age: 68
Discharge: HOME | End: 2018-10-19
Payer: MEDICARE

## 2018-10-19 LAB
ALBUMIN SERPL-MCNC: 4.3 G/DL (ref 3.5–5.2)
ALBUMIN/GLOBULIN RATIO: ABNORMAL (ref 1–2.5)
ALP BLD-CCNC: 79 U/L (ref 40–129)
ALT SERPL-CCNC: 68 U/L (ref 5–41)
AST SERPL-CCNC: 60 U/L
BILIRUB SERPL-MCNC: 0.59 MG/DL (ref 0.3–1.2)
BILIRUBIN DIRECT: 0.16 MG/DL
BILIRUBIN, INDIRECT: 0.43 MG/DL (ref 0–1)
GLOBULIN: ABNORMAL G/DL (ref 1.5–3.8)
HAV IGM SER IA-ACNC: NONREACTIVE
HEPATITIS B CORE IGM ANTIBODY: NONREACTIVE
HEPATITIS B SURFACE ANTIGEN: NONREACTIVE
HEPATITIS C ANTIBODY: REACTIVE
TOTAL PROTEIN: 7.4 G/DL (ref 6.4–8.3)

## 2018-10-19 PROCEDURE — 36415 COLL VENOUS BLD VENIPUNCTURE: CPT

## 2018-10-19 PROCEDURE — 93880 EXTRACRANIAL BILAT STUDY: CPT

## 2018-10-19 PROCEDURE — 80076 HEPATIC FUNCTION PANEL: CPT

## 2018-10-19 PROCEDURE — 80074 ACUTE HEPATITIS PANEL: CPT

## 2018-10-22 ENCOUNTER — OFFICE VISIT (OUTPATIENT)
Dept: UROLOGY | Age: 68
End: 2018-10-22
Payer: MEDICARE

## 2018-10-22 VITALS
SYSTOLIC BLOOD PRESSURE: 156 MMHG | DIASTOLIC BLOOD PRESSURE: 102 MMHG | WEIGHT: 198 LBS | TEMPERATURE: 97.8 F | HEART RATE: 60 BPM | BODY MASS INDEX: 29.33 KG/M2 | HEIGHT: 69 IN

## 2018-10-22 DIAGNOSIS — R97.20 ELEVATED PSA: Primary | ICD-10-CM

## 2018-10-22 DIAGNOSIS — R35.1 NOCTURIA: ICD-10-CM

## 2018-10-22 PROCEDURE — G8427 DOCREV CUR MEDS BY ELIG CLIN: HCPCS | Performed by: UROLOGY

## 2018-10-22 PROCEDURE — G8417 CALC BMI ABV UP PARAM F/U: HCPCS | Performed by: UROLOGY

## 2018-10-22 PROCEDURE — 1101F PT FALLS ASSESS-DOCD LE1/YR: CPT | Performed by: UROLOGY

## 2018-10-22 PROCEDURE — G8484 FLU IMMUNIZE NO ADMIN: HCPCS | Performed by: UROLOGY

## 2018-10-22 PROCEDURE — 1123F ACP DISCUSS/DSCN MKR DOCD: CPT | Performed by: UROLOGY

## 2018-10-22 PROCEDURE — 1036F TOBACCO NON-USER: CPT | Performed by: UROLOGY

## 2018-10-22 PROCEDURE — 4040F PNEUMOC VAC/ADMIN/RCVD: CPT | Performed by: UROLOGY

## 2018-10-22 PROCEDURE — 99204 OFFICE O/P NEW MOD 45 MIN: CPT | Performed by: UROLOGY

## 2018-10-22 PROCEDURE — 3017F COLORECTAL CA SCREEN DOC REV: CPT | Performed by: UROLOGY

## 2018-10-22 RX ORDER — LEVOFLOXACIN 500 MG/1
500 TABLET, FILM COATED ORAL DAILY
Qty: 14 TABLET | Refills: 0 | Status: SHIPPED | OUTPATIENT
Start: 2018-10-22 | End: 2018-11-05

## 2018-10-22 RX ORDER — OMEPRAZOLE 20 MG/1
CAPSULE, DELAYED RELEASE ORAL
Refills: 3 | COMMUNITY
Start: 2018-09-26 | End: 2018-12-10 | Stop reason: ALTCHOICE

## 2018-10-22 ASSESSMENT — ENCOUNTER SYMPTOMS
SORE THROAT: 0
EYE REDNESS: 0
COLOR CHANGE: 0
EYE PAIN: 0
ABDOMINAL PAIN: 0
NAUSEA: 0
SHORTNESS OF BREATH: 0
VOMITING: 0
COUGH: 0
ANAL BLEEDING: 0
VOICE CHANGE: 0
WHEEZING: 0

## 2018-10-22 NOTE — PROGRESS NOTES
you had to strain to start  urination?: About Half the time  NOCTURIA: How many times did you typically get up at night to uriniate?: 1 Time  TOTAL I-PSS SCORE[de-identified] 17  How would you feel if you were to spend the rest of your life with your urinary condition?: Mixe    Last BUN and creatinine:  Lab Results   Component Value Date    BUN 15 10/07/2018     Lab Results   Component Value Date    CREATININE 0.80 10/07/2018       Additional Lab/Culture results: none    Imaging Reviewed during this Office Visit: none  (results were independently reviewed by physician and radiology report verified)    PAST MEDICAL, FAMILY AND SOCIAL HISTORY:  Past Medical History:   Diagnosis Date    Anxiety state, unspecified     Chest pain, unspecified     Colon polyp     Depressive disorder, not elsewhere classified     Essential hypertension, benign     GERD (gastroesophageal reflux disease)     Hyperlipidemia     Impotence     Impotence of organic origin     Lipoma of unspecified site     Lumbago     Secondary localized osteoarthrosis, shoulder region     Swelling of limb      Past Surgical History:   Procedure Laterality Date    ANKLE SURGERY Right     CARPAL TUNNEL RELEASE Bilateral     CERVICAL FUSION      anterior    COLONOSCOPY  04/13/2017    EXCISION / BIOPSY SKIN LESION OF ARM Left 9/15/2017    EXCISION SKIN LESION LEFT CHEST AND LEFT BUTTOCK, EXCISION LIPOMA LEFT LOWER ABDOMEN performed by Mai Watson DO at [facility] SCR[facility] NOT  [address] [place] [address] N/A 4/13/2017    COLONOSCOPY performed by Rabia Owens MD at [address] Right      Family History   Problem Relation Age of Onset    High Blood Pressure Maternal Uncle     Diabetes Maternal Uncle     Diabetes Maternal Grandmother     Cancer Maternal Grandfather      Outpatient Prescriptions Marked as Taking for the 10/22/18 encounter (Office Visit) with Shahid Dela Cruz MD   Medication Sig Dispense Refill    levofloxacin (LEVAQUIN) 500 MG tablet Take 1 tablet by mouth daily for 14 days 14 tablet 0    albuterol (PROVENTIL) (2.5 MG/3ML) 0.083% nebulizer solution Take 3 mLs by nebulization 4 times daily 120 each 3    tiotropium (SPIRIVA) 18 MCG inhalation capsule Inhale 1 capsule into the lungs daily 30 capsule 3    atorvastatin (LIPITOR) 20 MG tablet TAKE 1 TABLET DAILY 90 tablet 1    Multiple Vitamins-Minerals (CENTRUM SILVER 50+MEN PO) Take 1 tablet by mouth daily      amLODIPine (NORVASC) 10 MG tablet Take 10 mg by mouth daily      aspirin 325 MG EC tablet Take 325 mg by mouth every 6 hours as needed for Pain      lisinopril (PRINIVIL;ZESTRIL) 20 MG tablet Take 1 tablet by mouth daily 30 tablet 2       Codeine; Morphine; and Pcn [penicillins]  History   Smoking Status    Former Smoker    Packs/day: 0.75    Years: 53.00    Types: Cigarettes    Quit date: 3/13/2018   Smokeless Tobacco    Never Used      (If patient a smoker, smoking cessation counseling offered)   History   Alcohol Use    Yes     Comment: OCCASIONAL ON WEEKENDS       REVIEW OF SYSTEMS:  Review of Systems    Physical Exam:    This a 78 y.o. male   Vitals:    10/22/18 1002   BP: (!) 156/102   Pulse: 60   Temp: 97.8 °F (36.6 °C)     Body mass index is 29.24 kg/m². Physical Exam  Constitutional: Patient in no acute distress. Neuro: Alert and oriented to person, place and time. Psych: Mood normal, affect normal  Skin: No rash noted  HEENT: Head: Normocephalic and atraumatic  Conjunctivae and EOM are normal. Pupils are equal, round  Nose: Normal  Right External Ear: Normal; Left External Ear: Normal  Mouth: Mucosa Moist  Neck: Supple  Lungs:Respiratory effort is normal  Cardiovascular: Warm & Pink  Abdomen: Soft, non-tender, non-distendedwith no CVA,  No flank tenderness,  Orhepatosplenomegaly   Lymphatics: No palpable lymphadenopathy. Bladder non-tender and not distended.   Musculoskeletal: Normal gait and station  Penis normal and circumcised  Urethral

## 2018-11-25 ENCOUNTER — HOSPITAL ENCOUNTER (EMERGENCY)
Age: 68
Discharge: HOME | End: 2018-11-25
Attending: EMERGENCY MEDICINE
Payer: MEDICARE

## 2018-11-25 VITALS
DIASTOLIC BLOOD PRESSURE: 86 MMHG | SYSTOLIC BLOOD PRESSURE: 130 MMHG | BODY MASS INDEX: 29.62 KG/M2 | RESPIRATION RATE: 16 BRPM | TEMPERATURE: 98.1 F | HEART RATE: 83 BPM | HEIGHT: 69 IN | OXYGEN SATURATION: 97 % | WEIGHT: 200 LBS

## 2018-11-25 DIAGNOSIS — S61.012A LACERATION OF LEFT THUMB WITHOUT FOREIGN BODY WITHOUT DAMAGE TO NAIL, INITIAL ENCOUNTER: Primary | ICD-10-CM

## 2018-11-25 PROCEDURE — 2500000003 HC RX 250 WO HCPCS: Performed by: PHYSICIAN ASSISTANT

## 2018-11-25 PROCEDURE — 90715 TDAP VACCINE 7 YRS/> IM: CPT | Performed by: PHYSICIAN ASSISTANT

## 2018-11-25 PROCEDURE — 99282 EMERGENCY DEPT VISIT SF MDM: CPT

## 2018-11-25 PROCEDURE — 90471 IMMUNIZATION ADMIN: CPT | Performed by: PHYSICIAN ASSISTANT

## 2018-11-25 PROCEDURE — 6360000002 HC RX W HCPCS: Performed by: PHYSICIAN ASSISTANT

## 2018-11-25 RX ORDER — LIDOCAINE HYDROCHLORIDE 10 MG/ML
5 INJECTION, SOLUTION INFILTRATION; PERINEURAL ONCE
Status: COMPLETED | OUTPATIENT
Start: 2018-11-25 | End: 2018-11-25

## 2018-11-25 RX ADMIN — TETANUS TOXOID, REDUCED DIPHTHERIA TOXOID AND ACELLULAR PERTUSSIS VACCINE, ADSORBED 0.5 ML: 5; 2.5; 8; 8; 2.5 SUSPENSION INTRAMUSCULAR at 19:29

## 2018-11-25 RX ADMIN — LIDOCAINE HYDROCHLORIDE 5 ML: 10 INJECTION, SOLUTION INFILTRATION; PERINEURAL at 19:31

## 2018-11-25 ASSESSMENT — PAIN SCALES - GENERAL: PAINLEVEL_OUTOF10: 0

## 2018-11-29 ENCOUNTER — HOSPITAL ENCOUNTER (OUTPATIENT)
Age: 68
Discharge: HOME | End: 2018-11-29
Payer: MEDICARE

## 2018-11-29 DIAGNOSIS — R97.20 ELEVATED PSA: ICD-10-CM

## 2018-11-29 PROCEDURE — 36415 COLL VENOUS BLD VENIPUNCTURE: CPT

## 2018-11-29 PROCEDURE — 84153 ASSAY OF PSA TOTAL: CPT

## 2018-11-30 LAB — PROSTATE SPECIFIC ANTIGEN: 4.54 UG/L

## 2018-12-03 ENCOUNTER — OFFICE VISIT (OUTPATIENT)
Dept: UROLOGY | Age: 68
End: 2018-12-03
Payer: MEDICARE

## 2018-12-03 VITALS — HEART RATE: 69 BPM | TEMPERATURE: 97.8 F | DIASTOLIC BLOOD PRESSURE: 70 MMHG | SYSTOLIC BLOOD PRESSURE: 134 MMHG

## 2018-12-03 DIAGNOSIS — R97.20 ELEVATED PSA: Primary | ICD-10-CM

## 2018-12-03 DIAGNOSIS — R35.1 NOCTURIA: ICD-10-CM

## 2018-12-03 PROCEDURE — 1123F ACP DISCUSS/DSCN MKR DOCD: CPT | Performed by: UROLOGY

## 2018-12-03 PROCEDURE — 1101F PT FALLS ASSESS-DOCD LE1/YR: CPT | Performed by: UROLOGY

## 2018-12-03 PROCEDURE — 1036F TOBACCO NON-USER: CPT | Performed by: UROLOGY

## 2018-12-03 PROCEDURE — 99214 OFFICE O/P EST MOD 30 MIN: CPT | Performed by: UROLOGY

## 2018-12-03 PROCEDURE — 3017F COLORECTAL CA SCREEN DOC REV: CPT | Performed by: UROLOGY

## 2018-12-03 PROCEDURE — 4040F PNEUMOC VAC/ADMIN/RCVD: CPT | Performed by: UROLOGY

## 2018-12-03 PROCEDURE — G8417 CALC BMI ABV UP PARAM F/U: HCPCS | Performed by: UROLOGY

## 2018-12-03 PROCEDURE — G8427 DOCREV CUR MEDS BY ELIG CLIN: HCPCS | Performed by: UROLOGY

## 2018-12-03 PROCEDURE — G8484 FLU IMMUNIZE NO ADMIN: HCPCS | Performed by: UROLOGY

## 2018-12-03 RX ORDER — CIPROFLOXACIN 500 MG/1
500 TABLET, FILM COATED ORAL 2 TIMES DAILY
Qty: 6 TABLET | Refills: 0 | Status: SHIPPED | OUTPATIENT
Start: 2018-12-03 | End: 2018-12-10 | Stop reason: ALTCHOICE

## 2018-12-03 ASSESSMENT — ENCOUNTER SYMPTOMS
VOMITING: 0
COUGH: 0
SHORTNESS OF BREATH: 0
EYE PAIN: 0
EYE REDNESS: 0
BACK PAIN: 0
ABDOMINAL PAIN: 0
NAUSEA: 0
WHEEZING: 0
COLOR CHANGE: 0

## 2018-12-06 ENCOUNTER — HOSPITAL ENCOUNTER (OUTPATIENT)
Age: 68
Discharge: HOME | End: 2018-12-08
Payer: MEDICARE

## 2018-12-06 ENCOUNTER — HOSPITAL ENCOUNTER (OUTPATIENT)
Dept: GENERAL RADIOLOGY | Age: 68
Discharge: HOME | End: 2018-12-08
Payer: MEDICARE

## 2018-12-06 DIAGNOSIS — I10 HYPERTENSION, UNSPECIFIED TYPE: ICD-10-CM

## 2018-12-06 DIAGNOSIS — M54.9 BACK PAIN, UNSPECIFIED BACK LOCATION, UNSPECIFIED BACK PAIN LATERALITY, UNSPECIFIED CHRONICITY: ICD-10-CM

## 2018-12-06 DIAGNOSIS — M54.9 DORSALGIA: ICD-10-CM

## 2018-12-06 PROCEDURE — 72040 X-RAY EXAM NECK SPINE 2-3 VW: CPT

## 2018-12-06 PROCEDURE — 72072 X-RAY EXAM THORAC SPINE 3VWS: CPT

## 2018-12-06 PROCEDURE — 72110 X-RAY EXAM L-2 SPINE 4/>VWS: CPT

## 2018-12-06 PROCEDURE — 74019 RADEX ABDOMEN 2 VIEWS: CPT

## 2018-12-10 ENCOUNTER — OFFICE VISIT (OUTPATIENT)
Dept: FAMILY MEDICINE CLINIC | Age: 68
End: 2018-12-10
Payer: MEDICARE

## 2018-12-10 VITALS
HEART RATE: 83 BPM | HEIGHT: 69 IN | SYSTOLIC BLOOD PRESSURE: 116 MMHG | OXYGEN SATURATION: 95 % | WEIGHT: 200 LBS | TEMPERATURE: 98.2 F | DIASTOLIC BLOOD PRESSURE: 81 MMHG | RESPIRATION RATE: 16 BRPM | BODY MASS INDEX: 29.62 KG/M2

## 2018-12-10 DIAGNOSIS — S61.411A LACERATION OF RIGHT HAND, FOREIGN BODY PRESENCE UNSPECIFIED, INITIAL ENCOUNTER: Primary | ICD-10-CM

## 2018-12-10 PROCEDURE — 1101F PT FALLS ASSESS-DOCD LE1/YR: CPT | Performed by: FAMILY MEDICINE

## 2018-12-10 PROCEDURE — 12001 RPR S/N/AX/GEN/TRNK 2.5CM/<: CPT | Performed by: FAMILY MEDICINE

## 2018-12-10 PROCEDURE — 99214 OFFICE O/P EST MOD 30 MIN: CPT | Performed by: FAMILY MEDICINE

## 2018-12-10 PROCEDURE — G8484 FLU IMMUNIZE NO ADMIN: HCPCS | Performed by: FAMILY MEDICINE

## 2018-12-10 PROCEDURE — G8417 CALC BMI ABV UP PARAM F/U: HCPCS | Performed by: FAMILY MEDICINE

## 2018-12-10 PROCEDURE — 1036F TOBACCO NON-USER: CPT | Performed by: FAMILY MEDICINE

## 2018-12-10 PROCEDURE — 1123F ACP DISCUSS/DSCN MKR DOCD: CPT | Performed by: FAMILY MEDICINE

## 2018-12-10 PROCEDURE — 4040F PNEUMOC VAC/ADMIN/RCVD: CPT | Performed by: FAMILY MEDICINE

## 2018-12-10 PROCEDURE — 3017F COLORECTAL CA SCREEN DOC REV: CPT | Performed by: FAMILY MEDICINE

## 2018-12-10 PROCEDURE — G8427 DOCREV CUR MEDS BY ELIG CLIN: HCPCS | Performed by: FAMILY MEDICINE

## 2018-12-10 RX ORDER — DOXYCYCLINE HYCLATE 100 MG/1
100 CAPSULE ORAL 2 TIMES DAILY
Qty: 20 CAPSULE | Refills: 0 | Status: SHIPPED | OUTPATIENT
Start: 2018-12-10 | End: 2018-12-20

## 2018-12-10 ASSESSMENT — ENCOUNTER SYMPTOMS
GASTROINTESTINAL NEGATIVE: 1
EYES NEGATIVE: 1
ALLERGIC/IMMUNOLOGIC NEGATIVE: 1
RESPIRATORY NEGATIVE: 1

## 2018-12-20 ENCOUNTER — OFFICE VISIT (OUTPATIENT)
Dept: FAMILY MEDICINE CLINIC | Age: 68
End: 2018-12-20
Payer: MEDICARE

## 2018-12-20 VITALS
WEIGHT: 210 LBS | BODY MASS INDEX: 31.83 KG/M2 | HEART RATE: 68 BPM | DIASTOLIC BLOOD PRESSURE: 74 MMHG | HEIGHT: 68 IN | SYSTOLIC BLOOD PRESSURE: 120 MMHG | RESPIRATION RATE: 16 BRPM | OXYGEN SATURATION: 96 % | TEMPERATURE: 98.3 F

## 2018-12-20 DIAGNOSIS — R51.9 ACUTE INTRACTABLE HEADACHE, UNSPECIFIED HEADACHE TYPE: ICD-10-CM

## 2018-12-20 DIAGNOSIS — J01.00 ACUTE NON-RECURRENT MAXILLARY SINUSITIS: ICD-10-CM

## 2018-12-20 DIAGNOSIS — Z48.02 VISIT FOR SUTURE REMOVAL: Primary | ICD-10-CM

## 2018-12-20 PROCEDURE — G8427 DOCREV CUR MEDS BY ELIG CLIN: HCPCS | Performed by: FAMILY MEDICINE

## 2018-12-20 PROCEDURE — G8417 CALC BMI ABV UP PARAM F/U: HCPCS | Performed by: FAMILY MEDICINE

## 2018-12-20 PROCEDURE — 3017F COLORECTAL CA SCREEN DOC REV: CPT | Performed by: FAMILY MEDICINE

## 2018-12-20 PROCEDURE — G8484 FLU IMMUNIZE NO ADMIN: HCPCS | Performed by: FAMILY MEDICINE

## 2018-12-20 PROCEDURE — 99214 OFFICE O/P EST MOD 30 MIN: CPT | Performed by: FAMILY MEDICINE

## 2018-12-20 PROCEDURE — 1123F ACP DISCUSS/DSCN MKR DOCD: CPT | Performed by: FAMILY MEDICINE

## 2018-12-20 PROCEDURE — 1036F TOBACCO NON-USER: CPT | Performed by: FAMILY MEDICINE

## 2018-12-20 PROCEDURE — 1101F PT FALLS ASSESS-DOCD LE1/YR: CPT | Performed by: FAMILY MEDICINE

## 2018-12-20 PROCEDURE — 4040F PNEUMOC VAC/ADMIN/RCVD: CPT | Performed by: FAMILY MEDICINE

## 2018-12-20 RX ORDER — AMLODIPINE BESYLATE 5 MG/1
TABLET ORAL
Refills: 3 | Status: ON HOLD | COMMUNITY
Start: 2018-12-12 | End: 2021-10-13

## 2018-12-20 RX ORDER — MELOXICAM 7.5 MG/1
TABLET ORAL
Refills: 1 | COMMUNITY
Start: 2018-12-13 | End: 2019-04-10 | Stop reason: ALTCHOICE

## 2018-12-20 RX ORDER — BUTALBITAL, ACETAMINOPHEN AND CAFFEINE 50; 325; 40 MG/1; MG/1; MG/1
TABLET ORAL
Qty: 12 TABLET | Refills: 0 | Status: SHIPPED | OUTPATIENT
Start: 2018-12-20 | End: 2019-06-11 | Stop reason: ALTCHOICE

## 2018-12-20 RX ORDER — AZITHROMYCIN 250 MG/1
TABLET, FILM COATED ORAL
Qty: 1 PACKET | Refills: 0 | Status: SHIPPED | OUTPATIENT
Start: 2018-12-20 | End: 2019-04-10 | Stop reason: ALTCHOICE

## 2018-12-20 RX ORDER — FLUTICASONE PROPIONATE 50 MCG
1 SPRAY, SUSPENSION (ML) NASAL 2 TIMES DAILY
Qty: 1 BOTTLE | Refills: 2 | Status: SHIPPED | OUTPATIENT
Start: 2018-12-20 | End: 2019-06-11 | Stop reason: ALTCHOICE

## 2018-12-20 RX ORDER — IBUPROFEN 800 MG/1
800 TABLET ORAL EVERY 6 HOURS PRN
Qty: 90 TABLET | Refills: 3 | Status: ON HOLD | OUTPATIENT
Start: 2018-12-20 | End: 2019-06-27 | Stop reason: HOSPADM

## 2018-12-20 RX ORDER — PANTOPRAZOLE SODIUM 40 MG/1
TABLET, DELAYED RELEASE ORAL
Refills: 3 | COMMUNITY
Start: 2018-12-13 | End: 2019-07-02 | Stop reason: ALTCHOICE

## 2018-12-20 RX ORDER — TRAMADOL HYDROCHLORIDE 50 MG/1
TABLET ORAL
Refills: 0 | COMMUNITY
Start: 2018-12-19 | End: 2019-04-10 | Stop reason: ALTCHOICE

## 2018-12-20 ASSESSMENT — ENCOUNTER SYMPTOMS
EYES NEGATIVE: 1
ALLERGIC/IMMUNOLOGIC NEGATIVE: 1
RHINORRHEA: 1
SINUS PAIN: 1
SINUS PRESSURE: 1
COUGH: 1
GASTROINTESTINAL NEGATIVE: 1

## 2018-12-20 NOTE — PROGRESS NOTES
SKIN LESION LEFT CHEST AND LEFT BUTTOCK, EXCISION LIPOMA LEFT LOWER ABDOMEN performed by Anthony Bai DO at [facility] SCRN NOT  [address] [place] [address] N/A 4/13/2017    COLONOSCOPY performed by Bro Joe MD at [address] Right        Family History   Problem Relation Age of Onset    High Blood Pressure Maternal Uncle     Diabetes Maternal Uncle     Diabetes Maternal Grandmother     Cancer Maternal Grandfather        Social History   Substance Use Topics    Smoking status: Former Smoker     Packs/day: 0.75     Years: 53.00     Types: Cigarettes     Quit date: 3/13/2018    Smokeless tobacco: Never Used    Alcohol use Yes      Comment: OCCASIONAL ON WEEKENDS      Current Outpatient Prescriptions   Medication Sig Dispense Refill    pantoprazole (PROTONIX) 40 MG tablet TAKE 1 TABLET (40 MG) BY ORAL ROUTE ONCE DAILY FOR 30 DAYS  3    meloxicam (MOBIC) 7.5 MG tablet TAKE ONE TABLET BY MOUTH ONCE A DAY  1    amLODIPine (NORVASC) 5 MG tablet TAKE 1 TABLET (5 MG) BY ORAL ROUTE ONCE DAILY FOR 30 DAYS  3    traMADol (ULTRAM) 50 MG tablet TAKE 1 TABLET (50 MG) BY ORAL ROUTE EVERY 6 HOURS AS NEEDED FOR 5 DAYS  0    azithromycin (ZITHROMAX Z-DELILAH) 250 MG tablet Take 2 tablets (500 mg) on Day 1, and then take 1 tablet (250 mg) on days 2 through 5. 1 packet 0    fluticasone (FLONASE) 50 MCG/ACT nasal spray 1 spray by Nasal route 2 times daily for 14 days 1 Bottle 2    ibuprofen (ADVIL;MOTRIN) 800 MG tablet Take 1 tablet by mouth every 6 hours as needed for Pain 90 tablet 3    butalbital-acetaminophen-caffeine (FIORICET, ESGIC) -40 MG per tablet Please take one pill at night when necessary headache.  12 tablet 0    sertraline (ZOLOFT) 50 MG tablet TAKE 1 TABLET (50 MG) BY ORAL ROUTE ONCE DAILY FOR 30 DAYS  2    doxycycline hyclate (VIBRAMYCIN) 100 MG capsule Take 1 capsule by mouth 2 times daily for 10 days 20 capsule 0    albuterol (PROVENTIL) (2.5 MG/3ML) 0.083% nebulizer solution Take 3 mLs by nebulization 4 times daily 120 each 3    tiotropium (SPIRIVA) 18 MCG inhalation capsule Inhale 1 capsule into the lungs daily 30 capsule 3    fluticasone (FLONASE) 50 MCG/ACT nasal spray 2 sprays by Nasal route daily 1 Bottle 0    atorvastatin (LIPITOR) 20 MG tablet TAKE 1 TABLET DAILY 90 tablet 1    Multiple Vitamins-Minerals (CENTRUM SILVER 50+MEN PO) Take 1 tablet by mouth daily      aspirin 325 MG EC tablet Take 325 mg by mouth every 6 hours as needed for Pain      lisinopril (PRINIVIL;ZESTRIL) 20 MG tablet Take 1 tablet by mouth daily (Patient taking differently: Take 40 mg by mouth daily ) 30 tablet 2    Esomeprazole Magnesium (NEXIUM PO) Take by mouth       No current facility-administered medications for this visit. Allergies   Allergen Reactions    Codeine Nausea Only    Morphine Hives    Pcn [Penicillins] Hives       Health Maintenance   Topic Date Due    AAA screen  1950    Shingles Vaccine (1 of 2 - 2 Dose Series) 11/10/2001    Flu vaccine (1) 09/01/2018    Low dose CT lung screening  03/14/2019    Potassium monitoring  10/07/2019    Creatinine monitoring  10/07/2019    Pneumococcal low/med risk (2 of 2 - PPSV23) 10/20/2019    Diabetes screen  08/23/2021    Lipid screen  10/07/2023    Colon cancer screen colonoscopy  04/13/2027    DTaP/Tdap/Td vaccine (2 - Td) 11/25/2028       Subjective:     Review of Systems   Constitutional: Negative. HENT: Positive for congestion, postnasal drip, rhinorrhea, sinus pain and sinus pressure. Eyes: Negative. Respiratory: Positive for cough. Cardiovascular: Negative. Gastrointestinal: Negative. Endocrine: Negative. Genitourinary: Negative. Musculoskeletal: Negative. Skin: Positive for wound. Allergic/Immunologic: Negative. Neurological: Negative. Hematological: Negative. Psychiatric/Behavioral: Negative.         Objective:     Physical Exam   Constitutional: He is

## 2019-02-11 ENCOUNTER — TELEPHONE (OUTPATIENT)
Dept: FAMILY MEDICINE CLINIC | Age: 69
End: 2019-02-11

## 2019-02-28 ENCOUNTER — OFFICE VISIT (OUTPATIENT)
Dept: ORTHOPEDIC SURGERY | Age: 69
End: 2019-02-28
Payer: MEDICARE

## 2019-02-28 VITALS — HEIGHT: 69 IN | BODY MASS INDEX: 29.62 KG/M2 | WEIGHT: 200 LBS

## 2019-02-28 DIAGNOSIS — M75.42 IMPINGEMENT SYNDROME OF LEFT SHOULDER: ICD-10-CM

## 2019-02-28 DIAGNOSIS — M25.512 BILATERAL SHOULDER PAIN, UNSPECIFIED CHRONICITY: ICD-10-CM

## 2019-02-28 DIAGNOSIS — M75.41 IMPINGEMENT SYNDROME OF RIGHT SHOULDER: ICD-10-CM

## 2019-02-28 DIAGNOSIS — G56.03 BILATERAL CARPAL TUNNEL SYNDROME: ICD-10-CM

## 2019-02-28 DIAGNOSIS — M25.511 BILATERAL SHOULDER PAIN, UNSPECIFIED CHRONICITY: ICD-10-CM

## 2019-02-28 DIAGNOSIS — M77.12 LATERAL EPICONDYLITIS OF LEFT ELBOW: Primary | ICD-10-CM

## 2019-02-28 DIAGNOSIS — M47.892 OTHER OSTEOARTHRITIS OF SPINE, CERVICAL REGION: ICD-10-CM

## 2019-02-28 PROCEDURE — 99203 OFFICE O/P NEW LOW 30 MIN: CPT | Performed by: ORTHOPAEDIC SURGERY

## 2019-02-28 PROCEDURE — 20610 DRAIN/INJ JOINT/BURSA W/O US: CPT | Performed by: ORTHOPAEDIC SURGERY

## 2019-02-28 RX ORDER — TRIAMCINOLONE ACETONIDE 40 MG/ML
40 INJECTION, SUSPENSION INTRA-ARTICULAR; INTRAMUSCULAR ONCE
Status: COMPLETED | OUTPATIENT
Start: 2019-02-28 | End: 2019-02-28

## 2019-02-28 RX ORDER — LIDOCAINE HYDROCHLORIDE 10 MG/ML
1 INJECTION, SOLUTION EPIDURAL; INFILTRATION; INTRACAUDAL; PERINEURAL ONCE
Status: COMPLETED | OUTPATIENT
Start: 2019-02-28 | End: 2019-02-28

## 2019-02-28 RX ADMIN — TRIAMCINOLONE ACETONIDE 40 MG: 40 INJECTION, SUSPENSION INTRA-ARTICULAR; INTRAMUSCULAR at 16:38

## 2019-02-28 RX ADMIN — LIDOCAINE HYDROCHLORIDE 1 ML: 10 INJECTION, SOLUTION EPIDURAL; INFILTRATION; INTRACAUDAL; PERINEURAL at 16:37

## 2019-03-05 ENCOUNTER — HOSPITAL ENCOUNTER (OUTPATIENT)
Age: 69
Discharge: HOME | End: 2019-03-05
Payer: MEDICARE

## 2019-03-05 LAB
ABSOLUTE EOS #: 0.1 K/UL (ref 0–0.4)
ABSOLUTE IMMATURE GRANULOCYTE: ABNORMAL K/UL (ref 0–0.3)
ABSOLUTE LYMPH #: 3.3 K/UL (ref 1–4.8)
ABSOLUTE MONO #: 1 K/UL (ref 0.1–1.3)
ALBUMIN SERPL-MCNC: 4 G/DL (ref 3.5–5.2)
ALBUMIN/GLOBULIN RATIO: ABNORMAL (ref 1–2.5)
ALP BLD-CCNC: 87 U/L (ref 40–129)
ALT SERPL-CCNC: 52 U/L (ref 5–41)
ANION GAP SERPL CALCULATED.3IONS-SCNC: 12 MMOL/L (ref 9–17)
AST SERPL-CCNC: 38 U/L
BASOPHILS # BLD: 1 % (ref 0–2)
BASOPHILS ABSOLUTE: 0.1 K/UL (ref 0–0.2)
BILIRUB SERPL-MCNC: 0.46 MG/DL (ref 0.3–1.2)
BUN BLDV-MCNC: 18 MG/DL (ref 8–23)
BUN/CREAT BLD: ABNORMAL (ref 9–20)
CALCIUM SERPL-MCNC: 9.4 MG/DL (ref 8.6–10.4)
CHLORIDE BLD-SCNC: 103 MMOL/L (ref 98–107)
CO2: 22 MMOL/L (ref 20–31)
CREAT SERPL-MCNC: 0.78 MG/DL (ref 0.7–1.2)
DIFFERENTIAL TYPE: ABNORMAL
EOSINOPHILS RELATIVE PERCENT: 1 % (ref 0–4)
GFR AFRICAN AMERICAN: >60 ML/MIN
GFR NON-AFRICAN AMERICAN: >60 ML/MIN
GFR SERPL CREATININE-BSD FRML MDRD: ABNORMAL ML/MIN/{1.73_M2}
GFR SERPL CREATININE-BSD FRML MDRD: ABNORMAL ML/MIN/{1.73_M2}
GLUCOSE BLD-MCNC: 99 MG/DL (ref 70–99)
HCT VFR BLD CALC: 53.1 % (ref 41–53)
HEMOGLOBIN: 17.9 G/DL (ref 13.5–17.5)
HEPATITIS C ANTIBODY: REACTIVE
IMMATURE GRANULOCYTES: ABNORMAL %
LYMPHOCYTES # BLD: 33 % (ref 24–44)
MCH RBC QN AUTO: 32.8 PG (ref 26–34)
MCHC RBC AUTO-ENTMCNC: 33.8 G/DL (ref 31–37)
MCV RBC AUTO: 96.9 FL (ref 80–100)
MONOCYTES # BLD: 10 % (ref 1–7)
NRBC AUTOMATED: ABNORMAL PER 100 WBC
PARTIAL THROMBOPLASTIN TIME: 28.6 SEC (ref 24–36)
PDW BLD-RTO: 13 % (ref 11.5–14.9)
PLATELET # BLD: 197 K/UL (ref 150–450)
PLATELET ESTIMATE: ABNORMAL
PMV BLD AUTO: 9.2 FL (ref 6–12)
POTASSIUM SERPL-SCNC: 4 MMOL/L (ref 3.7–5.3)
RBC # BLD: 5.47 M/UL (ref 4.5–5.9)
RBC # BLD: ABNORMAL 10*6/UL
SEG NEUTROPHILS: 55 % (ref 36–66)
SEGMENTED NEUTROPHILS ABSOLUTE COUNT: 5.8 K/UL (ref 1.3–9.1)
SODIUM BLD-SCNC: 137 MMOL/L (ref 135–144)
TOTAL PROTEIN: 7.7 G/DL (ref 6.4–8.3)
WBC # BLD: 10.2 K/UL (ref 3.5–11)
WBC # BLD: ABNORMAL 10*3/UL

## 2019-03-05 PROCEDURE — 80053 COMPREHEN METABOLIC PANEL: CPT

## 2019-03-05 PROCEDURE — 84520 ASSAY OF UREA NITROGEN: CPT

## 2019-03-05 PROCEDURE — 83883 ASSAY NEPHELOMETRY NOT SPEC: CPT

## 2019-03-05 PROCEDURE — 86803 HEPATITIS C AB TEST: CPT

## 2019-03-05 PROCEDURE — 84450 TRANSFERASE (AST) (SGOT): CPT

## 2019-03-05 PROCEDURE — 36415 COLL VENOUS BLD VENIPUNCTURE: CPT

## 2019-03-05 PROCEDURE — 87902 NFCT AGT GNTYP ALYS HEP C: CPT

## 2019-03-05 PROCEDURE — 87522 HEPATITIS C REVRS TRNSCRPJ: CPT

## 2019-03-05 PROCEDURE — 85730 THROMBOPLASTIN TIME PARTIAL: CPT

## 2019-03-05 PROCEDURE — 84460 ALANINE AMINO (ALT) (SGPT): CPT

## 2019-03-05 PROCEDURE — 85025 COMPLETE CBC W/AUTO DIFF WBC: CPT

## 2019-03-05 PROCEDURE — 82977 ASSAY OF GGT: CPT

## 2019-03-07 LAB
ALANINE AMINOTRANSFERASE, FIBROMETER: 58 U/L (ref 5–50)
ALPHA-2-MACROGLOBULIN, FIBROMETER: 440 MG/DL (ref 131–293)
ASPARTATE AMINOTRANSFERASE, FIBROMETER: 40 U/L (ref 9–50)
CIRRHOMETER PATIENT SCORE: 0.08
EER FIBROMETER REPORT: ABNORMAL
FIBROMETER INTERPRETATION: ABNORMAL
FIBROMETER PATIENT SCORE: 0.83
FIBROMETER PLATELET COUNT: 197
FIBROMETER PROTHROMBIN INDEX: 96 % (ref 90–120)
FIBROSIS METAVIR CLASSIFICATION: ABNORMAL
GAMMA GLUTAMYL TRANSFERASE, FIBROMETER: 175 U/L (ref 7–51)
INFLAMETER METAVIR CLASSIFICATION: ABNORMAL
INFLAMETER PATIENT SCORE: 0.66
UREA NITROGEN, FIBROMETER: 17 MG/DL (ref 7–20)

## 2019-03-08 LAB
DIRECT EXAM: ABNORMAL
DIRECT EXAM: ABNORMAL
Lab: ABNORMAL
SPECIMEN DESCRIPTION: ABNORMAL

## 2019-03-10 LAB
HCV QUANTITATIVE: NORMAL
HEPATITIS C GENOTYPE: NORMAL

## 2019-03-11 DIAGNOSIS — M25.511 ACUTE PAIN OF RIGHT SHOULDER: Primary | ICD-10-CM

## 2019-03-12 ENCOUNTER — OFFICE VISIT (OUTPATIENT)
Dept: ORTHOPEDIC SURGERY | Age: 69
End: 2019-03-12
Payer: MEDICARE

## 2019-03-12 DIAGNOSIS — M19.011 OSTEOARTHRITIS OF RIGHT GLENOHUMERAL JOINT: Primary | ICD-10-CM

## 2019-03-12 PROCEDURE — 99203 OFFICE O/P NEW LOW 30 MIN: CPT | Performed by: ORTHOPAEDIC SURGERY

## 2019-03-13 ENCOUNTER — TELEPHONE (OUTPATIENT)
Dept: ONCOLOGY | Age: 69
End: 2019-03-13

## 2019-04-02 ENCOUNTER — TELEPHONE (OUTPATIENT)
Dept: UROLOGY | Age: 69
End: 2019-04-02

## 2019-04-02 NOTE — TELEPHONE ENCOUNTER
Prostate BX & US @ [facility] 4/24/19 2:00pm **STOP BLOOD THINNERS 4/17/19**   PAT- @ [facility] 4/10/19 2:00pm           Spoke with Hannah, procedure info may be picked up in office.

## 2019-04-10 ENCOUNTER — HOSPITAL ENCOUNTER (OUTPATIENT)
Dept: PREADMISSION TESTING | Age: 69
Discharge: HOME | End: 2019-04-14
Payer: MEDICARE

## 2019-04-10 VITALS
TEMPERATURE: 98.5 F | HEART RATE: 66 BPM | OXYGEN SATURATION: 96 % | HEIGHT: 69 IN | SYSTOLIC BLOOD PRESSURE: 126 MMHG | RESPIRATION RATE: 16 BRPM | WEIGHT: 203.26 LBS | DIASTOLIC BLOOD PRESSURE: 84 MMHG | BODY MASS INDEX: 30.11 KG/M2

## 2019-04-10 LAB
ANION GAP SERPL CALCULATED.3IONS-SCNC: 12 MMOL/L (ref 9–17)
BUN BLDV-MCNC: 14 MG/DL (ref 8–23)
CHLORIDE BLD-SCNC: 106 MMOL/L (ref 98–107)
CO2: 24 MMOL/L (ref 20–31)
CREAT SERPL-MCNC: 0.66 MG/DL (ref 0.7–1.2)
GFR AFRICAN AMERICAN: >60 ML/MIN
GFR NON-AFRICAN AMERICAN: >60 ML/MIN
GFR SERPL CREATININE-BSD FRML MDRD: ABNORMAL ML/MIN/{1.73_M2}
GFR SERPL CREATININE-BSD FRML MDRD: ABNORMAL ML/MIN/{1.73_M2}
GLUCOSE BLD-MCNC: 108 MG/DL (ref 70–99)
HCT VFR BLD CALC: 53.6 % (ref 40.7–50.3)
HEMOGLOBIN: 17.8 G/DL (ref 13–17)
POTASSIUM SERPL-SCNC: 5.1 MMOL/L (ref 3.7–5.3)
SODIUM BLD-SCNC: 142 MMOL/L (ref 135–144)

## 2019-04-10 PROCEDURE — 80051 ELECTROLYTE PANEL: CPT

## 2019-04-10 PROCEDURE — 85018 HEMOGLOBIN: CPT

## 2019-04-10 PROCEDURE — 84520 ASSAY OF UREA NITROGEN: CPT

## 2019-04-10 PROCEDURE — 82947 ASSAY GLUCOSE BLOOD QUANT: CPT

## 2019-04-10 PROCEDURE — 82565 ASSAY OF CREATININE: CPT

## 2019-04-10 PROCEDURE — 93005 ELECTROCARDIOGRAM TRACING: CPT

## 2019-04-10 PROCEDURE — 87086 URINE CULTURE/COLONY COUNT: CPT

## 2019-04-10 PROCEDURE — 85014 HEMATOCRIT: CPT

## 2019-04-10 PROCEDURE — 36415 COLL VENOUS BLD VENIPUNCTURE: CPT

## 2019-04-10 RX ORDER — SODIUM CHLORIDE, SODIUM LACTATE, POTASSIUM CHLORIDE, CALCIUM CHLORIDE 600; 310; 30; 20 MG/100ML; MG/100ML; MG/100ML; MG/100ML
1000 INJECTION, SOLUTION INTRAVENOUS CONTINUOUS
Status: CANCELLED | OUTPATIENT
Start: 2019-04-10

## 2019-04-10 RX ORDER — LISINOPRIL 40 MG/1
40 TABLET ORAL NIGHTLY
COMMUNITY

## 2019-04-10 RX ORDER — CLONAZEPAM 1 MG/1
1 TABLET ORAL 2 TIMES DAILY
COMMUNITY

## 2019-04-10 RX ORDER — ALBUTEROL SULFATE 90 UG/1
2 AEROSOL, METERED RESPIRATORY (INHALATION) EVERY 6 HOURS PRN
COMMUNITY

## 2019-04-10 NOTE — H&P
History and Physical    Pt Name: Basia Scherer  MRN: [id number]  YOB: 1950  Date of evaluation: 4/10/2019    SUBJECTIVE:   History of Chief Complaint:    Patient complains of elevated PSA level, nocturia. He says that he thinks it is due to an \"infection\" that he had last fall. He has been scheduled for prostate biopsy. Past Medical History    has a past medical history of Anxiety state, unspecified, Carotid artery stenosis, Colon polyp, Depressive disorder, not elsewhere classified, Essential hypertension, benign, GERD (gastroesophageal reflux disease), History of colon polyps, History of hepatitis C, Hyperlipidemia, Impotence of organic origin, Lipoma of unspecified site, Lumbago, No natural teeth, Secondary localized osteoarthrosis, shoulder region, and Swelling of limb. Past Surgical History   has a past surgical history that includes Carpal tunnel release (Bilateral); Ankle surgery (Right); shoulder surgery (Right); Colonoscopy (04/13/2017); pr colon ca scrn not hi rsk ind (N/A, 4/13/2017); cervical fusion; lumbar fusion; EXCISION / BIOPSY SKIN LESION OF ARM (Left, 9/15/2017); and Ulnar tunnel release (Left). Medications  Prior to Admission medications    Medication Sig Start Date End Date Taking? Authorizing Provider   lisinopril (PRINIVIL;ZESTRIL) 40 MG tablet Take 40 mg by mouth daily   Yes Historical Provider, MD   clonazePAM (KLONOPIN) 1 MG tablet Take 1 mg by mouth 2 times daily as needed.    Yes Historical Provider, MD   albuterol sulfate  (90 Base) MCG/ACT inhaler Inhale 2 puffs into the lungs every 6 hours as needed for Wheezing   Yes Historical Provider, MD   pantoprazole (PROTONIX) 40 MG tablet TAKE 1 TABLET (40 MG) BY ORAL ROUTE ONCE DAILY FOR 30 DAYS 12/13/18  Yes Historical Provider, MD   amLODIPine (NORVASC) 5 MG tablet TAKE 1 TABLET (5 MG) BY ORAL ROUTE ONCE DAILY FOR 30 DAYS 12/12/18  Yes Historical Provider, MD   sertraline (ZOLOFT) 50 MG tablet TAKE 1 TABLET (50 MG) BY ORAL ROUTE ONCE DAILY FOR 30 DAYS 12/5/18  Yes Historical Provider, MD   tiotropium (SPIRIVA) 18 MCG inhalation capsule Inhale 1 capsule into the lungs daily 8/24/18  Yes Elena Laboy MD   fluticasone (FLONASE) 50 MCG/ACT nasal spray 2 sprays by Nasal route daily 7/2/18  Yes Salena Gupta MD   atorvastatin (LIPITOR) 20 MG tablet TAKE 1 TABLET DAILY 3/27/18  Yes Salena Gupta MD   Multiple Vitamins-Minerals (CENTRUM SILVER 50+MEN PO) Take 1 tablet by mouth daily   Yes Historical Provider, MD   aspirin 325 MG EC tablet Take 325 mg by mouth every 6 hours as needed for Pain   Yes Historical Provider, MD   fluticasone (FLONASE) 50 MCG/ACT nasal spray 1 spray by Nasal route 2 times daily for 14 days 12/20/18 1/3/19  Ashlyn Samano MD   ibuprofen (ADVIL;MOTRIN) 800 MG tablet Take 1 tablet by mouth every 6 hours as needed for Pain 12/20/18 1/19/19  Ashlyn Samano MD   butalbital-acetaminophen-caffeine (FIORICET, ESGIC) -06 MG per tablet Please take one pill at night when necessary headache. 12/20/18   Ashlyn Samano MD     Allergies  is allergic to bee venom; fentanyl; morphine; pcn [penicillins]; and codeine. Family History  family history includes Cancer in his maternal grandfather; Diabetes in his maternal grandmother and maternal uncle; High Blood Pressure in his maternal uncle; No Known Problems in his mother; Other in his father. Social History   reports that he has been smoking cigarettes. He has a 54.00 pack-year smoking history. He has never used smokeless tobacco.  1 PPD for 54 years. reports that he drinks alcohol. Occasionally. reports that he does not use drugs. Marital Status   Occupation retired [employer] employee    OBJECTIVE:   VITALS:  height is 5' 9\" (1.753 m) and weight is 203 lb 4.2 oz (92.2 kg). His oral temperature is 98.5 °F (36.9 °C). His blood pressure is 126/84 and his pulse is 66. His respiration is 16 and oxygen saturation is 96%.    CONSTITUTIONAL:alert & oriented x 3, no acute distress. Very talkative. BUE tremor noted. SKIN:  Warm and dry, no rashes. Tattoos present. HEAD:  Normocephalic, atraumatic   EYES: PERRL. EOMs intact. EARS:  Hearing loss. NOSE:  Nares patent. No rhinorrhea. MOUTH/THROAT:  No teeth. NECK:supple, no lymphadenopathy  LUNGS: Clear to auscultation bilaterally, no wheezes. CARDIOVASCULAR: Heart sounds are normal.  Regular rate and rhythm without murmur. ABDOMEN: soft, non tender, non distended. EXTREMITIES: no edema bilateral lower extremities. Left wrist post-surgical scar noted (carpal tunnel release last month). Testing:   EK/10/19  Labs pending: drawn 4/10/2019     IMPRESSIONS:   1. Elevated PSA  2.  has a past medical history of Anxiety state, unspecified, Carotid artery stenosis, Colon polyp, Depressive disorder, not elsewhere classified, Essential hypertension, benign, GERD (gastroesophageal reflux disease), History of colon polyps, History of hepatitis C, Hyperlipidemia, Impotence of organic origin, Lipoma of unspecified site, Lumbago, No natural teeth, Secondary localized osteoarthrosis, shoulder region, and Swelling of limb. PLANS:   1.  Prostate biopsy    CAROLINE Espinosa PA-C  Electronically signed 4/10/2019 at 3:16 PM

## 2019-04-10 NOTE — PROGRESS NOTES
Anesthesia Focused Assessment    STOP-BANG Sleep Apnea Questionnaire    SNORE loudly (heard through closed doors)? No  TIRED, fatigued, sleepy during daytime? No  OBSERVED stopping breathing during sleep? No  High blood PRESSURE being treated? Yes    BMI over 35? No  AGE over 48? Yes  NECK circumference over 16\"? No  GENDER (male)? Yes             Total 3  High risk 5-8  Intermediate risk 3-4  Low risk 0-2    Obstructive Sleep Apnea: denies  If YES, machine used: no     Type 1 DM:   no  T2DM:  no    Coronary Artery Disease:  no  Hypertension:  yes    Active smoker:  1 ppd for 54 years  Drinks Alcohol:  occasionally    Dentition: no teeth    Defib / AICD / Pacemaker: no      Renal Failure/dialysis:  no    Patient was evaluated in PAT & anesthesia guidelines were applied. NPO guidelines, medication instructions and scheduled arrival time were reviewed with patient. Hx of anesthesia complications:  no  Family hx of anesthesia complications:  no                                                                                                                     Anesthesia contacted:   no  Medical or cardiac clearance ordered: cardiac clearance is in the chart.     Laurence Diaz PA-C  4/10/19  2:35 PM

## 2019-04-10 NOTE — H&P (VIEW-ONLY)
History and Physical    Pt Name: Casa Tang  MRN: [id number]  YOB: 1950  Date of evaluation: 4/10/2019    SUBJECTIVE:   History of Chief Complaint:    Patient complains of elevated PSA level, nocturia. He says that he thinks it is due to an \"infection\" that he had last fall. He has been scheduled for prostate biopsy. Past Medical History    has a past medical history of Anxiety state, unspecified, Carotid artery stenosis, Colon polyp, Depressive disorder, not elsewhere classified, Essential hypertension, benign, GERD (gastroesophageal reflux disease), History of colon polyps, History of hepatitis C, Hyperlipidemia, Impotence of organic origin, Lipoma of unspecified site, Lumbago, No natural teeth, Secondary localized osteoarthrosis, shoulder region, and Swelling of limb. Past Surgical History   has a past surgical history that includes Carpal tunnel release (Bilateral); Ankle surgery (Right); shoulder surgery (Right); Colonoscopy (04/13/2017); pr colon ca scrn not hi rsk ind (N/A, 4/13/2017); cervical fusion; lumbar fusion; EXCISION / BIOPSY SKIN LESION OF ARM (Left, 9/15/2017); and Ulnar tunnel release (Left). Medications  Prior to Admission medications    Medication Sig Start Date End Date Taking? Authorizing Provider   lisinopril (PRINIVIL;ZESTRIL) 40 MG tablet Take 40 mg by mouth daily   Yes Historical Provider, MD   clonazePAM (KLONOPIN) 1 MG tablet Take 1 mg by mouth 2 times daily as needed.    Yes Historical Provider, MD   albuterol sulfate  (90 Base) MCG/ACT inhaler Inhale 2 puffs into the lungs every 6 hours as needed for Wheezing   Yes Historical Provider, MD   pantoprazole (PROTONIX) 40 MG tablet TAKE 1 TABLET (40 MG) BY ORAL ROUTE ONCE DAILY FOR 30 DAYS 12/13/18  Yes Historical Provider, MD   amLODIPine (NORVASC) 5 MG tablet TAKE 1 TABLET (5 MG) BY ORAL ROUTE ONCE DAILY FOR 30 DAYS 12/12/18  Yes Historical Provider, MD   sertraline (ZOLOFT) 50 MG tablet TAKE 1 TABLET (50 MG) BY ORAL ROUTE ONCE DAILY FOR 30 DAYS 12/5/18  Yes Historical Provider, MD   tiotropium (SPIRIVA) 18 MCG inhalation capsule Inhale 1 capsule into the lungs daily 8/24/18  Yes Betty Portillo MD   fluticasone (FLONASE) 50 MCG/ACT nasal spray 2 sprays by Nasal route daily 7/2/18  Yes Cris Salas MD   atorvastatin (LIPITOR) 20 MG tablet TAKE 1 TABLET DAILY 3/27/18  Yes Cris Primus, MD   Multiple Vitamins-Minerals (CENTRUM SILVER 50+MEN PO) Take 1 tablet by mouth daily   Yes Historical Provider, MD   aspirin 325 MG EC tablet Take 325 mg by mouth every 6 hours as needed for Pain   Yes Historical Provider, MD   fluticasone (FLONASE) 50 MCG/ACT nasal spray 1 spray by Nasal route 2 times daily for 14 days 12/20/18 1/3/19  Taqueria Salazar MD   ibuprofen (ADVIL;MOTRIN) 800 MG tablet Take 1 tablet by mouth every 6 hours as needed for Pain 12/20/18 1/19/19  Taqueria Salazar MD   butalbital-acetaminophen-caffeine (FIORICET, ESGIC) -63 MG per tablet Please take one pill at night when necessary headache. 12/20/18   Taqueria Salazar MD     Allergies  is allergic to bee venom; fentanyl; morphine; pcn [penicillins]; and codeine. Family History  family history includes Cancer in his maternal grandfather; Diabetes in his maternal grandmother and maternal uncle; High Blood Pressure in his maternal uncle; No Known Problems in his mother; Other in his father. Social History   reports that he has been smoking cigarettes. He has a 54.00 pack-year smoking history. He has never used smokeless tobacco.  1 PPD for 54 years. reports that he drinks alcohol. Occasionally. reports that he does not use drugs. Marital Status   Occupation retired [employer] employee    OBJECTIVE:   VITALS:  height is 5' 9\" (1.753 m) and weight is 203 lb 4.2 oz (92.2 kg). His oral temperature is 98.5 °F (36.9 °C). His blood pressure is 126/84 and his pulse is 66. His respiration is 16 and oxygen saturation is 96%.    CONSTITUTIONAL:alert & oriented x 3, no acute distress. Very talkative. BUE tremor noted. SKIN:  Warm and dry, no rashes. Tattoos present. HEAD:  Normocephalic, atraumatic   EYES: PERRL. EOMs intact. EARS:  Hearing loss. NOSE:  Nares patent. No rhinorrhea. MOUTH/THROAT:  No teeth. NECK:supple, no lymphadenopathy  LUNGS: Clear to auscultation bilaterally, no wheezes. CARDIOVASCULAR: Heart sounds are normal.  Regular rate and rhythm without murmur. ABDOMEN: soft, non tender, non distended. EXTREMITIES: no edema bilateral lower extremities. Left wrist post-surgical scar noted (carpal tunnel release last month). Testing:   EK/10/19  Labs pending: drawn 4/10/2019     IMPRESSIONS:   1. Elevated PSA  2.  has a past medical history of Anxiety state, unspecified, Carotid artery stenosis, Colon polyp, Depressive disorder, not elsewhere classified, Essential hypertension, benign, GERD (gastroesophageal reflux disease), History of colon polyps, History of hepatitis C, Hyperlipidemia, Impotence of organic origin, Lipoma of unspecified site, Lumbago, No natural teeth, Secondary localized osteoarthrosis, shoulder region, and Swelling of limb. PLANS:   1.  Prostate biopsy    CAROLINE Patel PA-C  Electronically signed 4/10/2019 at 3:16 PM

## 2019-04-11 LAB
CULTURE: NORMAL
EKG ATRIAL RATE: 68 BPM
EKG P AXIS: 46 DEGREES
EKG P-R INTERVAL: 160 MS
EKG Q-T INTERVAL: 408 MS
EKG QRS DURATION: 82 MS
EKG QTC CALCULATION (BAZETT): 433 MS
EKG R AXIS: 35 DEGREES
EKG T AXIS: 23 DEGREES
EKG VENTRICULAR RATE: 68 BPM
Lab: NORMAL
SPECIMEN DESCRIPTION: NORMAL

## 2019-04-19 RX ORDER — CIPROFLOXACIN 500 MG/1
500 TABLET, FILM COATED ORAL 2 TIMES DAILY
Qty: 6 TABLET | Refills: 0 | Status: SHIPPED | OUTPATIENT
Start: 2019-04-23 | End: 2019-04-26

## 2019-04-24 ENCOUNTER — ANESTHESIA EVENT (OUTPATIENT)
Dept: OPERATING ROOM | Age: 69
End: 2019-04-24
Payer: MEDICARE

## 2019-04-24 ENCOUNTER — HOSPITAL ENCOUNTER (OUTPATIENT)
Dept: ULTRASOUND IMAGING | Age: 69
Discharge: HOME | End: 2019-04-26
Attending: UROLOGY
Payer: MEDICARE

## 2019-04-24 ENCOUNTER — HOSPITAL ENCOUNTER (OUTPATIENT)
Age: 69
Setting detail: OUTPATIENT SURGERY
Discharge: HOME | End: 2019-04-24
Attending: UROLOGY | Admitting: UROLOGY
Payer: MEDICARE

## 2019-04-24 ENCOUNTER — ANESTHESIA (OUTPATIENT)
Dept: OPERATING ROOM | Age: 69
End: 2019-04-24
Payer: MEDICARE

## 2019-04-24 VITALS — SYSTOLIC BLOOD PRESSURE: 113 MMHG | DIASTOLIC BLOOD PRESSURE: 64 MMHG | OXYGEN SATURATION: 98 %

## 2019-04-24 VITALS
WEIGHT: 200 LBS | TEMPERATURE: 97.3 F | BODY MASS INDEX: 29.62 KG/M2 | SYSTOLIC BLOOD PRESSURE: 138 MMHG | HEART RATE: 64 BPM | HEIGHT: 69 IN | RESPIRATION RATE: 16 BRPM | OXYGEN SATURATION: 97 % | DIASTOLIC BLOOD PRESSURE: 83 MMHG

## 2019-04-24 DIAGNOSIS — R97.20 ELEVATED PSA: Primary | ICD-10-CM

## 2019-04-24 PROCEDURE — 7100000011 HC PHASE II RECOVERY - ADDTL 15 MIN: Performed by: UROLOGY

## 2019-04-24 PROCEDURE — 88344 IMHCHEM/IMCYTCHM EA MLT ANTB: CPT

## 2019-04-24 PROCEDURE — 6360000002 HC RX W HCPCS: Performed by: NURSE ANESTHETIST, CERTIFIED REGISTERED

## 2019-04-24 PROCEDURE — 3600000012 HC SURGERY LEVEL 2 ADDTL 15MIN: Performed by: UROLOGY

## 2019-04-24 PROCEDURE — 2580000003 HC RX 258: Performed by: ANESTHESIOLOGY

## 2019-04-24 PROCEDURE — 7100000010 HC PHASE II RECOVERY - FIRST 15 MIN: Performed by: UROLOGY

## 2019-04-24 PROCEDURE — 2580000003 HC RX 258: Performed by: NURSE ANESTHETIST, CERTIFIED REGISTERED

## 2019-04-24 PROCEDURE — 3700000000 HC ANESTHESIA ATTENDED CARE: Performed by: UROLOGY

## 2019-04-24 PROCEDURE — 3600000002 HC SURGERY LEVEL 2 BASE: Performed by: UROLOGY

## 2019-04-24 PROCEDURE — 88305 TISSUE EXAM BY PATHOLOGIST: CPT

## 2019-04-24 PROCEDURE — 76942 ECHO GUIDE FOR BIOPSY: CPT

## 2019-04-24 PROCEDURE — 2500000003 HC RX 250 WO HCPCS: Performed by: NURSE ANESTHETIST, CERTIFIED REGISTERED

## 2019-04-24 PROCEDURE — 3700000001 HC ADD 15 MINUTES (ANESTHESIA): Performed by: UROLOGY

## 2019-04-24 PROCEDURE — 2709999900 HC NON-CHARGEABLE SUPPLY: Performed by: UROLOGY

## 2019-04-24 PROCEDURE — 2500000003 HC RX 250 WO HCPCS: Performed by: UROLOGY

## 2019-04-24 RX ORDER — LABETALOL HYDROCHLORIDE 5 MG/ML
5 INJECTION, SOLUTION INTRAVENOUS EVERY 10 MIN PRN
Status: DISCONTINUED | OUTPATIENT
Start: 2019-04-24 | End: 2019-04-24 | Stop reason: HOSPADM

## 2019-04-24 RX ORDER — PROPOFOL 10 MG/ML
INJECTION, EMULSION INTRAVENOUS PRN
Status: DISCONTINUED | OUTPATIENT
Start: 2019-04-24 | End: 2019-04-24 | Stop reason: SDUPTHER

## 2019-04-24 RX ORDER — MIDAZOLAM HYDROCHLORIDE 1 MG/ML
INJECTION INTRAMUSCULAR; INTRAVENOUS PRN
Status: DISCONTINUED | OUTPATIENT
Start: 2019-04-24 | End: 2019-04-24 | Stop reason: SDUPTHER

## 2019-04-24 RX ORDER — PROPOFOL 10 MG/ML
INJECTION, EMULSION INTRAVENOUS CONTINUOUS PRN
Status: DISCONTINUED | OUTPATIENT
Start: 2019-04-24 | End: 2019-04-24 | Stop reason: SDUPTHER

## 2019-04-24 RX ORDER — ONDANSETRON 2 MG/ML
4 INJECTION INTRAMUSCULAR; INTRAVENOUS
Status: DISCONTINUED | OUTPATIENT
Start: 2019-04-24 | End: 2019-04-24 | Stop reason: HOSPADM

## 2019-04-24 RX ORDER — LIDOCAINE HYDROCHLORIDE 10 MG/ML
INJECTION, SOLUTION EPIDURAL; INFILTRATION; INTRACAUDAL; PERINEURAL PRN
Status: DISCONTINUED | OUTPATIENT
Start: 2019-04-24 | End: 2019-04-24 | Stop reason: SDUPTHER

## 2019-04-24 RX ORDER — DIPHENHYDRAMINE HYDROCHLORIDE 50 MG/ML
12.5 INJECTION INTRAMUSCULAR; INTRAVENOUS
Status: DISCONTINUED | OUTPATIENT
Start: 2019-04-24 | End: 2019-04-24 | Stop reason: HOSPADM

## 2019-04-24 RX ORDER — SODIUM CHLORIDE, SODIUM LACTATE, POTASSIUM CHLORIDE, CALCIUM CHLORIDE 600; 310; 30; 20 MG/100ML; MG/100ML; MG/100ML; MG/100ML
1000 INJECTION, SOLUTION INTRAVENOUS CONTINUOUS
Status: DISCONTINUED | OUTPATIENT
Start: 2019-04-24 | End: 2019-04-24 | Stop reason: HOSPADM

## 2019-04-24 RX ORDER — HYDROCODONE BITARTRATE AND ACETAMINOPHEN 5; 325 MG/1; MG/1
1 TABLET ORAL EVERY 6 HOURS PRN
Qty: 20 TABLET | Refills: 0 | Status: SHIPPED | OUTPATIENT
Start: 2019-04-24 | End: 2019-05-01

## 2019-04-24 RX ORDER — SODIUM CHLORIDE, SODIUM LACTATE, POTASSIUM CHLORIDE, CALCIUM CHLORIDE 600; 310; 30; 20 MG/100ML; MG/100ML; MG/100ML; MG/100ML
INJECTION, SOLUTION INTRAVENOUS CONTINUOUS PRN
Status: DISCONTINUED | OUTPATIENT
Start: 2019-04-24 | End: 2019-04-24 | Stop reason: SDUPTHER

## 2019-04-24 RX ORDER — LIDOCAINE HYDROCHLORIDE 10 MG/ML
INJECTION, SOLUTION EPIDURAL; INFILTRATION; INTRACAUDAL; PERINEURAL PRN
Status: DISCONTINUED | OUTPATIENT
Start: 2019-04-24 | End: 2019-04-24 | Stop reason: ALTCHOICE

## 2019-04-24 RX ADMIN — SODIUM CHLORIDE, POTASSIUM CHLORIDE, SODIUM LACTATE AND CALCIUM CHLORIDE 1000 ML: 600; 310; 30; 20 INJECTION, SOLUTION INTRAVENOUS at 13:29

## 2019-04-24 RX ADMIN — PROPOFOL 75 MCG/KG/MIN: 10 INJECTION, EMULSION INTRAVENOUS at 14:17

## 2019-04-24 RX ADMIN — SODIUM CHLORIDE, POTASSIUM CHLORIDE, SODIUM LACTATE AND CALCIUM CHLORIDE: 600; 310; 30; 20 INJECTION, SOLUTION INTRAVENOUS at 14:06

## 2019-04-24 RX ADMIN — PROPOFOL 20 MG: 10 INJECTION, EMULSION INTRAVENOUS at 14:26

## 2019-04-24 RX ADMIN — LIDOCAINE HYDROCHLORIDE 50 MG: 10 INJECTION, SOLUTION EPIDURAL; INFILTRATION; INTRACAUDAL; PERINEURAL at 14:17

## 2019-04-24 RX ADMIN — PROPOFOL 30 MG: 10 INJECTION, EMULSION INTRAVENOUS at 14:19

## 2019-04-24 RX ADMIN — PROPOFOL 50 MG: 10 INJECTION, EMULSION INTRAVENOUS at 14:17

## 2019-04-24 RX ADMIN — MIDAZOLAM HYDROCHLORIDE 1 MG: 1 INJECTION, SOLUTION INTRAMUSCULAR; INTRAVENOUS at 14:14

## 2019-04-24 RX ADMIN — MIDAZOLAM HYDROCHLORIDE 1 MG: 1 INJECTION, SOLUTION INTRAMUSCULAR; INTRAVENOUS at 14:08

## 2019-04-24 RX ADMIN — PROPOFOL 30 MG: 10 INJECTION, EMULSION INTRAVENOUS at 14:21

## 2019-04-24 ASSESSMENT — PULMONARY FUNCTION TESTS
PIF_VALUE: 1
PIF_VALUE: 0
PIF_VALUE: 1
PIF_VALUE: 0
PIF_VALUE: 1
PIF_VALUE: 0
PIF_VALUE: 1
PIF_VALUE: 1
PIF_VALUE: 0
PIF_VALUE: 0
PIF_VALUE: 1

## 2019-04-24 ASSESSMENT — PAIN SCALES - GENERAL
PAINLEVEL_OUTOF10: 0

## 2019-04-24 ASSESSMENT — PAIN - FUNCTIONAL ASSESSMENT: PAIN_FUNCTIONAL_ASSESSMENT: 0-10

## 2019-04-24 ASSESSMENT — LIFESTYLE VARIABLES: SMOKING_STATUS: 1

## 2019-04-24 NOTE — ANESTHESIA PRE PROCEDURE
Department of Anesthesiology  Preprocedure Note       Name:  Casa Tang   Age:  80 y.o.  :  1950                                          MRN:  [id number]         Date:  2019      Surgeon: Christine Olivera):  MD Armando Ovalles MD    Procedure: PROSTATE BIOPSY WITH ULTRASOUND  (OFFICE TO NOTIFY U.S) (N/A )    Department of Anesthesiology  Pre-Anesthesia Evaluation/Consultation         Name:  Casa Tang                                         Age:  80 y.o.   MRN:  [id number]             Medications  Current Facility-Administered Medications   Medication Dose Route Frequency Provider Last Rate Last Dose    lactated ringers infusion 1,000 mL  1,000 mL Intravenous Continuous Alia Du MD           Allergies   Allergen Reactions    Bee Venom Anaphylaxis    Fentanyl Swelling    Morphine Hives    Pcn [Penicillins] Hives    Codeine Nausea Only     Patient Active Problem List   Diagnosis    Colon polyps    Viral hepatitis C without hepatic coma    GERD (gastroesophageal reflux disease)    Anxiety    COPD (chronic obstructive pulmonary disease) (HCC)    Hyperlipidemia    Essential hypertension    Chest pain    Tobacco abuse    Osteoarthritis of right glenohumeral joint     Past Medical History:   Diagnosis Date    Anxiety state, unspecified     Carotid artery stenosis     Colon polyp     Depressive disorder, not elsewhere classified     Essential hypertension, benign     GERD (gastroesophageal reflux disease)     History of colon polyps     History of hepatitis C     Hyperlipidemia     Impotence of organic origin     Lipoma of unspecified site     Lumbago     No natural teeth     Secondary localized osteoarthrosis, shoulder region     Swelling of limb      Past Surgical History:   Procedure Laterality Date    ANKLE SURGERY Right     CARPAL TUNNEL RELEASE Bilateral     CERVICAL FUSION      anterior    COLONOSCOPY  2017    EXCISION / BIOPSY SKIN Value Date    APTT 28.6 03/05/2019       HCG (If Applicable) No results found for: PREGTESTUR, PREGSERUM, HCG, HCGQUANT     ABGs No results found for: PHART, PO2ART, CRF1NBD, YNP0MQT, BEART, A2CXMDUA     Type & Screen (If Applicable)  No results found for: LABABO, [address]    Radiology (If Applicable)    Cardiac Testing (If Applicable) neg stress '18,nl EF    EKG (If Applicable) PAC's          Medications prior to admission:   Prior to Admission medications    Medication Sig Start Date End Date Taking? Authorizing Provider   ciprofloxacin (CIPRO) 500 MG tablet Take 1 tablet by mouth 2 times daily for 3 days Start the day before the procedure 4/23/19 4/26/19 Yes Lazarus Saltness, MD   lisinopril (PRINIVIL;ZESTRIL) 40 MG tablet Take 40 mg by mouth daily   Yes Historical Provider, MD   clonazePAM (KLONOPIN) 1 MG tablet Take 1 mg by mouth 2 times daily as needed.    Yes Historical Provider, MD   pantoprazole (PROTONIX) 40 MG tablet TAKE 1 TABLET (40 MG) BY ORAL ROUTE ONCE DAILY FOR 30 DAYS 12/13/18  Yes Historical Provider, MD   amLODIPine (NORVASC) 5 MG tablet TAKE 1 TABLET (5 MG) BY ORAL ROUTE ONCE DAILY FOR 30 DAYS 12/12/18  Yes Historical Provider, MD   butalbital-acetaminophen-caffeine (FIORICET, ESGIC) -40 MG per tablet Please take one pill at night when necessary headache. 12/20/18  Yes Abdelrahman Pritchett MD   sertraline (ZOLOFT) 50 MG tablet TAKE 1 TABLET (50 MG) BY ORAL ROUTE ONCE DAILY FOR 30 DAYS 12/5/18  Yes Historical Provider, MD   tiotropium (SPIRIVA) 18 MCG inhalation capsule Inhale 1 capsule into the lungs daily 8/24/18  Yes Duglas Arrieta MD   fluticasone (FLONASE) 50 MCG/ACT nasal spray 2 sprays by Nasal route daily 7/2/18  Yes Kamilla Morrison MD   atorvastatin (LIPITOR) 20 MG tablet TAKE 1 TABLET DAILY 3/27/18  Yes Kamilla Morrison MD   Multiple Vitamins-Minerals (CENTRUM SILVER 50+MEN PO) Take 1 tablet by mouth daily   Yes Historical Provider, MD   albuterol sulfate  (90 Base) MCG/ACT inhaler Inhale 2 puffs into the lungs every 6 hours as needed for Wheezing    Historical Provider, MD   fluticasone (FLONASE) 50 MCG/ACT nasal spray 1 spray by Nasal route 2 times daily for 14 days 12/20/18 1/3/19  Dennis Ruano MD   ibuprofen (ADVIL;MOTRIN) 800 MG tablet Take 1 tablet by mouth every 6 hours as needed for Pain 12/20/18 1/19/19  Dennis Ruano MD   aspirin 325 MG EC tablet Take 325 mg by mouth every 6 hours as needed for Pain    Historical Provider, MD       Current medications:    Current Facility-Administered Medications   Medication Dose Route Frequency Provider Last Rate Last Dose    lactated ringers infusion 1,000 mL  1,000 mL Intravenous Continuous Vaughn Zelaya MD           Allergies:     Allergies   Allergen Reactions    Bee Venom Anaphylaxis    Fentanyl Swelling    Morphine Hives    Pcn [Penicillins] Hives    Codeine Nausea Only       Problem List:    Patient Active Problem List   Diagnosis Code    Colon polyps K63.5    Viral hepatitis C without hepatic coma B19.20    GERD (gastroesophageal reflux disease) K21.9    Anxiety F41.9    COPD (chronic obstructive pulmonary disease) (HCC) J44.9    Hyperlipidemia E78.5    Essential hypertension I10    Chest pain R07.9    Tobacco abuse Z72.0    Osteoarthritis of right glenohumeral joint M19.011       Past Medical History:        Diagnosis Date    Anxiety state, unspecified     Carotid artery stenosis     Colon polyp     Depressive disorder, not elsewhere classified     Essential hypertension, benign     GERD (gastroesophageal reflux disease)     History of colon polyps     History of hepatitis C     Hyperlipidemia     Impotence of organic origin     Lipoma of unspecified site     Lumbago     No natural teeth     Secondary localized osteoarthrosis, shoulder region     Swelling of limb        Past Surgical History:        Procedure Laterality Date    ANKLE SURGERY Right     CARPAL TUNNEL RELEASE Bilateral    

## 2019-04-24 NOTE — OP NOTE
Dr. Carter Eng MD      [address] [facility]. [place]  04/24/19    Patient:  José Antonio Espinoza  MRN: [id number]  YOB: 1950    Surgeon: Carter Eng MD  Assistant: Vikas Marin MD    Pre-op Diagnosis: Elevated PSA. Post-op Diagnosis: Elevated PSA. Procedure:   Procedure(s):  PROSTATE BIOPSY WITH ULTRASOUND  (OFFICE TO NOTIFY U.S)    Findings:        --Gland Size: 59cc       --Prostate Lesions: Prostate calcifications at the apex. Anesthesia: Monitor Anesthesia Care  Complications: None  OR Blood Loss: Minimal  Fluids: Cystalloids  Specimens:  ID Type Source Tests Collected by Time Destination   A : LLB Tissue Prostate SURGICAL PATHOLOGY Skye Esteves MD 4/24/2019 1251    B : LB Tissue Prostate SURGICAL PATHOLOGY Skye Esteves MD 4/24/2019 1251    C : RB Tissue Prostate [address] [facility], MD 4/24/2019 1251    D : RLB Tissue Prostate SURGICAL PATHOLOGY Skye Esteves MD 4/24/2019 1251    E : LLM Tissue Prostate SURGICAL PATHOLOGY Skye Esteves MD 4/24/2019 1251    F : LM Tissue Prostate SURGICAL PATHOLOGY Skye Esteves MD 4/24/2019 1251    G : RM Tissue Prostate SURGICAL PATHOLOGY Skye Esteves MD 4/24/2019 1251    H : RLM Tissue Prostate SURGICAL PATHOLOGY Skye Esteves MD 4/24/2019 1251    I : LLA Tissue Prostate SURGICAL PATHOLOGY Skye Esteves MD 4/24/2019 1251    J : LA Tissue Prostate SURGICAL PATHOLOGY Skye Esteves MD 4/24/2019 1251    K : RA Tissue Prostate SURGICAL PATHOLOGY Skye Esteves MD 4/24/2019 1251    L : RLA Tissue Prostate SURGICAL PATHOLOGY Skye Esteves MD 4/24/2019 1251        Indication:  80year old male with an elevated PSA of 4.5. He presents for standard biopsy of the prostate. Narrative of the Procedure:    After informed consent was obtain, the patient was taken to the operative suite. He remained on the [facility]. He was rolled onto the side.  The legs were brought toward the chest. Anesthesia was induced. A timeout occurred in which two patient identifiers were used. The rectal ultrasound probe was inserted and volumetric measurements were taken. The prostate volume was 59 grams. The prostate was assessed in its entirety and only some calcifications were seen in the apex of the gland. The bilateral neurovascular bundles were located and 1% lidocaine local anesthetic was injected bilaterally for local nerve blocks. Starting at the base of the gland and worked apically, sampling was completed. A standard sextant template was employed bilaterally. A total of 1 core was taken within each quadrant for a total of 12 biopsies. Once completed, the rectal ultrasound probe was removed. Inspection of the rectum demonstrated no active bleeding. The patient was rolled back into the supine position. He was then discharged back to the PACU in good and stable condition. Follow-Up: The patient will be discharged home today. Once resulted, the pathology report will be discussed with the patient.     Hugh Woodard  Electronically signed on 4/24/2019 at 2:28 PM

## 2019-04-24 NOTE — INTERVAL H&P NOTE
H&P Update    Patient's History and Physical from April 10, 2019 was reviewed. Patient examined. There has been no change. Patient here for prostate biopsy under MAC.      Wilton Moreno

## 2019-04-24 NOTE — ANESTHESIA POSTPROCEDURE EVALUATION
Department of Anesthesiology  Postprocedure Note    Patient: José Antonio Espinoza  MRN: [id number]  YOB: 1950  Date of evaluation: 4/24/2019  Time:  4:45 PM     Procedure Summary     Date:  04/24/19 Room / Location:  [facility] OR  / [facility] OR    Anesthesia Start:  1406 Anesthesia Stop:  5020    Procedure:  PROSTATE BIOPSY WITH ULTRASOUND  (OFFICE TO NOTIFY U.S) (N/A ) Diagnosis:  (ELEVATED PSA)    Surgeon:  Skye Esteves MD Responsible Provider:  Genna Stokes MD    Anesthesia Type:  MAC ASA Status:  3          Anesthesia Type: MAC    Glenn Phase I: Glenn Score: 10    Glenn Phase II: Glenn Score: 10    Last vitals: Reviewed and per EMR flowsheets.        Anesthesia Post Evaluation    Patient location during evaluation: PACU  Patient participation: complete - patient participated  Level of consciousness: awake and alert  Pain score: 0  Nausea & Vomiting: no nausea  Cardiovascular status: hemodynamically stable  Respiratory status: room air  Hydration status: euvolemic

## 2019-04-26 ENCOUNTER — TELEPHONE (OUTPATIENT)
Dept: ONCOLOGY | Age: 69
End: 2019-04-26

## 2019-04-26 LAB — SURGICAL PATHOLOGY REPORT: NORMAL

## 2019-05-06 ENCOUNTER — OFFICE VISIT (OUTPATIENT)
Dept: UROLOGY | Age: 69
End: 2019-05-06
Payer: MEDICARE

## 2019-05-06 VITALS
HEIGHT: 69 IN | WEIGHT: 201.6 LBS | HEART RATE: 73 BPM | BODY MASS INDEX: 29.86 KG/M2 | TEMPERATURE: 97.7 F | DIASTOLIC BLOOD PRESSURE: 97 MMHG | SYSTOLIC BLOOD PRESSURE: 165 MMHG

## 2019-05-06 DIAGNOSIS — C61 PROSTATE CANCER (HCC): ICD-10-CM

## 2019-05-06 DIAGNOSIS — R97.20 ELEVATED PSA: Primary | ICD-10-CM

## 2019-05-06 DIAGNOSIS — R31.0 GROSS HEMATURIA: ICD-10-CM

## 2019-05-06 PROCEDURE — 99214 OFFICE O/P EST MOD 30 MIN: CPT | Performed by: UROLOGY

## 2019-05-06 ASSESSMENT — ENCOUNTER SYMPTOMS
EYE PAIN: 0
VOMITING: 0
EYE REDNESS: 0
SHORTNESS OF BREATH: 0
ABDOMINAL PAIN: 0
BACK PAIN: 0
NAUSEA: 0
CONSTIPATION: 1
COUGH: 1
WHEEZING: 0
DIARRHEA: 0

## 2019-05-06 NOTE — PROGRESS NOTES
Review of Systems   Constitutional: Negative for appetite change, chills and fatigue. Eyes: Positive for visual disturbance. Negative for pain and redness. Respiratory: Positive for cough. Negative for shortness of breath and wheezing. Cardiovascular: Negative for chest pain and leg swelling. Gastrointestinal: Positive for constipation. Negative for abdominal pain, diarrhea, nausea and vomiting. Genitourinary: Negative for difficulty urinating, dysuria, flank pain, frequency, hematuria and urgency. Musculoskeletal: Positive for myalgias. Negative for back pain and joint swelling. Skin: Negative for rash and wound. Neurological: Negative for dizziness, weakness and numbness. Hematological: Does not bruise/bleed easily.

## 2019-05-06 NOTE — PROGRESS NOTES
[facility]  [facility] [place]  [address]  [address]  [address] [facility] [address]  Dept: [phone] [facility] Urology Office Note - Established    Patient:  Stuart Garcia  YOB: 1950  Date: 5/6/2019    The patient is a 80 y.o. male who presents todayfor evaluation of the following problems:   Chief Complaint   Patient presents with    Follow-up    Results     Prostate BX       HPI  Pt had h/o elevated psa. ahd recent trus bx. Had hematuria. Resolved. No f/c. Path positive. Pt has ED. Summary of old records: N/A    Additional History: N/A    Procedures Today: N/A    Urinalysis today:  No results found for this visit on 05/06/19.   Last several PSA's:  Lab Results   Component Value Date    PSA 4.54 (H) 11/29/2018    PSA 4.96 (H) 10/07/2018    PSA 2.47 10/08/2014     Last total testosterone:  No results found for: TESTOSTERONE    AUA Symptom Score (5/6/2019):  INCOMPLETE EMPTYING: How often have you had the sensation of not emptying your bladder?: Less than Half the time  FREQUENCY: How often do you have to urinate less than every two hours?: Not at all  INTERMITTENCY: How often have you found you stopped and started again several times when you urinated?: Not at all  URGENCY: How often have you found it difficult to postpone urination?: Not at all  WEAK STREAM: How often have you had a weak urinary stream?: About Half the time  STRAINING: How often have you had to strain to start  urination?: Not at all  NOCTURIA: How many times did you typically get up at night to uriniate?: NONE  TOTAL I-PSS SCORE[de-identified] 5  How would you feel if you were to spend the rest of your life with your urinary condition?: Mostly Satisfied    Last BUN and creatinine:  Lab Results   Component Value Date    BUN 14 04/10/2019     Lab Results   Component Value Date    CREATININE 0.66 (L) 04/10/2019       Additional Lab/Culture results: path jerry 4+3=7    Imaging Reviewed during this Office Visit: none  (results were independently reviewed by physician and radiology report verified)    PAST MEDICAL, FAMILY AND SOCIAL HISTORY UPDATE:  Past Medical History:   Diagnosis Date    Anxiety state, unspecified     Carotid artery stenosis     Colon polyp     Depressive disorder, not elsewhere classified     Essential hypertension, benign     GERD (gastroesophageal reflux disease)     History of colon polyps     History of hepatitis C     Hyperlipidemia     Impotence of organic origin     Lipoma of unspecified site     Lumbago     No natural teeth     Secondary localized osteoarthrosis, shoulder region     Swelling of limb      Past Surgical History:   Procedure Laterality Date    ANKLE SURGERY Right     CARPAL TUNNEL RELEASE Bilateral     CERVICAL FUSION      anterior    COLONOSCOPY  04/13/2017    EXCISION / BIOPSY SKIN LESION OF ARM Left 9/15/2017    EXCISION SKIN LESION LEFT CHEST AND LEFT BUTTOCK, EXCISION LIPOMA LEFT LOWER ABDOMEN performed by Piedad Nick DO at [address] [place] [address] SCRN NOT  [address] [place] [address] N/A 4/13/2017    COLONOSCOPY performed by Susan Rodriguez MD at [facility]  04/24/2019    PROSTATE BIOPSY WITH ULTRASOUND     PROSTATE BIOPSY N/A 4/24/2019    PROSTATE BIOPSY WITH ULTRASOUND  (OFFICE TO NOTIFY U.S) performed by Jorgito Hurley MD at [address] [place], [place] [address]e Right     ULNAR TUNNEL RELEASE Left      Family History   Problem Relation Age of Onset    High Blood Pressure Maternal Uncle     Diabetes Maternal Uncle     Diabetes Maternal Grandmother     Cancer Maternal Grandfather     No Known Problems Mother     Other Father         MVA     Outpatient Medications Marked as Taking for the 5/6/19 encounter (Office Visit) with Jorgito Hurley MD   Medication Sig Dispense Refill    lisinopril (PRINIVIL;ZESTRIL) 40 MG tablet Take 40 mg by mouth daily      clonazePAM (KLONOPIN) 1 MG tablet Take 1 mg by mouth 2 times daily as and time. Psych: Mood normal, affect normal  Skin: No rash noted  HEENT: Head: Normocephalic andatraumatic  Conjunctivae and EOM are normal. Pupils are equal, round  Nose:Normal  Right External Ear: Normal; Left External Ear: Normal  Mouth: Mucosa Moist  Neck: Supple  Lungs: Respiratory effort is normal  Cardiovascular: Warm & Pink  Abdomen: Soft, non-tender, non-distended with no CVA,  No flank tenderness,  Or hepatosplenomegaly   Lymphatics: No palpablelymphadenopathy. Bladder non-tender and not distended. Assessment and Plan      1. Elevated PSA    2. Gross hematuria    3. Prostate cancer [facility])           Plan:   Robotic prostatectomy and bilateral pelvic lymph node dissection at [facility]; wait at least 6 weeks. Prescriptions Ordered:  No orders of the defined types were placed in this encounter. Orders Placed:  No orders of the defined types were placed in this encounter. Corinna Wilcox MD    Agree with the ROS entered by the MA.

## 2019-05-23 ENCOUNTER — TELEPHONE (OUTPATIENT)
Dept: UROLOGY | Age: 69
End: 2019-05-23

## 2019-05-23 DIAGNOSIS — C61 PROSTATE CANCER (HCC): Primary | ICD-10-CM

## 2019-05-23 NOTE — TELEPHONE ENCOUNTER
Robotic Prostatectomy w/ nodes @ [facility] 6/26/19 9:00am **STOP BLOOD THINNERS 6/19/19**  Pre-tech @ office 6/11/19 9:00am   Pre testing @ [facility] 6/11/19 10:00am   Cystogram @ [facility]  Follow-up @ [facility]          Spoke with patient, procedure info to be emailed    Please place order for cystogram- per Dr. Evan Jolley, thank you.

## 2019-05-28 ENCOUNTER — TELEPHONE (OUTPATIENT)
Dept: UROLOGY | Age: 69
End: 2019-05-28

## 2019-05-28 ENCOUNTER — OFFICE VISIT (OUTPATIENT)
Dept: UROLOGY | Age: 69
End: 2019-05-28
Payer: MEDICARE

## 2019-05-28 VITALS
SYSTOLIC BLOOD PRESSURE: 132 MMHG | TEMPERATURE: 98.6 F | DIASTOLIC BLOOD PRESSURE: 86 MMHG | WEIGHT: 198 LBS | HEIGHT: 69 IN | BODY MASS INDEX: 29.33 KG/M2

## 2019-05-28 DIAGNOSIS — R36.9 PENILE DISCHARGE: ICD-10-CM

## 2019-05-28 DIAGNOSIS — R33.9 INCOMPLETE EMPTYING OF BLADDER: Primary | ICD-10-CM

## 2019-05-28 DIAGNOSIS — R30.0 DYSURIA: ICD-10-CM

## 2019-05-28 PROCEDURE — 51798 US URINE CAPACITY MEASURE: CPT | Performed by: NURSE PRACTITIONER

## 2019-05-28 PROCEDURE — 99213 OFFICE O/P EST LOW 20 MIN: CPT | Performed by: NURSE PRACTITIONER

## 2019-05-28 RX ORDER — TAMSULOSIN HYDROCHLORIDE 0.4 MG/1
0.4 CAPSULE ORAL DAILY
Qty: 30 CAPSULE | Refills: 1 | Status: SHIPPED | OUTPATIENT
Start: 2019-05-28 | End: 2019-07-02 | Stop reason: ALTCHOICE

## 2019-05-28 RX ORDER — PHENAZOPYRIDINE HYDROCHLORIDE 200 MG/1
200 TABLET, FILM COATED ORAL 3 TIMES DAILY PRN
Qty: 9 TABLET | Refills: 0 | Status: SHIPPED | OUTPATIENT
Start: 2019-05-28 | End: 2019-06-11 | Stop reason: ALTCHOICE

## 2019-05-28 RX ORDER — CIPROFLOXACIN 500 MG/1
500 TABLET, FILM COATED ORAL 2 TIMES DAILY
COMMUNITY
End: 2019-06-11 | Stop reason: ALTCHOICE

## 2019-05-28 ASSESSMENT — ENCOUNTER SYMPTOMS
EYE REDNESS: 0
VOMITING: 0
NAUSEA: 0
EYE PAIN: 0
CONSTIPATION: 0
DIARRHEA: 0
BACK PAIN: 0
SHORTNESS OF BREATH: 0
ABDOMINAL PAIN: 0
COUGH: 0
WHEEZING: 0

## 2019-05-28 NOTE — TELEPHONE ENCOUNTER
Pt's wife called stating that pt had brown discharge from his penis this weekend. Pt was advised to start cipro 500 mg BID and call with update today. Pt's wife states he is still c/o dysuria, but the brown discharge only occurred one time.  Pt's wife was offered an appt in the office and after agreeing to an evaluation, appt was scheduled for 3pm.

## 2019-05-28 NOTE — PATIENT INSTRUCTIONS
finish Cipro    Continue to drink water    Will start Tamsulosin at night before bed    Pyridium 1 tablet 3x per day for burning    Call with update on symptoms in 2 days.

## 2019-05-28 NOTE — LETTER
[facility]  [facility] [place]  [address]  [address]  [address] [place] [address]  Dept: [phone]  Dept Fax: [phone]        5/28/19    Patient: Pita Bettencourt  YOB: 1950    Dear Airam Doss,    I had the pleasure of seeing one of your patients, Joy Rodriguez today in the office today. Below are the relevant portions of my assessment and plan of care. IMPRESSION:     1. Incomplete emptying of bladder    2. Dysuria    3. Penile discharge        PLAN:   finish Cipro    Continue to drink water    Will start Tamsulosin at night before bed    Pyridium 1 tablet 3x per day for burning    Call with update on symptoms in 2 days. No follow-ups on file. Prescriptions Ordered:  No orders of the defined types were placed in this encounter. Orders Placed:  No orders of the defined types were placed in this encounter. Thank you for allowing me to participate in the care of this patient. I will keep you updated on this patient's follow up and I look forward to serving you and your patients again in the future.     Yash Matos, CARINE - CNP

## 2019-05-28 NOTE — PROGRESS NOTES
Review of Systems   Constitutional: Negative for appetite change, chills and fever. Eyes: Negative for pain, redness and visual disturbance. Respiratory: Negative for cough, shortness of breath and wheezing. Cardiovascular: Negative for chest pain and leg swelling. Gastrointestinal: Negative for abdominal pain, constipation, diarrhea, nausea and vomiting. Genitourinary: Positive for difficulty urinating, discharge and dysuria (burning sensation ). Negative for flank pain, frequency, hematuria and urgency. Musculoskeletal: Negative for back pain, joint swelling and myalgias. Skin: Negative for rash and wound. Neurological: Negative for dizziness, tremors and numbness. Hematological: Does not bruise/bleed easily.

## 2019-06-11 ENCOUNTER — TELEPHONE (OUTPATIENT)
Dept: UROLOGY | Age: 69
End: 2019-06-11

## 2019-06-11 ENCOUNTER — OFFICE VISIT (OUTPATIENT)
Dept: UROLOGY | Age: 69
End: 2019-06-11
Payer: MEDICARE

## 2019-06-11 ENCOUNTER — HOSPITAL ENCOUNTER (OUTPATIENT)
Dept: PREADMISSION TESTING | Age: 69
Discharge: HOME | End: 2019-06-15
Payer: MEDICARE

## 2019-06-11 VITALS
SYSTOLIC BLOOD PRESSURE: 113 MMHG | HEART RATE: 64 BPM | HEIGHT: 69 IN | DIASTOLIC BLOOD PRESSURE: 72 MMHG | BODY MASS INDEX: 28.88 KG/M2 | RESPIRATION RATE: 18 BRPM | WEIGHT: 195 LBS | OXYGEN SATURATION: 95 % | TEMPERATURE: 98.3 F

## 2019-06-11 VITALS
SYSTOLIC BLOOD PRESSURE: 129 MMHG | WEIGHT: 198 LBS | TEMPERATURE: 97.8 F | HEART RATE: 67 BPM | BODY MASS INDEX: 29.33 KG/M2 | DIASTOLIC BLOOD PRESSURE: 97 MMHG | HEIGHT: 69 IN

## 2019-06-11 DIAGNOSIS — C61 PROSTATE CANCER (HCC): Primary | ICD-10-CM

## 2019-06-11 LAB
ABO/RH: NORMAL
ANION GAP SERPL CALCULATED.3IONS-SCNC: 14 MMOL/L (ref 9–17)
ANTIBODY SCREEN: NEGATIVE
ARM BAND NUMBER: NORMAL
BUN BLDV-MCNC: 18 MG/DL (ref 8–23)
CHLORIDE BLD-SCNC: 107 MMOL/L (ref 98–107)
CO2: 22 MMOL/L (ref 20–31)
CREAT SERPL-MCNC: 0.63 MG/DL (ref 0.7–1.2)
EXPIRATION DATE: NORMAL
GFR AFRICAN AMERICAN: >60 ML/MIN
GFR NON-AFRICAN AMERICAN: >60 ML/MIN
GFR SERPL CREATININE-BSD FRML MDRD: ABNORMAL ML/MIN/{1.73_M2}
GFR SERPL CREATININE-BSD FRML MDRD: ABNORMAL ML/MIN/{1.73_M2}
GLUCOSE BLD-MCNC: 106 MG/DL (ref 70–99)
HCT VFR BLD CALC: 49.6 % (ref 40.7–50.3)
HEMOGLOBIN: 15.9 G/DL (ref 13–17)
MCH RBC QN AUTO: 32.6 PG (ref 25.2–33.5)
MCHC RBC AUTO-ENTMCNC: 32.1 G/DL (ref 28.4–34.8)
MCV RBC AUTO: 101.6 FL (ref 82.6–102.9)
NRBC AUTOMATED: 0 PER 100 WBC
PDW BLD-RTO: 11.9 % (ref 11.8–14.4)
PLATELET # BLD: 186 K/UL (ref 138–453)
PMV BLD AUTO: 10.6 FL (ref 8.1–13.5)
POTASSIUM SERPL-SCNC: 4.7 MMOL/L (ref 3.7–5.3)
RBC # BLD: 4.88 M/UL (ref 4.21–5.77)
SODIUM BLD-SCNC: 143 MMOL/L (ref 135–144)
WBC # BLD: 10.6 K/UL (ref 3.5–11.3)

## 2019-06-11 PROCEDURE — 80051 ELECTROLYTE PANEL: CPT

## 2019-06-11 PROCEDURE — 86900 BLOOD TYPING SEROLOGIC ABO: CPT

## 2019-06-11 PROCEDURE — 82565 ASSAY OF CREATININE: CPT

## 2019-06-11 PROCEDURE — 99214 OFFICE O/P EST MOD 30 MIN: CPT | Performed by: NURSE PRACTITIONER

## 2019-06-11 PROCEDURE — 85027 COMPLETE CBC AUTOMATED: CPT

## 2019-06-11 PROCEDURE — 84520 ASSAY OF UREA NITROGEN: CPT

## 2019-06-11 PROCEDURE — 86901 BLOOD TYPING SEROLOGIC RH(D): CPT

## 2019-06-11 PROCEDURE — 87086 URINE CULTURE/COLONY COUNT: CPT

## 2019-06-11 PROCEDURE — 36415 COLL VENOUS BLD VENIPUNCTURE: CPT

## 2019-06-11 PROCEDURE — 82947 ASSAY GLUCOSE BLOOD QUANT: CPT

## 2019-06-11 PROCEDURE — 86850 RBC ANTIBODY SCREEN: CPT

## 2019-06-11 RX ORDER — SODIUM CHLORIDE, SODIUM LACTATE, POTASSIUM CHLORIDE, CALCIUM CHLORIDE 600; 310; 30; 20 MG/100ML; MG/100ML; MG/100ML; MG/100ML
1000 INJECTION, SOLUTION INTRAVENOUS CONTINUOUS
Status: CANCELLED | OUTPATIENT
Start: 2019-06-11

## 2019-06-11 NOTE — PROGRESS NOTES
[facility]  [facility] [place]  [address]  [address]  [address] [facility] [address]  Dept: [facility] Office Note - Established    Patient:  Dale Lopez  YOB: 1950  Date: 6/11/2019    The patient is a 80 y.o. male who presents todayfor evaluation of the following problems:   Chief Complaint   Patient presents with    Other     pre robotic teaching        HPI    This patient presents today for education regarding his prostate cancer treatment, post-op care and rehabilitation for incontinence and penile health      I met with Dale Lopez with significant other for 30 minutes to discuss prostate cancer treatment including basic surgery information, pre-op instructions, and post-op care: catheter care and cleaning, application of thigh and overnight bag, Kegels exercise to begin now, to stop day of surgery and then to resume after catheter removal, constipation prevention, post op impotence and possible use of vacuum erection pump, activity restrictions and post op testing and appointments. Verbal, written and visual materials presented. Patient verbalized understanding of all materials presented, and handouts given for Kegels, constipation prevention and catheter care. All questions answered. Patient encouraged to call with any further questions or problems. Summary of old records: N/A    Additional History: N/A    Procedures Today: N/A    Urinalysis today:  No results found for this visit on 06/11/19.   Last several PSA's:  Lab Results   Component Value Date    PSA 4.54 (H) 11/29/2018    PSA 4.96 (H) 10/07/2018    PSA 2.47 10/08/2014     Last total testosterone:  No results found for: TESTOSTERONE    AUA Symptom Score (6/11/2019):  INCOMPLETE EMPTYING: How often have you had the sensation of not emptying your bladder?: Not at all  FREQUENCY: How often do you have to urinate less than every two hours?: Not at all  INTERMITTENCY: How often have you found you stopped and started again several times when you urinated?: Not at all  URGENCY: How often have you found it difficult to postpone urination?: Not at all  WEAK STREAM: How often have you had a weak urinary stream?: Not at all  STRAINING: How often have you had to strain to start  urination?: Not at all  NOCTURIA: How many times did you typically get up at night to uriniate?: NONE  TOTAL I-PSS SCORE[de-identified] 0  How would you feel if you were to spend the rest of your life with your urinary condition?: Pleased    Last BUN and creatinine:  Lab Results   Component Value Date    BUN 18 06/11/2019     Lab Results   Component Value Date    CREATININE 0.63 (L) 06/11/2019       Additional Lab/Culture results:     Imaging Reviewed during this Office Visit:   (results were independently reviewed by physician and radiology report verified)    PAST MEDICAL, FAMILY AND SOCIAL HISTORY UPDATE:  Past Medical History:   Diagnosis Date    Anxiety state, unspecified     Carotid artery stenosis     Colon polyp     Depressive disorder, not elsewhere classified     Elevated PSA 05/06/2019    Essential hypertension, benign     Cardiologist,  [phone]    GERD (gastroesophageal reflux disease)     History of colon polyps     History of hepatitis C     Hyperlipidemia     Impotence of organic origin     Lipoma of unspecified site     Lumbago     No natural teeth     Prostate CA ([facility] [address] 05/2019    Secondary localized osteoarthrosis, shoulder region     Sleep apnea     no CPAP    Swelling of limb      Past Surgical History:   Procedure Laterality Date    ANKLE SURGERY Right     CARPAL TUNNEL RELEASE Bilateral     CERVICAL FUSION  2009    anterior    COLONOSCOPY  04/13/2017    EXCISION / BIOPSY SKIN LESION OF ARM Left 9/15/2017    EXCISION SKIN LESION LEFT CHEST AND LEFT BUTTOCK, EXCISION LIPOMA LEFT LOWER ABDOMEN performed by Dasia Quinones DO at [address] [place] [address] CA SCRN NOT HI RSK IND N/A 4/13/2017    COLONOSCOPY performed by Jerilyn Hudson MD at [address] [place] [address] N/A 4/24/2019    PROSTATE BIOPSY WITH ULTRASOUND  (OFFICE TO NOTIFY [place]) performed by Lisa Galvan MD at [address] Right 2015    ULNAR TUNNEL RELEASE Left      Family History   Problem Relation Age of Onset    Stroke Maternal Uncle 80    Heart Surgery Maternal Uncle         Triple Bypass    Other Maternal Grandmother         Pul. Embolism    Cancer Maternal Grandfather         Throat    No Known Problems Mother     Other Father         MVA    No Known Problems Sister     Other Brother         Electric burn at work   [place] No Known Problems Paternal Grandmother     No Known Problems Paternal Grandfather     Diabetes Brother      Outpatient Medications Marked as Taking for the 6/11/19 encounter (Office Visit) with Louisa Castaneda, APRN - CNP   Medication Sig Dispense Refill    tamsulosin (FLOMAX) 0.4 MG capsule Take 1 capsule by mouth daily Take one capsule daily to facilitate passage of ureteral stone (Patient taking differently: Take 0.4 mg by mouth nightly Take one capsule daily to facilitate passage of ureteral stone) 30 capsule 1    [DISCONTINUED] ciprofloxacin (CIPRO) 500 MG tablet Take 500 mg by mouth 2 times daily      [DISCONTINUED] phenazopyridine (PYRIDIUM) 200 MG tablet Take 1 tablet by mouth 3 times daily as needed for Pain 9 tablet 0    lisinopril (PRINIVIL;ZESTRIL) 40 MG tablet Take 40 mg by mouth nightly       clonazePAM (KLONOPIN) 1 MG tablet Take 1 mg by mouth 2 times daily.        albuterol sulfate  (90 Base) MCG/ACT inhaler Inhale 2 puffs into the lungs every 6 hours as needed for Wheezing      pantoprazole (PROTONIX) 40 MG tablet TAKE 1 TABLET (40 MG) BY ORAL ROUTE ONCE DAILY  bed time  3    amLODIPine (NORVASC) 5 MG tablet TAKE 1 TABLET (5 MG) BY ORAL ROUTE ONCE DAILY bed time  3    [DISCONTINUED] butalbital-acetaminophen-caffeine (FIORICET, ESGIC)

## 2019-06-11 NOTE — H&P
History and Physical    Pt Name: Ella Lutz  MRN: [id number]  YOB: 1950  Date of evaluation: 6/11/2019    SUBJECTIVE:   History of Chief Complaint:    Patient complains of prostate cancer. He was experiencing nocturia, was found to have elevated PSA. He was sent for prostate biopsy, which was positive for cancer. He has now been scheduled for prostatectomy. Past Medical History    has a past medical history of Anxiety state, unspecified, Carotid artery stenosis, Colon polyp, Depressive disorder, not elsewhere classified, Elevated PSA, Essential hypertension, benign, GERD (gastroesophageal reflux disease), History of colon polyps, History of hepatitis C, Hyperlipidemia, Impotence of organic origin, Lipoma of unspecified site, Lumbago, No natural teeth, Prostate CA ([address] Secondary localized osteoarthrosis, shoulder region, Sleep apnea, and Swelling of limb. Past Surgical History   has a past surgical history that includes Carpal tunnel release (Bilateral); Ankle surgery (Right); shoulder surgery (Right, 2015); Colonoscopy (04/13/2017); pr colon ca scrn not hi rsk ind (N/A, 4/13/2017); cervical fusion (2009); lumbar fusion; EXCISION / BIOPSY SKIN LESION OF ARM (Left, 9/15/2017); Ulnar tunnel release (Left); and Prostate biopsy (N/A, 4/24/2019). Medications  Prior to Admission medications    Medication Sig Start Date End Date Taking? Authorizing Provider   tamsulosin (FLOMAX) 0.4 MG capsule Take 1 capsule by mouth daily Take one capsule daily to facilitate passage of ureteral stone  Patient taking differently: Take 0.4 mg by mouth nightly Take one capsule daily to facilitate passage of ureteral stone 5/28/19  Yes CARINE Genao - CNP   lisinopril (PRINIVIL;ZESTRIL) 40 MG tablet Take 40 mg by mouth nightly    Yes Historical Provider, MD   clonazePAM (KLONOPIN) 1 MG tablet Take 1 mg by mouth 2 times daily.     Yes Historical Provider, MD   albuterol sulfate  (90 Base) MCG/ACT inhaler Inhale 2 puffs into the lungs every 6 hours as needed for Wheezing   Yes Historical Provider, MD   pantoprazole (PROTONIX) 40 MG tablet TAKE 1 TABLET (40 MG) BY ORAL ROUTE ONCE DAILY  bed time 12/13/18  Yes Historical Provider, MD   amLODIPine (NORVASC) 5 MG tablet TAKE 1 TABLET (5 MG) BY ORAL ROUTE ONCE DAILY bed time 12/12/18  Yes Historical Provider, MD   ibuprofen (ADVIL;MOTRIN) 800 MG tablet Take 1 tablet by mouth every 6 hours as needed for Pain 12/20/18 6/11/19 Yes Beto Rivera MD   sertraline (ZOLOFT) 50 MG tablet TAKE 1 TABLET (50 MG) BY ORAL ROUTE ONCE DAILY bed time 12/5/18  Yes Historical Provider, MD   tiotropium (SPIRIVA) 18 MCG inhalation capsule Inhale 1 capsule into the lungs daily 8/24/18  Yes Archana Wallis MD   fluticasone (FLONASE) 50 MCG/ACT nasal spray 2 sprays by Nasal route daily  Patient taking differently: 2 sprays by Nasal route daily as needed  7/2/18  Yes Geno Li MD   atorvastatin (LIPITOR) 20 MG tablet TAKE 1 TABLET DAILY  Patient taking differently: TAKE 1 TABLET DAILY  bed time 3/27/18  Yes Geno Li MD   Multiple Vitamins-Minerals (CENTRUM SILVER 50+MEN PO) Take 1 tablet by mouth daily   Yes Historical Provider, MD     Allergies  is allergic to bee venom; fentanyl; morphine; pcn [penicillins]; and codeine. Family History  family history includes Cancer in his maternal grandfather; Diabetes in his brother; Heart Surgery in his maternal uncle; No Known Problems in his mother, paternal grandfather, paternal grandmother, and sister; Other in his brother, father, and maternal grandmother; Stroke (age of onset: 80) in his maternal uncle. Social History   reports that he has been smoking cigarettes. He started smoking about 59 years ago. He has a 54.00 pack-year smoking history. He has never used smokeless tobacco.  1 ppd for 54 years. reports that he drank alcohol. Occasionally. reports that he does not use drugs.   Marital Status   Occupation

## 2019-06-11 NOTE — PATIENT INSTRUCTIONS
1. Start Kegel exercises as instructed- hand out given. Do up until surgery, do no kegels when cath is in, will resume kegels when cath is out. 2. Pre-op bowel cleanse as ordered- start this and clear liquids the day before surgery  3. Avoid post-op constipation as discussed- consider Dulcolax daily after you get home from the hospital to promote soft formed stools. 4. Follow-up Cystograms scheduled 1 week after surgery, then come directly to  office for post-op appt. Start antibiotics the day before the cystogram.   5. Call with questions or concerns  6. needs erection pump.

## 2019-06-11 NOTE — PROGRESS NOTES
Anesthesia Focused Assessment    STOP-BANG Sleep Apnea Questionnaire    SNORE loudly (heard through closed doors)? No  TIRED, fatigued, sleepy during daytime? No  OBSERVED stopping breathing during sleep? No  High blood PRESSURE being treated? Yes    BMI over 35? No  AGE over 48? Yes  NECK circumference over 16\"? No  GENDER (male)? Yes             Total 3  High risk 5-8  Intermediate risk 3-4  Low risk 0-2    Obstructive Sleep Apnea: denies  If YES, machine used: no     Type 1 DM:   no  T2DM:  no    Coronary Artery Disease:  no  Hypertension:  yes  No teeth  Active smoker:  1 ppd for 54 years  Drinks Alcohol:  occasionally    Dentition: no teeth    Defib / AICD / Pacemaker: no      Renal Failure/dialysis:  no    Patient was evaluated in PAT & anesthesia guidelines were applied. NPO guidelines, medication instructions and scheduled arrival time were reviewed with patient. Hx of anesthesia complications:  no  Family hx of anesthesia complications:  no                                                                                                                     Anesthesia contacted:   no  Medical or cardiac clearance ordered: cardiac clearance is in the chart.     Lashell Phan PA-C  6/11/19  10:47 AM

## 2019-06-12 LAB
CULTURE: NO GROWTH
Lab: NORMAL
SPECIMEN DESCRIPTION: NORMAL

## 2019-06-14 ENCOUNTER — HOSPITAL ENCOUNTER (OUTPATIENT)
Age: 69
Discharge: HOME | End: 2019-06-14
Payer: MEDICARE

## 2019-06-14 LAB — HIV AG/AB: NONREACTIVE

## 2019-06-14 PROCEDURE — 36415 COLL VENOUS BLD VENIPUNCTURE: CPT

## 2019-06-14 PROCEDURE — 87389 HIV-1 AG W/HIV-1&-2 AB AG IA: CPT

## 2019-06-26 ENCOUNTER — ANESTHESIA (OUTPATIENT)
Dept: OPERATING ROOM | Age: 69
End: 2019-06-26
Payer: MEDICARE

## 2019-06-26 ENCOUNTER — ANESTHESIA EVENT (OUTPATIENT)
Dept: OPERATING ROOM | Age: 69
End: 2019-06-26
Payer: MEDICARE

## 2019-06-26 ENCOUNTER — HOSPITAL ENCOUNTER (OUTPATIENT)
Age: 69
Setting detail: OBSERVATION
Discharge: HOME | End: 2019-06-27
Attending: UROLOGY | Admitting: UROLOGY
Payer: MEDICARE

## 2019-06-26 VITALS — OXYGEN SATURATION: 94 % | SYSTOLIC BLOOD PRESSURE: 101 MMHG | TEMPERATURE: 95.1 F | DIASTOLIC BLOOD PRESSURE: 52 MMHG

## 2019-06-26 DIAGNOSIS — C61 PROSTATE CANCER (HCC): Primary | ICD-10-CM

## 2019-06-26 DIAGNOSIS — Z48.02 VISIT FOR SUTURE REMOVAL: ICD-10-CM

## 2019-06-26 DIAGNOSIS — J01.00 ACUTE NON-RECURRENT MAXILLARY SINUSITIS: ICD-10-CM

## 2019-06-26 DIAGNOSIS — R51.9 ACUTE INTRACTABLE HEADACHE, UNSPECIFIED HEADACHE TYPE: ICD-10-CM

## 2019-06-26 LAB
ANION GAP SERPL CALCULATED.3IONS-SCNC: 9 MMOL/L (ref 9–17)
BUN BLDV-MCNC: 11 MG/DL (ref 8–23)
BUN/CREAT BLD: ABNORMAL (ref 9–20)
CALCIUM SERPL-MCNC: 8.2 MG/DL (ref 8.6–10.4)
CHLORIDE BLD-SCNC: 106 MMOL/L (ref 98–107)
CO2: 24 MMOL/L (ref 20–31)
CREAT SERPL-MCNC: 0.56 MG/DL (ref 0.7–1.2)
GFR AFRICAN AMERICAN: >60 ML/MIN
GFR NON-AFRICAN AMERICAN: >60 ML/MIN
GFR SERPL CREATININE-BSD FRML MDRD: ABNORMAL ML/MIN/{1.73_M2}
GFR SERPL CREATININE-BSD FRML MDRD: ABNORMAL ML/MIN/{1.73_M2}
GLUCOSE BLD-MCNC: 178 MG/DL (ref 70–99)
HCT VFR BLD CALC: 48 % (ref 40.7–50.3)
HCT VFR BLD CALC: 49.7 % (ref 40.7–50.3)
HEMOGLOBIN: 15.6 G/DL (ref 13–17)
HEMOGLOBIN: 16.4 G/DL (ref 13–17)
MCH RBC QN AUTO: 32.7 PG (ref 25.2–33.5)
MCH RBC QN AUTO: 33.1 PG (ref 25.2–33.5)
MCHC RBC AUTO-ENTMCNC: 32.5 G/DL (ref 28.4–34.8)
MCHC RBC AUTO-ENTMCNC: 33 G/DL (ref 28.4–34.8)
MCV RBC AUTO: 101.9 FL (ref 82.6–102.9)
MCV RBC AUTO: 99.2 FL (ref 82.6–102.9)
NRBC AUTOMATED: 0 PER 100 WBC
NRBC AUTOMATED: 0 PER 100 WBC
PDW BLD-RTO: 11.7 % (ref 11.8–14.4)
PDW BLD-RTO: 11.8 % (ref 11.8–14.4)
PLATELET # BLD: 172 K/UL (ref 138–453)
PLATELET # BLD: 175 K/UL (ref 138–453)
PMV BLD AUTO: 10.6 FL (ref 8.1–13.5)
PMV BLD AUTO: 10.8 FL (ref 8.1–13.5)
POTASSIUM SERPL-SCNC: 4.5 MMOL/L (ref 3.7–5.3)
RBC # BLD: 4.71 M/UL (ref 4.21–5.77)
RBC # BLD: 5.01 M/UL (ref 4.21–5.77)
SODIUM BLD-SCNC: 139 MMOL/L (ref 135–144)
WBC # BLD: 10.9 K/UL (ref 3.5–11.3)
WBC # BLD: 11.2 K/UL (ref 3.5–11.3)

## 2019-06-26 PROCEDURE — 3700000000 HC ANESTHESIA ATTENDED CARE: Performed by: UROLOGY

## 2019-06-26 PROCEDURE — 2500000003 HC RX 250 WO HCPCS: Performed by: UROLOGY

## 2019-06-26 PROCEDURE — 2580000003 HC RX 258: Performed by: NURSE ANESTHETIST, CERTIFIED REGISTERED

## 2019-06-26 PROCEDURE — 6360000002 HC RX W HCPCS: Performed by: NURSE ANESTHETIST, CERTIFIED REGISTERED

## 2019-06-26 PROCEDURE — 2500000003 HC RX 250 WO HCPCS: Performed by: NURSE ANESTHETIST, CERTIFIED REGISTERED

## 2019-06-26 PROCEDURE — 2580000003 HC RX 258: Performed by: STUDENT IN AN ORGANIZED HEALTH CARE EDUCATION/TRAINING PROGRAM

## 2019-06-26 PROCEDURE — 2500000003 HC RX 250 WO HCPCS: Performed by: STUDENT IN AN ORGANIZED HEALTH CARE EDUCATION/TRAINING PROGRAM

## 2019-06-26 PROCEDURE — 7100000001 HC PACU RECOVERY - ADDTL 15 MIN: Performed by: UROLOGY

## 2019-06-26 PROCEDURE — 2580000003 HC RX 258: Performed by: UROLOGY

## 2019-06-26 PROCEDURE — 2709999900 HC NON-CHARGEABLE SUPPLY: Performed by: UROLOGY

## 2019-06-26 PROCEDURE — 88307 TISSUE EXAM BY PATHOLOGIST: CPT

## 2019-06-26 PROCEDURE — 7100000000 HC PACU RECOVERY - FIRST 15 MIN: Performed by: UROLOGY

## 2019-06-26 PROCEDURE — 6370000000 HC RX 637 (ALT 250 FOR IP): Performed by: STUDENT IN AN ORGANIZED HEALTH CARE EDUCATION/TRAINING PROGRAM

## 2019-06-26 PROCEDURE — 6360000002 HC RX W HCPCS: Performed by: STUDENT IN AN ORGANIZED HEALTH CARE EDUCATION/TRAINING PROGRAM

## 2019-06-26 PROCEDURE — 3700000001 HC ADD 15 MINUTES (ANESTHESIA): Performed by: UROLOGY

## 2019-06-26 PROCEDURE — 85027 COMPLETE CBC AUTOMATED: CPT

## 2019-06-26 PROCEDURE — G0378 HOSPITAL OBSERVATION PER HR: HCPCS

## 2019-06-26 PROCEDURE — 6370000000 HC RX 637 (ALT 250 FOR IP): Performed by: NURSE ANESTHETIST, CERTIFIED REGISTERED

## 2019-06-26 PROCEDURE — 2580000003 HC RX 258: Performed by: ANESTHESIOLOGY

## 2019-06-26 PROCEDURE — 36415 COLL VENOUS BLD VENIPUNCTURE: CPT

## 2019-06-26 PROCEDURE — 3600000019 HC SURGERY ROBOT ADDTL 15MIN: Performed by: UROLOGY

## 2019-06-26 PROCEDURE — 6370000000 HC RX 637 (ALT 250 FOR IP): Performed by: UROLOGY

## 2019-06-26 PROCEDURE — 3600000009 HC SURGERY ROBOT BASE: Performed by: UROLOGY

## 2019-06-26 PROCEDURE — 88309 TISSUE EXAM BY PATHOLOGIST: CPT

## 2019-06-26 PROCEDURE — 87086 URINE CULTURE/COLONY COUNT: CPT

## 2019-06-26 PROCEDURE — 80048 BASIC METABOLIC PNL TOTAL CA: CPT

## 2019-06-26 PROCEDURE — 6360000002 HC RX W HCPCS

## 2019-06-26 PROCEDURE — S2900 ROBOTIC SURGICAL SYSTEM: HCPCS | Performed by: UROLOGY

## 2019-06-26 RX ORDER — NEOSTIGMINE METHYLSULFATE 5 MG/5 ML
SYRINGE (ML) INTRAVENOUS PRN
Status: DISCONTINUED | OUTPATIENT
Start: 2019-06-26 | End: 2019-06-26 | Stop reason: SDUPTHER

## 2019-06-26 RX ORDER — ROCURONIUM BROMIDE 10 MG/ML
INJECTION, SOLUTION INTRAVENOUS PRN
Status: DISCONTINUED | OUTPATIENT
Start: 2019-06-26 | End: 2019-06-26 | Stop reason: SDUPTHER

## 2019-06-26 RX ORDER — SODIUM CHLORIDE 0.9 % (FLUSH) 0.9 %
10 SYRINGE (ML) INJECTION PRN
Status: DISCONTINUED | OUTPATIENT
Start: 2019-06-26 | End: 2019-06-27 | Stop reason: HOSPADM

## 2019-06-26 RX ORDER — MAGNESIUM HYDROXIDE 1200 MG/15ML
LIQUID ORAL PRN
Status: DISCONTINUED | OUTPATIENT
Start: 2019-06-26 | End: 2019-06-26 | Stop reason: ALTCHOICE

## 2019-06-26 RX ORDER — CLINDAMYCIN PHOSPHATE 900 MG/50ML
900 INJECTION INTRAVENOUS ONCE
Status: COMPLETED | OUTPATIENT
Start: 2019-06-26 | End: 2019-06-26

## 2019-06-26 RX ORDER — SODIUM CHLORIDE 0.9 % (FLUSH) 0.9 %
10 SYRINGE (ML) INJECTION EVERY 12 HOURS SCHEDULED
Status: CANCELLED | OUTPATIENT
Start: 2019-06-26

## 2019-06-26 RX ORDER — ACETAMINOPHEN 325 MG/1
650 TABLET ORAL EVERY 6 HOURS
Status: DISCONTINUED | OUTPATIENT
Start: 2019-06-26 | End: 2019-06-27 | Stop reason: HOSPADM

## 2019-06-26 RX ORDER — CLONAZEPAM 1 MG/1
1 TABLET ORAL 2 TIMES DAILY
Status: DISCONTINUED | OUTPATIENT
Start: 2019-06-26 | End: 2019-06-27 | Stop reason: HOSPADM

## 2019-06-26 RX ORDER — ONDANSETRON 2 MG/ML
4 INJECTION INTRAMUSCULAR; INTRAVENOUS
Status: DISCONTINUED | OUTPATIENT
Start: 2019-06-26 | End: 2019-06-26 | Stop reason: HOSPADM

## 2019-06-26 RX ORDER — PANTOPRAZOLE SODIUM 40 MG/1
40 TABLET, DELAYED RELEASE ORAL
Status: DISCONTINUED | OUTPATIENT
Start: 2019-06-27 | End: 2019-06-27 | Stop reason: HOSPADM

## 2019-06-26 RX ORDER — BUPIVACAINE HYDROCHLORIDE AND EPINEPHRINE 2.5; 5 MG/ML; UG/ML
INJECTION, SOLUTION EPIDURAL; INFILTRATION; INTRACAUDAL; PERINEURAL PRN
Status: DISCONTINUED | OUTPATIENT
Start: 2019-06-26 | End: 2019-06-26 | Stop reason: ALTCHOICE

## 2019-06-26 RX ORDER — ONDANSETRON 2 MG/ML
INJECTION INTRAMUSCULAR; INTRAVENOUS PRN
Status: DISCONTINUED | OUTPATIENT
Start: 2019-06-26 | End: 2019-06-26 | Stop reason: SDUPTHER

## 2019-06-26 RX ORDER — DEXAMETHASONE SODIUM PHOSPHATE 10 MG/ML
INJECTION INTRAMUSCULAR; INTRAVENOUS PRN
Status: DISCONTINUED | OUTPATIENT
Start: 2019-06-26 | End: 2019-06-26 | Stop reason: SDUPTHER

## 2019-06-26 RX ORDER — MAGNESIUM HYDROXIDE 1200 MG/15ML
LIQUID ORAL CONTINUOUS PRN
Status: COMPLETED | OUTPATIENT
Start: 2019-06-26 | End: 2019-06-26

## 2019-06-26 RX ORDER — ALBUTEROL SULFATE 90 UG/1
2 AEROSOL, METERED RESPIRATORY (INHALATION) EVERY 6 HOURS PRN
Status: CANCELLED | OUTPATIENT
Start: 2019-06-26

## 2019-06-26 RX ORDER — SODIUM CHLORIDE 9 MG/ML
INJECTION, SOLUTION INTRAVENOUS CONTINUOUS
Status: DISCONTINUED | OUTPATIENT
Start: 2019-06-26 | End: 2019-06-27

## 2019-06-26 RX ORDER — ONDANSETRON 2 MG/ML
4 INJECTION INTRAMUSCULAR; INTRAVENOUS EVERY 6 HOURS PRN
Status: DISCONTINUED | OUTPATIENT
Start: 2019-06-26 | End: 2019-06-27 | Stop reason: HOSPADM

## 2019-06-26 RX ORDER — ATORVASTATIN CALCIUM 20 MG/1
20 TABLET, FILM COATED ORAL DAILY
Status: DISCONTINUED | OUTPATIENT
Start: 2019-06-26 | End: 2019-06-27 | Stop reason: HOSPADM

## 2019-06-26 RX ORDER — OXYCODONE HYDROCHLORIDE AND ACETAMINOPHEN 5; 325 MG/1; MG/1
1 TABLET ORAL EVERY 6 HOURS PRN
Qty: 18 TABLET | Refills: 0 | Status: SHIPPED | OUTPATIENT
Start: 2019-06-26 | End: 2019-07-02 | Stop reason: SDUPTHER

## 2019-06-26 RX ORDER — KETOROLAC TROMETHAMINE 15 MG/ML
15 INJECTION, SOLUTION INTRAMUSCULAR; INTRAVENOUS EVERY 6 HOURS
Status: DISCONTINUED | OUTPATIENT
Start: 2019-06-26 | End: 2019-06-27 | Stop reason: HOSPADM

## 2019-06-26 RX ORDER — SODIUM CHLORIDE, SODIUM LACTATE, POTASSIUM CHLORIDE, CALCIUM CHLORIDE 600; 310; 30; 20 MG/100ML; MG/100ML; MG/100ML; MG/100ML
INJECTION, SOLUTION INTRAVENOUS CONTINUOUS PRN
Status: DISCONTINUED | OUTPATIENT
Start: 2019-06-26 | End: 2019-06-26 | Stop reason: SDUPTHER

## 2019-06-26 RX ORDER — DOCUSATE SODIUM 100 MG/1
100 CAPSULE, LIQUID FILLED ORAL 2 TIMES DAILY
Status: DISCONTINUED | OUTPATIENT
Start: 2019-06-26 | End: 2019-06-27 | Stop reason: HOSPADM

## 2019-06-26 RX ORDER — OXYCODONE HYDROCHLORIDE 5 MG/1
10 TABLET ORAL EVERY 4 HOURS PRN
Status: DISCONTINUED | OUTPATIENT
Start: 2019-06-26 | End: 2019-06-27 | Stop reason: HOSPADM

## 2019-06-26 RX ORDER — SODIUM CHLORIDE 0.9 % (FLUSH) 0.9 %
10 SYRINGE (ML) INJECTION PRN
Status: CANCELLED | OUTPATIENT
Start: 2019-06-26

## 2019-06-26 RX ORDER — LIDOCAINE HYDROCHLORIDE 10 MG/ML
INJECTION, SOLUTION EPIDURAL; INFILTRATION; INTRACAUDAL; PERINEURAL PRN
Status: DISCONTINUED | OUTPATIENT
Start: 2019-06-26 | End: 2019-06-26 | Stop reason: SDUPTHER

## 2019-06-26 RX ORDER — KETOROLAC TROMETHAMINE 30 MG/ML
INJECTION, SOLUTION INTRAMUSCULAR; INTRAVENOUS PRN
Status: DISCONTINUED | OUTPATIENT
Start: 2019-06-26 | End: 2019-06-26 | Stop reason: SDUPTHER

## 2019-06-26 RX ORDER — ACETAMINOPHEN 10 MG/ML
INJECTION, SOLUTION INTRAVENOUS PRN
Status: DISCONTINUED | OUTPATIENT
Start: 2019-06-26 | End: 2019-06-26 | Stop reason: SDUPTHER

## 2019-06-26 RX ORDER — ALBUTEROL SULFATE 90 UG/1
2 AEROSOL, METERED RESPIRATORY (INHALATION) EVERY 6 HOURS PRN
Status: DISCONTINUED | OUTPATIENT
Start: 2019-06-26 | End: 2019-06-27 | Stop reason: HOSPADM

## 2019-06-26 RX ORDER — TAMSULOSIN HYDROCHLORIDE 0.4 MG/1
0.4 CAPSULE ORAL DAILY
Status: DISCONTINUED | OUTPATIENT
Start: 2019-06-26 | End: 2019-06-27 | Stop reason: HOSPADM

## 2019-06-26 RX ORDER — OXYCODONE HYDROCHLORIDE 5 MG/1
5 TABLET ORAL EVERY 4 HOURS PRN
Status: DISCONTINUED | OUTPATIENT
Start: 2019-06-26 | End: 2019-06-27 | Stop reason: HOSPADM

## 2019-06-26 RX ORDER — MIDAZOLAM HYDROCHLORIDE 1 MG/ML
2 INJECTION INTRAMUSCULAR; INTRAVENOUS
Status: CANCELLED | OUTPATIENT
Start: 2019-06-26 | End: 2019-06-26

## 2019-06-26 RX ORDER — SODIUM CHLORIDE, SODIUM LACTATE, POTASSIUM CHLORIDE, CALCIUM CHLORIDE 600; 310; 30; 20 MG/100ML; MG/100ML; MG/100ML; MG/100ML
1000 INJECTION, SOLUTION INTRAVENOUS CONTINUOUS
Status: DISCONTINUED | OUTPATIENT
Start: 2019-06-26 | End: 2019-06-27

## 2019-06-26 RX ORDER — POLYETHYLENE GLYCOL 3350 17 G/17G
17 POWDER, FOR SOLUTION ORAL DAILY
Qty: 238 G | Refills: 0 | Status: SHIPPED | OUTPATIENT
Start: 2019-06-26 | End: 2019-07-10

## 2019-06-26 RX ORDER — CLINDAMYCIN PHOSPHATE 600 MG/50ML
600 INJECTION INTRAVENOUS EVERY 8 HOURS
Status: DISCONTINUED | OUTPATIENT
Start: 2019-06-26 | End: 2019-06-27 | Stop reason: HOSPADM

## 2019-06-26 RX ORDER — FENTANYL CITRATE 50 UG/ML
25 INJECTION, SOLUTION INTRAMUSCULAR; INTRAVENOUS EVERY 5 MIN PRN
Status: DISCONTINUED | OUTPATIENT
Start: 2019-06-26 | End: 2019-06-26 | Stop reason: HOSPADM

## 2019-06-26 RX ORDER — CLINDAMYCIN PHOSPHATE 900 MG/50ML
INJECTION INTRAVENOUS PRN
Status: DISCONTINUED | OUTPATIENT
Start: 2019-06-26 | End: 2019-06-26 | Stop reason: SDUPTHER

## 2019-06-26 RX ORDER — CIPROFLOXACIN 500 MG/1
500 TABLET, FILM COATED ORAL 2 TIMES DAILY
Qty: 6 TABLET | Refills: 0 | Status: SHIPPED | OUTPATIENT
Start: 2019-06-26 | End: 2019-06-29

## 2019-06-26 RX ORDER — ALBUTEROL SULFATE 90 UG/1
AEROSOL, METERED RESPIRATORY (INHALATION) PRN
Status: DISCONTINUED | OUTPATIENT
Start: 2019-06-26 | End: 2019-06-26 | Stop reason: SDUPTHER

## 2019-06-26 RX ORDER — SODIUM CHLORIDE 0.9 % (FLUSH) 0.9 %
10 SYRINGE (ML) INJECTION EVERY 12 HOURS SCHEDULED
Status: DISCONTINUED | OUTPATIENT
Start: 2019-06-26 | End: 2019-06-27 | Stop reason: HOSPADM

## 2019-06-26 RX ORDER — AMLODIPINE BESYLATE 5 MG/1
5 TABLET ORAL DAILY
Status: DISCONTINUED | OUTPATIENT
Start: 2019-06-26 | End: 2019-06-27 | Stop reason: HOSPADM

## 2019-06-26 RX ORDER — KETAMINE HYDROCHLORIDE 10 MG/ML
INJECTION, SOLUTION INTRAMUSCULAR; INTRAVENOUS PRN
Status: DISCONTINUED | OUTPATIENT
Start: 2019-06-26 | End: 2019-06-26 | Stop reason: SDUPTHER

## 2019-06-26 RX ORDER — ULTRASOUND COUPLING MEDIUM
GEL (GRAM) TOPICAL PRN
Status: DISCONTINUED | OUTPATIENT
Start: 2019-06-26 | End: 2019-06-26 | Stop reason: ALTCHOICE

## 2019-06-26 RX ORDER — PROPOFOL 10 MG/ML
INJECTION, EMULSION INTRAVENOUS PRN
Status: DISCONTINUED | OUTPATIENT
Start: 2019-06-26 | End: 2019-06-26 | Stop reason: SDUPTHER

## 2019-06-26 RX ORDER — GLYCOPYRROLATE 1 MG/5 ML
SYRINGE (ML) INTRAVENOUS PRN
Status: DISCONTINUED | OUTPATIENT
Start: 2019-06-26 | End: 2019-06-26 | Stop reason: SDUPTHER

## 2019-06-26 RX ORDER — FLUTICASONE PROPIONATE 50 MCG
2 SPRAY, SUSPENSION (ML) NASAL DAILY
Status: DISCONTINUED | OUTPATIENT
Start: 2019-06-26 | End: 2019-06-27 | Stop reason: HOSPADM

## 2019-06-26 RX ADMIN — SODIUM CHLORIDE, POTASSIUM CHLORIDE, SODIUM LACTATE AND CALCIUM CHLORIDE: 600; 310; 30; 20 INJECTION, SOLUTION INTRAVENOUS at 11:32

## 2019-06-26 RX ADMIN — SODIUM CHLORIDE: 9 INJECTION, SOLUTION INTRAVENOUS at 13:42

## 2019-06-26 RX ADMIN — CLINDAMYCIN IN 5 PERCENT DEXTROSE 600 MG: 12 INJECTION, SOLUTION INTRAVENOUS at 20:27

## 2019-06-26 RX ADMIN — KETAMINE HYDROCHLORIDE 40 MG: 10 INJECTION INTRAMUSCULAR; INTRAVENOUS at 08:46

## 2019-06-26 RX ADMIN — SODIUM CHLORIDE, POTASSIUM CHLORIDE, SODIUM LACTATE AND CALCIUM CHLORIDE: 600; 310; 30; 20 INJECTION, SOLUTION INTRAVENOUS at 08:52

## 2019-06-26 RX ADMIN — Medication 0.2 MG: at 08:43

## 2019-06-26 RX ADMIN — Medication 10 ML: at 20:46

## 2019-06-26 RX ADMIN — ACETAMINOPHEN 650 MG: 325 TABLET ORAL at 18:40

## 2019-06-26 RX ADMIN — PHENYLEPHRINE HYDROCHLORIDE 100 MCG: 10 INJECTION INTRAVENOUS at 10:29

## 2019-06-26 RX ADMIN — KETAMINE HYDROCHLORIDE 10 MG: 10 INJECTION INTRAMUSCULAR; INTRAVENOUS at 11:30

## 2019-06-26 RX ADMIN — ALBUTEROL SULFATE 2 PUFF: 90 AEROSOL, METERED RESPIRATORY (INHALATION) at 11:36

## 2019-06-26 RX ADMIN — Medication 4 MG: at 11:34

## 2019-06-26 RX ADMIN — PHENYLEPHRINE HYDROCHLORIDE 100 MCG: 10 INJECTION INTRAVENOUS at 09:58

## 2019-06-26 RX ADMIN — PROPOFOL 200 MG: 10 INJECTION, EMULSION INTRAVENOUS at 08:45

## 2019-06-26 RX ADMIN — KETOROLAC TROMETHAMINE 15 MG: 15 INJECTION, SOLUTION INTRAMUSCULAR; INTRAVENOUS at 18:40

## 2019-06-26 RX ADMIN — Medication 0.6 MG: at 11:34

## 2019-06-26 RX ADMIN — DEXAMETHASONE SODIUM PHOSPHATE 10 MG: 10 INJECTION INTRAMUSCULAR; INTRAVENOUS at 09:15

## 2019-06-26 RX ADMIN — TAMSULOSIN HYDROCHLORIDE 0.4 MG: 0.4 CAPSULE ORAL at 20:45

## 2019-06-26 RX ADMIN — ROCURONIUM BROMIDE 10 MG: 10 INJECTION INTRAVENOUS at 10:18

## 2019-06-26 RX ADMIN — HYDROMORPHONE HYDROCHLORIDE 0.25 MG: 1 INJECTION, SOLUTION INTRAMUSCULAR; INTRAVENOUS; SUBCUTANEOUS at 12:35

## 2019-06-26 RX ADMIN — ROCURONIUM BROMIDE 10 MG: 10 INJECTION INTRAVENOUS at 09:22

## 2019-06-26 RX ADMIN — OXYCODONE HYDROCHLORIDE 10 MG: 5 TABLET ORAL at 16:24

## 2019-06-26 RX ADMIN — SODIUM CHLORIDE, POTASSIUM CHLORIDE, SODIUM LACTATE AND CALCIUM CHLORIDE 1000 ML: 600; 310; 30; 20 INJECTION, SOLUTION INTRAVENOUS at 08:19

## 2019-06-26 RX ADMIN — KETOROLAC TROMETHAMINE 30 MG: 30 INJECTION, SOLUTION INTRAMUSCULAR at 11:19

## 2019-06-26 RX ADMIN — CLONAZEPAM 1 MG: 1 TABLET ORAL at 20:45

## 2019-06-26 RX ADMIN — ACETAMINOPHEN 1000 MG: 10 INJECTION, SOLUTION INTRAVENOUS at 09:01

## 2019-06-26 RX ADMIN — ONDANSETRON 4 MG: 2 INJECTION, SOLUTION INTRAMUSCULAR; INTRAVENOUS at 11:10

## 2019-06-26 RX ADMIN — PHENYLEPHRINE HYDROCHLORIDE 100 MCG: 10 INJECTION INTRAVENOUS at 09:05

## 2019-06-26 RX ADMIN — DOCUSATE SODIUM 100 MG: 100 CAPSULE, LIQUID FILLED ORAL at 20:45

## 2019-06-26 RX ADMIN — LIDOCAINE HYDROCHLORIDE 50 MG: 10 INJECTION, SOLUTION EPIDURAL; INFILTRATION; INTRACAUDAL; PERINEURAL at 08:45

## 2019-06-26 RX ADMIN — CLINDAMYCIN IN 5 PERCENT DEXTROSE 900 MG: 18 INJECTION, SOLUTION INTRAVENOUS at 08:59

## 2019-06-26 RX ADMIN — ACETAMINOPHEN 650 MG: 325 TABLET ORAL at 13:33

## 2019-06-26 RX ADMIN — ROCURONIUM BROMIDE 10 MG: 10 INJECTION INTRAVENOUS at 09:11

## 2019-06-26 RX ADMIN — ROCURONIUM BROMIDE 10 MG: 10 INJECTION INTRAVENOUS at 11:05

## 2019-06-26 RX ADMIN — ATORVASTATIN CALCIUM 20 MG: 20 TABLET, FILM COATED ORAL at 20:45

## 2019-06-26 RX ADMIN — CLINDAMYCIN PHOSPHATE 900 MG: 900 INJECTION, SOLUTION INTRAVENOUS at 13:35

## 2019-06-26 RX ADMIN — Medication 0.25 MG: at 12:35

## 2019-06-26 RX ADMIN — FLUTICASONE PROPIONATE 2 SPRAY: 50 SPRAY, METERED NASAL at 20:47

## 2019-06-26 RX ADMIN — ROCURONIUM BROMIDE 50 MG: 10 INJECTION INTRAVENOUS at 08:45

## 2019-06-26 RX ADMIN — PHENYLEPHRINE HYDROCHLORIDE 100 MCG: 10 INJECTION INTRAVENOUS at 09:10

## 2019-06-26 RX ADMIN — AMLODIPINE BESYLATE 5 MG: 5 TABLET ORAL at 20:45

## 2019-06-26 RX ADMIN — KETOROLAC TROMETHAMINE 15 MG: 15 INJECTION, SOLUTION INTRAMUSCULAR; INTRAVENOUS at 13:33

## 2019-06-26 ASSESSMENT — PULMONARY FUNCTION TESTS
PIF_VALUE: 17
PIF_VALUE: 36
PIF_VALUE: 36
PIF_VALUE: 35
PIF_VALUE: 28
PIF_VALUE: 35
PIF_VALUE: 26
PIF_VALUE: 1
PIF_VALUE: 34
PIF_VALUE: 36
PIF_VALUE: 35
PIF_VALUE: 36
PIF_VALUE: 0
PIF_VALUE: 14
PIF_VALUE: 18
PIF_VALUE: 17
PIF_VALUE: 32
PIF_VALUE: 35
PIF_VALUE: 37
PIF_VALUE: 29
PIF_VALUE: 36
PIF_VALUE: 1
PIF_VALUE: 35
PIF_VALUE: 36
PIF_VALUE: 36
PIF_VALUE: 18
PIF_VALUE: 36
PIF_VALUE: 36
PIF_VALUE: 35
PIF_VALUE: 36
PIF_VALUE: 35
PIF_VALUE: 36
PIF_VALUE: 14
PIF_VALUE: 36
PIF_VALUE: 13
PIF_VALUE: 18
PIF_VALUE: 34
PIF_VALUE: 37
PIF_VALUE: 35
PIF_VALUE: 35
PIF_VALUE: 26
PIF_VALUE: 36
PIF_VALUE: 17
PIF_VALUE: 0
PIF_VALUE: 35
PIF_VALUE: 34
PIF_VALUE: 35
PIF_VALUE: 36
PIF_VALUE: 35
PIF_VALUE: 18
PIF_VALUE: 18
PIF_VALUE: 31
PIF_VALUE: 35
PIF_VALUE: 36
PIF_VALUE: 37
PIF_VALUE: 30
PIF_VALUE: 18
PIF_VALUE: 35
PIF_VALUE: 35
PIF_VALUE: 3
PIF_VALUE: 18
PIF_VALUE: 3
PIF_VALUE: 36
PIF_VALUE: 35
PIF_VALUE: 26
PIF_VALUE: 36
PIF_VALUE: 27
PIF_VALUE: 37
PIF_VALUE: 36
PIF_VALUE: 35
PIF_VALUE: 18
PIF_VALUE: 37
PIF_VALUE: 17
PIF_VALUE: 36
PIF_VALUE: 35
PIF_VALUE: 34
PIF_VALUE: 17
PIF_VALUE: 36
PIF_VALUE: 26
PIF_VALUE: 18
PIF_VALUE: 36
PIF_VALUE: 35
PIF_VALUE: 14
PIF_VALUE: 18
PIF_VALUE: 34
PIF_VALUE: 2
PIF_VALUE: 36
PIF_VALUE: 19
PIF_VALUE: 37
PIF_VALUE: 19
PIF_VALUE: 3
PIF_VALUE: 35
PIF_VALUE: 34
PIF_VALUE: 17
PIF_VALUE: 35
PIF_VALUE: 36
PIF_VALUE: 3
PIF_VALUE: 36
PIF_VALUE: 37
PIF_VALUE: 37
PIF_VALUE: 36
PIF_VALUE: 18
PIF_VALUE: 35
PIF_VALUE: 18
PIF_VALUE: 33
PIF_VALUE: 17
PIF_VALUE: 34
PIF_VALUE: 36
PIF_VALUE: 35
PIF_VALUE: 36
PIF_VALUE: 35
PIF_VALUE: 36
PIF_VALUE: 26
PIF_VALUE: 34
PIF_VALUE: 36
PIF_VALUE: 25
PIF_VALUE: 13
PIF_VALUE: 4
PIF_VALUE: 29
PIF_VALUE: 35
PIF_VALUE: 37
PIF_VALUE: 3
PIF_VALUE: 34
PIF_VALUE: 3
PIF_VALUE: 1
PIF_VALUE: 37
PIF_VALUE: 25
PIF_VALUE: 37
PIF_VALUE: 35
PIF_VALUE: 35
PIF_VALUE: 36
PIF_VALUE: 35
PIF_VALUE: 18
PIF_VALUE: 35
PIF_VALUE: 36
PIF_VALUE: 34
PIF_VALUE: 36
PIF_VALUE: 3
PIF_VALUE: 37
PIF_VALUE: 35
PIF_VALUE: 37
PIF_VALUE: 36
PIF_VALUE: 18
PIF_VALUE: 36
PIF_VALUE: 36
PIF_VALUE: 37
PIF_VALUE: 26
PIF_VALUE: 0
PIF_VALUE: 18
PIF_VALUE: 36
PIF_VALUE: 35
PIF_VALUE: 0
PIF_VALUE: 36
PIF_VALUE: 35
PIF_VALUE: 25
PIF_VALUE: 34
PIF_VALUE: 36
PIF_VALUE: 36
PIF_VALUE: 35
PIF_VALUE: 3
PIF_VALUE: 3
PIF_VALUE: 35
PIF_VALUE: 36
PIF_VALUE: 36
PIF_VALUE: 14
PIF_VALUE: 34
PIF_VALUE: 17
PIF_VALUE: 35
PIF_VALUE: 36
PIF_VALUE: 35
PIF_VALUE: 18
PIF_VALUE: 37
PIF_VALUE: 35
PIF_VALUE: 3

## 2019-06-26 ASSESSMENT — PAIN - FUNCTIONAL ASSESSMENT: PAIN_FUNCTIONAL_ASSESSMENT: 0-10

## 2019-06-26 ASSESSMENT — PAIN SCALES - GENERAL
PAINLEVEL_OUTOF10: 7
PAINLEVEL_OUTOF10: 8
PAINLEVEL_OUTOF10: 10
PAINLEVEL_OUTOF10: 10

## 2019-06-26 ASSESSMENT — PAIN DESCRIPTION - LOCATION: LOCATION: ABDOMEN

## 2019-06-26 ASSESSMENT — PAIN DESCRIPTION - PAIN TYPE
TYPE: ACUTE PAIN;SURGICAL PAIN
TYPE: SURGICAL PAIN

## 2019-06-26 ASSESSMENT — PAIN SCALES - WONG BAKER: WONGBAKER_NUMERICALRESPONSE: 8

## 2019-06-26 ASSESSMENT — PAIN DESCRIPTION - ORIENTATION: ORIENTATION: MID

## 2019-06-26 ASSESSMENT — LIFESTYLE VARIABLES: SMOKING_STATUS: 1

## 2019-06-26 NOTE — H&P
6/26/2019    I have examined the patient and reviewed the medical history. There are no changes to the medical history or physical exam.  The patient is here today for RALP, BL PLND with Dr. Denise Flannery. NAD, AOx3  RRR  Equal chest rise bilaterally  Soft, NT/ND  Extremities warm to touch    Impression: Prostate cancer [facility])  Plan: RALP, BL PLND with Dr. Trisha Ely MD  PGY-4, Urology  [phone]        History and Physical    Pt Name: Vicente Paredes  MRN: [id number]  YOB: 1950  Date of evaluation: 6/26/2019    SUBJECTIVE:   History of Chief Complaint:    Patient complains of prostate cancer. He was experiencing nocturia, was found to have elevated PSA. He was sent for prostate biopsy, which was positive for cancer. He has now been scheduled for prostatectomy. Past Medical History    has a past medical history of Anxiety state, unspecified, Carotid artery stenosis, Colon polyp, Depressive disorder, not elsewhere classified, Elevated PSA, Essential hypertension, benign, GERD (gastroesophageal reflux disease), History of colon polyps, History of hepatitis C, Hyperlipidemia, Impotence of organic origin, Lipoma of unspecified site, Lumbago, No natural teeth, Prostate CA ([address] Secondary localized osteoarthrosis, shoulder region, Sleep apnea, and Swelling of limb. Past Surgical History   has a past surgical history that includes Carpal tunnel release (Bilateral); Ankle surgery (Right); shoulder surgery (Right, 2015); Colonoscopy (04/13/2017); pr colon ca scrn not hi rsk ind (N/A, 4/13/2017); cervical fusion (2009); lumbar fusion; EXCISION / BIOPSY SKIN LESION OF ARM (Left, 9/15/2017); Ulnar tunnel release (Left); and Prostate biopsy (N/A, 4/24/2019). Medications  Prior to Admission medications    Medication Sig Start Date End Date Taking?  Authorizing Provider   tamsulosin (FLOMAX) 0.4 MG capsule Take 1 capsule by mouth daily Take one capsule daily to facilitate passage of ureteral stone  Patient

## 2019-06-26 NOTE — CARE COORDINATION
Case Management Initial Discharge Plan  Geovanni Garland,             Met with:patient to discuss discharge plans. Information verified: address, contacts, phone number, , insurance Yes  PCP: Ernestina Hou  Date of last visit: last week    Insurance Provider: Celia Luna    Discharge Planning    Living Arrangements:  Spouse/Significant Other   Support Systems:  Spouse/Significant Other    Home has 2 stories  Pt uses ramp get into front door, one flight of stairs to climb to reach second floor  Location of bedroom/bathroom in home is on the main floor    Patient able to perform ADL's:Independent    Current Services (outpatient & in home) none  DME equipment: nebulizer  DME provider: n/a    Pharmacy: [facility] [address] [place] [address] Medications:  No  Does patient want to participate in local refill/ meds to beds program?  Yes    Potential Assistance Needed:  N/A    Patient agreeable to home care: No  [place] of choice provided:  n/a    Prior SNF/Rehab Placement and Facility: none  Agreeable to SNF/Rehab: No  [place] of choice provided: n/a   Evaluation: n/a    Expected Discharge date:  19  Patient expects to be discharged to:  Home independently  Follow Up Appointment: Best Day/ Time: Monday AM    Transportation provider: [place]ER[place]  Transportation arrangements needed for discharge: No    Readmission Risk              Risk of Unplanned Readmission:        9             Does patient have a readmission risk score greater than 14?: No  If yes, follow-up appointment must be made within 7 days of discharge.      Discharge Plan: Home independently          Electronically signed by Sarah Espinal RN on 19 at 4:50 PM

## 2019-06-26 NOTE — ANESTHESIA PRE PROCEDURE
Department of Anesthesiology  Preprocedure Note       Name:  Rashel Bach   Age:  80 y.o.  :  1950                                          MRN:  [id number]         Date:  2019      Surgeon: Lieutenant Shields):  Flaca Fuller MD    Procedure: XI ROBOTIC LAPAROSCOPIC PROSTATECTOMY , PELVIC LYMPH NODE DISSECTION (N/A )    Department of Anesthesiology  Pre-Anesthesia Evaluation/Consultation         Name:  Rashel Bach                                         Age:  80 y.o. MRN:  [id number]             Medications  No current facility-administered medications for this visit. No current outpatient medications on file.      Facility-Administered Medications Ordered in Other Visits   Medication Dose Route Frequency Provider Last Rate Last Dose    enoxaparin (LOVENOX) injection 40 mg  40 mg Subcutaneous Once Flaca Fuller MD        lactated ringers infusion 1,000 mL  1,000 mL Intravenous Continuous Roc Jose MD 50 mL/hr at 19 0819 1,000 mL at 19 0819    clindamycin (CLEOCIN) 900 mg in dextrose 5 % 50 mL IVPB  900 mg Intravenous Once Minda Bates MD           Allergies   Allergen Reactions    Bee Venom Anaphylaxis    Fentanyl Shortness Of Breath and Swelling     Tongue swelling    Morphine Hives, Shortness Of Breath and Swelling     Face swelling    Pcn [Penicillins] Hives and Rash    Codeine Hives and Nausea Only     Patient Active Problem List   Diagnosis    Colon polyps    Viral hepatitis C without hepatic coma    GERD (gastroesophageal reflux disease)    Anxiety    COPD (chronic obstructive pulmonary disease) (HCC)    Hyperlipidemia    Essential hypertension    Chest pain    Tobacco abuse    Osteoarthritis of right glenohumeral joint    Prostate cancer [facility])     Past Medical History:   Diagnosis Date    Anxiety state, unspecified     Carotid artery stenosis     Colon polyp     Depressive disorder, not elsewhere classified     Elevated PSA 2019    122/73   Max taken time: 19 0757  SpO2  Av %  Min: 98 %   Min taken time: 19 0757  Max: 98 %   Max taken time: 19 0757  BP Readings from Last 3 Encounters:   19 122/73   19 113/72   19 (!) 129/97       BMI  There is no height or weight on file to calculate BMI. CBC   Lab Results   Component Value Date    WBC 10.6 2019    RBC 4.88 2019    HGB 15.9 2019    HCT 49.6 2019    .6 2019    RDW 11.9 2019     2019       CMP    Lab Results   Component Value Date     2019    K 4.7 2019     2019    CO2 22 2019    BUN 18 2019    CREATININE 0.63 2019    GFRAA >60 2019    LABGLOM >60 2019    GLUCOSE 106 2019    PROT 7.7 2019    CALCIUM 9.4 2019    BILITOT 0.46 2019    ALKPHOS 87 2019    AST 38 2019    ALT 52 2019       BMP    Lab Results   Component Value Date     2019    K 4.7 2019     2019    CO2 22 2019    BUN 18 2019    CREATININE 0.63 2019    CALCIUM 9.4 2019    GFRAA >60 2019    LABGLOM >60 2019    GLUCOSE 106 2019       POC Testing  No results for input(s): POCGLU, POCNA, POCK, POCCL, POCBUN, POCHEMO, POCHCT in the last 72 hours. Coags    Lab Results   Component Value Date    APTT 28.6 2019       HCG (If Applicable) No results found for: PREGTESTUR, PREGSERUM, HCG, HCGQUANT     ABGs No results found for: PHART, PO2ART, EHR5SXC, OIQ1HPL, BEART, Z2DBRIWS     Type & Screen (If Applicable)  No results found for: LABABO, [address]    Radiology (If Applicable)    Cardiac Testing (If Applicable) neg stress '18,nl EF    EKG (If Applicable) PAC's          Medications prior to admission:   Prior to Admission medications    Medication Sig Start Date End Date Taking?  Authorizing Provider   tamsulosin (FLOMAX) 0.4 MG capsule Take 1 capsule by mouth daily Take one capsule daily to facilitate passage of ureteral stone  Patient taking differently: Take 0.4 mg by mouth nightly Take one capsule daily to facilitate passage of ureteral stone 5/28/19   CARINE Link CNP   lisinopril (PRINIVIL;ZESTRIL) 40 MG tablet Take 40 mg by mouth nightly     Historical Provider, MD   clonazePAM (KLONOPIN) 1 MG tablet Take 1 mg by mouth 2 times daily. Historical Provider, MD   albuterol sulfate  (90 Base) MCG/ACT inhaler Inhale 2 puffs into the lungs every 6 hours as needed for Wheezing    Historical Provider, MD   pantoprazole (PROTONIX) 40 MG tablet TAKE 1 TABLET (40 MG) BY ORAL ROUTE ONCE DAILY  bed time 12/13/18   Historical Provider, MD   amLODIPine (NORVASC) 5 MG tablet TAKE 1 TABLET (5 MG) BY ORAL ROUTE ONCE DAILY bed time 12/12/18   Historical Provider, MD   ibuprofen (ADVIL;MOTRIN) 800 MG tablet Take 1 tablet by mouth every 6 hours as needed for Pain 12/20/18 6/11/19  Kim Wolf MD   sertraline (ZOLOFT) 50 MG tablet TAKE 1 TABLET (50 MG) BY ORAL ROUTE ONCE DAILY bed time 12/5/18   Historical Provider, MD   tiotropium (SPIRIVA) 18 MCG inhalation capsule Inhale 1 capsule into the lungs daily 8/24/18   Reid Main MD   fluticasone (FLONASE) 50 MCG/ACT nasal spray 2 sprays by Nasal route daily  Patient taking differently: 2 sprays by Nasal route daily as needed  7/2/18   Claudio Peña MD   atorvastatin (LIPITOR) 20 MG tablet TAKE 1 TABLET DAILY  Patient taking differently: TAKE 1 TABLET DAILY  bed time 3/27/18   Claudio Peña MD   Multiple Vitamins-Minerals (CENTRUM SILVER 50+MEN PO) Take 1 tablet by mouth daily    Historical Provider, MD       Current medications:    No current facility-administered medications for this visit. No current outpatient medications on file.      Facility-Administered Medications Ordered in Other Visits   Medication Dose Route Frequency Provider Last Rate Last Dose    enoxaparin (LOVENOX) injection 40 mg  40 mg Subcutaneous Once Lotus Mckenzie MD        lactated ringers infusion 1,000 mL  1,000 mL Intravenous Continuous Loretta Dockery MD 50 mL/hr at 06/26/19 0819 1,000 mL at 06/26/19 0819    clindamycin (CLEOCIN) 900 mg in dextrose 5 % 50 mL IVPB  900 mg Intravenous Once Brigido Epsinoza MD           Allergies:     Allergies   Allergen Reactions    Bee Venom Anaphylaxis    Fentanyl Shortness Of Breath and Swelling     Tongue swelling    Morphine Hives, Shortness Of Breath and Swelling     Face swelling    Pcn [Penicillins] Hives and Rash    Codeine Hives and Nausea Only       Problem List:    Patient Active Problem List   Diagnosis Code    Colon polyps K63.5    Viral hepatitis C without hepatic coma B19.20    GERD (gastroesophageal reflux disease) K21.9    Anxiety F41.9    COPD (chronic obstructive pulmonary disease) (HCC) J44.9    Hyperlipidemia E78.5    Essential hypertension I10    Chest pain R07.9    Tobacco abuse Z72.0    Osteoarthritis of right glenohumeral joint M19.011    Prostate cancer ([facility] C61       Past Medical History:        Diagnosis Date    Anxiety state, unspecified     Carotid artery stenosis     Colon polyp     Depressive disorder, not elsewhere classified     Elevated PSA 05/06/2019    Essential hypertension, benign     Cardiologist,  [phone]    GERD (gastroesophageal reflux disease)     History of colon polyps     History of hepatitis C     Hyperlipidemia     Impotence of organic origin     Lipoma of unspecified site     Lumbago     No natural teeth     Prostate CA ([facility] 05/2019    Secondary localized osteoarthrosis, shoulder region     Sleep apnea     no CPAP    Swelling of limb        Past Surgical History:        Procedure Laterality Date    ANKLE SURGERY Right     CARPAL TUNNEL RELEASE Bilateral     CERVICAL FUSION  2009    anterior    COLONOSCOPY  04/13/2017    EXCISION / BIOPSY SKIN LESION OF ARM Left 9/15/2017    EXCISION SKIN LESION LEFT CHEST AND LEFT BUTTOCK, EXCISION LIPOMA LEFT LOWER ABDOMEN performed by Anibal Case DO at [address] [facility]CRN NOT  [address] [place] [address] N/A 4/13/2017    COLONOSCOPY performed by Keely Crabtree MD at [address] [place] [address] N/A 4/24/2019    PROSTATE BIOPSY WITH ULTRASOUND  (OFFICE TO NOTIFY [place]) performed by Carolnie Spencer MD at [address] Right 2015    ULNAR TUNNEL RELEASE Left        Social History:    Social History     Tobacco Use    Smoking status: Current Every Day Smoker     Packs/day: 1.00     Years: 54.00     Pack years: 54.00     Types: Cigarettes     Start date: 1960    Smokeless tobacco: Never Used   Substance Use Topics    Alcohol use: Not Currently     Comment: OCCASIONAL ON WEEKENDS                                Ready to quit: Not Answered  Counseling given: Not Answered      Vital Signs (Current): There were no vitals filed for this visit.                                            BP Readings from Last 3 Encounters:   06/26/19 122/73   06/11/19 113/72   06/11/19 (!) 129/97       NPO Status:                                                                                 BMI:   Wt Readings from Last 3 Encounters:   06/26/19 192 lb (87.1 kg)   06/11/19 195 lb (88.5 kg)   06/11/19 198 lb (89.8 kg)     There is no height or weight on file to calculate BMI.    CBC:   Lab Results   Component Value Date    WBC 10.6 06/11/2019    RBC 4.88 06/11/2019    HGB 15.9 06/11/2019    HCT 49.6 06/11/2019    .6 06/11/2019    RDW 11.9 06/11/2019     06/11/2019       CMP:   Lab Results   Component Value Date     06/11/2019    K 4.7 06/11/2019     06/11/2019    CO2 22 06/11/2019    BUN 18 06/11/2019    CREATININE 0.63 06/11/2019    GFRAA >60 06/11/2019    LABGLOM >60 06/11/2019    GLUCOSE 106 06/11/2019    PROT 7.7 03/05/2019    CALCIUM 9.4 03/05/2019    BILITOT 0.46 03/05/2019    ALKPHOS 87 03/05/2019    AST 38 03/05/2019    ALT 52 03/05/2019       POC Tests: No results for input(s): POCGLU, POCNA, POCK, POCCL, POCBUN, POCHEMO, POCHCT in the last 72 hours. Coags:   Lab Results   Component Value Date    APTT 28.6 03/05/2019       HCG (If Applicable): No results found for: PREGTESTUR, PREGSERUM, HCG, HCGQUANT     ABGs: No results found for: PHART, PO2ART, MDY5FBX, MTN1YHE, BEART, M9LTLQYC     Type & Screen (If Applicable):  No results found for: LABABO, [address]    Anesthesia Evaluation  Patient summary reviewed and Nursing notes reviewed  Airway: Mallampati: II     Neck ROM: full   Dental:    (+) upper dentures and lower dentures      Pulmonary:   (+) COPD:  current smoker    (-) recent URI                          ROS comment: 47 pk yrs   Cardiovascular:    (+) hypertension:,                ROS comment: PAD  -cp,syn,pnd     Neuro/Psych:   (+) neuromuscular disease:, depression/anxiety    (-) seizures and CVA            ROS comment: anxiety GI/Hepatic/Renal:   (+) GERD:, hepatitis: C,           Endo/Other:        (-) diabetes mellitus               Abdominal:           Vascular:                                          Anesthesia Plan      general     ASA 3     (Asa 3 copd)  Induction: intravenous. MIPS: Postoperative opioids intended and Prophylactic antiemetics administered. Anesthetic plan and risks discussed with patient. Plan discussed with CRNA.     Attending anesthesiologist reviewed and agrees with 2300 Phyllis Copeland MD   6/26/2019

## 2019-06-26 NOTE — ANESTHESIA POSTPROCEDURE EVALUATION
Department of Anesthesiology  Postprocedure Note    Patient: José Antonio Espinoza  MRN: [id number]  [place]: 1950  Date of evaluation: 6/26/2019  Time:  12:18 PM     Procedure Summary     Date:  06/26/19 Room / Location:  [facility] OR  / [facility] OR    Anesthesia Start:  4381 Anesthesia Stop:  3606    Procedure:  XI ROBOTIC LAPAROSCOPIC PROSTATECTOMY , PELVIC LYMPH NODE DISSECTION (N/A ) Diagnosis:  (PROSTATE CANCER)    Surgeon:  Skye Esteves MD Responsible Provider:  Wolf Young MD    Anesthesia Type:  general ASA Status:  3          Anesthesia Type: general    Glenn Phase I:      Glenn Phase II:      Last vitals: Reviewed and per EMR flowsheets.        Anesthesia Post Evaluation    Patient location during evaluation: PACU  Patient participation: complete - patient participated  Level of consciousness: awake  Pain score: 0  Airway patency: patent  Nausea & Vomiting: no vomiting and no nausea  Complications: no  Cardiovascular status: blood pressure returned to baseline  Respiratory status: acceptable  Hydration status: euvolemic

## 2019-06-27 ENCOUNTER — CARE COORDINATION (OUTPATIENT)
Dept: CARE COORDINATION | Age: 69
End: 2019-06-27

## 2019-06-27 VITALS
RESPIRATION RATE: 18 BRPM | SYSTOLIC BLOOD PRESSURE: 113 MMHG | WEIGHT: 192 LBS | TEMPERATURE: 98.4 F | DIASTOLIC BLOOD PRESSURE: 81 MMHG | BODY MASS INDEX: 28.44 KG/M2 | OXYGEN SATURATION: 93 % | HEIGHT: 69 IN | HEART RATE: 63 BPM

## 2019-06-27 LAB
ANION GAP SERPL CALCULATED.3IONS-SCNC: 9 MMOL/L (ref 9–17)
BUN BLDV-MCNC: 12 MG/DL (ref 8–23)
BUN/CREAT BLD: ABNORMAL (ref 9–20)
CALCIUM SERPL-MCNC: 8 MG/DL (ref 8.6–10.4)
CHLORIDE BLD-SCNC: 104 MMOL/L (ref 98–107)
CO2: 24 MMOL/L (ref 20–31)
CREAT SERPL-MCNC: 0.62 MG/DL (ref 0.7–1.2)
CULTURE: NO GROWTH
GFR AFRICAN AMERICAN: >60 ML/MIN
GFR NON-AFRICAN AMERICAN: >60 ML/MIN
GFR SERPL CREATININE-BSD FRML MDRD: ABNORMAL ML/MIN/{1.73_M2}
GFR SERPL CREATININE-BSD FRML MDRD: ABNORMAL ML/MIN/{1.73_M2}
GLUCOSE BLD-MCNC: 129 MG/DL (ref 70–99)
HCT VFR BLD CALC: 44.9 % (ref 40.7–50.3)
HEMOGLOBIN: 14.5 G/DL (ref 13–17)
Lab: NORMAL
MCH RBC QN AUTO: 32.4 PG (ref 25.2–33.5)
MCHC RBC AUTO-ENTMCNC: 32.3 G/DL (ref 28.4–34.8)
MCV RBC AUTO: 100.4 FL (ref 82.6–102.9)
NRBC AUTOMATED: 0 PER 100 WBC
PDW BLD-RTO: 11.7 % (ref 11.8–14.4)
PLATELET # BLD: 176 K/UL (ref 138–453)
PMV BLD AUTO: 10.8 FL (ref 8.1–13.5)
POTASSIUM SERPL-SCNC: 4.3 MMOL/L (ref 3.7–5.3)
RBC # BLD: 4.47 M/UL (ref 4.21–5.77)
SODIUM BLD-SCNC: 137 MMOL/L (ref 135–144)
SPECIMEN DESCRIPTION: NORMAL
SURGICAL PATHOLOGY REPORT: NORMAL
WBC # BLD: 17.1 K/UL (ref 3.5–11.3)

## 2019-06-27 PROCEDURE — 2580000003 HC RX 258: Performed by: STUDENT IN AN ORGANIZED HEALTH CARE EDUCATION/TRAINING PROGRAM

## 2019-06-27 PROCEDURE — 2500000003 HC RX 250 WO HCPCS: Performed by: STUDENT IN AN ORGANIZED HEALTH CARE EDUCATION/TRAINING PROGRAM

## 2019-06-27 PROCEDURE — 85027 COMPLETE CBC AUTOMATED: CPT

## 2019-06-27 PROCEDURE — G0378 HOSPITAL OBSERVATION PER HR: HCPCS

## 2019-06-27 PROCEDURE — 36415 COLL VENOUS BLD VENIPUNCTURE: CPT

## 2019-06-27 PROCEDURE — 94664 DEMO&/EVAL PT USE INHALER: CPT

## 2019-06-27 PROCEDURE — 94640 AIRWAY INHALATION TREATMENT: CPT

## 2019-06-27 PROCEDURE — 6370000000 HC RX 637 (ALT 250 FOR IP): Performed by: STUDENT IN AN ORGANIZED HEALTH CARE EDUCATION/TRAINING PROGRAM

## 2019-06-27 PROCEDURE — 80048 BASIC METABOLIC PNL TOTAL CA: CPT

## 2019-06-27 PROCEDURE — 6360000002 HC RX W HCPCS: Performed by: STUDENT IN AN ORGANIZED HEALTH CARE EDUCATION/TRAINING PROGRAM

## 2019-06-27 PROCEDURE — 6370000000 HC RX 637 (ALT 250 FOR IP): Performed by: UROLOGY

## 2019-06-27 RX ADMIN — Medication 10 ML: at 08:07

## 2019-06-27 RX ADMIN — PANTOPRAZOLE SODIUM 40 MG: 40 TABLET, DELAYED RELEASE ORAL at 06:35

## 2019-06-27 RX ADMIN — SERTRALINE 50 MG: 50 TABLET, FILM COATED ORAL at 08:04

## 2019-06-27 RX ADMIN — FLUTICASONE PROPIONATE 2 SPRAY: 50 SPRAY, METERED NASAL at 08:04

## 2019-06-27 RX ADMIN — DOCUSATE SODIUM 100 MG: 100 CAPSULE, LIQUID FILLED ORAL at 08:03

## 2019-06-27 RX ADMIN — HYDROMORPHONE HYDROCHLORIDE 0.5 MG: 1 INJECTION, SOLUTION INTRAMUSCULAR; INTRAVENOUS; SUBCUTANEOUS at 01:06

## 2019-06-27 RX ADMIN — KETOROLAC TROMETHAMINE 15 MG: 15 INJECTION, SOLUTION INTRAMUSCULAR; INTRAVENOUS at 00:57

## 2019-06-27 RX ADMIN — ACETAMINOPHEN 650 MG: 325 TABLET ORAL at 06:34

## 2019-06-27 RX ADMIN — ATORVASTATIN CALCIUM 20 MG: 20 TABLET, FILM COATED ORAL at 08:04

## 2019-06-27 RX ADMIN — KETOROLAC TROMETHAMINE 15 MG: 15 INJECTION, SOLUTION INTRAMUSCULAR; INTRAVENOUS at 06:35

## 2019-06-27 RX ADMIN — CLINDAMYCIN IN 5 PERCENT DEXTROSE 600 MG: 12 INJECTION, SOLUTION INTRAVENOUS at 05:25

## 2019-06-27 RX ADMIN — ACETAMINOPHEN 650 MG: 325 TABLET ORAL at 00:56

## 2019-06-27 RX ADMIN — CLONAZEPAM 1 MG: 1 TABLET ORAL at 08:04

## 2019-06-27 RX ADMIN — OXYCODONE HYDROCHLORIDE 10 MG: 5 TABLET ORAL at 09:57

## 2019-06-27 RX ADMIN — TIOTROPIUM BROMIDE 18 MCG: 18 CAPSULE ORAL; RESPIRATORY (INHALATION) at 07:57

## 2019-06-27 RX ADMIN — AMLODIPINE BESYLATE 5 MG: 5 TABLET ORAL at 08:04

## 2019-06-27 RX ADMIN — OXYCODONE HYDROCHLORIDE 10 MG: 5 TABLET ORAL at 05:25

## 2019-06-27 ASSESSMENT — PAIN SCALES - GENERAL
PAINLEVEL_OUTOF10: 8
PAINLEVEL_OUTOF10: 7
PAINLEVEL_OUTOF10: 8
PAINLEVEL_OUTOF10: 6
PAINLEVEL_OUTOF10: 8
PAINLEVEL_OUTOF10: 5
PAINLEVEL_OUTOF10: 8
PAINLEVEL_OUTOF10: 8

## 2019-06-27 NOTE — DISCHARGE SUMMARY
lisinopril (PRINIVIL;ZESTRIL) 40 MG tablet  Take 40 mg by mouth nightly              Multiple Vitamins-Minerals (CENTRUM SILVER 50+MEN PO)  Take 1 tablet by mouth daily             oxyCODONE-acetaminophen (PERCOCET) 5-325 MG per tablet  Take 1 tablet by mouth every 6 hours as needed for Pain for up to 5 days. Intended supply: 5 days. Take lowest dose possible to manage pain             pantoprazole (PROTONIX) 40 MG tablet  TAKE 1 TABLET (40 MG) BY ORAL ROUTE ONCE DAILY  bed time             polyethylene glycol (GLYCOLAX) powder  Take 17 g by mouth daily for 14 days             sertraline (ZOLOFT) 50 MG tablet  TAKE 1 TABLET (50 MG) BY ORAL ROUTE ONCE DAILY bed time             tamsulosin (FLOMAX) 0.4 MG capsule  Take 1 capsule by mouth daily Take one capsule daily to facilitate passage of ureteral stone             tiotropium (SPIRIVA) 18 MCG inhalation capsule  Inhale 1 capsule into the lungs daily                 Hospital course: Lauren Grimaldo is an 80 y.o. that underwent a Robotic Assisted Laparoscopic Prostatectomy by Marylen Hurst, MD  on  6/26/2019. For more details please see the operative report. The patient did well in their hospital course. The diet was advanced to regular. The patient had pain controlled with PO meds, tolerated diet, and was ambulating appropriately. Hemoglobin was stable on discharge at 14.5 from 16.4. He did have multiple drug allergies including to morphine, but he tolerated oxycodone and dilaudid without any issue as he had tolerated dilaudid and percocet in the past without any allergic reaction. No RIKKI  Was placed during the procedure. Kendall was in place and draining well. The patient was afebrile for 24 hrs before discharge. They were given a prescription for antibiotic to take starting the day prior to Kendall removal. The patient agrees to discharge, understands discharge instructions, and agrees to follow up.        Laura Ferrera  10:54 AM 6/27/2019

## 2019-06-27 NOTE — CARE COORDINATION
received report from [facility], stating patient accepts their Home to stay program.

## 2019-06-27 NOTE — PROGRESS NOTES
Discharge instrutions reviewed with patient and Hannah both able to verbalize understanding
Inhaler / Aerosol Education        [x] Served spacer    [] Provided and reviewed booklet   [x] Good return demonstration per patient   [] Aerosolized Medications:     Verbal education has been provided in the use, benefits and possible adverse reactions of aerosolized medications used in the treatment of this patient.     [] Other:
Smoking Cessation - topics covered   []  Health Risks  []  Benefits of Quitting   []  Smoking Cessation  []  Patient has no history of tobacco use  []  Patient is former smoker. []  No need for tobacco cessation education. []  Booklet given  []  Patient verbalizes understanding. []  Patient denies need for tobacco cessation education. [x]  Unable to meet with patient today. Will follow up as able.   Ariana Montes  2:39 PM
CLARITYU, SPECGRAV, LEUKOCYTESUR, UROBILINOGEN, Jazmyne Phenes in the last 72 hours.     Invalid input(s): NITRATE, GLUCOSEUKETONESUAMORPHOUS    Current Facility-Administered Medications   Medication Dose Route Frequency Provider Last Rate Last Dose    lactated ringers infusion 1,000 mL  1,000 mL Intravenous Continuous Lux River MD   Stopped at 06/26/19 1132    albuterol sulfate  (90 Base) MCG/ACT inhaler 2 puff  2 puff Inhalation Q6H PRN Cristy Deluca MD        amLODIPine (NORVASC) tablet 5 mg  5 mg Oral Daily Cristy Deluca MD   5 mg at 06/26/19 2045    atorvastatin (LIPITOR) tablet 20 mg  20 mg Oral Daily Cristy Deluca MD   20 mg at 06/26/19 2045    clonazePAM (KLONOPIN) tablet 1 mg  1 mg Oral BID Cristy Deluca MD   1 mg at 06/26/19 2045    fluticasone (FLONASE) 50 MCG/ACT nasal spray 2 spray  2 spray Nasal Daily Cristy Deluca MD   2 spray at 06/26/19 2047    pantoprazole (PROTONIX) tablet 40 mg  40 mg Oral QAM AC Cristy Deluca MD        sertraline (ZOLOFT) tablet 50 mg  50 mg Oral Daily Cristy Deluca MD        tamsulosin (FLOMAX) capsule 0.4 mg  0.4 mg Oral Daily Cristy Deluca MD   0.4 mg at 06/26/19 2045    tiotropium (SPIRIVA) inhalation capsule 18 mcg  18 mcg Inhalation Daily Racquel Vanegas MD        sodium chloride flush 0.9 % injection 10 mL  10 mL Intravenous 2 times per day Cristy Deluca MD   10 mL at 06/26/19 2046    sodium chloride flush 0.9 % injection 10 mL  10 mL Intravenous PRN Cristy Deluca MD        0.9 % sodium chloride infusion   Intravenous Continuous Cristy Deluca  mL/hr at 06/26/19 1342      docusate sodium (COLACE) capsule 100 mg  100 mg Oral BID Cristy Deluca MD   100 mg at 06/26/19 2045    ondansetron (ZOFRAN) injection 4 mg  4 mg Intravenous Q6H PRN Cristy Deluca MD        acetaminophen (TYLENOL) tablet 650 mg  650 mg Oral Q6H Cristy Deluca MD   650 mg at 06/27/19 0056    ketorolac (TORADOL) injection 15 mg  15 mg Intravenous Q6H Freddy

## 2019-07-02 ENCOUNTER — HOSPITAL ENCOUNTER (OUTPATIENT)
Dept: GENERAL RADIOLOGY | Age: 69
Discharge: HOME | End: 2019-07-04
Payer: MEDICARE

## 2019-07-02 ENCOUNTER — OFFICE VISIT (OUTPATIENT)
Dept: UROLOGY | Age: 69
End: 2019-07-02

## 2019-07-02 VITALS
HEART RATE: 78 BPM | WEIGHT: 192.02 LBS | HEIGHT: 69 IN | DIASTOLIC BLOOD PRESSURE: 83 MMHG | SYSTOLIC BLOOD PRESSURE: 139 MMHG | BODY MASS INDEX: 28.44 KG/M2

## 2019-07-02 DIAGNOSIS — C61 PROSTATE CANCER (HCC): ICD-10-CM

## 2019-07-02 PROCEDURE — 99024 POSTOP FOLLOW-UP VISIT: CPT | Performed by: NURSE PRACTITIONER

## 2019-07-02 PROCEDURE — 6360000004 HC RX CONTRAST MEDICATION: Performed by: UROLOGY

## 2019-07-02 PROCEDURE — 74430 CONTRAST X-RAY BLADDER: CPT

## 2019-07-02 PROCEDURE — 51600 INJECTION FOR BLADDER X-RAY: CPT

## 2019-07-02 RX ORDER — OXYCODONE HYDROCHLORIDE AND ACETAMINOPHEN 5; 325 MG/1; MG/1
1 TABLET ORAL EVERY 8 HOURS PRN
Qty: 10 TABLET | Refills: 0 | Status: SHIPPED | OUTPATIENT
Start: 2019-07-02 | End: 2019-07-10

## 2019-07-02 RX ADMIN — IOVERSOL 125 ML: 741 INJECTION INTRA-ARTERIAL; INTRAVENOUS at 10:56

## 2019-07-02 ASSESSMENT — ENCOUNTER SYMPTOMS
WHEEZING: 0
CONSTIPATION: 0
BACK PAIN: 0
ABDOMINAL PAIN: 0
DIARRHEA: 0
NAUSEA: 0
VOMITING: 0
EYE REDNESS: 0
SHORTNESS OF BREATH: 0
COUGH: 0
EYE PAIN: 0

## 2019-07-02 NOTE — PATIENT INSTRUCTIONS
PSA in 6 weeks prior to office appt with Dr Alex Gonzalez. Start Miralax for BM's.     kegels - squeeze and hold 3 seconds, relax and release 3 seconds, repeat 10-15 times 6x per day. Then follow with quick flicks squeeze, let go- 10-15x in a row 6x per day. Mild soap for washing perineal area, consider Desitin if there is soreness for a barrier production    wean off pain medications. Drink fluids.

## 2019-07-02 NOTE — PROGRESS NOTES
[facility]  [facility] [place]  [address]  [address] [facility] [address]  Dept: [address] [facility] Urology Office Note - Established    Patient:  Myah Hale  YOB: 1950  Date: 7/2/2019    The patient is a 80 y.o. male who presents todayfor evaluation of the following problems:   Chief Complaint   Patient presents with    Post-Op Check     prostatectomy       HPI  Here for follow up on RRP. He has not had a significant BM since surgery- small stools. Feels pain in his rectum, golf ball feeling in his rectum, has trouble bending. Is using Percocet for pain and wants more meds. \"He is in so much pain and does not feel he can make it through the weekend without a few pain meds. Summary of old records: N/A    Additional History: N/A    Procedures Today: N/A    Urinalysis today:  No results found for this visit on 07/02/19. Last several PSA's:  Lab Results   Component Value Date    PSA 4.54 (H) 11/29/2018    PSA 4.96 (H) 10/07/2018    PSA 2.47 10/08/2014     Last total testosterone:  No results found for: TESTOSTERONE    AUA Symptom Score (7/2/2019):                               Last BUN and creatinine:  Lab Results   Component Value Date    BUN 12 06/27/2019     Lab Results   Component Value Date    CREATININE 0.62 (L) 06/27/2019       Additional Lab/Culture results:    Patient Name: Chaz June [facility] Rec: [id number]   Path Number: [id number]   Collected: 6/26/2019   Received: 6/26/2019   Reported: 6/27/2019 17:21     -- Diagnosis --   1.  PROSTATE GLAND AND SEMINAL VESICLES, RADICAL PROSTATECTOMY:   - PROSTATIC ADENOCARCINOMA, LORI SCORE 3+4 = 7 (GRADE GROUP 2),    LIMITED TO THE PROSTATE GLAND.   - THE SURGICAL MARGINS ARE NEGATIVE FOR NEOPLASM. - BILATERAL SEMINAL VESICLES ARE NEGATIVE FOR NEOPLASM. 2.  BILATERAL PELVIC LYMPH NODES, RESECTION:   - NEGATIVE FOR MALIGNANCY (0/2).  Baljinder Rivera,   **Electronically Signed Out**     Imaging Reviewed during this Office Visit: (results were independently reviewed by physician and radiology report verified)    PAST MEDICAL, FAMILY AND SOCIAL HISTORY UPDATE:  Past Medical History:   Diagnosis Date    Anxiety state, unspecified     Carotid artery stenosis     Colon polyp     Depressive disorder, not elsewhere classified     Elevated PSA 05/06/2019    Essential hypertension, benign     Cardiologist,  [phone]    GERD (gastroesophageal reflux disease)     History of colon polyps     History of hepatitis C     Hyperlipidemia     Impotence of organic origin     Lipoma of unspecified site     Lumbago     No natural teeth     Prostate CA ([address] 05/2019    Secondary localized osteoarthrosis, shoulder region     Sleep apnea     no CPAP    Swelling of limb      Past Surgical History:   Procedure Laterality Date    ANKLE SURGERY Right     CARPAL TUNNEL RELEASE Bilateral     CERVICAL FUSION  2009    anterior    EXCISION / BIOPSY SKIN LESION OF ARM Left 9/15/2017    EXCISION SKIN LESION LEFT CHEST AND LEFT BUTTOCK, EXCISION LIPOMA LEFT LOWER ABDOMEN performed by Jono Vance DO at [address] [facility] SCRN NOT  [address] [place] [address] N/A 4/13/2017    COLONOSCOPY performed by Hao Rhodes MD at [address] [place] [address] N/A 4/24/2019    PROSTATE BIOPSY WITH ULTRASOUND  (OFFICE TO NOTIFY [place]S) performed by Lissette Clark MD at [facility]  06/26/2019    ROBOTIC LAPAROSCOPIC PROSTATECTOMY , PELVIC LYMPH NODE DISSECTION     PROSTATECTOMY N/A 6/26/2019    XI ROBOTIC LAPAROSCOPIC PROSTATECTOMY , PELVIC LYMPH NODE DISSECTION performed by Lissette Clark MD at [address] Right 2015    ULNAR TUNNEL RELEASE Left      Family History   Problem Relation Age of Onset    Stroke Maternal Uncle 80    Heart Surgery Maternal Uncle         Triple Bypass    Other Maternal Grandmother         Pul.  Embolism    Cancer Maternal Grandfather CARINE Gao CNP    Reviewed and Agree with the ROS entered by the MA.

## 2019-07-14 ENCOUNTER — HOSPITAL ENCOUNTER (OUTPATIENT)
Age: 69
Discharge: HOME | End: 2019-07-14
Payer: MEDICARE

## 2019-07-14 DIAGNOSIS — C61 PROSTATE CANCER (HCC): ICD-10-CM

## 2019-07-14 LAB — PROSTATE SPECIFIC ANTIGEN: 0.05 UG/L

## 2019-07-14 PROCEDURE — 36415 COLL VENOUS BLD VENIPUNCTURE: CPT

## 2019-07-14 PROCEDURE — 84153 ASSAY OF PSA TOTAL: CPT

## 2019-07-15 DIAGNOSIS — C61 PROSTATE CANCER (HCC): Primary | ICD-10-CM

## 2019-07-24 ENCOUNTER — HOSPITAL ENCOUNTER (OUTPATIENT)
Age: 69
Discharge: HOME | End: 2019-07-24
Payer: MEDICARE

## 2019-07-24 LAB
ALBUMIN SERPL-MCNC: 4.6 G/DL (ref 3.5–5.2)
ALBUMIN/GLOBULIN RATIO: ABNORMAL (ref 1–2.5)
ALP BLD-CCNC: 88 U/L (ref 40–129)
ALT SERPL-CCNC: 19 U/L (ref 5–41)
ANION GAP SERPL CALCULATED.3IONS-SCNC: 13 MMOL/L (ref 9–17)
AST SERPL-CCNC: 25 U/L
BILIRUB SERPL-MCNC: 0.43 MG/DL (ref 0.3–1.2)
BUN BLDV-MCNC: 15 MG/DL (ref 8–23)
BUN/CREAT BLD: ABNORMAL (ref 9–20)
CALCIUM SERPL-MCNC: 9.8 MG/DL (ref 8.6–10.4)
CHLORIDE BLD-SCNC: 104 MMOL/L (ref 98–107)
CO2: 25 MMOL/L (ref 20–31)
CREAT SERPL-MCNC: 0.68 MG/DL (ref 0.7–1.2)
GFR AFRICAN AMERICAN: >60 ML/MIN
GFR NON-AFRICAN AMERICAN: >60 ML/MIN
GFR SERPL CREATININE-BSD FRML MDRD: ABNORMAL ML/MIN/{1.73_M2}
GFR SERPL CREATININE-BSD FRML MDRD: ABNORMAL ML/MIN/{1.73_M2}
GLUCOSE BLD-MCNC: 97 MG/DL (ref 70–99)
HCT VFR BLD CALC: 49.1 % (ref 41–53)
HEMOGLOBIN: 16.5 G/DL (ref 13.5–17.5)
MCH RBC QN AUTO: 32.8 PG (ref 26–34)
MCHC RBC AUTO-ENTMCNC: 33.7 G/DL (ref 31–37)
MCV RBC AUTO: 97.4 FL (ref 80–100)
NRBC AUTOMATED: NORMAL PER 100 WBC
PDW BLD-RTO: 12.3 % (ref 11.5–14.9)
PLATELET # BLD: 186 K/UL (ref 150–450)
PMV BLD AUTO: 8.8 FL (ref 6–12)
POTASSIUM SERPL-SCNC: 4.6 MMOL/L (ref 3.7–5.3)
RBC # BLD: 5.04 M/UL (ref 4.5–5.9)
SODIUM BLD-SCNC: 142 MMOL/L (ref 135–144)
TOTAL PROTEIN: 8 G/DL (ref 6.4–8.3)
WBC # BLD: 10.4 K/UL (ref 3.5–11)

## 2019-07-24 PROCEDURE — 36415 COLL VENOUS BLD VENIPUNCTURE: CPT

## 2019-07-24 PROCEDURE — 80053 COMPREHEN METABOLIC PANEL: CPT

## 2019-07-24 PROCEDURE — 87522 HEPATITIS C REVRS TRNSCRPJ: CPT

## 2019-07-24 PROCEDURE — 85027 COMPLETE CBC AUTOMATED: CPT

## 2019-07-28 LAB
DIRECT EXAM: NORMAL
Lab: NORMAL
SPECIMEN DESCRIPTION: NORMAL

## 2019-08-15 ENCOUNTER — HOSPITAL ENCOUNTER (OUTPATIENT)
Age: 69
Discharge: HOME | End: 2019-08-15
Payer: MEDICARE

## 2019-08-15 DIAGNOSIS — C61 PROSTATE CANCER (HCC): ICD-10-CM

## 2019-08-15 LAB — PROSTATE SPECIFIC ANTIGEN: <0.01 UG/L

## 2019-08-15 PROCEDURE — 36415 COLL VENOUS BLD VENIPUNCTURE: CPT

## 2019-08-15 PROCEDURE — 84153 ASSAY OF PSA TOTAL: CPT

## 2019-08-19 ENCOUNTER — OFFICE VISIT (OUTPATIENT)
Dept: UROLOGY | Age: 69
End: 2019-08-19

## 2019-08-19 VITALS
TEMPERATURE: 99 F | WEIGHT: 191.8 LBS | SYSTOLIC BLOOD PRESSURE: 138 MMHG | HEIGHT: 69 IN | DIASTOLIC BLOOD PRESSURE: 86 MMHG | BODY MASS INDEX: 28.41 KG/M2 | HEART RATE: 89 BPM

## 2019-08-19 DIAGNOSIS — N39.3 STRESS INCONTINENCE: ICD-10-CM

## 2019-08-19 DIAGNOSIS — C61 PROSTATE CANCER (HCC): Primary | ICD-10-CM

## 2019-08-19 PROCEDURE — 99024 POSTOP FOLLOW-UP VISIT: CPT | Performed by: UROLOGY

## 2019-08-19 ASSESSMENT — ENCOUNTER SYMPTOMS
EYE PAIN: 0
EYE REDNESS: 0
COUGH: 0
ABDOMINAL PAIN: 0
NAUSEA: 0
BACK PAIN: 0
SHORTNESS OF BREATH: 0
WHEEZING: 0
COLOR CHANGE: 0
VOMITING: 0

## 2019-08-19 NOTE — PROGRESS NOTES
Review of Systems   Constitutional: Positive for fatigue. Negative for activity change, chills and fever. Eyes: Negative for pain, redness and visual disturbance. Respiratory: Negative for cough, shortness of breath and wheezing. Cardiovascular: Negative for chest pain and leg swelling. Gastrointestinal: Negative for abdominal pain, nausea and vomiting. Genitourinary: Negative for difficulty urinating, discharge, dysuria, flank pain, frequency, hematuria, scrotal swelling and testicular pain. Musculoskeletal: Negative for back pain, joint swelling and myalgias. Skin: Negative for color change and rash. Neurological: Negative for dizziness, tremors and numbness. Hematological: Negative for adenopathy. Does not bruise/bleed easily.
(NORVASC) 5 MG tablet TAKE 1 TABLET (5 MG) BY ORAL ROUTE ONCE DAILY bed time  3    sertraline (ZOLOFT) 50 MG tablet TAKE 1 TABLET (50 MG) BY ORAL ROUTE ONCE DAILY bed time  2    tiotropium (SPIRIVA) 18 MCG inhalation capsule Inhale 1 capsule into the lungs daily 30 capsule 3    fluticasone (FLONASE) 50 MCG/ACT nasal spray 2 sprays by Nasal route daily (Patient taking differently: 2 sprays by Nasal route daily as needed ) 1 Bottle 0    atorvastatin (LIPITOR) 20 MG tablet TAKE 1 TABLET DAILY (Patient taking differently: TAKE 1 TABLET DAILY  bed time) 90 tablet 1    Multiple Vitamins-Minerals (CENTRUM SILVER 50+MEN PO) Take 1 tablet by mouth daily         Bee venom; Fentanyl; Morphine; Pcn [penicillins]; and Codeine  Social History     Tobacco Use   Smoking Status Current Every Day Smoker    Packs/day: 1.00    Years: 54.00    Pack years: 54.00    Types: Cigarettes    Start date: 1960   Smokeless Tobacco Never Used     (Ifpatient a smoker, smoking cessation counseling offered)    Social History     Substance and Sexual Activity   Alcohol Use Not Currently    Comment: OCCASIONAL ON WEEKENDS       REVIEW OF SYSTEMS:  Review of Systems    Physical Exam:      Vitals:    08/19/19 1352   BP: 138/86   Pulse: 89   Temp: 99 °F (37.2 °C)     Body mass index is 28.31 kg/m². Patient is a 80 y.o. male in no acute distress and alert and oriented to person, place and time. Physical Exam  Constitutional: Patient in no acute distress. Neuro: Alert and oriented to person, place and time.   Psych: Mood normal, affect normal  Skin: No rash noted  HEENT: Head: Normocephalic andatraumatic  Conjunctivae and EOM are normal. Pupils are equal, round  Nose:Normal  Right External Ear: Normal; Left External Ear: Normal  Mouth: Mucosa Moist  Neck: Supple  Lungs: Respiratory effort is normal  Cardiovascular: Warm & Pink  Abdomen: Soft, non-tender, non-distended with no CVA,  No flank tenderness,  Or hepatosplenomegaly   Lymphatics: No

## 2019-08-21 ENCOUNTER — HOSPITAL ENCOUNTER (OUTPATIENT)
Age: 69
Discharge: HOME | End: 2019-08-21
Payer: MEDICARE

## 2019-08-21 LAB
ABSOLUTE EOS #: 0.1 K/UL (ref 0–0.4)
ABSOLUTE IMMATURE GRANULOCYTE: ABNORMAL K/UL (ref 0–0.3)
ABSOLUTE LYMPH #: 3.5 K/UL (ref 1–4.8)
ABSOLUTE MONO #: 0.8 K/UL (ref 0.1–1.3)
ALBUMIN SERPL-MCNC: 4.3 G/DL (ref 3.5–5.2)
ALBUMIN/GLOBULIN RATIO: NORMAL (ref 1–2.5)
ALP BLD-CCNC: 78 U/L (ref 40–129)
ALT SERPL-CCNC: 19 U/L (ref 5–41)
ANION GAP SERPL CALCULATED.3IONS-SCNC: 12 MMOL/L (ref 9–17)
AST SERPL-CCNC: 25 U/L
BASOPHILS # BLD: 0 % (ref 0–2)
BASOPHILS ABSOLUTE: 0 K/UL (ref 0–0.2)
BILIRUB SERPL-MCNC: 0.45 MG/DL (ref 0.3–1.2)
BUN BLDV-MCNC: 16 MG/DL (ref 8–23)
BUN/CREAT BLD: NORMAL (ref 9–20)
CALCIUM SERPL-MCNC: 9.4 MG/DL (ref 8.6–10.4)
CHLORIDE BLD-SCNC: 106 MMOL/L (ref 98–107)
CO2: 24 MMOL/L (ref 20–31)
CREAT SERPL-MCNC: 0.75 MG/DL (ref 0.7–1.2)
DIFFERENTIAL TYPE: ABNORMAL
EOSINOPHILS RELATIVE PERCENT: 1 % (ref 0–4)
GFR AFRICAN AMERICAN: >60 ML/MIN
GFR NON-AFRICAN AMERICAN: >60 ML/MIN
GFR SERPL CREATININE-BSD FRML MDRD: NORMAL ML/MIN/{1.73_M2}
GFR SERPL CREATININE-BSD FRML MDRD: NORMAL ML/MIN/{1.73_M2}
GLUCOSE BLD-MCNC: 77 MG/DL (ref 70–99)
HCT VFR BLD CALC: 47.8 % (ref 41–53)
HEMOGLOBIN: 16.2 G/DL (ref 13.5–17.5)
IMMATURE GRANULOCYTES: ABNORMAL %
LYMPHOCYTES # BLD: 41 % (ref 24–44)
MCH RBC QN AUTO: 32.7 PG (ref 26–34)
MCHC RBC AUTO-ENTMCNC: 33.9 G/DL (ref 31–37)
MCV RBC AUTO: 96.6 FL (ref 80–100)
MONOCYTES # BLD: 9 % (ref 1–7)
NRBC AUTOMATED: ABNORMAL PER 100 WBC
PDW BLD-RTO: 12.3 % (ref 11.5–14.9)
PLATELET # BLD: 198 K/UL (ref 150–450)
PLATELET ESTIMATE: ABNORMAL
PMV BLD AUTO: 8.8 FL (ref 6–12)
POTASSIUM SERPL-SCNC: 3.8 MMOL/L (ref 3.7–5.3)
RBC # BLD: 4.95 M/UL (ref 4.5–5.9)
RBC # BLD: ABNORMAL 10*6/UL
SEG NEUTROPHILS: 49 % (ref 36–66)
SEGMENTED NEUTROPHILS ABSOLUTE COUNT: 4 K/UL (ref 1.3–9.1)
SODIUM BLD-SCNC: 142 MMOL/L (ref 135–144)
TOTAL PROTEIN: 7.7 G/DL (ref 6.4–8.3)
WBC # BLD: 8.4 K/UL (ref 3.5–11)
WBC # BLD: ABNORMAL 10*3/UL

## 2019-08-21 PROCEDURE — 85025 COMPLETE CBC W/AUTO DIFF WBC: CPT

## 2019-08-21 PROCEDURE — 87522 HEPATITIS C REVRS TRNSCRPJ: CPT

## 2019-08-21 PROCEDURE — 36415 COLL VENOUS BLD VENIPUNCTURE: CPT

## 2019-08-21 PROCEDURE — 80053 COMPREHEN METABOLIC PANEL: CPT

## 2019-08-24 LAB
DIRECT EXAM: NORMAL
Lab: NORMAL
SPECIMEN DESCRIPTION: NORMAL

## 2019-08-28 ENCOUNTER — HOSPITAL ENCOUNTER (OUTPATIENT)
Dept: PHYSICAL THERAPY | Facility: CLINIC | Age: 69
Setting detail: THERAPIES SERIES
Discharge: HOME | End: 2019-08-28
Payer: MEDICARE

## 2019-09-09 ENCOUNTER — HOSPITAL ENCOUNTER (OUTPATIENT)
Dept: PHYSICAL THERAPY | Facility: CLINIC | Age: 69
Setting detail: THERAPIES SERIES
Discharge: HOME | End: 2019-09-09
Payer: MEDICARE

## 2019-10-21 ENCOUNTER — HOSPITAL ENCOUNTER (OUTPATIENT)
Age: 69
Discharge: HOME | End: 2019-10-21
Payer: MEDICARE

## 2019-10-21 LAB
ALBUMIN SERPL-MCNC: 4.3 G/DL (ref 3.5–5.2)
ALBUMIN/GLOBULIN RATIO: ABNORMAL (ref 1–2.5)
ALP BLD-CCNC: 79 U/L (ref 40–129)
ALT SERPL-CCNC: 19 U/L (ref 5–41)
ANION GAP SERPL CALCULATED.3IONS-SCNC: 11 MMOL/L (ref 9–17)
AST SERPL-CCNC: 22 U/L
BILIRUB SERPL-MCNC: 0.45 MG/DL (ref 0.3–1.2)
BUN BLDV-MCNC: 16 MG/DL (ref 8–23)
BUN/CREAT BLD: ABNORMAL (ref 9–20)
CALCIUM SERPL-MCNC: 9.5 MG/DL (ref 8.6–10.4)
CHLORIDE BLD-SCNC: 105 MMOL/L (ref 98–107)
CO2: 24 MMOL/L (ref 20–31)
CREAT SERPL-MCNC: 0.75 MG/DL (ref 0.7–1.2)
GFR AFRICAN AMERICAN: >60 ML/MIN
GFR NON-AFRICAN AMERICAN: >60 ML/MIN
GFR SERPL CREATININE-BSD FRML MDRD: ABNORMAL ML/MIN/{1.73_M2}
GFR SERPL CREATININE-BSD FRML MDRD: ABNORMAL ML/MIN/{1.73_M2}
GLUCOSE BLD-MCNC: 146 MG/DL (ref 70–99)
HCT VFR BLD CALC: 50.4 % (ref 41–53)
HEMOGLOBIN: 17.1 G/DL (ref 13.5–17.5)
MCH RBC QN AUTO: 32.6 PG (ref 26–34)
MCHC RBC AUTO-ENTMCNC: 33.9 G/DL (ref 31–37)
MCV RBC AUTO: 96.4 FL (ref 80–100)
NRBC AUTOMATED: NORMAL PER 100 WBC
PDW BLD-RTO: 12.7 % (ref 11.5–14.9)
PLATELET # BLD: 176 K/UL (ref 150–450)
PMV BLD AUTO: 8.9 FL (ref 6–12)
POTASSIUM SERPL-SCNC: 4.5 MMOL/L (ref 3.7–5.3)
RBC # BLD: 5.23 M/UL (ref 4.5–5.9)
SODIUM BLD-SCNC: 140 MMOL/L (ref 135–144)
TOTAL PROTEIN: 7.4 G/DL (ref 6.4–8.3)
WBC # BLD: 8.9 K/UL (ref 3.5–11)

## 2019-10-21 PROCEDURE — 36415 COLL VENOUS BLD VENIPUNCTURE: CPT

## 2019-10-21 PROCEDURE — 80053 COMPREHEN METABOLIC PANEL: CPT

## 2019-10-21 PROCEDURE — 87522 HEPATITIS C REVRS TRNSCRPJ: CPT

## 2019-10-21 PROCEDURE — 85027 COMPLETE CBC AUTOMATED: CPT

## 2019-10-22 ENCOUNTER — HOSPITAL ENCOUNTER (OUTPATIENT)
Dept: CT IMAGING | Age: 69
Discharge: HOME | End: 2019-10-24
Payer: MEDICARE

## 2019-10-22 DIAGNOSIS — F17.200 TOBACCO DEPENDENCE: ICD-10-CM

## 2019-10-22 DIAGNOSIS — R91.1 LUNG NODULE: ICD-10-CM

## 2019-10-22 PROCEDURE — G0297 LDCT FOR LUNG CA SCREEN: HCPCS

## 2019-10-23 LAB
DIRECT EXAM: NORMAL
Lab: NORMAL
SPECIMEN DESCRIPTION: NORMAL

## 2019-11-16 ENCOUNTER — HOSPITAL ENCOUNTER (OUTPATIENT)
Age: 69
Discharge: HOME | End: 2019-11-16
Payer: MEDICARE

## 2019-11-16 DIAGNOSIS — C61 PROSTATE CANCER (HCC): ICD-10-CM

## 2019-11-16 LAB — PROSTATE SPECIFIC ANTIGEN: <0.01 UG/L

## 2019-11-16 PROCEDURE — 36415 COLL VENOUS BLD VENIPUNCTURE: CPT

## 2019-11-16 PROCEDURE — 84153 ASSAY OF PSA TOTAL: CPT

## 2019-11-18 ENCOUNTER — OFFICE VISIT (OUTPATIENT)
Dept: UROLOGY | Age: 69
End: 2019-11-18
Payer: MEDICARE

## 2019-11-18 VITALS
WEIGHT: 191.8 LBS | DIASTOLIC BLOOD PRESSURE: 84 MMHG | HEART RATE: 84 BPM | BODY MASS INDEX: 28.41 KG/M2 | TEMPERATURE: 98 F | SYSTOLIC BLOOD PRESSURE: 143 MMHG | HEIGHT: 69 IN

## 2019-11-18 DIAGNOSIS — N52.31 ERECTILE DYSFUNCTION AFTER RADICAL PROSTATECTOMY: ICD-10-CM

## 2019-11-18 DIAGNOSIS — N39.3 STRESS INCONTINENCE: ICD-10-CM

## 2019-11-18 DIAGNOSIS — C61 PROSTATE CANCER (HCC): Primary | ICD-10-CM

## 2019-11-18 PROCEDURE — 99214 OFFICE O/P EST MOD 30 MIN: CPT | Performed by: UROLOGY

## 2019-11-18 RX ORDER — TADALAFIL 20 MG/1
20 TABLET ORAL DAILY PRN
Qty: 30 TABLET | Refills: 1 | Status: SHIPPED | OUTPATIENT
Start: 2019-11-18 | End: 2020-08-13

## 2019-11-18 ASSESSMENT — ENCOUNTER SYMPTOMS
WHEEZING: 0
COUGH: 0
ABDOMINAL PAIN: 0
DIARRHEA: 0
EYE PAIN: 0
VOMITING: 0
CONSTIPATION: 0
EYE REDNESS: 0
BACK PAIN: 0
SHORTNESS OF BREATH: 0
NAUSEA: 0

## 2019-12-10 ENCOUNTER — OFFICE VISIT (OUTPATIENT)
Dept: UROLOGY | Age: 69
End: 2019-12-10
Payer: MEDICARE

## 2019-12-10 ENCOUNTER — TELEPHONE (OUTPATIENT)
Dept: UROLOGY | Age: 69
End: 2019-12-10

## 2019-12-10 VITALS
HEIGHT: 69 IN | TEMPERATURE: 98.8 F | SYSTOLIC BLOOD PRESSURE: 128 MMHG | WEIGHT: 199 LBS | DIASTOLIC BLOOD PRESSURE: 68 MMHG | BODY MASS INDEX: 29.47 KG/M2 | HEART RATE: 60 BPM

## 2019-12-10 DIAGNOSIS — N52.31 ERECTILE DYSFUNCTION AFTER RADICAL PROSTATECTOMY: ICD-10-CM

## 2019-12-10 DIAGNOSIS — N52.31 ERECTILE DYSFUNCTION AFTER RADICAL PROSTATECTOMY: Primary | ICD-10-CM

## 2019-12-10 DIAGNOSIS — C61 PROSTATE CANCER (HCC): Primary | ICD-10-CM

## 2019-12-10 PROCEDURE — 99214 OFFICE O/P EST MOD 30 MIN: CPT | Performed by: NURSE PRACTITIONER

## 2019-12-10 RX ORDER — PANTOPRAZOLE SODIUM 40 MG/1
TABLET, DELAYED RELEASE ORAL
Refills: 0 | COMMUNITY
Start: 2019-11-23 | End: 2020-08-13

## 2019-12-10 ASSESSMENT — ENCOUNTER SYMPTOMS
EYE REDNESS: 0
SHORTNESS OF BREATH: 0
COLOR CHANGE: 0
EYE PAIN: 0
BACK PAIN: 0
COUGH: 0
VOMITING: 0
WHEEZING: 0
NAUSEA: 0
ABDOMINAL PAIN: 0

## 2020-02-22 ENCOUNTER — HOSPITAL ENCOUNTER (OUTPATIENT)
Age: 70
Discharge: HOME | End: 2020-02-22
Payer: MEDICARE

## 2020-02-22 LAB — PROSTATE SPECIFIC ANTIGEN: <0.01 UG/L

## 2020-02-22 PROCEDURE — 36415 COLL VENOUS BLD VENIPUNCTURE: CPT

## 2020-02-22 PROCEDURE — 84153 ASSAY OF PSA TOTAL: CPT

## 2020-02-24 ENCOUNTER — OFFICE VISIT (OUTPATIENT)
Dept: UROLOGY | Age: 70
End: 2020-02-24
Payer: MEDICARE

## 2020-02-24 VITALS
SYSTOLIC BLOOD PRESSURE: 139 MMHG | BODY MASS INDEX: 29.71 KG/M2 | WEIGHT: 200.62 LBS | DIASTOLIC BLOOD PRESSURE: 85 MMHG | HEART RATE: 88 BPM | HEIGHT: 69 IN | TEMPERATURE: 98.5 F

## 2020-02-24 PROCEDURE — 99214 OFFICE O/P EST MOD 30 MIN: CPT | Performed by: UROLOGY

## 2020-02-24 ASSESSMENT — ENCOUNTER SYMPTOMS
EYE REDNESS: 0
BACK PAIN: 0
EYE PAIN: 0
COUGH: 0
NAUSEA: 0
COLOR CHANGE: 0
ABDOMINAL PAIN: 0
SHORTNESS OF BREATH: 0
VOMITING: 0
WHEEZING: 0

## 2020-02-24 NOTE — PROGRESS NOTES
[facility]  [facility] - [facility] [address] [address]  Dept: [address] [facility] Urology Office Note - Established    Patient:  Wilmer Manley  YOB: 1950  Date: 2/24/2020    The patient is a 77 y.o. male who presents todayfor evaluation of the following problems:   Chief Complaint   Patient presents with    Prostate Cancer     3 mo f/u with PSA        HPI  Pt has h/o prostate cancer. Had dvp 6/2019. Has persistent agatha. States that he is one to two ppd. No erections. Pt frustrated with agatha. Has not done PFT. Summary of old records: N/A    Additional History: N/A    Procedures Today: N/A    Urinalysis today:  No results found for this visit on 02/24/20.   Last several PSA's:  Lab Results   Component Value Date    PSA <0.01 02/22/2020    PSA <0.01 11/16/2019    PSA <0.01 08/15/2019     Last total testosterone:  No results found for: TESTOSTERONE    AUA Symptom Score (2/24/2020):                               Last BUN and creatinine:  Lab Results   Component Value Date    BUN 16 10/21/2019     Lab Results   Component Value Date    CREATININE 0.75 10/21/2019       Additional Lab/Culture results: none    Imaging Reviewed during this Office Visit: none  (results were independently reviewed by physician and radiology report verified)    PAST MEDICAL, FAMILY AND SOCIAL HISTORY UPDATE:  Past Medical History:   Diagnosis Date    Anxiety state, unspecified     Carotid artery stenosis     Colon polyp     Depressive disorder, not elsewhere classified     Elevated PSA 05/06/2019    Essential hypertension, benign     Cardiologist,  [phone]    GERD (gastroesophageal reflux disease)     History of colon polyps     History of hepatitis C     Hyperlipidemia     Impotence of organic origin     Lipoma of unspecified site     Lumbago     No natural teeth     Prostate CA ([address] 05/2019    Secondary localized osteoarthrosis, shoulder region     Sleep apnea     no CPAP    Swelling of limb      Past Surgical History:   Procedure Laterality Date    ANKLE SURGERY Right     CARPAL TUNNEL RELEASE Bilateral     CERVICAL FUSION  2009    anterior    EXCISION / BIOPSY SKIN LESION OF ARM Left 9/15/2017    EXCISION SKIN LESION LEFT CHEST AND LEFT BUTTOCK, EXCISION LIPOMA LEFT LOWER ABDOMEN performed by Mikel Mcdaniels DO at [address] [facility] SCRN NOT  [address] [place] [address] N/A 4/13/2017    COLONOSCOPY performed by Eldon Trevino MD at [address] [place] [address] N/A 4/24/2019    PROSTATE BIOPSY WITH ULTRASOUND  (OFFICE TO NOTIFY U.S) performed by Woody Balderrama MD at [address] [facility]  06/26/2019    ROBOTIC LAPAROSCOPIC PROSTATECTOMY , PELVIC LYMPH NODE DISSECTION     PROSTATECTOMY N/A 6/26/2019    XI ROBOTIC LAPAROSCOPIC PROSTATECTOMY , PELVIC LYMPH NODE DISSECTION performed by Woody Balderrama MD at [address] Right 2015    ULNAR TUNNEL RELEASE Left      Family History   Problem Relation Age of Onset    Stroke Maternal Uncle 80    Heart Surgery Maternal Uncle         Triple Bypass    Other Maternal Grandmother         Pul.  Embolism    Cancer Maternal Grandfather         Throat    No Known Problems Mother     Other Father         MVA    No Known Problems Sister     Other Brother         Electric burn at work   [employer] No Known Problems Paternal Grandmother     No Known Problems Paternal Grandfather     Diabetes Brother      Outpatient Medications Marked as Taking for the 2/24/20 encounter (Office Visit) with Woody Balderrama MD   Medication Sig Dispense Refill    pantoprazole (PROTONIX) 40 MG tablet TAKE 1 TABLET (40 MG) BY ORAL ROUTE ONCE DAILY FOR 30 DAYS  0    tadalafil (CIALIS) 20 MG tablet Take 1 tablet by mouth daily as needed for Erectile Dysfunction 30 tablet 1    Ledipasvir-Sofosbuvir (HARVONI PO) Take by mouth daily      lisinopril (PRINIVIL;ZESTRIL) 40 MG tablet Take 40 mg by mouth nightly

## 2020-03-02 ENCOUNTER — OFFICE VISIT (OUTPATIENT)
Dept: ORTHOPEDIC SURGERY | Age: 70
End: 2020-03-02
Payer: MEDICARE

## 2020-03-02 VITALS — BODY MASS INDEX: 29.71 KG/M2 | WEIGHT: 200.62 LBS | HEIGHT: 69 IN

## 2020-03-02 PROCEDURE — 99203 OFFICE O/P NEW LOW 30 MIN: CPT | Performed by: STUDENT IN AN ORGANIZED HEALTH CARE EDUCATION/TRAINING PROGRAM

## 2020-03-02 ASSESSMENT — ENCOUNTER SYMPTOMS
EYE DISCHARGE: 0
CHOKING: 0
VOMITING: 0
EYE REDNESS: 0
NAUSEA: 0
RHINORRHEA: 0
APNEA: 0

## 2020-03-02 NOTE — PROGRESS NOTES
organization: Not on file     Attends meetings of clubs or organizations: Not on file     Relationship status: Not on file    Intimate partner violence:     Fear of current or ex partner: Not on file     Emotionally abused: Not on file     Physically abused: Not on file     Forced sexual activity: Not on file   Other Topics Concern    Not on file   Social History Narrative    Not on file       Family History:  Family History   Problem Relation Age of Onset    Stroke Maternal Uncle 80    Heart Surgery Maternal Uncle         Triple Bypass    Other Maternal Grandmother         Pul. Embolism    Cancer Maternal Grandfather         Throat    No Known Problems Mother     Other Father         MVA    No Known Problems Sister     Other Brother         Electric burn at work   [employer] No Known Problems Paternal Grandmother     No Known Problems Paternal Grandfather     Diabetes Brother          REVIEW OF SYSTEMS:  Review of Systems   Constitutional: Negative for chills and fever. HENT: Negative for drooling and rhinorrhea. Eyes: Negative for discharge and redness. Respiratory: Negative for apnea and choking. Cardiovascular: Negative for chest pain. Gastrointestinal: Negative for nausea and vomiting. Endocrine: Negative for cold intolerance and heat intolerance. Genitourinary: Negative for flank pain. Musculoskeletal: Positive for neck pain. Negative for arthralgias and joint swelling. Skin: Negative for wound. Allergic/Immunologic: Negative for immunocompromised state. Neurological: Positive for tremors and numbness. Negative for seizures. Hematological: Does not bruise/bleed easily. Psychiatric/Behavioral: Negative for agitation and confusion. I have reviewed the CC, HPI, ROS, PMH, FHX, Social History.    I agree with the documentation provided by other staff, residents, and/or medical students and have reviewed their documentation prior to providing my signature indicating flexion/extension, side bending, rotation. Surgical scar noted to midline posterior neck. Radiology:   X-ray cervical spine  Date: 3/2/2020  History:   Bilateral upper extremity numbness, neck pain    Comparison:   None    Findings:   AP, odontoid and lateral views of the cervical spine demonstrates extensive diffuse spondylosis with sydnesmophytes noted at C4-C5, C6-C7. There is a fusion noted at C5-C6 form prior surgery. Loss normal lordosis with kyphosis noted in the lower subaxial cervical spine. No acute fracture or dislocation. No lytic or blastic lesions. No soft tissue abnormalities. Impression:  Diffuse spondylosis of the cervical spine. X-ray flexion extension cervical spine  Date: 3/2/2020  History:   Bilateral upper extremity numbness, neck pain    Comparison:   None    Findings:   Flexion and extension lateral views of the cervical spine demonstrates extensive diffuse spondylosis with sydnesmophytes noted at C4-C5, C6-C7. There is a fusion noted at C5-C6 form prior surgery. Loss normal lordosis with kyphosis noted in the lower subaxial cervical spine. No instability noted. No acute fracture or dislocation. No lytic or blastic lesions. No soft tissue abnormalities. Impression:  Diffuse spondylosis of the cervical spine with no instability noted. ASSESSMENT:     1. Bilateral carpal tunnel syndrome    2. Cubital tunnel syndrome, bilateral         PLAN:    We had a lengthy discussion with the patient regarding the etiology and natural history of his bilateral carpal tunnel syndrome and bilateral cubital tunnel syndrome. His history and physical exam findings are indicative of bilateral median and ulnar nerve entrapment at his wrist and elbow, respectively. He has not had an EMG/nerve conduction study done since his surgery done in April by Dr. Yessenia Dinh. At this time we will obtain an EMG/nerve conduction study, including the paracervicals.   We also obtained cervical spine trays today in office. A prescription for Voltaren was provided to the patient for pain control. We offer the patient gabapentin but he states that she has tried that and it does not work. Depending on his EMG findings, if it appears to involve the cervical spine we may obtain an MRI of his cervical fine after his next visit. Patient is to follow-up after his EMG is obtained. Return in about 2 weeks (around 3/16/2020), or after EMG results.     Orders Placed This Encounter   Medications    diclofenac (VOLTAREN) 50 MG EC tablet     Sig: Take 1 tablet by mouth 3 times daily (with meals)     Dispense:  60 tablet     Refill:  3       Orders Placed This Encounter   Procedures    XR CERVICAL SPINE FLEXION AND EXTENSION    XR CERVICAL SPINE (2-3 VIEWS)   Kole Licea MD, Neurology, [facility]     Referral Priority:   Routine     Referral Type:   Eval and Treat     Referral Reason:   Specialty Services Required     Referred to Provider:   Maria Steinberg MD     Requested Specialty:   Neurology     Number of Visits Requested:   1        Electronically signed by Margarette Salazar DO on 3/2/2020 at 1:54 PM

## 2020-04-03 ENCOUNTER — APPOINTMENT (OUTPATIENT)
Dept: CT IMAGING | Age: 70
End: 2020-04-03
Payer: MEDICARE

## 2020-04-03 ENCOUNTER — HOSPITAL ENCOUNTER (EMERGENCY)
Age: 70
Discharge: HOME | End: 2020-04-03
Attending: EMERGENCY MEDICINE
Payer: MEDICARE

## 2020-04-03 ENCOUNTER — APPOINTMENT (OUTPATIENT)
Dept: GENERAL RADIOLOGY | Age: 70
End: 2020-04-03
Payer: MEDICARE

## 2020-04-03 VITALS
BODY MASS INDEX: 29.62 KG/M2 | HEART RATE: 91 BPM | TEMPERATURE: 97.8 F | HEIGHT: 69 IN | DIASTOLIC BLOOD PRESSURE: 86 MMHG | RESPIRATION RATE: 15 BRPM | OXYGEN SATURATION: 95 % | WEIGHT: 200 LBS | SYSTOLIC BLOOD PRESSURE: 153 MMHG

## 2020-04-03 LAB
ABSOLUTE EOS #: 0.1 K/UL (ref 0–0.4)
ABSOLUTE IMMATURE GRANULOCYTE: ABNORMAL K/UL (ref 0–0.3)
ABSOLUTE LYMPH #: 3 K/UL (ref 1–4.8)
ABSOLUTE MONO #: 0.9 K/UL (ref 0.1–1.3)
ALBUMIN SERPL-MCNC: 4.6 G/DL (ref 3.5–5.2)
ALBUMIN/GLOBULIN RATIO: ABNORMAL (ref 1–2.5)
ALP BLD-CCNC: 72 U/L (ref 40–129)
ALT SERPL-CCNC: 20 U/L (ref 5–41)
ANION GAP SERPL CALCULATED.3IONS-SCNC: 15 MMOL/L (ref 9–17)
AST SERPL-CCNC: 29 U/L
BASOPHILS # BLD: 0 % (ref 0–2)
BASOPHILS ABSOLUTE: 0 K/UL (ref 0–0.2)
BILIRUB SERPL-MCNC: 0.56 MG/DL (ref 0.3–1.2)
BUN BLDV-MCNC: 19 MG/DL (ref 8–23)
BUN/CREAT BLD: ABNORMAL (ref 9–20)
CALCIUM SERPL-MCNC: 9.2 MG/DL (ref 8.6–10.4)
CHLORIDE BLD-SCNC: 102 MMOL/L (ref 98–107)
CO2: 22 MMOL/L (ref 20–31)
CREAT SERPL-MCNC: 0.69 MG/DL (ref 0.7–1.2)
DIFFERENTIAL TYPE: ABNORMAL
EOSINOPHILS RELATIVE PERCENT: 1 % (ref 0–4)
ETHANOL PERCENT: <0.01 %
ETHANOL: <10 MG/DL
GFR AFRICAN AMERICAN: >60 ML/MIN
GFR NON-AFRICAN AMERICAN: >60 ML/MIN
GFR SERPL CREATININE-BSD FRML MDRD: ABNORMAL ML/MIN/{1.73_M2}
GFR SERPL CREATININE-BSD FRML MDRD: ABNORMAL ML/MIN/{1.73_M2}
GLUCOSE BLD-MCNC: 93 MG/DL (ref 70–99)
HCT VFR BLD CALC: 46.2 % (ref 41–53)
HEMOGLOBIN: 15.9 G/DL (ref 13.5–17.5)
IMMATURE GRANULOCYTES: ABNORMAL %
LYMPHOCYTES # BLD: 30 % (ref 24–44)
MCH RBC QN AUTO: 32.5 PG (ref 26–34)
MCHC RBC AUTO-ENTMCNC: 34.4 G/DL (ref 31–37)
MCV RBC AUTO: 94.5 FL (ref 80–100)
MONOCYTES # BLD: 9 % (ref 1–7)
NRBC AUTOMATED: ABNORMAL PER 100 WBC
PDW BLD-RTO: 12.6 % (ref 11.5–14.9)
PLATELET # BLD: 185 K/UL (ref 150–450)
PLATELET ESTIMATE: ABNORMAL
PMV BLD AUTO: 8.8 FL (ref 6–12)
POTASSIUM SERPL-SCNC: 4.1 MMOL/L (ref 3.7–5.3)
RBC # BLD: 4.89 M/UL (ref 4.5–5.9)
RBC # BLD: ABNORMAL 10*6/UL
SEG NEUTROPHILS: 60 % (ref 36–66)
SEGMENTED NEUTROPHILS ABSOLUTE COUNT: 5.9 K/UL (ref 1.3–9.1)
SODIUM BLD-SCNC: 139 MMOL/L (ref 135–144)
TOTAL PROTEIN: 7.6 G/DL (ref 6.4–8.3)
WBC # BLD: 10 K/UL (ref 3.5–11)
WBC # BLD: ABNORMAL 10*3/UL

## 2020-04-03 PROCEDURE — 6360000004 HC RX CONTRAST MEDICATION: Performed by: EMERGENCY MEDICINE

## 2020-04-03 PROCEDURE — 85025 COMPLETE CBC W/AUTO DIFF WBC: CPT

## 2020-04-03 PROCEDURE — 73130 X-RAY EXAM OF HAND: CPT

## 2020-04-03 PROCEDURE — 71260 CT THORAX DX C+: CPT

## 2020-04-03 PROCEDURE — 80053 COMPREHEN METABOLIC PANEL: CPT

## 2020-04-03 PROCEDURE — 90471 IMMUNIZATION ADMIN: CPT | Performed by: PHYSICIAN ASSISTANT

## 2020-04-03 PROCEDURE — 2500000003 HC RX 250 WO HCPCS: Performed by: PHYSICIAN ASSISTANT

## 2020-04-03 PROCEDURE — 90715 TDAP VACCINE 7 YRS/> IM: CPT | Performed by: PHYSICIAN ASSISTANT

## 2020-04-03 PROCEDURE — 6370000000 HC RX 637 (ALT 250 FOR IP): Performed by: PHYSICIAN ASSISTANT

## 2020-04-03 PROCEDURE — 6360000002 HC RX W HCPCS: Performed by: PHYSICIAN ASSISTANT

## 2020-04-03 PROCEDURE — 36415 COLL VENOUS BLD VENIPUNCTURE: CPT

## 2020-04-03 PROCEDURE — 72125 CT NECK SPINE W/O DYE: CPT

## 2020-04-03 PROCEDURE — 2580000003 HC RX 258: Performed by: PHYSICIAN ASSISTANT

## 2020-04-03 PROCEDURE — 96375 TX/PRO/DX INJ NEW DRUG ADDON: CPT

## 2020-04-03 PROCEDURE — G0480 DRUG TEST DEF 1-7 CLASSES: HCPCS

## 2020-04-03 PROCEDURE — 96365 THER/PROPH/DIAG IV INF INIT: CPT

## 2020-04-03 PROCEDURE — 2580000003 HC RX 258: Performed by: EMERGENCY MEDICINE

## 2020-04-03 PROCEDURE — 70450 CT HEAD/BRAIN W/O DYE: CPT

## 2020-04-03 PROCEDURE — 99285 EMERGENCY DEPT VISIT HI MDM: CPT

## 2020-04-03 PROCEDURE — 12053 INTMD RPR FACE/MM 5.1-7.5 CM: CPT

## 2020-04-03 RX ORDER — 0.9 % SODIUM CHLORIDE 0.9 %
80 INTRAVENOUS SOLUTION INTRAVENOUS ONCE
Status: COMPLETED | OUTPATIENT
Start: 2020-04-03 | End: 2020-04-03

## 2020-04-03 RX ORDER — HYDROCODONE BITARTRATE AND ACETAMINOPHEN 5; 325 MG/1; MG/1
1 TABLET ORAL ONCE
Status: COMPLETED | OUTPATIENT
Start: 2020-04-03 | End: 2020-04-03

## 2020-04-03 RX ORDER — KETOROLAC TROMETHAMINE 30 MG/ML
30 INJECTION, SOLUTION INTRAMUSCULAR; INTRAVENOUS ONCE
Status: COMPLETED | OUTPATIENT
Start: 2020-04-03 | End: 2020-04-03

## 2020-04-03 RX ORDER — SODIUM CHLORIDE 0.9 % (FLUSH) 0.9 %
10 SYRINGE (ML) INJECTION ONCE
Status: COMPLETED | OUTPATIENT
Start: 2020-04-03 | End: 2020-04-03

## 2020-04-03 RX ORDER — IBUPROFEN 400 MG/1
400 TABLET ORAL EVERY 6 HOURS PRN
Qty: 30 TABLET | Refills: 0 | Status: SHIPPED | OUTPATIENT
Start: 2020-04-03 | End: 2020-08-13

## 2020-04-03 RX ORDER — LIDOCAINE HYDROCHLORIDE AND EPINEPHRINE 10; 10 MG/ML; UG/ML
20 INJECTION, SOLUTION INFILTRATION; PERINEURAL ONCE
Status: COMPLETED | OUTPATIENT
Start: 2020-04-03 | End: 2020-04-03

## 2020-04-03 RX ORDER — HYDROCODONE BITARTRATE AND ACETAMINOPHEN 5; 325 MG/1; MG/1
1 TABLET ORAL EVERY 8 HOURS PRN
Qty: 9 TABLET | Refills: 0 | Status: SHIPPED | OUTPATIENT
Start: 2020-04-03 | End: 2020-04-06

## 2020-04-03 RX ADMIN — Medication 10 ML: at 21:07

## 2020-04-03 RX ADMIN — LIDOCAINE HYDROCHLORIDE AND EPINEPHRINE 20 ML: 10; 10 INJECTION, SOLUTION INFILTRATION; PERINEURAL at 21:55

## 2020-04-03 RX ADMIN — TETANUS TOXOID, REDUCED DIPHTHERIA TOXOID AND ACELLULAR PERTUSSIS VACCINE, ADSORBED 0.5 ML: 5; 2.5; 8; 8; 2.5 SUSPENSION INTRAMUSCULAR at 19:58

## 2020-04-03 RX ADMIN — KETOROLAC TROMETHAMINE 30 MG: 30 INJECTION, SOLUTION INTRAMUSCULAR at 22:02

## 2020-04-03 RX ADMIN — CEFAZOLIN 1 G: 1 INJECTION, POWDER, FOR SOLUTION INTRAMUSCULAR; INTRAVENOUS at 21:13

## 2020-04-03 RX ADMIN — SODIUM CHLORIDE 80 ML: 9 INJECTION, SOLUTION INTRAVENOUS at 21:07

## 2020-04-03 RX ADMIN — IOVERSOL 75 ML: 741 INJECTION INTRA-ARTERIAL; INTRAVENOUS at 21:07

## 2020-04-03 RX ADMIN — HYDROCODONE BITARTRATE AND ACETAMINOPHEN 1 TABLET: 5; 325 TABLET ORAL at 22:02

## 2020-04-03 ASSESSMENT — ENCOUNTER SYMPTOMS
VOMITING: 0
DOUBLE VISION: 0
NAUSEA: 0
DIFFICULTY BREATHING: 0

## 2020-04-03 ASSESSMENT — PAIN DESCRIPTION - LOCATION: LOCATION: CHEST

## 2020-04-03 ASSESSMENT — PAIN DESCRIPTION - PAIN TYPE: TYPE: ACUTE PAIN

## 2020-04-03 ASSESSMENT — PAIN SCALES - GENERAL
PAINLEVEL_OUTOF10: 10
PAINLEVEL_OUTOF10: 10

## 2020-04-04 NOTE — ED PROVIDER NOTES
[address]  eMERGENCY dEPARTMENT eNCOUnter      Pt Name: Luis Alfredo Medina  MRN: [id number]  [place] 1950  Date of evaluation: 4/3/20      CHIEF COMPLAINT       Chief Complaint   Patient presents with    Fall    Hand Injury    Chest Pain         HISTORY OF PRESENT ILLNESS    Luis Alfredo Medina is a 77 y.o. male who presents complaining of forehead laceration s/p fall, he was standing on a ladder when the ladder bent causing him to fall he struck his head on a hard fixed object he is not sure what he hit his head on. He did not lose consciousness. He suffered a laceration to the forehead left hand pain  The history is provided by the patient. Head Injury   Location:  Frontal  Mechanism of injury: fall    Fall:     Fall occurred:  From a ladder    Height of fall:  6 ft    Impact surface:  [place]    Point of impact:  Outstretched arms    Entrapped after fall: no    Pain details:     Quality:  Aching    Severity:  Mild    Duration:  1 hour    Timing:  Intermittent    Progression:  Waxing and waning  Chronicity:  New  Relieved by:  Nothing  Worsened by:  Nothing  Ineffective treatments:  None tried  Associated symptoms: no difficulty breathing, no double vision, no headaches, no hearing loss, no loss of consciousness, no nausea, no neck pain, no numbness, no seizures and no vomiting        REVIEW OF SYSTEMS       Review of Systems   HENT: Negative for hearing loss. Eyes: Negative for double vision. Gastrointestinal: Negative for nausea and vomiting. Musculoskeletal: Negative for neck pain. Skin: Positive for wound (right forehead laceration). Neurological: Negative for seizures, loss of consciousness, numbness and headaches. All other systems reviewed and are negative.       PAST MEDICAL HISTORY     Past Medical History:   Diagnosis Date    Anxiety state, unspecified     Carotid artery stenosis     Colon polyp     Depressive disorder, not elsewhere classified     Elevated PSA drugs.    PHYSICAL EXAM     INITIAL VITALS: BP (!) 153/86   Pulse 91   Temp 97.8 °F (36.6 °C) (Oral)   Resp 15   Ht 5' 9\" (1.753 m)   Wt 200 lb (90.7 kg)   SpO2 95%   BMI 29.53 kg/m²      Physical Exam  Vitals signs and nursing note reviewed. Constitutional:       General: He is not in acute distress. Appearance: He is well-developed. He is not diaphoretic. HENT:      Head: Normocephalic. Comments: V-shaped laceration noted to right forehead scalp area venous bleeding controlled with pressure no obvious foreign body. Right Ear: Hearing, tympanic membrane, ear canal and external ear normal.      Left Ear: Hearing, tympanic membrane, ear canal and external ear normal.      Mouth/Throat:      Pharynx: Uvula midline. No oropharyngeal exudate or posterior oropharyngeal erythema. Tonsils: No tonsillar abscesses. Neck:      Musculoskeletal: Normal range of motion. Cardiovascular:      Rate and Rhythm: Normal rate and regular rhythm. Pulses: Normal pulses. Heart sounds: Normal heart sounds. Pulmonary:      Effort: Pulmonary effort is normal. No respiratory distress. Abdominal:      Palpations: Abdomen is soft. Musculoskeletal: Normal range of motion. General: No tenderness. Lymphadenopathy:      Cervical: No cervical adenopathy. Skin:     General: Skin is warm and dry. Capillary Refill: Capillary refill takes less than 2 seconds. Neurological:      Mental Status: He is alert and oriented to person, place, and time. Mental status is at baseline. Cranial Nerves: No cranial nerve deficit. Sensory: No sensory deficit. Motor: No weakness. Coordination: Coordination normal.      Gait: Gait normal.      Deep Tendon Reflexes: Reflexes normal.         MEDICAL DECISION MAKING:   Patient with a fall. CT spine precautions were maintained. Wound was sutured. Wound was dressed.   While in the emergency department he was given Ancef, tetanus, x-ray injection 10 mL    0.9 % sodium chloride bolus    ioversol (OPTIRAY) 74 % injection 75 mL    ketorolac (TORADOL) injection 30 mg    HYDROcodone-acetaminophen (NORCO) 5-325 MG per tablet 1 tablet    HYDROcodone-acetaminophen (LORCET) 5-325 MG per tablet     Sig: Take 1 tablet by mouth every 8 hours as needed for Pain for up to 3 days. Intended supply: 3 days. Take lowest dose possible to manage pain     Dispense:  9 tablet     Refill:  0    ibuprofen (IBU) 400 MG tablet     Sig: Take 1 tablet by mouth every 6 hours as needed for Pain     Dispense:  30 tablet     Refill:  0       -------------------------      CRITICAL CARE:     CONSULTS:  None    PROCEDURES:  Lac Repair  Date/Time: 4/3/2020 11:08 PM  Performed by: Laura Angelo PA-C  Authorized by: Yessenia Myers MD     Consent:     Consent obtained:  Verbal    Consent given by:  Patient    Risks discussed:  Infection, pain, poor cosmetic result and poor wound healing  Anesthesia (see MAR for exact dosages): Anesthesia method:  Local infiltration    Local anesthetic:  Lidocaine 1% WITH epi  Laceration details:     Location:  Scalp    Scalp location:  Frontal    Length (cm):  6    Depth (mm):  4  Repair type:     Repair type:  Simple  Pre-procedure details:     Preparation:  Patient was prepped and draped in usual sterile fashion  Exploration:     Hemostasis achieved with:  Direct pressure    Wound exploration: wound explored through full range of motion      Contaminated: no    Treatment:     Area cleansed with:  Betadine    Amount of cleaning:  Standard    Visualized foreign bodies/material removed: yes    Skin repair:     Repair method:  Sutures and staples    Suture size:  5-0    Suture material:  Prolene    Suture technique:  Simple interrupted    Number of sutures:  6    Number of staples:  5  Approximation:     Approximation:  Close  Post-procedure details:     Dressing:  Antibiotic ointment    Patient tolerance of procedure:   Tolerated well, no immediate complications        FINAL IMPRESSION      1. Fall, initial encounter    2. Injury of head, initial encounter    3. Laceration of scalp, initial encounter    4. Contusion of left hand, initial encounter    5. Chest wall pain          DISPOSITION/PLAN   DISPOSITION Decision To Discharge 04/03/2020 11:03:51 PM      PATIENT REFERREDTO:  No follow-up provider specified. DISCHARGEMEDICATIONS:  Discharge Medication List as of 4/3/2020 11:09 PM      START taking these medications    Details   HYDROcodone-acetaminophen (LORCET) 5-325 MG per tablet Take 1 tablet by mouth every 8 hours as needed for Pain for up to 3 days. Intended supply: 3 days.  Take lowest dose possible to manage pain, Disp-9 tablet, R-0Print      ibuprofen (IBU) 400 MG tablet Take 1 tablet by mouth every 6 hours as needed for Pain, Disp-30 tablet, R-0Print             (Please note that portions of this note were completed with a voice recognition program.  Efforts were made to edit thedictations but occasionally words are mis-transcribed.)    JAMARCUS Francois PA-C  04/04/20 [id number]

## 2020-04-06 NOTE — ED PROVIDER NOTES
[address] [place] [address] Name: Addis Flower  MRN: [id number]  [place] 1950  Date of evaluation: 4/5/20     dAdis Flower is a 77 y.o. male with CC: Fall; Hand Injury; and Chest Pain      I was personally available for consultation in the Emergency Department.     Rudy Acuña MD  Attending Emergency Physician                    Rudy Acuña MD  04/05/20 2022

## 2020-05-12 ENCOUNTER — TELEPHONE (OUTPATIENT)
Dept: ORTHOPEDIC SURGERY | Age: 70
End: 2020-05-12

## 2020-05-26 ENCOUNTER — TELEPHONE (OUTPATIENT)
Dept: UROLOGY | Age: 70
End: 2020-05-26

## 2020-05-30 ENCOUNTER — APPOINTMENT (OUTPATIENT)
Dept: CT IMAGING | Age: 70
End: 2020-05-30
Payer: MEDICARE

## 2020-05-30 ENCOUNTER — HOSPITAL ENCOUNTER (OUTPATIENT)
Age: 70
Discharge: HOME | End: 2020-05-30
Payer: MEDICARE

## 2020-05-30 ENCOUNTER — HOSPITAL ENCOUNTER (EMERGENCY)
Age: 70
Discharge: HOME | End: 2020-05-30
Attending: EMERGENCY MEDICINE
Payer: MEDICARE

## 2020-05-30 VITALS
OXYGEN SATURATION: 97 % | BODY MASS INDEX: 29.62 KG/M2 | HEART RATE: 79 BPM | HEIGHT: 69 IN | SYSTOLIC BLOOD PRESSURE: 153 MMHG | DIASTOLIC BLOOD PRESSURE: 76 MMHG | WEIGHT: 200 LBS | RESPIRATION RATE: 18 BRPM | TEMPERATURE: 97.9 F

## 2020-05-30 LAB
ABSOLUTE EOS #: 0.1 K/UL (ref 0–0.4)
ABSOLUTE IMMATURE GRANULOCYTE: ABNORMAL K/UL (ref 0–0.3)
ABSOLUTE LYMPH #: 3.2 K/UL (ref 1–4.8)
ABSOLUTE MONO #: 0.9 K/UL (ref 0.1–1.3)
ALBUMIN SERPL-MCNC: 4.3 G/DL (ref 3.5–5.2)
ALBUMIN/GLOBULIN RATIO: ABNORMAL (ref 1–2.5)
ALP BLD-CCNC: 76 U/L (ref 40–129)
ALT SERPL-CCNC: 22 U/L (ref 5–41)
ANION GAP SERPL CALCULATED.3IONS-SCNC: 12 MMOL/L (ref 9–17)
AST SERPL-CCNC: 31 U/L
BASOPHILS # BLD: 1 % (ref 0–2)
BASOPHILS ABSOLUTE: 0 K/UL (ref 0–0.2)
BILIRUB SERPL-MCNC: 0.44 MG/DL (ref 0.3–1.2)
BUN BLDV-MCNC: 16 MG/DL (ref 8–23)
BUN/CREAT BLD: ABNORMAL (ref 9–20)
CALCIUM SERPL-MCNC: 9.4 MG/DL (ref 8.6–10.4)
CHLORIDE BLD-SCNC: 105 MMOL/L (ref 98–107)
CO2: 22 MMOL/L (ref 20–31)
CREAT SERPL-MCNC: 0.65 MG/DL (ref 0.7–1.2)
DIFFERENTIAL TYPE: ABNORMAL
EOSINOPHILS RELATIVE PERCENT: 1 % (ref 0–4)
GFR AFRICAN AMERICAN: >60 ML/MIN
GFR NON-AFRICAN AMERICAN: >60 ML/MIN
GFR SERPL CREATININE-BSD FRML MDRD: ABNORMAL ML/MIN/{1.73_M2}
GFR SERPL CREATININE-BSD FRML MDRD: ABNORMAL ML/MIN/{1.73_M2}
GLUCOSE BLD-MCNC: 99 MG/DL (ref 70–99)
HCT VFR BLD CALC: 47.5 % (ref 41–53)
HEMOGLOBIN: 16.3 G/DL (ref 13.5–17.5)
IMMATURE GRANULOCYTES: ABNORMAL %
LYMPHOCYTES # BLD: 37 % (ref 24–44)
MCH RBC QN AUTO: 32.6 PG (ref 26–34)
MCHC RBC AUTO-ENTMCNC: 34.3 G/DL (ref 31–37)
MCV RBC AUTO: 95.2 FL (ref 80–100)
MONOCYTES # BLD: 10 % (ref 1–7)
NRBC AUTOMATED: ABNORMAL PER 100 WBC
PDW BLD-RTO: 12.5 % (ref 11.5–14.9)
PLATELET # BLD: 199 K/UL (ref 150–450)
PLATELET ESTIMATE: ABNORMAL
PMV BLD AUTO: 8.9 FL (ref 6–12)
POTASSIUM SERPL-SCNC: 4.7 MMOL/L (ref 3.7–5.3)
PROSTATE SPECIFIC ANTIGEN: <0.01 UG/L
RBC # BLD: 4.99 M/UL (ref 4.5–5.9)
RBC # BLD: ABNORMAL 10*6/UL
SEG NEUTROPHILS: 51 % (ref 36–66)
SEGMENTED NEUTROPHILS ABSOLUTE COUNT: 4.5 K/UL (ref 1.3–9.1)
SODIUM BLD-SCNC: 139 MMOL/L (ref 135–144)
TOTAL PROTEIN: 7.5 G/DL (ref 6.4–8.3)
TROPONIN INTERP: NORMAL
TROPONIN T: NORMAL NG/ML
TROPONIN, HIGH SENSITIVITY: 6 NG/L (ref 0–22)
WBC # BLD: 8.7 K/UL (ref 3.5–11)
WBC # BLD: ABNORMAL 10*3/UL

## 2020-05-30 PROCEDURE — 85025 COMPLETE CBC W/AUTO DIFF WBC: CPT

## 2020-05-30 PROCEDURE — 84153 ASSAY OF PSA TOTAL: CPT

## 2020-05-30 PROCEDURE — 84484 ASSAY OF TROPONIN QUANT: CPT

## 2020-05-30 PROCEDURE — 99284 EMERGENCY DEPT VISIT MOD MDM: CPT

## 2020-05-30 PROCEDURE — 93005 ELECTROCARDIOGRAM TRACING: CPT | Performed by: EMERGENCY MEDICINE

## 2020-05-30 PROCEDURE — 70450 CT HEAD/BRAIN W/O DYE: CPT

## 2020-05-30 PROCEDURE — 80053 COMPREHEN METABOLIC PANEL: CPT

## 2020-05-30 PROCEDURE — 36415 COLL VENOUS BLD VENIPUNCTURE: CPT

## 2020-05-30 RX ORDER — MECLIZINE HYDROCHLORIDE 25 MG/1
25 TABLET ORAL 3 TIMES DAILY PRN
Qty: 15 TABLET | Refills: 0 | Status: SHIPPED | OUTPATIENT
Start: 2020-05-30 | End: 2020-06-09

## 2020-05-30 ASSESSMENT — ENCOUNTER SYMPTOMS
COUGH: 0
RHINORRHEA: 0
ABDOMINAL PAIN: 0
BACK PAIN: 0

## 2020-05-30 NOTE — ED PROVIDER NOTES
Hyperlipidemia     Impotence of organic origin     Lipoma of unspecified site     Lumbago     No natural teeth     Prostate CA ([address] 05/2019    Secondary localized osteoarthrosis, shoulder region     Sleep apnea     no CPAP    Swelling of limb        SURGICAL HISTORY       Past Surgical History:   Procedure Laterality Date    ANKLE SURGERY Right     CARPAL TUNNEL RELEASE Bilateral     CERVICAL FUSION  2009    anterior    EXCISION / BIOPSY SKIN LESION OF ARM Left 9/15/2017    EXCISION SKIN LESION LEFT CHEST AND LEFT BUTTOCK, EXCISION LIPOMA LEFT LOWER ABDOMEN performed by Willis Beth DO at [facility]CR[facility] NOT  [address] [place] [address] N/A 4/13/2017    COLONOSCOPY performed by Kerry Rahman MD at [address] [place] [address] N/A 4/24/2019    PROSTATE BIOPSY WITH ULTRASOUND  (OFFICE TO NOTIFY U.S) performed by Berna Germain MD at [facility]  06/26/2019    ROBOTIC LAPAROSCOPIC PROSTATECTOMY , PELVIC LYMPH NODE DISSECTION     PROSTATECTOMY N/A 6/26/2019    XI ROBOTIC LAPAROSCOPIC PROSTATECTOMY , PELVIC LYMPH NODE DISSECTION performed by Berna Germain MD at [address] Right 2015    ULNAR TUNNEL RELEASE Left        CURRENT MEDICATIONS       Discharge Medication List as of 5/30/2020  4:30 PM      CONTINUE these medications which have NOT CHANGED    Details   ibuprofen (IBU) 400 MG tablet Take 1 tablet by mouth every 6 hours as needed for Pain, Disp-30 tablet, R-0Print      diclofenac (VOLTAREN) 50 MG EC tablet Take 1 tablet by mouth 3 times daily (with meals), Disp-60 tablet, R-3Print      pantoprazole (PROTONIX) 40 MG tablet TAKE 1 TABLET (40 MG) BY ORAL ROUTE ONCE DAILY FOR 30 DAYS, R-0Historical Med      tadalafil (CIALIS) 20 MG tablet Take 1 tablet by mouth daily as needed for Erectile Dysfunction, Disp-30 tablet, R-1Normal      Ledipasvir-Sofosbuvir (HARVONI PO) Take by mouth dailyHistorical Med      lisinopril (PRINIVIL;ZESTRIL) 40 MG tablet Take 40 mg by mouth nightly Historical Med      clonazePAM (KLONOPIN) 1 MG tablet Take 1 mg by mouth 2 times daily. Historical Med      albuterol sulfate  (90 Base) MCG/ACT inhaler Inhale 2 puffs into the lungs every 6 hours as needed for WheezingHistorical Med      amLODIPine (NORVASC) 5 MG tablet TAKE 1 TABLET (5 MG) BY ORAL ROUTE ONCE DAILY bed time, R-3Historical Med      sertraline (ZOLOFT) 50 MG tablet TAKE 1 TABLET (50 MG) BY ORAL ROUTE ONCE DAILY bed time, R-2Historical Med      tiotropium (SPIRIVA) 18 MCG inhalation capsule Inhale 1 capsule into the lungs daily, Disp-30 capsule, R-3Normal      fluticasone (FLONASE) 50 MCG/ACT nasal spray 2 sprays by Nasal route daily, Disp-1 Bottle, R-0Normal      atorvastatin (LIPITOR) 20 MG tablet TAKE 1 TABLET DAILY, Disp-90 tablet, R-1Normal      Multiple Vitamins-Minerals (CENTRUM SILVER 50+MEN PO) Take 1 tablet by mouth dailyHistorical Med             ALLERGIES     is allergic to bee venom; fentanyl; morphine; pcn [penicillins]; and codeine. FAMILY HISTORY     He indicated that his mother is alive. He indicated that his father is . He indicated that the status of his sister is unknown. He indicated that only one of his two brothers is alive. He indicated that his maternal grandmother is . He indicated that his maternal grandfather is . He indicated that the status of his paternal grandmother is unknown. He indicated that the status of his paternal grandfather is unknown. He indicated that his daughter is alive. He indicated that his son is alive. He indicated that the status of his maternal uncle is unknown. SOCIAL HISTORY      reports that he has been smoking cigarettes. He started smoking about 60 years ago. He has a 54.00 pack-year smoking history. He has never used smokeless tobacco. He reports previous alcohol use. He reports that he does not use drugs.     PHYSICAL EXAM     INITIAL VITALS: BP (!) 153/76   Pulse 79   Temp 97.9

## 2020-06-01 ENCOUNTER — CARE COORDINATION (OUTPATIENT)
Dept: CARE COORDINATION | Age: 70
End: 2020-06-01

## 2020-06-01 ENCOUNTER — OFFICE VISIT (OUTPATIENT)
Dept: UROLOGY | Age: 70
End: 2020-06-01
Payer: MEDICARE

## 2020-06-01 VITALS — TEMPERATURE: 97.9 F

## 2020-06-01 LAB
EKG ATRIAL RATE: 63 BPM
EKG P AXIS: 23 DEGREES
EKG P-R INTERVAL: 160 MS
EKG Q-T INTERVAL: 404 MS
EKG QRS DURATION: 78 MS
EKG QTC CALCULATION (BAZETT): 413 MS
EKG R AXIS: 17 DEGREES
EKG T AXIS: 5 DEGREES
EKG VENTRICULAR RATE: 63 BPM

## 2020-06-01 PROCEDURE — 99214 OFFICE O/P EST MOD 30 MIN: CPT | Performed by: UROLOGY

## 2020-06-01 PROCEDURE — 93010 ELECTROCARDIOGRAM REPORT: CPT | Performed by: INTERNAL MEDICINE

## 2020-06-01 NOTE — CARE COORDINATION
1st outreach call x2 to follow up with patient for ED visit,no answer, lvm to return call. If no return call, ACM will attempt outreach call again tomorrow.
is feeling better today but still has a headache. Patient went to ED with post-traumatic headache after having a fall a month ago. States they are waiting for testing to come back. Hannah states she will call for PCP appt today. She states that the wrong PCP is in his chart and that he sees Dr Shayy Rivas on [facility] unable to change PCP in chart. Instructed her to have the office change it when she calls for appt. Offered [facility] and it was declined.

## 2020-06-15 ENCOUNTER — CARE COORDINATION (OUTPATIENT)
Dept: CARE COORDINATION | Age: 70
End: 2020-06-15

## 2020-06-15 NOTE — CARE COORDINATION
Your Patient resolved from the Care Transitions episode on 6/15/2020    Attempted to contact patient for follow up ED visit, no answer, lvm to return call to this nurse. No further outreach scheduled with this CTN/ACM. Episode of Care resolved. Patient has this CTN/ACM contact information if future needs arise.

## 2020-06-24 ENCOUNTER — OFFICE VISIT (OUTPATIENT)
Dept: ORTHOPEDIC SURGERY | Age: 70
End: 2020-06-24
Payer: MEDICARE

## 2020-06-24 VITALS — WEIGHT: 199.96 LBS | HEIGHT: 69 IN | BODY MASS INDEX: 29.62 KG/M2

## 2020-06-24 PROCEDURE — 99213 OFFICE O/P EST LOW 20 MIN: CPT | Performed by: STUDENT IN AN ORGANIZED HEALTH CARE EDUCATION/TRAINING PROGRAM

## 2020-06-30 ENCOUNTER — HOSPITAL ENCOUNTER (OUTPATIENT)
Dept: MRI IMAGING | Age: 70
Discharge: HOME | End: 2020-07-02
Payer: MEDICARE

## 2020-06-30 PROCEDURE — 72141 MRI NECK SPINE W/O DYE: CPT

## 2020-07-06 ENCOUNTER — OFFICE VISIT (OUTPATIENT)
Dept: UROLOGY | Age: 70
End: 2020-07-06
Payer: MEDICARE

## 2020-07-06 VITALS — WEIGHT: 190 LBS | BODY MASS INDEX: 28.14 KG/M2 | HEIGHT: 69 IN | TEMPERATURE: 97.9 F

## 2020-07-06 PROCEDURE — 99214 OFFICE O/P EST MOD 30 MIN: CPT | Performed by: UROLOGY

## 2020-07-06 ASSESSMENT — ENCOUNTER SYMPTOMS
ABDOMINAL PAIN: 0
SHORTNESS OF BREATH: 0
WHEEZING: 0
EYE REDNESS: 0
BACK PAIN: 0
EYE PAIN: 0
COLOR CHANGE: 0
NAUSEA: 0
COUGH: 0
VOMITING: 0

## 2020-07-06 NOTE — PROGRESS NOTES
..Review of Systems   Constitutional: Negative for appetite change, chills and fever. Eyes: Negative for pain, redness and visual disturbance. Respiratory: Negative for cough, shortness of breath and wheezing. Cardiovascular: Negative for chest pain and leg swelling. Gastrointestinal: Negative for abdominal pain, nausea and vomiting. Genitourinary: Negative for difficulty urinating, dysuria, flank pain, frequency, hematuria, testicular pain and urgency. Musculoskeletal: Negative for back pain, joint swelling and myalgias. Skin: Negative for color change, rash and wound. Neurological: Negative for dizziness, tremors, weakness, numbness and headaches. Hematological: Negative for adenopathy. Does not bruise/bleed easily.

## 2020-07-06 NOTE — PROGRESS NOTES
[facility]  [facility] UROLOGY SPECIALISTS - [facility]  [address] [place] [id number]  Dept: [address] [facility] Urology Office Note - Established    Patient:  Merle Hall  YOB: 1950  Date: 7/6/2020    The patient is a 77 y.o. male who presents todayfor evaluation of the following problems:   Chief Complaint   Patient presents with    Other     discuss IPP       HPI  Pt has h/o prostate cancer. Had dvp one year ago. psa undetectable. Has very mild agatha only when bladder full. Wears 1ppd. Still doing kegels. Pt has continued ED. Has failed pde5 I's. Pt interested in IPP. Summary of old records: N/A    Additional History: N/A    Procedures Today: N/A    Urinalysis today:  No results found for this visit on 07/06/20.   Last several PSA's:  Lab Results   Component Value Date    PSA <0.01 05/30/2020    PSA <0.01 02/22/2020    PSA <0.01 11/16/2019     Last total testosterone:  No results found for: TESTOSTERONE    AUA Symptom Score (7/6/2020):  INCOMPLETE EMPTYING: How often have you had the sensation of not emptying your bladder?: Not at all  FREQUENCY: How often do you have to urinate less than every two hours?: Not at all  INTERMITTENCY: How often have you found you stopped and started again several times when you urinated?: Not at all  URGENCY: How often have you found it difficult to postpone urination?: Not at all  WEAK STREAM: How often have you had a weak urinary stream?: Not at all  STRAINING: How often have you had to strain to start  urination?: Not at all  NOCTURIA: How many times did you typically get up at night to uriniate?: NONE  TOTAL I-PSS SCORE[de-identified] 0  How would you feel if you were to spend the rest of your life with your urinary condition?: Mostly Satisfied    Last BUN and creatinine:  Lab Results   Component Value Date    BUN 16 05/30/2020     Lab Results   Component Value Date    CREATININE 0.65 (L) 05/30/2020       Additional Lab/Culture results: none    Imaging Reviewed during this Office Visit: none  (results were independently reviewed by physician and radiology report verified)    PAST MEDICAL, FAMILY AND SOCIAL HISTORY UPDATE:  Past Medical History:   Diagnosis Date    Anxiety state, unspecified     Carotid artery stenosis     Colon polyp     Depressive disorder, not elsewhere classified     Elevated PSA 05/06/2019    Essential hypertension, benign     Cardiologist,  [phone]    GERD (gastroesophageal reflux disease)     History of colon polyps     History of hepatitis C     Hyperlipidemia     Impotence of organic origin     Lipoma of unspecified site     Lumbago     No natural teeth     Prostate CA ([address] 05/2019    Secondary localized osteoarthrosis, shoulder region     Sleep apnea     no CPAP    Swelling of limb      Past Surgical History:   Procedure Laterality Date    ANKLE SURGERY Right     CARPAL TUNNEL RELEASE Bilateral     CERVICAL FUSION  2009    anterior    EXCISION / BIOPSY SKIN LESION OF ARM Left 9/15/2017    EXCISION SKIN LESION LEFT CHEST AND LEFT BUTTOCK, EXCISION LIPOMA LEFT LOWER ABDOMEN performed by Elizabet Guzmán DO at [facility] NOT  [address] [place] [address] N/A 4/13/2017    COLONOSCOPY performed by Danish Bolanos MD at [address] [place] [address] N/A 4/24/2019    PROSTATE BIOPSY WITH ULTRASOUND  (OFFICE TO NOTIFY [place]) performed by Jigar Johnson MD at [facility]  06/26/2019    ROBOTIC LAPAROSCOPIC PROSTATECTOMY , PELVIC LYMPH NODE DISSECTION     PROSTATECTOMY N/A 6/26/2019    XI ROBOTIC LAPAROSCOPIC PROSTATECTOMY , PELVIC LYMPH NODE DISSECTION performed by Jigar Johnson MD at [address] Right 2015    ULNAR TUNNEL RELEASE Left      Family History   Problem Relation Age of Onset    Stroke Maternal Uncle 80    Heart Surgery Maternal Uncle         Triple Bypass    Other Maternal Grandmother         Pul.  Embolism    Cancer Maternal Grandfather         Throat    No Known Problems Mother     Other Father         MVA    No Known Problems Sister     Other Brother         Electric burn at work   [employer] No Known Problems Paternal Grandmother     No Known Problems Paternal Grandfather     Diabetes Brother      Outpatient Medications Marked as Taking for the 7/6/20 encounter (Office Visit) with Shalini Mcneil MD   Medication Sig Dispense Refill    ibuprofen (IBU) 400 MG tablet Take 1 tablet by mouth every 6 hours as needed for Pain 30 tablet 0    diclofenac (VOLTAREN) 50 MG EC tablet Take 1 tablet by mouth 3 times daily (with meals) 60 tablet 3    pantoprazole (PROTONIX) 40 MG tablet TAKE 1 TABLET (40 MG) BY ORAL ROUTE ONCE DAILY FOR 30 DAYS  0    tadalafil (CIALIS) 20 MG tablet Take 1 tablet by mouth daily as needed for Erectile Dysfunction 30 tablet 1    Ledipasvir-Sofosbuvir (HARVONI PO) Take by mouth daily      lisinopril (PRINIVIL;ZESTRIL) 40 MG tablet Take 40 mg by mouth nightly       clonazePAM (KLONOPIN) 1 MG tablet Take 1 mg by mouth 2 times daily.  albuterol sulfate  (90 Base) MCG/ACT inhaler Inhale 2 puffs into the lungs every 6 hours as needed for Wheezing      amLODIPine (NORVASC) 5 MG tablet TAKE 1 TABLET (5 MG) BY ORAL ROUTE ONCE DAILY bed time  3    sertraline (ZOLOFT) 50 MG tablet TAKE 1 TABLET (50 MG) BY ORAL ROUTE ONCE DAILY bed time  2    tiotropium (SPIRIVA) 18 MCG inhalation capsule Inhale 1 capsule into the lungs daily 30 capsule 3    fluticasone (FLONASE) 50 MCG/ACT nasal spray 2 sprays by Nasal route daily (Patient taking differently: 2 sprays by Nasal route daily as needed ) 1 Bottle 0    atorvastatin (LIPITOR) 20 MG tablet TAKE 1 TABLET DAILY (Patient taking differently: TAKE 1 TABLET DAILY  bed time) 90 tablet 1    Multiple Vitamins-Minerals (CENTRUM SILVER 50+MEN PO) Take 1 tablet by mouth daily         Bee venom;  Fentanyl; Morphine; Pcn [penicillins]; and Codeine  Social History     Tobacco Use Smoking Status Current Every Day Smoker    Packs/day: 1.00    Years: 54.00    Pack years: 54.00    Types: Cigarettes    Start date: 1960   Smokeless Tobacco Never Used     (Ifpatient a smoker, smoking cessation counseling offered)    Social History     Substance and Sexual Activity   Alcohol Use Not Currently    Comment: OCCASIONAL ON WEEKENDS       REVIEW OF SYSTEMS:  Review of Systems    Physical Exam:      Vitals:    07/06/20 1601   Temp: 97.9 °F (36.6 °C)     Body mass index is 28.06 kg/m². Patient is a 77 y.o. male in no acute distress and alert and oriented to person, place and time. Physical Exam  Constitutional: Patient in no acute distress. Neuro: Alert and oriented to person, place and time. Psych: Mood normal, affect normal  Skin: No rash noted  HEENT: Head: Normocephalic andatraumatic  Conjunctivae and EOM are normal. Pupils are equal, round  Nose:Normal  Right External Ear: Normal; Left External Ear: Normal  Mouth: Mucosa Moist  Neck: Supple  Lungs: Respiratory effort is normal  Cardiovascular: Warm & Pink  Abdomen: Soft, non-tender, non-distended with no CVA,  No flank tenderness,  Or hepatosplenomegaly   Lymphatics: No palpablelymphadenopathy. Bladder non-tender and not distended. Assessment and Plan      1. Stress incontinence    2. Erectile dysfunction after radical prostatectomy    3. History of malignant neoplasm of prostate           Plan:   IPP with ams 700 at [facility]  Will schedule with Veronica Reich AMS rep    Return for surgery. Prescriptions Ordered:  No orders of the defined types were placed in this encounter. Orders Placed:  No orders of the defined types were placed in this encounter. Mat Dukes MD    Agree with the ROS entered by the MA.

## 2020-07-16 ENCOUNTER — OFFICE VISIT (OUTPATIENT)
Dept: ORTHOPEDIC SURGERY | Age: 70
End: 2020-07-16
Payer: MEDICARE

## 2020-07-16 VITALS — WEIGHT: 190 LBS | BODY MASS INDEX: 28.06 KG/M2

## 2020-07-16 PROCEDURE — 99214 OFFICE O/P EST MOD 30 MIN: CPT | Performed by: ORTHOPAEDIC SURGERY

## 2020-07-16 RX ORDER — METHYLPREDNISOLONE 4 MG/1
TABLET ORAL
Qty: 1 KIT | Refills: 0 | Status: SHIPPED | OUTPATIENT
Start: 2020-07-16 | End: 2020-07-22

## 2020-07-16 NOTE — PROGRESS NOTES
[facility]  [facility] ORTHO SPECIALISTS  [address] [address]  Dept: [phone]  Dept Fax: [phone]        Orthopaedic Clinic Follow Up      Subjective:       Linda Lucia is a 77y.o. year old male who presents to the clinic today for routine followup regarding his bilateral carpal tunnel syndrome. Patient's last visit was on 6/24/2020 where he reviewed the EMG findings with the patient which demonstrated moderate bilateral carpal tunnel syndrome and evidence of bilateral cubital tunnel syndrome. Of note, patient did have a history of left carpal and cubital tunnel release about 1 year prior with Dr. Farrah Gonsalez. Patient also had a history of cervical spine surgery with evident arthritis on imaging thus we have elected to first obtain a cervical MRI to rule out spinal pathology before possibly proceeding with surgery. Patient is here today for follow-up regarding his MRI study of the cervical spine. It is noted that patient has multilevel neural foraminal narrowing which is severe at bilateral C7. Patient does have history of prior C5-6 ACDF and right C5 laminectomy. There is also noted right neuroforaminal narrowing secondary to posterior endplate osteophytes. On today's examination, patient reports worsening symptoms of weakness and burning pain to the left hand. Reports that he was able to  his puppy at last visit but is now unable to do so secondary to his pain. The pain is reported as a burning sensation overlying the previous incision of his carpal tunnel release the left side. Patient also states that all his fingers become numb intermittently and worsens with use. Otherwise, patient has no orthopedic complaints at this time.     Review of Systems  Gen: no fever, chills, malaise  CV: no chest pain or palpitations  Resp: no cough or shortness of breath  GI: no nausea, vomiting, diarrhea, or constipation  Neuro: no numbness, tingling, or weakness  Msk: Myalgia and dysesthesias to the left hand4  10 remaining systems reviewed and negative    Objective : There were no vitals filed for this visit. Body mass index is 28.06 kg/m². General: No acute distress, resting comfortably in the clinic  Neuro: alert. oriented  Eyes: Extra-ocular muscles intact  Pulm: Respirations unlabored and regular. Skin: warm, well perfused  Psych:   Patient has good fund of knowledge and displays understanging of exam, diagnosis, and plan. MSK: Left upper extremity: Dysesthesia is noted surrounding the previous left carpal tunnel incision site. Previous incision site appears well-healed without signs of erythema, cellulitic changes, or drainage. 4/5 C6, C7, and C8 motor strength.  strength 4/5. Sensations intact light touch from C5-T1. Radial pulse 2+. Radiology:  None taken at clinic     Assessment:   77y.o. year old male with history of left carpal tunnel release with residual dysesthesia at the incision site, bilateral carpal tunnel and cubital tunnel syndrome, severe bilateral C7 neural foraminal stenosis, history of C5-6 ACDF. Plan:      Patient is here today for follow-up regarding his bilateral carpal tunnel syndrome. At last visit, we elected to proceed with a cervical MRI to rule out cervical pathology that could be the cause of his dysesthesias to bilateral hands. The MRI findings were positive for severe neuroforaminal narrowing at the C7 level with a history of C5-6 ACDF performed by Dr. Sam Romo. Of note, patient does have positive EMG findings of bilateral carpal tunnel and possible cubital tunnel syndromes. Although his EMG is positive, patient does have severe neuroforaminal narrowing of the C7 level which is a contributor to the median nerve. On physical exam, patient does have significant tenderness to palpation dysesthesias to the incision site. The incision appears extensile overlying the volar carpal tunnel.   Is a possibility that patient has developed a painful neuroma overlying the palmar cutaneous branch of the median nerve. As patient does have a positive MRI to the cervical spine, I discussed with the patient that before we may address his left wrist, his neck will require evaluation by a spine specialist for possible intervention. Following this, if his pain and dysesthesia to the left hand has not resolved, we may discuss possible surgical intervention consisting of a revision of his left carpal tunnel. Patient is opted for the after mentioned treatment plan. Therefore, I placed a consult to Dr. Jose Domínguez for further evaluation. I have also prescribed a Medrol Dosepak in hopes to alleviate his nerve pain. He will follow-up with us after his visit with the spine specialist. He was amenable to all recommendations and was encouraged to call the office with any questions     Follow up:Return after spine specialist visit. Orders Placed This Encounter   Medications    methylPREDNISolone (MEDROL DOSEPACK) 4 MG tablet     Sig: Take by mouth.      Dispense:  1 kit     Refill:  0          Orders Placed This Encounter   Procedures   Mell Mancini MD, Orthopedic Surgery, [facility]     Referral Priority:   Routine     Referral Type:   Eval and Treat     Referral Reason:   Specialty Services Required     Referred to Provider:   Rebekah Vela MD     Requested Specialty:   Orthopedic Surgery     Number of Visits Requested:   1       Electronically signed by Cy Jones DO on 7/16/2020 at 4:18 PM

## 2020-07-29 ENCOUNTER — TELEPHONE (OUTPATIENT)
Dept: UROLOGY | Age: 70
End: 2020-07-29

## 2020-08-10 ENCOUNTER — TELEPHONE (OUTPATIENT)
Dept: PREADMISSION TESTING | Age: 70
End: 2020-08-10

## 2020-08-13 ENCOUNTER — HOSPITAL ENCOUNTER (OUTPATIENT)
Dept: PREADMISSION TESTING | Age: 70
Discharge: HOME | End: 2020-08-17
Payer: MEDICARE

## 2020-08-13 VITALS
OXYGEN SATURATION: 97 % | HEIGHT: 69 IN | HEART RATE: 68 BPM | SYSTOLIC BLOOD PRESSURE: 132 MMHG | DIASTOLIC BLOOD PRESSURE: 80 MMHG | WEIGHT: 203 LBS | RESPIRATION RATE: 20 BRPM | TEMPERATURE: 97.2 F | BODY MASS INDEX: 30.07 KG/M2

## 2020-08-13 LAB
BUN BLDV-MCNC: 19 MG/DL (ref 8–23)
CREAT SERPL-MCNC: 0.75 MG/DL (ref 0.7–1.2)
GFR AFRICAN AMERICAN: >60 ML/MIN
GFR NON-AFRICAN AMERICAN: >60 ML/MIN
GFR SERPL CREATININE-BSD FRML MDRD: NORMAL ML/MIN/{1.73_M2}
GFR SERPL CREATININE-BSD FRML MDRD: NORMAL ML/MIN/{1.73_M2}
GLUCOSE BLD-MCNC: 94 MG/DL (ref 70–99)

## 2020-08-13 PROCEDURE — 87086 URINE CULTURE/COLONY COUNT: CPT

## 2020-08-13 PROCEDURE — 82947 ASSAY GLUCOSE BLOOD QUANT: CPT

## 2020-08-13 PROCEDURE — 82565 ASSAY OF CREATININE: CPT

## 2020-08-13 PROCEDURE — 36415 COLL VENOUS BLD VENIPUNCTURE: CPT

## 2020-08-13 PROCEDURE — 93005 ELECTROCARDIOGRAM TRACING: CPT | Performed by: ANESTHESIOLOGY

## 2020-08-13 PROCEDURE — 84520 ASSAY OF UREA NITROGEN: CPT

## 2020-08-13 RX ORDER — IBUPROFEN 200 MG
1 CAPSULE ORAL DAILY
Status: ON HOLD | COMMUNITY
End: 2021-10-13

## 2020-08-13 RX ORDER — SODIUM CHLORIDE, SODIUM LACTATE, POTASSIUM CHLORIDE, CALCIUM CHLORIDE 600; 310; 30; 20 MG/100ML; MG/100ML; MG/100ML; MG/100ML
1000 INJECTION, SOLUTION INTRAVENOUS CONTINUOUS
Status: CANCELLED | OUTPATIENT
Start: 2020-08-13

## 2020-08-13 RX ORDER — OMEPRAZOLE 20 MG/1
20 CAPSULE, DELAYED RELEASE ORAL DAILY
COMMUNITY

## 2020-08-13 ASSESSMENT — PAIN SCALES - GENERAL: PAINLEVEL_OUTOF10: 6

## 2020-08-13 ASSESSMENT — PAIN DESCRIPTION - LOCATION: LOCATION: BACK;WRIST

## 2020-08-13 ASSESSMENT — PAIN DESCRIPTION - ONSET: ONSET: ON-GOING

## 2020-08-13 ASSESSMENT — PAIN DESCRIPTION - PROGRESSION: CLINICAL_PROGRESSION: NOT CHANGED

## 2020-08-13 ASSESSMENT — PAIN DESCRIPTION - FREQUENCY: FREQUENCY: CONTINUOUS

## 2020-08-13 ASSESSMENT — PAIN DESCRIPTION - DESCRIPTORS: DESCRIPTORS: ACHING

## 2020-08-13 ASSESSMENT — PAIN DESCRIPTION - ORIENTATION: ORIENTATION: LEFT

## 2020-08-13 ASSESSMENT — PAIN - FUNCTIONAL ASSESSMENT: PAIN_FUNCTIONAL_ASSESSMENT: PREVENTS OR INTERFERES SOME ACTIVE ACTIVITIES AND ADLS

## 2020-08-13 ASSESSMENT — PAIN DESCRIPTION - PAIN TYPE: TYPE: CHRONIC PAIN

## 2020-08-13 NOTE — PROGRESS NOTES
Anesthesia Focused Assessment    STOP-BANG Sleep Apnea Questionnaire    SNORE loudly (heard through closed doors)? Yes  TIRED, fatigued, sleepy during daytime? No  OBSERVED stopping breathing during sleep? No  High blood PRESSURE being treated? Yes    BMI over 35? No  AGE over 48? Yes  NECK circumference over 16\"? No  GENDER (male)? Yes             Total 4  High risk 5-8  Intermediate risk 3-4  Low risk 0-2    Obstructive Sleep Apnea: no  If YES, machine used: no     Type 1 DM:   no  T2DM:  no    Coronary Artery Disease:  yes  Hypertension:  yes    Active smoker:  1 ppd since age [de-identified]  Drinks Alcohol:  A six pack of beer per week    Dentition: edentulous    Defib / AICD / Pacemaker: no      Renal Failure/dialysis:  no    Patient was evaluated in PAT & anesthesia guidelines were applied. NPO guidelines, medication instructions and scheduled arrival time were reviewed with patient.     Hx of anesthesia complications:  no  Family hx of anesthesia complications:  no                                                                                                                     Anesthesia contacted:   no  Medical or cardiac clearance ordered: DEVIN Jensen  8/13/20  2:41 PM

## 2020-08-13 NOTE — H&P
History and Physical    Pt Name: Duane Fuller  MRN: [id number]  YOB: 1950  Date of evaluation: 8/13/2020    SUBJECTIVE:     History of Chief Complaint:    Patient complains of erectile dysfunction since a prostatectomy last June. He has not had good results with medications. He has been scheduled for insertion of an inflatable penile prosthesis. Past Medical History    has a past medical history of Anxiety state, unspecified, Asthma, CAD (coronary artery disease), Carotid artery stenosis, Colon polyp, Depressive disorder, not elsewhere classified, Elevated PSA, Essential hypertension, benign, GERD (gastroesophageal reflux disease), Hearing loss, History of colon polyps, History of hepatitis C, Hyperlipidemia, Impotence of organic origin, Lipoma of unspecified site, Lumbago, No natural teeth, Prostate CA ([address] Secondary localized osteoarthrosis, shoulder region, Snores, Swelling of limb, and Wellness examination. Past Surgical History   has a past surgical history that includes Carpal tunnel release (Bilateral); Ankle surgery (Right); shoulder surgery (Right, 2015); pr colon ca scrn not hi rsk ind (N/A, 4/13/2017); cervical fusion (2009); lumbar fusion; EXCISION / BIOPSY SKIN LESION OF ARM (Left, 9/15/2017); Ulnar tunnel release (Left); Prostate biopsy (N/A, 4/24/2019); Prostatectomy (N/A, 6/26/2019); Colonoscopy; and Cholecystectomy. Medications    Current Outpatient Medications:     omeprazole (PRILOSEC) 20 MG delayed release capsule, Take 20 mg by mouth daily, Disp: , Rfl:     calcium citrate (CALCITRATE) 950 MG tablet, Take 1 tablet by mouth daily, Disp: , Rfl:     Aspirin-Salicylamide-Caffeine (BC HEADACHE POWDER PO), Take by mouth Hold 7 days prior to surgery, Disp: , Rfl:     lisinopril (PRINIVIL;ZESTRIL) 40 MG tablet, Take 40 mg by mouth nightly Per pcp, Disp: , Rfl:     clonazePAM (KLONOPIN) 1 MG tablet, Take 1 mg by mouth 2 times daily.  pcp, Disp: , Rfl:     albuterol sulfate HFA 108 (90 Base) MCG/ACT inhaler, Inhale 2 puffs into the lungs every 6 hours as needed for Wheezing Per pcp, Disp: , Rfl:     amLODIPine (NORVASC) 5 MG tablet, Per pcp, Disp: , Rfl: 3    sertraline (ZOLOFT) 50 MG tablet, Per pcp, Disp: , Rfl: 2    tiotropium (SPIRIVA) 18 MCG inhalation capsule, Inhale 1 capsule into the lungs daily, Disp: 30 capsule, Rfl: 3    fluticasone (FLONASE) 50 MCG/ACT nasal spray, 2 sprays by Nasal route daily (Patient taking differently: 2 sprays by Nasal route daily as needed ), Disp: 1 Bottle, Rfl: 0    atorvastatin (LIPITOR) 20 MG tablet, TAKE 1 TABLET DAILY (Patient taking differently: Per Dr. Krissy Lafleur), Disp: 90 tablet, Rfl: 1    Multiple Vitamins-Minerals (CENTRUM SILVER 50+MEN PO), Take 1 tablet by mouth daily, Disp: , Rfl:   Allergies  is allergic to bee venom; fentanyl; morphine; pcn [penicillins]; and codeine. Family History  family history includes Cancer in his maternal grandfather; Diabetes in his brother; Heart Surgery in his maternal uncle; No Known Problems in his mother, paternal grandfather, paternal grandmother, and sister; Other in his brother, father, and maternal grandmother; Stroke (age of onset: 80) in his maternal uncle. Social History   reports that he has been smoking cigarettes. He started smoking about 60 years ago. He has a 54.00 pack-year smoking history. He has never used smokeless tobacco.   reports current alcohol use. reports no history of drug use.     Marital Status:   Children: five children and two grandchildren \"that I know of\"  Occupation: retired from [address] [place], [place] [address] [address]:  negative   EYES:  negative   HENT:  hearing loss  RESPIRATORY:  negative   CARDIOVASCULAR:  negative   GASTROINTESTINAL:  negative   GENITOURINARY:  erectile dysfunction  INTEGUMENT/BREAST:  negative   HEMATOLOGIC/LYMPHATIC:  negative   ALLERGIC/IMMUNOLOGIC:  drug reactions  ENDOCRINE:  negative   MUSCULOSKELETAL:  negative   NEUROLOGICAL: negative   BEHAVIOR/PSYCH:  negative     OBJECTIVE:     VITALS:  height is 5' 9\" (1.753 m) and weight is 203 lb (92.1 kg). His temporal temperature is 97.2 °F (36.2 °C). His blood pressure is 132/80 and his pulse is 68. His respiration is 20 and oxygen saturation is 97%. CONSTITUTIONAL: Alert & oriented x 3, no acute distress. SKIN:  Warm and dry, no rash or erythema. HEAD:  Normocephalic, atraumatic. EYES: PERRLA. EOMs intact. EARS:  Hearing grossly normal.    NOSE:  Nares patent. Septum midline. No rhinorrhea   MOUTH/THROAT:  Edentulous  NECK: Supple with no lymphadenopathy. LUNGS: Clear to auscultation throughout. No wheezes, rales or rhonchi. CARDIOVASCULAR: Heart rate regular. Rhythm without murmur, click, gallop or rub. ABDOMEN: Soft, non tender, non distended, no masses or organomegaly. EXTREMITIES: No clubbing, cyanosis or edema. TESTING:     EK2020  Labs pending: drawn 2020     IMPRESSION:   1. Erectile dysfunction after radical prostatectomy  2.  has a past medical history of Anxiety state, unspecified, Asthma, CAD (coronary artery disease), Carotid artery stenosis, Colon polyp, Depressive disorder, not elsewhere classified, Elevated PSA (2019), Essential hypertension, benign, GERD (gastroesophageal reflux disease), Hearing loss, History of colon polyps, History of hepatitis C, Hyperlipidemia, Impotence of organic origin, Lipoma of unspecified site, Lumbago, No natural teeth, Prostate CA ([address] (2019), Secondary localized osteoarthrosis, shoulder region, Snores, Swelling of limb, and Wellness examination. PLAN:   1.  Insertion of inflatable penile prosthesis    MARICEL AntonC  Electronically signed 2020 at 3:18 PM

## 2020-08-13 NOTE — H&P (VIEW-ONLY)
History and Physical    Pt Name: Huan Viramontes  MRN: [id number]  YOB: 1950  Date of evaluation: 8/13/2020    SUBJECTIVE:     History of Chief Complaint:    Patient complains of erectile dysfunction since a prostatectomy last June. He has not had good results with medications. He has been scheduled for insertion of an inflatable penile prosthesis. Past Medical History    has a past medical history of Anxiety state, unspecified, Asthma, CAD (coronary artery disease), Carotid artery stenosis, Colon polyp, Depressive disorder, not elsewhere classified, Elevated PSA, Essential hypertension, benign, GERD (gastroesophageal reflux disease), Hearing loss, History of colon polyps, History of hepatitis C, Hyperlipidemia, Impotence of organic origin, Lipoma of unspecified site, Lumbago, No natural teeth, Prostate CA ([address] Secondary localized osteoarthrosis, shoulder region, Snores, Swelling of limb, and Wellness examination. Past Surgical History   has a past surgical history that includes Carpal tunnel release (Bilateral); Ankle surgery (Right); shoulder surgery (Right, 2015); pr colon ca scrn not hi rsk ind (N/A, 4/13/2017); cervical fusion (2009); lumbar fusion; EXCISION / BIOPSY SKIN LESION OF ARM (Left, 9/15/2017); Ulnar tunnel release (Left); Prostate biopsy (N/A, 4/24/2019); Prostatectomy (N/A, 6/26/2019); Colonoscopy; and Cholecystectomy. Medications    Current Outpatient Medications:     omeprazole (PRILOSEC) 20 MG delayed release capsule, Take 20 mg by mouth daily, Disp: , Rfl:     calcium citrate (CALCITRATE) 950 MG tablet, Take 1 tablet by mouth daily, Disp: , Rfl:     Aspirin-Salicylamide-Caffeine (BC HEADACHE POWDER PO), Take by mouth Hold 7 days prior to surgery, Disp: , Rfl:     lisinopril (PRINIVIL;ZESTRIL) 40 MG tablet, Take 40 mg by mouth nightly Per pcp, Disp: , Rfl:     clonazePAM (KLONOPIN) 1 MG tablet, Take 1 mg by mouth 2 times daily.  pcp, Disp: , Rfl:     albuterol sulfate HFA negative   BEHAVIOR/PSYCH:  negative     OBJECTIVE:     VITALS:  height is 5' 9\" (1.753 m) and weight is 203 lb (92.1 kg). His temporal temperature is 97.2 °F (36.2 °C). His blood pressure is 132/80 and his pulse is 68. His respiration is 20 and oxygen saturation is 97%. CONSTITUTIONAL: Alert & oriented x 3, no acute distress. SKIN:  Warm and dry, no rash or erythema. HEAD:  Normocephalic, atraumatic. EYES: PERRLA. EOMs intact. EARS:  Hearing grossly normal.    NOSE:  Nares patent. Septum midline. No rhinorrhea   MOUTH/THROAT:  Edentulous  NECK: Supple with no lymphadenopathy. LUNGS: Clear to auscultation throughout. No wheezes, rales or rhonchi. CARDIOVASCULAR: Heart rate regular. Rhythm without murmur, click, gallop or rub. ABDOMEN: Soft, non tender, non distended, no masses or organomegaly. EXTREMITIES: No clubbing, cyanosis or edema. TESTING:     EK2020  Labs pending: drawn 2020     IMPRESSION:   1. Erectile dysfunction after radical prostatectomy  2.  has a past medical history of Anxiety state, unspecified, Asthma, CAD (coronary artery disease), Carotid artery stenosis, Colon polyp, Depressive disorder, not elsewhere classified, Elevated PSA (2019), Essential hypertension, benign, GERD (gastroesophageal reflux disease), Hearing loss, History of colon polyps, History of hepatitis C, Hyperlipidemia, Impotence of organic origin, Lipoma of unspecified site, Lumbago, No natural teeth, Prostate CA ([facility] (2019), Secondary localized osteoarthrosis, shoulder region, Snores, Swelling of limb, and Wellness examination. PLAN:   1.  Insertion of inflatable penile prosthesis    Lizzette MCKEON, MARICELC  Electronically signed 2020 at 3:18 PM

## 2020-08-14 LAB
CULTURE: NO GROWTH
EKG ATRIAL RATE: 60 BPM
EKG P AXIS: 58 DEGREES
EKG P-R INTERVAL: 148 MS
EKG Q-T INTERVAL: 414 MS
EKG QRS DURATION: 84 MS
EKG QTC CALCULATION (BAZETT): 414 MS
EKG R AXIS: 50 DEGREES
EKG T AXIS: 25 DEGREES
EKG VENTRICULAR RATE: 60 BPM
Lab: NORMAL
SPECIMEN DESCRIPTION: NORMAL

## 2020-08-22 ENCOUNTER — HOSPITAL ENCOUNTER (OUTPATIENT)
Dept: PREADMISSION TESTING | Age: 70
Setting detail: SPECIMEN
Discharge: HOME | End: 2020-08-26
Payer: MEDICARE

## 2020-08-22 PROCEDURE — U0003 INFECTIOUS AGENT DETECTION BY NUCLEIC ACID (DNA OR RNA); SEVERE ACUTE RESPIRATORY SYNDROME CORONAVIRUS 2 (SARS-COV-2) (CORONAVIRUS DISEASE [COVID-19]), AMPLIFIED PROBE TECHNIQUE, MAKING USE OF HIGH THROUGHPUT TECHNOLOGIES AS DESCRIBED BY CMS-2020-01-R: HCPCS

## 2020-08-24 LAB — SARS-COV-2, NAA: NOT DETECTED

## 2020-08-25 ENCOUNTER — TELEPHONE (OUTPATIENT)
Dept: PRIMARY CARE CLINIC | Age: 70
End: 2020-08-25

## 2020-08-26 ENCOUNTER — ANESTHESIA (OUTPATIENT)
Dept: OPERATING ROOM | Age: 70
End: 2020-08-26
Payer: MEDICARE

## 2020-08-26 ENCOUNTER — ANESTHESIA EVENT (OUTPATIENT)
Dept: OPERATING ROOM | Age: 70
End: 2020-08-26
Payer: MEDICARE

## 2020-08-26 ENCOUNTER — HOSPITAL ENCOUNTER (OUTPATIENT)
Age: 70
Setting detail: OBSERVATION
Discharge: HOME | End: 2020-08-27
Attending: UROLOGY | Admitting: UROLOGY
Payer: MEDICARE

## 2020-08-26 VITALS — TEMPERATURE: 96.5 F | DIASTOLIC BLOOD PRESSURE: 88 MMHG | SYSTOLIC BLOOD PRESSURE: 133 MMHG | OXYGEN SATURATION: 90 %

## 2020-08-26 PROBLEM — N52.31 ERECTILE DYSFUNCTION AFTER RADICAL PROSTATECTOMY: Status: ACTIVE | Noted: 2020-08-26

## 2020-08-26 LAB
ANION GAP SERPL CALCULATED.3IONS-SCNC: 11 MMOL/L (ref 9–17)
BUN BLDV-MCNC: 19 MG/DL (ref 8–23)
BUN/CREAT BLD: ABNORMAL (ref 9–20)
CALCIUM SERPL-MCNC: 8.7 MG/DL (ref 8.6–10.4)
CHLORIDE BLD-SCNC: 103 MMOL/L (ref 98–107)
CO2: 21 MMOL/L (ref 20–31)
CREAT SERPL-MCNC: 0.77 MG/DL (ref 0.7–1.2)
GFR AFRICAN AMERICAN: >60 ML/MIN
GFR NON-AFRICAN AMERICAN: >60 ML/MIN
GFR SERPL CREATININE-BSD FRML MDRD: ABNORMAL ML/MIN/{1.73_M2}
GFR SERPL CREATININE-BSD FRML MDRD: ABNORMAL ML/MIN/{1.73_M2}
GLUCOSE BLD-MCNC: 143 MG/DL (ref 70–99)
HCT VFR BLD CALC: 54 % (ref 40.7–50.3)
HEMOGLOBIN: 17.5 G/DL (ref 13–17)
MCH RBC QN AUTO: 32.4 PG (ref 25.2–33.5)
MCHC RBC AUTO-ENTMCNC: 32.4 G/DL (ref 28.4–34.8)
MCV RBC AUTO: 100 FL (ref 82.6–102.9)
NRBC AUTOMATED: 0 PER 100 WBC
PDW BLD-RTO: 11.8 % (ref 11.8–14.4)
PLATELET # BLD: 169 K/UL (ref 138–453)
PMV BLD AUTO: 10.5 FL (ref 8.1–13.5)
POTASSIUM SERPL-SCNC: 5.1 MMOL/L (ref 3.7–5.3)
RBC # BLD: 5.4 M/UL (ref 4.21–5.77)
SODIUM BLD-SCNC: 135 MMOL/L (ref 135–144)
WBC # BLD: 13.3 K/UL (ref 3.5–11.3)

## 2020-08-26 PROCEDURE — 2580000003 HC RX 258: Performed by: ANESTHESIOLOGY

## 2020-08-26 PROCEDURE — 6370000000 HC RX 637 (ALT 250 FOR IP): Performed by: STUDENT IN AN ORGANIZED HEALTH CARE EDUCATION/TRAINING PROGRAM

## 2020-08-26 PROCEDURE — 6360000002 HC RX W HCPCS: Performed by: ANESTHESIOLOGY

## 2020-08-26 PROCEDURE — 2720000010 HC SURG SUPPLY STERILE: Performed by: UROLOGY

## 2020-08-26 PROCEDURE — 6360000002 HC RX W HCPCS: Performed by: NURSE ANESTHETIST, CERTIFIED REGISTERED

## 2020-08-26 PROCEDURE — 3600000004 HC SURGERY LEVEL 4 BASE: Performed by: UROLOGY

## 2020-08-26 PROCEDURE — C1729 CATH, DRAINAGE: HCPCS | Performed by: UROLOGY

## 2020-08-26 PROCEDURE — 6360000002 HC RX W HCPCS: Performed by: STUDENT IN AN ORGANIZED HEALTH CARE EDUCATION/TRAINING PROGRAM

## 2020-08-26 PROCEDURE — 3600000014 HC SURGERY LEVEL 4 ADDTL 15MIN: Performed by: UROLOGY

## 2020-08-26 PROCEDURE — 2709999900 HC NON-CHARGEABLE SUPPLY: Performed by: UROLOGY

## 2020-08-26 PROCEDURE — G0378 HOSPITAL OBSERVATION PER HR: HCPCS

## 2020-08-26 PROCEDURE — 6360000002 HC RX W HCPCS: Performed by: UROLOGY

## 2020-08-26 PROCEDURE — 2580000003 HC RX 258: Performed by: STUDENT IN AN ORGANIZED HEALTH CARE EDUCATION/TRAINING PROGRAM

## 2020-08-26 PROCEDURE — 80048 BASIC METABOLIC PNL TOTAL CA: CPT

## 2020-08-26 PROCEDURE — 3700000000 HC ANESTHESIA ATTENDED CARE: Performed by: UROLOGY

## 2020-08-26 PROCEDURE — 2500000003 HC RX 250 WO HCPCS: Performed by: NURSE ANESTHETIST, CERTIFIED REGISTERED

## 2020-08-26 PROCEDURE — 7100000001 HC PACU RECOVERY - ADDTL 15 MIN: Performed by: UROLOGY

## 2020-08-26 PROCEDURE — C1813 PROSTHESIS, PENILE, INFLATAB: HCPCS | Performed by: UROLOGY

## 2020-08-26 PROCEDURE — 85027 COMPLETE CBC AUTOMATED: CPT

## 2020-08-26 PROCEDURE — 2580000003 HC RX 258: Performed by: NURSE ANESTHETIST, CERTIFIED REGISTERED

## 2020-08-26 PROCEDURE — 7100000000 HC PACU RECOVERY - FIRST 15 MIN: Performed by: UROLOGY

## 2020-08-26 PROCEDURE — 6360000002 HC RX W HCPCS

## 2020-08-26 PROCEDURE — 3700000001 HC ADD 15 MINUTES (ANESTHESIA): Performed by: UROLOGY

## 2020-08-26 DEVICE — INFLATABLE PENILE PROSTHESIS, INHIBIZONE/PRECONNECTED - PENOSCROTAL 1 PUMP 2 CYLINDERS WITH 12 CM LONG PRECONNECT TUBING
Type: IMPLANTABLE DEVICE | Site: PENIS | Status: FUNCTIONAL
Brand: AMS 700 CX MS PUMP

## 2020-08-26 DEVICE — IMPL PENILE RSVR 100ML: Type: IMPLANTABLE DEVICE | Site: PENIS | Status: FUNCTIONAL

## 2020-08-26 DEVICE — INFLATABLE PENILE PROSTHESIS WITH MS PUMP ACCESSORY KIT
Type: IMPLANTABLE DEVICE | Site: PENIS | Status: FUNCTIONAL
Brand: AMS 700 ACCESSORY KIT

## 2020-08-26 RX ORDER — AMLODIPINE BESYLATE 5 MG/1
5 TABLET ORAL DAILY
Status: DISCONTINUED | OUTPATIENT
Start: 2020-08-26 | End: 2020-08-27 | Stop reason: HOSPADM

## 2020-08-26 RX ORDER — SODIUM CHLORIDE 0.9 % (FLUSH) 0.9 %
10 SYRINGE (ML) INJECTION EVERY 12 HOURS SCHEDULED
Status: DISCONTINUED | OUTPATIENT
Start: 2020-08-26 | End: 2020-08-26 | Stop reason: HOSPADM

## 2020-08-26 RX ORDER — UREA 10 %
2 LOTION (ML) TOPICAL NIGHTLY PRN
Status: DISCONTINUED | OUTPATIENT
Start: 2020-08-26 | End: 2020-08-27 | Stop reason: HOSPADM

## 2020-08-26 RX ORDER — OXYCODONE HYDROCHLORIDE AND ACETAMINOPHEN 5; 325 MG/1; MG/1
1 TABLET ORAL EVERY 4 HOURS PRN
Status: DISCONTINUED | OUTPATIENT
Start: 2020-08-26 | End: 2020-08-27 | Stop reason: HOSPADM

## 2020-08-26 RX ORDER — SODIUM CHLORIDE 0.9 % (FLUSH) 0.9 %
10 SYRINGE (ML) INJECTION PRN
Status: DISCONTINUED | OUTPATIENT
Start: 2020-08-26 | End: 2020-08-26 | Stop reason: HOSPADM

## 2020-08-26 RX ORDER — GLYCOPYRROLATE 1 MG/5 ML
SYRINGE (ML) INTRAVENOUS PRN
Status: DISCONTINUED | OUTPATIENT
Start: 2020-08-26 | End: 2020-08-26 | Stop reason: SDUPTHER

## 2020-08-26 RX ORDER — ONDANSETRON 2 MG/ML
4 INJECTION INTRAMUSCULAR; INTRAVENOUS ONCE
Status: DISCONTINUED | OUTPATIENT
Start: 2020-08-26 | End: 2020-08-26 | Stop reason: HOSPADM

## 2020-08-26 RX ORDER — CLONAZEPAM 1 MG/1
1 TABLET ORAL 2 TIMES DAILY
Status: DISCONTINUED | OUTPATIENT
Start: 2020-08-26 | End: 2020-08-27 | Stop reason: HOSPADM

## 2020-08-26 RX ORDER — DEXAMETHASONE SODIUM PHOSPHATE 4 MG/ML
INJECTION, SOLUTION INTRA-ARTICULAR; INTRALESIONAL; INTRAMUSCULAR; INTRAVENOUS; SOFT TISSUE PRN
Status: DISCONTINUED | OUTPATIENT
Start: 2020-08-26 | End: 2020-08-26 | Stop reason: SDUPTHER

## 2020-08-26 RX ORDER — NICOTINE 21 MG/24HR
1 PATCH, TRANSDERMAL 24 HOURS TRANSDERMAL DAILY
Status: DISCONTINUED | OUTPATIENT
Start: 2020-08-26 | End: 2020-08-27 | Stop reason: HOSPADM

## 2020-08-26 RX ORDER — SULFAMETHOXAZOLE AND TRIMETHOPRIM 800; 160 MG/1; MG/1
1 TABLET ORAL 2 TIMES DAILY
Qty: 10 TABLET | Refills: 0 | Status: SHIPPED | OUTPATIENT
Start: 2020-08-26 | End: 2020-08-31

## 2020-08-26 RX ORDER — GENTAMICIN SULFATE 80 MG/50ML
80 INJECTION, SOLUTION INTRAVENOUS
Status: COMPLETED | OUTPATIENT
Start: 2020-08-26 | End: 2020-08-26

## 2020-08-26 RX ORDER — ONDANSETRON 2 MG/ML
4 INJECTION INTRAMUSCULAR; INTRAVENOUS
Status: DISCONTINUED | OUTPATIENT
Start: 2020-08-26 | End: 2020-08-26 | Stop reason: HOSPADM

## 2020-08-26 RX ORDER — ALBUTEROL SULFATE 90 UG/1
2 AEROSOL, METERED RESPIRATORY (INHALATION) EVERY 6 HOURS PRN
Status: DISCONTINUED | OUTPATIENT
Start: 2020-08-26 | End: 2020-08-27 | Stop reason: HOSPADM

## 2020-08-26 RX ORDER — SODIUM CHLORIDE 9 MG/ML
INJECTION, SOLUTION INTRAVENOUS CONTINUOUS
Status: DISCONTINUED | OUTPATIENT
Start: 2020-08-26 | End: 2020-08-27

## 2020-08-26 RX ORDER — ATORVASTATIN CALCIUM 20 MG/1
20 TABLET, FILM COATED ORAL DAILY
Status: DISCONTINUED | OUTPATIENT
Start: 2020-08-26 | End: 2020-08-27 | Stop reason: HOSPADM

## 2020-08-26 RX ORDER — KETAMINE HYDROCHLORIDE 10 MG/ML
INJECTION, SOLUTION INTRAMUSCULAR; INTRAVENOUS PRN
Status: DISCONTINUED | OUTPATIENT
Start: 2020-08-26 | End: 2020-08-26 | Stop reason: SDUPTHER

## 2020-08-26 RX ORDER — SODIUM CHLORIDE, SODIUM LACTATE, POTASSIUM CHLORIDE, CALCIUM CHLORIDE 600; 310; 30; 20 MG/100ML; MG/100ML; MG/100ML; MG/100ML
INJECTION, SOLUTION INTRAVENOUS CONTINUOUS
Status: DISCONTINUED | OUTPATIENT
Start: 2020-08-26 | End: 2020-08-26

## 2020-08-26 RX ORDER — OMEPRAZOLE 20 MG/1
20 CAPSULE, DELAYED RELEASE ORAL DAILY
Status: DISCONTINUED | OUTPATIENT
Start: 2020-08-26 | End: 2020-08-27 | Stop reason: HOSPADM

## 2020-08-26 RX ORDER — GENTAMICIN SULFATE 80 MG/50ML
80 INJECTION, SOLUTION INTRAVENOUS EVERY 12 HOURS
Status: COMPLETED | OUTPATIENT
Start: 2020-08-26 | End: 2020-08-26

## 2020-08-26 RX ORDER — SODIUM CHLORIDE 0.9 % (FLUSH) 0.9 %
10 SYRINGE (ML) INJECTION PRN
Status: DISCONTINUED | OUTPATIENT
Start: 2020-08-26 | End: 2020-08-27 | Stop reason: HOSPADM

## 2020-08-26 RX ORDER — NEOSTIGMINE METHYLSULFATE 5 MG/5 ML
SYRINGE (ML) INTRAVENOUS PRN
Status: DISCONTINUED | OUTPATIENT
Start: 2020-08-26 | End: 2020-08-26 | Stop reason: SDUPTHER

## 2020-08-26 RX ORDER — ACETAMINOPHEN 650 MG/1
650 SUPPOSITORY RECTAL EVERY 6 HOURS PRN
Status: DISCONTINUED | OUTPATIENT
Start: 2020-08-26 | End: 2020-08-27 | Stop reason: HOSPADM

## 2020-08-26 RX ORDER — SODIUM CHLORIDE 0.9 % (FLUSH) 0.9 %
10 SYRINGE (ML) INJECTION EVERY 12 HOURS SCHEDULED
Status: DISCONTINUED | OUTPATIENT
Start: 2020-08-26 | End: 2020-08-27 | Stop reason: HOSPADM

## 2020-08-26 RX ORDER — MIDAZOLAM HYDROCHLORIDE 1 MG/ML
INJECTION INTRAMUSCULAR; INTRAVENOUS PRN
Status: DISCONTINUED | OUTPATIENT
Start: 2020-08-26 | End: 2020-08-26 | Stop reason: SDUPTHER

## 2020-08-26 RX ORDER — OXYCODONE HYDROCHLORIDE AND ACETAMINOPHEN 5; 325 MG/1; MG/1
2 TABLET ORAL EVERY 4 HOURS PRN
Status: DISCONTINUED | OUTPATIENT
Start: 2020-08-26 | End: 2020-08-27 | Stop reason: HOSPADM

## 2020-08-26 RX ORDER — FLUTICASONE PROPIONATE 50 MCG
2 SPRAY, SUSPENSION (ML) NASAL DAILY
Status: DISCONTINUED | OUTPATIENT
Start: 2020-08-26 | End: 2020-08-27 | Stop reason: HOSPADM

## 2020-08-26 RX ORDER — LIDOCAINE HYDROCHLORIDE 10 MG/ML
INJECTION, SOLUTION EPIDURAL; INFILTRATION; INTRACAUDAL; PERINEURAL PRN
Status: DISCONTINUED | OUTPATIENT
Start: 2020-08-26 | End: 2020-08-26 | Stop reason: SDUPTHER

## 2020-08-26 RX ORDER — ONDANSETRON 2 MG/ML
4 INJECTION INTRAMUSCULAR; INTRAVENOUS EVERY 6 HOURS PRN
Status: DISCONTINUED | OUTPATIENT
Start: 2020-08-26 | End: 2020-08-27 | Stop reason: HOSPADM

## 2020-08-26 RX ORDER — ROCURONIUM BROMIDE 10 MG/ML
INJECTION, SOLUTION INTRAVENOUS PRN
Status: DISCONTINUED | OUTPATIENT
Start: 2020-08-26 | End: 2020-08-26 | Stop reason: SDUPTHER

## 2020-08-26 RX ORDER — DOCUSATE SODIUM 100 MG/1
100 CAPSULE, LIQUID FILLED ORAL 2 TIMES DAILY
Qty: 60 CAPSULE | Refills: 0 | Status: SHIPPED | OUTPATIENT
Start: 2020-08-26 | End: 2020-09-25

## 2020-08-26 RX ORDER — LISINOPRIL 20 MG/1
40 TABLET ORAL NIGHTLY
Status: DISCONTINUED | OUTPATIENT
Start: 2020-08-26 | End: 2020-08-27 | Stop reason: HOSPADM

## 2020-08-26 RX ORDER — VANCOMYCIN HYDROCHLORIDE 1 G/200ML
1000 INJECTION, SOLUTION INTRAVENOUS ONCE
Status: COMPLETED | OUTPATIENT
Start: 2020-08-26 | End: 2020-08-26

## 2020-08-26 RX ORDER — VANCOMYCIN HYDROCHLORIDE 500 MG/10ML
500 INJECTION, POWDER, LYOPHILIZED, FOR SOLUTION INTRAVENOUS 3 TIMES DAILY
Status: DISCONTINUED | OUTPATIENT
Start: 2020-08-26 | End: 2020-08-26

## 2020-08-26 RX ORDER — GENTAMICIN SULFATE 40 MG/ML
INJECTION, SOLUTION INTRAMUSCULAR; INTRAVENOUS PRN
Status: DISCONTINUED | OUTPATIENT
Start: 2020-08-26 | End: 2020-08-26 | Stop reason: HOSPADM

## 2020-08-26 RX ORDER — SODIUM CHLORIDE, SODIUM LACTATE, POTASSIUM CHLORIDE, CALCIUM CHLORIDE 600; 310; 30; 20 MG/100ML; MG/100ML; MG/100ML; MG/100ML
1000 INJECTION, SOLUTION INTRAVENOUS CONTINUOUS
Status: DISCONTINUED | OUTPATIENT
Start: 2020-08-26 | End: 2020-08-26

## 2020-08-26 RX ORDER — SODIUM CHLORIDE, SODIUM LACTATE, POTASSIUM CHLORIDE, CALCIUM CHLORIDE 600; 310; 30; 20 MG/100ML; MG/100ML; MG/100ML; MG/100ML
INJECTION, SOLUTION INTRAVENOUS CONTINUOUS PRN
Status: DISCONTINUED | OUTPATIENT
Start: 2020-08-26 | End: 2020-08-26 | Stop reason: SDUPTHER

## 2020-08-26 RX ORDER — VANCOMYCIN HYDROCHLORIDE 1 G/20ML
INJECTION, POWDER, LYOPHILIZED, FOR SOLUTION INTRAVENOUS PRN
Status: DISCONTINUED | OUTPATIENT
Start: 2020-08-26 | End: 2020-08-26 | Stop reason: HOSPADM

## 2020-08-26 RX ORDER — MIDAZOLAM HYDROCHLORIDE 1 MG/ML
2 INJECTION INTRAMUSCULAR; INTRAVENOUS
Status: DISCONTINUED | OUTPATIENT
Start: 2020-08-26 | End: 2020-08-26 | Stop reason: HOSPADM

## 2020-08-26 RX ORDER — ACETAMINOPHEN 325 MG/1
650 TABLET ORAL EVERY 6 HOURS PRN
Status: DISCONTINUED | OUTPATIENT
Start: 2020-08-26 | End: 2020-08-27 | Stop reason: HOSPADM

## 2020-08-26 RX ORDER — OXYCODONE HYDROCHLORIDE AND ACETAMINOPHEN 5; 325 MG/1; MG/1
1 TABLET ORAL EVERY 6 HOURS PRN
Qty: 15 TABLET | Refills: 0 | Status: SHIPPED | OUTPATIENT
Start: 2020-08-26 | End: 2020-08-31

## 2020-08-26 RX ORDER — PROPOFOL 10 MG/ML
INJECTION, EMULSION INTRAVENOUS PRN
Status: DISCONTINUED | OUTPATIENT
Start: 2020-08-26 | End: 2020-08-26 | Stop reason: SDUPTHER

## 2020-08-26 RX ORDER — POLYETHYLENE GLYCOL 3350 17 G/17G
17 POWDER, FOR SOLUTION ORAL DAILY PRN
Status: DISCONTINUED | OUTPATIENT
Start: 2020-08-26 | End: 2020-08-27 | Stop reason: HOSPADM

## 2020-08-26 RX ADMIN — HYDROMORPHONE HYDROCHLORIDE 0.5 MG: 1 INJECTION, SOLUTION INTRAMUSCULAR; INTRAVENOUS; SUBCUTANEOUS at 14:33

## 2020-08-26 RX ADMIN — ATORVASTATIN CALCIUM 20 MG: 20 TABLET, FILM COATED ORAL at 20:54

## 2020-08-26 RX ADMIN — HYDROMORPHONE HYDROCHLORIDE 0.5 MG: 1 INJECTION, SOLUTION INTRAMUSCULAR; INTRAVENOUS; SUBCUTANEOUS at 15:59

## 2020-08-26 RX ADMIN — HYDROMORPHONE HYDROCHLORIDE 1 MG: 1 INJECTION, SOLUTION INTRAMUSCULAR; INTRAVENOUS; SUBCUTANEOUS at 12:12

## 2020-08-26 RX ADMIN — OMEPRAZOLE 20 MG: 20 CAPSULE, DELAYED RELEASE ORAL at 20:54

## 2020-08-26 RX ADMIN — LISINOPRIL 40 MG: 20 TABLET ORAL at 20:54

## 2020-08-26 RX ADMIN — VANCOMYCIN HYDROCHLORIDE 500 MG: 500 INJECTION, POWDER, LYOPHILIZED, FOR SOLUTION INTRAVENOUS at 17:56

## 2020-08-26 RX ADMIN — HYDROMORPHONE HYDROCHLORIDE 0.5 MG: 1 INJECTION, SOLUTION INTRAMUSCULAR; INTRAVENOUS; SUBCUTANEOUS at 14:52

## 2020-08-26 RX ADMIN — Medication 3 MG: at 12:05

## 2020-08-26 RX ADMIN — Medication 0.6 MG: at 12:05

## 2020-08-26 RX ADMIN — DEXAMETHASONE SODIUM PHOSPHATE 4 MG: 4 INJECTION, SOLUTION INTRAMUSCULAR; INTRAVENOUS at 11:32

## 2020-08-26 RX ADMIN — HYDROMORPHONE HYDROCHLORIDE 9 MG: 1 INJECTION, SOLUTION INTRAMUSCULAR; INTRAVENOUS; SUBCUTANEOUS at 16:07

## 2020-08-26 RX ADMIN — PROPOFOL 200 MG: 10 INJECTION, EMULSION INTRAVENOUS at 11:20

## 2020-08-26 RX ADMIN — CLONAZEPAM 1 MG: 1 TABLET ORAL at 20:54

## 2020-08-26 RX ADMIN — SODIUM CHLORIDE, POTASSIUM CHLORIDE, SODIUM LACTATE AND CALCIUM CHLORIDE 1000 ML: 600; 310; 30; 20 INJECTION, SOLUTION INTRAVENOUS at 09:11

## 2020-08-26 RX ADMIN — ROCURONIUM BROMIDE 30 MG: 10 INJECTION INTRAVENOUS at 11:20

## 2020-08-26 RX ADMIN — HYDROMORPHONE HYDROCHLORIDE 0.5 MG: 1 INJECTION, SOLUTION INTRAMUSCULAR; INTRAVENOUS; SUBCUTANEOUS at 19:33

## 2020-08-26 RX ADMIN — HYDROMORPHONE HYDROCHLORIDE 0.5 MG: 1 INJECTION, SOLUTION INTRAMUSCULAR; INTRAVENOUS; SUBCUTANEOUS at 15:20

## 2020-08-26 RX ADMIN — VANCOMYCIN HYDROCHLORIDE 1 G: 1 INJECTION, SOLUTION INTRAVENOUS at 11:29

## 2020-08-26 RX ADMIN — Medication 2 MG: at 22:09

## 2020-08-26 RX ADMIN — GENTAMICIN SULFATE 80 MG: 80 INJECTION, SOLUTION INTRAVENOUS at 22:08

## 2020-08-26 RX ADMIN — KETAMINE HYDROCHLORIDE 30 MG: 10 INJECTION INTRAMUSCULAR; INTRAVENOUS at 11:55

## 2020-08-26 RX ADMIN — SODIUM CHLORIDE, POTASSIUM CHLORIDE, SODIUM LACTATE AND CALCIUM CHLORIDE: 600; 310; 30; 20 INJECTION, SOLUTION INTRAVENOUS at 10:57

## 2020-08-26 RX ADMIN — KETAMINE HYDROCHLORIDE 50 MG: 10 INJECTION INTRAMUSCULAR; INTRAVENOUS at 11:41

## 2020-08-26 RX ADMIN — HYDROMORPHONE HYDROCHLORIDE 0.5 MG: 1 INJECTION, SOLUTION INTRAMUSCULAR; INTRAVENOUS; SUBCUTANEOUS at 15:26

## 2020-08-26 RX ADMIN — KETAMINE HYDROCHLORIDE 20 MG: 10 INJECTION INTRAMUSCULAR; INTRAVENOUS at 11:20

## 2020-08-26 RX ADMIN — OXYCODONE HYDROCHLORIDE AND ACETAMINOPHEN 2 TABLET: 5; 325 TABLET ORAL at 23:57

## 2020-08-26 RX ADMIN — MIDAZOLAM HYDROCHLORIDE 2 MG: 1 INJECTION, SOLUTION INTRAMUSCULAR; INTRAVENOUS at 11:14

## 2020-08-26 RX ADMIN — OXYCODONE HYDROCHLORIDE AND ACETAMINOPHEN 2 TABLET: 5; 325 TABLET ORAL at 17:57

## 2020-08-26 RX ADMIN — AMLODIPINE BESYLATE 5 MG: 5 TABLET ORAL at 20:54

## 2020-08-26 RX ADMIN — GENTAMICIN SULFATE 80 MG: 80 INJECTION, SOLUTION INTRAVENOUS at 09:25

## 2020-08-26 RX ADMIN — SODIUM CHLORIDE: 9 INJECTION, SOLUTION INTRAVENOUS at 17:56

## 2020-08-26 RX ADMIN — HYDROMORPHONE HYDROCHLORIDE 0.5 MG: 1 INJECTION, SOLUTION INTRAMUSCULAR; INTRAVENOUS; SUBCUTANEOUS at 14:40

## 2020-08-26 RX ADMIN — LIDOCAINE HYDROCHLORIDE 50 MG: 10 INJECTION, SOLUTION EPIDURAL; INFILTRATION; INTRACAUDAL; PERINEURAL at 11:20

## 2020-08-26 RX ADMIN — HYDROMORPHONE HYDROCHLORIDE 0.5 MG: 1 INJECTION, SOLUTION INTRAMUSCULAR; INTRAVENOUS; SUBCUTANEOUS at 15:01

## 2020-08-26 RX ADMIN — ONDANSETRON 4 MG: 2 INJECTION INTRAMUSCULAR; INTRAVENOUS at 19:37

## 2020-08-26 ASSESSMENT — PULMONARY FUNCTION TESTS
PIF_VALUE: 23
PIF_VALUE: 22
PIF_VALUE: 19
PIF_VALUE: 22
PIF_VALUE: 26
PIF_VALUE: 3
PIF_VALUE: 5
PIF_VALUE: 19
PIF_VALUE: 26
PIF_VALUE: 27
PIF_VALUE: 26
PIF_VALUE: 0
PIF_VALUE: 26
PIF_VALUE: 26
PIF_VALUE: 18
PIF_VALUE: 18
PIF_VALUE: 26
PIF_VALUE: 26
PIF_VALUE: 1
PIF_VALUE: 26
PIF_VALUE: 18
PIF_VALUE: 2
PIF_VALUE: 26
PIF_VALUE: 2
PIF_VALUE: 22
PIF_VALUE: 18
PIF_VALUE: 1
PIF_VALUE: 26
PIF_VALUE: 23
PIF_VALUE: 19
PIF_VALUE: 23
PIF_VALUE: 22
PIF_VALUE: 24
PIF_VALUE: 22
PIF_VALUE: 26
PIF_VALUE: 18
PIF_VALUE: 18
PIF_VALUE: 26
PIF_VALUE: 18
PIF_VALUE: 26
PIF_VALUE: 26
PIF_VALUE: 19
PIF_VALUE: 18
PIF_VALUE: 26
PIF_VALUE: 27
PIF_VALUE: 26
PIF_VALUE: 26
PIF_VALUE: 22
PIF_VALUE: 18
PIF_VALUE: 18
PIF_VALUE: 0
PIF_VALUE: 2
PIF_VALUE: 26
PIF_VALUE: 25
PIF_VALUE: 1
PIF_VALUE: 21
PIF_VALUE: 20
PIF_VALUE: 6
PIF_VALUE: 27
PIF_VALUE: 18
PIF_VALUE: 5
PIF_VALUE: 2
PIF_VALUE: 19
PIF_VALUE: 25
PIF_VALUE: 23
PIF_VALUE: 26
PIF_VALUE: 0
PIF_VALUE: 26
PIF_VALUE: 23
PIF_VALUE: 20
PIF_VALUE: 3
PIF_VALUE: 23
PIF_VALUE: 21
PIF_VALUE: 22
PIF_VALUE: 22
PIF_VALUE: 18
PIF_VALUE: 26
PIF_VALUE: 2
PIF_VALUE: 2
PIF_VALUE: 22
PIF_VALUE: 23
PIF_VALUE: 7
PIF_VALUE: 26
PIF_VALUE: 23
PIF_VALUE: 23
PIF_VALUE: 26
PIF_VALUE: 22
PIF_VALUE: 32
PIF_VALUE: 26
PIF_VALUE: 27
PIF_VALUE: 18
PIF_VALUE: 21
PIF_VALUE: 1
PIF_VALUE: 2
PIF_VALUE: 26
PIF_VALUE: 1
PIF_VALUE: 18
PIF_VALUE: 4
PIF_VALUE: 21
PIF_VALUE: 26
PIF_VALUE: 22
PIF_VALUE: 23

## 2020-08-26 ASSESSMENT — PAIN DESCRIPTION - FREQUENCY: FREQUENCY: CONTINUOUS

## 2020-08-26 ASSESSMENT — PAIN DESCRIPTION - PROGRESSION: CLINICAL_PROGRESSION: GRADUALLY WORSENING

## 2020-08-26 ASSESSMENT — PAIN SCALES - GENERAL
PAINLEVEL_OUTOF10: 8
PAINLEVEL_OUTOF10: 9
PAINLEVEL_OUTOF10: 9
PAINLEVEL_OUTOF10: 7
PAINLEVEL_OUTOF10: 6
PAINLEVEL_OUTOF10: 3
PAINLEVEL_OUTOF10: 6
PAINLEVEL_OUTOF10: 10

## 2020-08-26 ASSESSMENT — PAIN DESCRIPTION - LOCATION
LOCATION: ABDOMEN
LOCATION: ABDOMEN
LOCATION: GROIN;PENIS
LOCATION: ABDOMEN

## 2020-08-26 ASSESSMENT — PAIN DESCRIPTION - PAIN TYPE
TYPE: ACUTE PAIN
TYPE: SURGICAL PAIN

## 2020-08-26 ASSESSMENT — LIFESTYLE VARIABLES: SMOKING_STATUS: 1

## 2020-08-26 ASSESSMENT — PAIN DESCRIPTION - ORIENTATION
ORIENTATION: LEFT

## 2020-08-26 ASSESSMENT — PAIN - FUNCTIONAL ASSESSMENT
PAIN_FUNCTIONAL_ASSESSMENT: 0-10
PAIN_FUNCTIONAL_ASSESSMENT: ACTIVITIES ARE NOT PREVENTED

## 2020-08-26 ASSESSMENT — PAIN DESCRIPTION - DESCRIPTORS: DESCRIPTORS: ACHING;DISCOMFORT

## 2020-08-26 ASSESSMENT — PAIN DESCRIPTION - ONSET: ONSET: ON-GOING

## 2020-08-26 NOTE — OP NOTE
700CX cylinders with inhibizone (rifampine and minocycline) were prepared on the back table. We dissected through the left superficial inguinal ring and bluntly reached retropubic space on the left side and using finger dissection created a space for placement of reservoir. The  reservoir with inhibizone (rifampine and minocycline) with 100 mL was placed in the preformed preperitoneal space. The cylinders were then placed through the corporotomies proximally and distally and they fit in well without any crossover noted without any buckling or S-curve deformity noted. We did test the cylinders and they were in proper position up to the most distal aspect of the corporal bodies. Again, no deformities or crossover noted. We then placed a pump within the scrotum and used 3-0 Vicryl to place a layer of dartos over this. The pump tubing was tunneled from the scrotum up to suprapubic incision. We then used the quick connect device to connect the reservoir tubing to the pump. At this point we began our closure. We closed the dartos layer in the scrotum with 3-0 Vicryl suture and then the skin with 4-0 vicryl in running fashion. Pressure dressing was placed and this concluded the case. All needle counts were correct. His anesthesia was reversed and he was extubated and taken to recovery in stable condition. The Attending was  was present throughout the entire case.

## 2020-08-26 NOTE — ANESTHESIA PRE PROCEDURE
Department of Anesthesiology  Preprocedure Note       Name:  Mario Nina   Age:  77 y.o.  :  1950                                          MRN:  [id number]         Date:  2020      Surgeon: Madison King):  Elier Conklin MD    Procedure: INSERTION INFLATABLE PENILE PROSTHESIS  (REP NOTIFIED - Dominic Laura) (N/A )    Department of Anesthesiology  Pre-Anesthesia Evaluation/Consultation         Name:  Mario Nina                                         Age:  77 y.o. MRN:  [id number]             Medications  No current facility-administered medications for this visit. No current outpatient medications on file.      Facility-Administered Medications Ordered in Other Visits   Medication Dose Route Frequency Provider Last Rate Last Dose    vancomycin (VANCOCIN) 1000 mg in dextrose 5% 200 mL IVPB  1,000 mg Intravenous Once Elba Devlin MD        gentamicin (GARAMYCIN) IVPB 80 mg  80 mg Intravenous On Call to Annalee Broussard MD        lactated ringers infusion 1,000 mL  1,000 mL Intravenous Continuous Meaghan Daley MD        ondansetron [facility]) injection 4 mg  4 mg Intravenous Once PRN Charlette Alford MD           Allergies   Allergen Reactions    Bee Venom Anaphylaxis    Fentanyl Shortness Of Breath and Swelling     Tongue swelling    Morphine Hives, Shortness Of Breath and Swelling     Face swelling    Pcn [Penicillins] Hives and Rash    Codeine Hives, Nausea Only, Rash and Other (See Comments)     Patient Active Problem List   Diagnosis    Colon polyps    Viral hepatitis C without hepatic coma    GERD (gastroesophageal reflux disease)    Anxiety    COPD (chronic obstructive pulmonary disease) (HCC)    Hyperlipidemia    Essential hypertension    Chest pain    Tobacco abuse    Osteoarthritis of right glenohumeral joint    Prostate cancer [facility])     Past Medical History:   Diagnosis Date    Anxiety state, unspecified     Asthma     inhalers per pcp    CAD (coronary artery disease)     [facility]int[facility] San [facility] Carotid artery stenosis     Colon polyp     Depressive disorder, not elsewhere classified     Elevated PSA 05/06/2019    Essential hypertension, benign     meds per pcp    GERD (gastroesophageal reflux disease)     Hearing loss     bilat.  Has hearing aids but does not wear    History of colon polyps     History of hepatitis C     Hyperlipidemia     on rx    Impotence of organic origin     Lipoma of unspecified site     Lumbago     No natural teeth     Prostate CA ([address] 05/2019    Secondary localized osteoarthrosis, shoulder region     Snores     no cpap or sleep apnea    Swelling of limb     Wellness examination     Darcy Broussard pcp last seen June 2020     Past Surgical History:   Procedure Laterality Date    ANKLE SURGERY Right     CARPAL TUNNEL RELEASE Bilateral     CERVICAL FUSION  2009    anterior    CHOLECYSTECTOMY      approx 2000    COLONOSCOPY      EXCISION / BIOPSY SKIN LESION OF ARM Left 9/15/2017    EXCISION SKIN LESION LEFT CHEST AND LEFT BUTTOCK, EXCISION LIPOMA LEFT LOWER ABDOMEN performed by Qiana Castillo DO at [address] [place] [address] SCRN NOT  [address] [place] [address] N/A 4/13/2017    COLONOSCOPY performed by Sterling Dutton MD at [address] [place] [address] N/A 4/24/2019    PROSTATE BIOPSY WITH ULTRASOUND  (OFFICE TO NOTIFY [place]) performed by Nando Alegria MD at [facility] 6/26/2019    XI ROBOTIC LAPAROSCOPIC PROSTATECTOMY , PELVIC LYMPH NODE DISSECTION performed by Nando Alegria MD at [address] [facility] Right 2015    ULNAR TUNNEL RELEASE Left      Social History     Tobacco Use    Smoking status: Current Every Day Smoker     Packs/day: 1.00     Years: 54.00     Pack years: 54.00     Types: Cigarettes     Start date: 57    Smokeless tobacco: Never Used   Substance Use Topics    Alcohol use: Yes     Comment: 22 oz beer every 2 days    Drug use: Never         Vital Signs ABGs No results found for: PHART, PO2ART, HMO3ANT, OKZ3RHV, BEART, D0ICECLF     Type & Screen (If Applicable)  No results found for: LABABO, [address]    Radiology (If Applicable)    Cardiac Testing (If Applicable) neg stress '18,nl EF    EKG (If Applicable) PAC's          Medications prior to admission:   Prior to Admission medications    Medication Sig Start Date End Date Taking? Authorizing Provider   omeprazole (PRILOSEC) 20 MG delayed release capsule Take 20 mg by mouth daily    Historical Provider, MD   calcium citrate (CALCITRATE) 950 MG tablet Take 1 tablet by mouth daily    Historical Provider, MD   Aspirin-Salicylamide-Caffeine (BC HEADACHE POWDER PO) Take by mouth Hold 7 days prior to surgery    Historical Provider, MD   lisinopril (PRINIVIL;ZESTRIL) 40 MG tablet Take 40 mg by mouth nightly Per pcp    Historical Provider, MD   clonazePAM (KLONOPIN) 1 MG tablet Take 1 mg by mouth 2 times daily. pcp    Historical Provider, MD   albuterol sulfate  (90 Base) MCG/ACT inhaler Inhale 2 puffs into the lungs every 6 hours as needed for Wheezing Per pcp    Historical Provider, MD   amLODIPine (NORVASC) 5 MG tablet Per pcp 12/12/18   Historical Provider, MD   sertraline (ZOLOFT) 50 MG tablet Per pcp 12/5/18   Historical Provider, MD   tiotropium (SPIRIVA) 18 MCG inhalation capsule Inhale 1 capsule into the lungs daily 8/24/18   Naseem Aviles MD   fluticasone (FLONASE) 50 MCG/ACT nasal spray 2 sprays by Nasal route daily  Patient taking differently: 2 sprays by Nasal route daily as needed  7/2/18   Mann Meyers MD   atorvastatin (LIPITOR) 20 MG tablet TAKE 1 TABLET DAILY  Patient taking differently: Per Dr. Caitlin Nichols 3/27/18   Mann Meyers MD   Multiple Vitamins-Minerals (CENTRUM SILVER 50+MEN PO) Take 1 tablet by mouth daily    Historical Provider, MD       Current medications:    No current facility-administered medications for this visit. No current outpatient medications on file. Facility-Administered Medications Ordered in Other Visits   Medication Dose Route Frequency Provider Last Rate Last Dose    vancomycin (VANCOCIN) 1000 mg in dextrose 5% 200 mL IVPB  1,000 mg Intravenous Once Alaina Bradley MD        gentamicin (GARAMYCIN) IVPB 80 mg  80 mg Intravenous On Call to Gisselle Borrero MD        lactated ringers infusion 1,000 mL  1,000 mL Intravenous Continuous Samantha Cano MD        ondansetron [facility]) injection 4 mg  4 mg Intravenous Once PRN Shaheen Koch MD           Allergies: Allergies   Allergen Reactions    Bee Venom Anaphylaxis    Fentanyl Shortness Of Breath and Swelling     Tongue swelling    Morphine Hives, Shortness Of Breath and Swelling     Face swelling    Pcn [Penicillins] Hives and Rash    Codeine Hives, Nausea Only, Rash and Other (See Comments)       Problem List:    Patient Active Problem List   Diagnosis Code    Colon polyps K63.5    Viral hepatitis C without hepatic coma B19.20    GERD (gastroesophageal reflux disease) K21.9    Anxiety F41.9    COPD (chronic obstructive pulmonary disease) (HCC) J44.9    Hyperlipidemia E78.5    Essential hypertension I10    Chest pain R07.9    Tobacco abuse Z72.0    Osteoarthritis of right glenohumeral joint M19.011    Prostate cancer ([facility] [address] C61       Past Medical History:        Diagnosis Date    Anxiety state, unspecified     Asthma     inhalers per pcp    CAD (coronary artery disease)     [facility] Carotid artery stenosis     Colon polyp     Depressive disorder, not elsewhere classified     Elevated PSA 05/06/2019    Essential hypertension, benign     meds per pcp    GERD (gastroesophageal reflux disease)     Hearing loss     bilat.  Has hearing aids but does not wear    History of colon polyps     History of hepatitis C     Hyperlipidemia     on rx    Impotence of organic origin     Lipoma of unspecified site     Lumbago     No natural teeth  Prostate CA ([address] 05/2019    Secondary localized osteoarthrosis, shoulder region     Snores     no cpap or sleep apnea    Swelling of limb     Wellness examination     Shala Corado pcp last seen June 2020       Past Surgical History:        Procedure Laterality Date    ANKLE SURGERY Right     CARPAL TUNNEL RELEASE Bilateral     CERVICAL FUSION  2009    anterior    CHOLECYSTECTOMY      approx 2000    COLONOSCOPY      EXCISION / BIOPSY SKIN LESION OF ARM Left 9/15/2017    EXCISION SKIN LESION LEFT CHEST AND LEFT BUTTOCK, EXCISION LIPOMA LEFT LOWER ABDOMEN performed by Nigel Coreas DO at [facility] [place] SCRN NOT  [address] [place] [address] N/A 4/13/2017    COLONOSCOPY performed by Maritza Escalante MD at [address] [place] [address] N/A 4/24/2019    PROSTATE BIOPSY WITH ULTRASOUND  (OFFICE TO NOTIFY [place]S) performed by Leland Contreras MD at [facility] N/A 6/26/2019    XI ROBOTIC LAPAROSCOPIC PROSTATECTOMY , PELVIC LYMPH NODE DISSECTION performed by Leland Contreras MD at [address] Right 2015    ULNAR TUNNEL RELEASE Left        Social History:    Social History     Tobacco Use    Smoking status: Current Every Day Smoker     Packs/day: 1.00     Years: 54.00     Pack years: 54.00     Types: Cigarettes     Start date: 1960    Smokeless tobacco: Never Used   Substance Use Topics    Alcohol use: Yes     Comment: 22 oz beer every 2 days                                Ready to quit: Not Answered  Counseling given: Not Answered      Vital Signs (Current): There were no vitals filed for this visit.                                            BP Readings from Last 3 Encounters:   08/26/20 129/76   08/13/20 132/80   05/30/20 (!) 153/76       NPO Status:                                                                                 BMI:   Wt Readings from Last 3 Encounters:   08/26/20 209 lb (94.8 kg)   08/13/20 203 lb (92.1 kg)   07/16/20 190 lb (86.2 kg)     There is no height or weight on file to calculate BMI.    CBC:   Lab Results   Component Value Date    WBC 8.7 05/30/2020    RBC 4.99 05/30/2020    HGB 16.3 05/30/2020    HCT 47.5 05/30/2020    MCV 95.2 05/30/2020    RDW 12.5 05/30/2020     05/30/2020       CMP:   Lab Results   Component Value Date     05/30/2020    K 4.7 05/30/2020     05/30/2020    CO2 22 05/30/2020    BUN 19 08/13/2020    CREATININE 0.75 08/13/2020    GFRAA >60 08/13/2020    LABGLOM >60 08/13/2020    GLUCOSE 94 08/13/2020    PROT 7.5 05/30/2020    CALCIUM 9.4 05/30/2020    BILITOT 0.44 05/30/2020    ALKPHOS 76 05/30/2020    AST 31 05/30/2020    ALT 22 05/30/2020       POC Tests: No results for input(s): POCGLU, POCNA, POCK, POCCL, POCBUN, POCHEMO, POCHCT in the last 72 hours. Coags:   Lab Results   Component Value Date    APTT 28.6 03/05/2019       HCG (If Applicable): No results found for: PREGTESTUR, PREGSERUM, HCG, HCGQUANT     ABGs: No results found for: PHART, PO2ART, BZU3GZX, DXP1WNF, BEART, F5CNGRQE     Type & Screen (If Applicable):  No results found for: LABABO, [address]    Anesthesia Evaluation  Patient summary reviewed and Nursing notes reviewed  Airway: Mallampati: II     Neck ROM: full   Dental:    (+) upper dentures and lower dentures      Pulmonary:   (+) COPD:  current smoker    (-) recent URI                          ROS comment: 47 pk yrs   Cardiovascular:    (+) hypertension:,     (-) CAD and CABG/stent            Stress test reviewed  Cleared by cardiology           ROS comment: PAD  -cp,syn,pnd     Neuro/Psych:   (+) neuromuscular disease:, depression/anxiety    (-) seizures and CVA            ROS comment: anxiety GI/Hepatic/Renal:   (+) GERD:, hepatitis: C,           Endo/Other:        (-) diabetes mellitus               Abdominal:           Vascular:                                          Anesthesia Plan      general     ASA 3       Induction: intravenous.     MIPS: Postoperative opioids intended and Prophylactic

## 2020-08-26 NOTE — INTERVAL H&P NOTE
Update History & Physical    The patient's History and Physical of August 13, 2020 was reviewed with the patient and I examined the patient. There was no change. The surgical site was confirmed by the patient and me. Plan: The risks, benefits, expected outcome, and alternative to the recommended procedure have been discussed with the patient. Patient understands and wants to proceed with the procedure.   Insertion of penile prosthesis today in OR    Electronically signed by Emelina Ivey PA-C on 8/26/2020 at 8:10 AM

## 2020-08-26 NOTE — ANESTHESIA POSTPROCEDURE EVALUATION
Department of Anesthesiology  Postprocedure Note    Patient: Myrna Cunningham  MRN: [id number]  [place]: 1950  Date of evaluation: 8/26/2020  Time:  1:29 PM     Procedure Summary     Date:  08/26/20 Room / Location:  [address]    Anesthesia Start:  6882 Anesthesia Stop:  3228    Procedure:  INSERTION INFLATABLE PENILE PROSTHESIS  (REP NOTIFIED - Estephania Ballard) (N/A Penis) Diagnosis:  (ERECTILE DYSFUNCTION AFTER RADICAL PROSTATECTOMY)    Surgeon:  Shalini Mcneil MD Responsible Provider:  Wilda Cunningham MD    Anesthesia Type:  general ASA Status:  3          Anesthesia Type: general    Glenn Phase I: Glenn Score: 8    Glenn Phase II:      Last vitals: Reviewed and per EMR flowsheets.        Anesthesia Post Evaluation    Patient location during evaluation: PACU  Patient participation: complete - patient participated  Level of consciousness: awake  Pain score: 3  Airway patency: patent  Nausea & Vomiting: no vomiting and no nausea  Complications: no  Cardiovascular status: blood pressure returned to baseline  Respiratory status: acceptable  Hydration status: euvolemic

## 2020-08-27 ENCOUNTER — TELEPHONE (OUTPATIENT)
Dept: UROLOGY | Age: 70
End: 2020-08-27

## 2020-08-27 VITALS
TEMPERATURE: 98.7 F | RESPIRATION RATE: 15 BRPM | WEIGHT: 208.2 LBS | HEIGHT: 69 IN | HEART RATE: 84 BPM | SYSTOLIC BLOOD PRESSURE: 145 MMHG | BODY MASS INDEX: 30.84 KG/M2 | OXYGEN SATURATION: 96 % | DIASTOLIC BLOOD PRESSURE: 84 MMHG

## 2020-08-27 LAB
ANION GAP SERPL CALCULATED.3IONS-SCNC: 14 MMOL/L (ref 9–17)
BUN BLDV-MCNC: 15 MG/DL (ref 8–23)
BUN/CREAT BLD: ABNORMAL (ref 9–20)
CALCIUM SERPL-MCNC: 8.6 MG/DL (ref 8.6–10.4)
CHLORIDE BLD-SCNC: 103 MMOL/L (ref 98–107)
CO2: 21 MMOL/L (ref 20–31)
CREAT SERPL-MCNC: 0.63 MG/DL (ref 0.7–1.2)
GFR AFRICAN AMERICAN: >60 ML/MIN
GFR NON-AFRICAN AMERICAN: >60 ML/MIN
GFR SERPL CREATININE-BSD FRML MDRD: ABNORMAL ML/MIN/{1.73_M2}
GFR SERPL CREATININE-BSD FRML MDRD: ABNORMAL ML/MIN/{1.73_M2}
GLUCOSE BLD-MCNC: 138 MG/DL (ref 70–99)
HCT VFR BLD CALC: 47.7 % (ref 40.7–50.3)
HEMOGLOBIN: 15.8 G/DL (ref 13–17)
MCH RBC QN AUTO: 32.3 PG (ref 25.2–33.5)
MCHC RBC AUTO-ENTMCNC: 33.1 G/DL (ref 28.4–34.8)
MCV RBC AUTO: 97.5 FL (ref 82.6–102.9)
NRBC AUTOMATED: 0 PER 100 WBC
PDW BLD-RTO: 11.8 % (ref 11.8–14.4)
PLATELET # BLD: 179 K/UL (ref 138–453)
PMV BLD AUTO: 10.9 FL (ref 8.1–13.5)
POTASSIUM SERPL-SCNC: 4.6 MMOL/L (ref 3.7–5.3)
RBC # BLD: 4.89 M/UL (ref 4.21–5.77)
SODIUM BLD-SCNC: 138 MMOL/L (ref 135–144)
WBC # BLD: 16.7 K/UL (ref 3.5–11.3)

## 2020-08-27 PROCEDURE — G0378 HOSPITAL OBSERVATION PER HR: HCPCS

## 2020-08-27 PROCEDURE — 6360000002 HC RX W HCPCS: Performed by: STUDENT IN AN ORGANIZED HEALTH CARE EDUCATION/TRAINING PROGRAM

## 2020-08-27 PROCEDURE — 36415 COLL VENOUS BLD VENIPUNCTURE: CPT

## 2020-08-27 PROCEDURE — 94640 AIRWAY INHALATION TREATMENT: CPT

## 2020-08-27 PROCEDURE — 6370000000 HC RX 637 (ALT 250 FOR IP): Performed by: STUDENT IN AN ORGANIZED HEALTH CARE EDUCATION/TRAINING PROGRAM

## 2020-08-27 PROCEDURE — 2580000003 HC RX 258: Performed by: STUDENT IN AN ORGANIZED HEALTH CARE EDUCATION/TRAINING PROGRAM

## 2020-08-27 PROCEDURE — 96374 THER/PROPH/DIAG INJ IV PUSH: CPT

## 2020-08-27 PROCEDURE — 85027 COMPLETE CBC AUTOMATED: CPT

## 2020-08-27 PROCEDURE — 80048 BASIC METABOLIC PNL TOTAL CA: CPT

## 2020-08-27 RX ADMIN — TIOTROPIUM BROMIDE INHALATION SPRAY 2 PUFF: 3.12 SPRAY, METERED RESPIRATORY (INHALATION) at 08:00

## 2020-08-27 RX ADMIN — SODIUM CHLORIDE: 9 INJECTION, SOLUTION INTRAVENOUS at 07:57

## 2020-08-27 RX ADMIN — OXYCODONE HYDROCHLORIDE AND ACETAMINOPHEN 2 TABLET: 5; 325 TABLET ORAL at 04:46

## 2020-08-27 RX ADMIN — OMEPRAZOLE 20 MG: 20 CAPSULE, DELAYED RELEASE ORAL at 05:52

## 2020-08-27 RX ADMIN — OXYCODONE HYDROCHLORIDE AND ACETAMINOPHEN 2 TABLET: 5; 325 TABLET ORAL at 09:25

## 2020-08-27 RX ADMIN — HYDROMORPHONE HYDROCHLORIDE 0.5 MG: 1 INJECTION, SOLUTION INTRAMUSCULAR; INTRAVENOUS; SUBCUTANEOUS at 03:11

## 2020-08-27 RX ADMIN — CLONAZEPAM 1 MG: 1 TABLET ORAL at 07:57

## 2020-08-27 ASSESSMENT — PAIN SCALES - GENERAL
PAINLEVEL_OUTOF10: 8
PAINLEVEL_OUTOF10: 10

## 2020-08-27 NOTE — DISCHARGE SUMMARY
DISCHARGE SUMMARY NOTE:        Patient Identification  PATIENT: Jadiel Denise is a 77 y.o. male. MRN: [id number]  :  1950  Admit Date:  2020  Discharge date:   20                                  Disposition: home  Discharged Condition:  good  Discharge Diagnoses:   Erectile dysfunction s/p prostatectomy    Patient Active Problem List   Diagnosis    Colon polyps    Viral hepatitis C without hepatic coma    GERD (gastroesophageal reflux disease)    Anxiety    [facility]) [facility]    Hyperlipidemia    Essential hypertension    Chest pain    Tobacco abuse    Osteoarthritis of right glenohumeral joint    Prostate cancer ([facility]    Erectile dysfunction after radical prostatectomy       Consults: none    Surgery: INSERTION INFLATABLE PENILE PROSTHESIS  (REP NOTIFIED - Hollie Brown)     Patient Instructions: Activity: Per discharge instructions  Diet: As tolerated  Patient told to follow up with Mary Horn MD in 2 week(s). Discharge Medications:    [place] Parents \"Latenite\"   Home Medication Instructions CPB:[id number]    Printed on:20 1011   Medication Information                      albuterol sulfate  (90 Base) MCG/ACT inhaler  Inhale 2 puffs into the lungs every 6 hours as needed for Wheezing Per pcp             amLODIPine (NORVASC) 5 MG tablet  Per pcp             Aspirin-Salicylamide-Caffeine (BC HEADACHE POWDER PO)  Take by mouth Hold 7 days prior to surgery             atorvastatin (LIPITOR) 20 MG tablet  TAKE 1 TABLET DAILY             calcium citrate (CALCITRATE) 950 MG tablet  Take 1 tablet by mouth daily             clonazePAM (KLONOPIN) 1 MG tablet  Take 1 mg by mouth 2 times daily.  pcp             docusate sodium (COLACE) 100 MG capsule  Take 1 capsule by mouth 2 times daily             fluticasone (FLONASE) 50 MCG/ACT nasal spray  2 sprays by Nasal route daily             lisinopril (PRINIVIL;ZESTRIL) 40 MG tablet  Take 40 mg by mouth nightly Per pcp             Multiple Vitamins-Minerals (CENTRUM SILVER 50+MEN PO)  Take 1 tablet by mouth daily             omeprazole (PRILOSEC) 20 MG delayed release capsule  Take 20 mg by mouth daily             oxyCODONE-acetaminophen (PERCOCET) 5-325 MG per tablet  Take 1 tablet by mouth every 6 hours as needed for Pain for up to 5 days. Intended supply: 3 days. Take lowest dose possible to manage pain             sertraline (ZOLOFT) 50 MG tablet  Per pcp             sulfamethoxazole-trimethoprim (BACTRIM DS;SEPTRA DS) 800-160 MG per tablet  Take 1 tablet by mouth 2 times daily for 5 days             tiotropium (SPIRIVA) 18 MCG inhalation capsule  Inhale 1 capsule into the lungs daily                 Hospital course: Mr. Nahed Markham is a 78 yo admitted after IPP. They were admitted for postop pain control, monitoring of labs. The patient did well in their hospital course. Hemoglobin and creatinine stable at time of discharge. The patient had pain controlled with PO meds, tolerated diet, and was ambulating appropriately prior to discharge. The patient was afebrile for 24 hrs before discharge. All unnecessary drains and catheters were removed at the appropriate times. His RIKKI site was bleeding after RIKKI drain removed, so it was sutured by resident with 3-0 chromic suture to control the bleeding. The patient agrees to discharge, understands discharge instructions, and agrees to follow up 1-2 weeks.     Wound instruction: Keep incisions clean and dry  Rx medications: Percocet, Colace, bactrim      Yuri Flores PA-C  12:56 PM 8/27/2020

## 2020-08-27 NOTE — PROGRESS NOTES
Barbara Rowe, [address] [facility], [address], [place]  Urology Progress Note     Subjective:   Diet: general  POD 1 from IPP placement   No acute events overnight  Denies nausea, vomiting, fever and chills   Complains of heart burns and pain from the reservoir site from left lower quadrant     Ambulation : None  Flatus/ BM :     Patient Vitals for the past 24 hrs:   BP Temp Temp src Pulse Resp SpO2 Height Weight   08/27/20 0400 128/71 98.3 °F (36.8 °C) -- 78 16 94 % -- --   08/26/20 1945 127/84 98.7 °F (37.1 °C) -- 110 16 92 % -- --   08/26/20 1646 (!) 143/102 98.6 °F (37 °C) Oral 112 16 95 % 5' 9\" (1.753 m) 208 lb 3.2 oz (94.4 kg)   08/26/20 1630 135/84 -- -- 106 14 93 % -- --   08/26/20 1615 (!) 138/94 -- -- 108 12 91 % -- --   08/26/20 1600 (!) 159/89 -- -- 113 15 95 % -- --   08/26/20 1545 (!) 148/88 -- -- -- 12 -- -- --   08/26/20 1530 (!) 143/91 -- -- 100 10 92 % -- --   08/26/20 1515 (!) 159/105 -- -- 106 17 91 % -- --   08/26/20 1500 (!) 150/95 -- -- 109 16 93 % -- --   08/26/20 1445 (!) 146/85 -- -- 103 14 91 % -- --   08/26/20 1415 (!) 153/89 -- -- 94 16 94 % -- --   08/26/20 1400 (!) 146/95 -- -- 92 17 95 % -- --   08/26/20 1345 (!) 151/91 -- -- 91 13 92 % -- --   08/26/20 1330 (!) 168/93 -- -- 89 12 92 % -- --   08/26/20 1315 (!) 151/99 -- -- 95 12 91 % -- --   08/26/20 1302 (!) 169/95 98.2 °F (36.8 °C) Temporal 100 14 92 % -- --   08/26/20 0850 129/76 96.8 °F (36 °C) Temporal 72 20 96 % -- --   08/26/20 0835 -- -- -- -- -- -- 5' 9\" (1.753 m) 209 lb (94.8 kg)       Intake/Output Summary (Last 24 hours) at 8/27/2020 0542  Last data filed at 8/27/2020 0449  Gross per 24 hour   Intake 2164 ml   Output 1575 ml   Net 589 ml       Recent Labs     08/26/20  1413   WBC 13.3*   HGB 17.5*   HCT 54.0*   .0        Recent Labs     08/26/20  1413      K 5.1      CO2 21   BUN 19   CREATININE 0.77       No results for input(s): COLORU, PHUR, LABCAST, WBCUA, RBCUA, MUCUS, TRICHOMONAS, YEAST, BACTERIA, CLARITYU, SPECGRAV, LEUKOCYTESUR, UROBILINOGEN, Silverio Elks in the last 72 hours. Invalid input(s): NITRATE, GLUCOSEUKETONESUAMORPHOUS    Additional Lab/culture results:    Physical Exam:   AO X 3  Neck: Supple   Chest: bilateral symmetrical chest rise/ Non labored breathing   Circulatory: Peripheries warm , well perfused   P/A: soft, non distended   scrotal ecchymosis / incision clean and dry / tenderness over the left lower quadrant reservoir site   Kendall in situ with 1500 clear urine   Drain .  Minimal sero sang         Interval Imaging Findings:    Impression:    POD 1 from Placement of three-piece inflatable penile prosthesis- [id number]    Plan:   Diet: General   IV fluids: stopped   Antibiotics: vancomycin and gentamicin for 24 hours / DC on a week of keflex  Pain control: percocet prn   Drain output : minimal/ removed this am  Kendall out/ check PVR    Ambulation / IS 10 times per hour   AM labs: pending   DC planning         Rustam Young  5:42 AM 8/27/2020

## 2020-08-27 NOTE — PLAN OF CARE
Problem: Falls - Risk of:  Goal: Will remain free from falls  Description: Will remain free from falls  8/27/2020 1228 by Brian Christian RN  Outcome: Completed  8/27/2020 0537 by Kacey Toledo RN  Outcome: Ongoing  Goal: Absence of physical injury  Description: Absence of physical injury  8/27/2020 1228 by Brian Christian RN  Outcome: Completed  8/27/2020 0537 by Kacey Toledo RN  Outcome: Ongoing     Problem: Pain:  Goal: Pain level will decrease  Description: Pain level will decrease  8/27/2020 1228 by Brian Christian RN  Outcome: Completed  8/27/2020 0537 by Kacey Toledo RN  Outcome: Ongoing  Goal: Control of acute pain  Description: Control of acute pain  8/27/2020 1228 by Brian Christian RN  Outcome: Completed  8/27/2020 0537 by Kacey Toledo RN  Outcome: Ongoing  Goal: Control of chronic pain  Description: Control of chronic pain  8/27/2020 1228 by Brian Christian RN  Outcome: Completed  8/27/2020 0537 by Kacey Toledo RN  Outcome: Ongoing

## 2020-09-01 ENCOUNTER — TELEPHONE (OUTPATIENT)
Dept: UROLOGY | Age: 70
End: 2020-09-01

## 2020-09-01 NOTE — TELEPHONE ENCOUNTER
Patient called, states has a black spot on penis, post IPP on 8/26/2020.  Has pain all over the area, but states he was told this was normal. Offered patient an appointment on 9/2/2020 at

## 2020-09-02 ENCOUNTER — OFFICE VISIT (OUTPATIENT)
Dept: UROLOGY | Age: 70
End: 2020-09-02
Payer: MEDICARE

## 2020-09-02 VITALS — TEMPERATURE: 98.1 F

## 2020-09-02 PROCEDURE — 99214 OFFICE O/P EST MOD 30 MIN: CPT | Performed by: NURSE PRACTITIONER

## 2020-09-02 ASSESSMENT — ENCOUNTER SYMPTOMS
COLOR CHANGE: 0
EYE REDNESS: 0
ABDOMINAL PAIN: 0
EYE PAIN: 0
NAUSEA: 0
BACK PAIN: 0
WHEEZING: 0
SHORTNESS OF BREATH: 0
COUGH: 0
VOMITING: 0

## 2020-09-02 NOTE — LETTER
[address] [address]  Dept: [phone]  Dept Fax: [phone]        9/3/20    Patient: Jeffrey Whatley  YOB: 1950    Dear Leda Calvo,    I had the pleasure of seeing one of your patients, Jeanette Lang today in the office today. Below are the relevant portions of my assessment and plan of care. IMPRESSION:  1. Contusion of penis, initial encounter    2. Contusion of scrotum, initial encounter    3. Scrotal edema    4. S/P insertion of penile implant        PLAN:  elevate scrotum    Ice to area 20 min and 20 off     Avoid constipation    Report any fevers or drainage     follow up with questions    Return for as scheduled post op visit. Prescriptions Ordered:  No orders of the defined types were placed in this encounter. Orders Placed:  No orders of the defined types were placed in this encounter. Thank you for allowing me to participate in the care of this patient. I will keep you updated on this patient's follow up and I look forward to serving you and your patients again in the future.     CARINE Bridges - CNP

## 2020-09-02 NOTE — PROGRESS NOTES
Review of Systems   Constitutional: Negative for appetite change, chills and fever. Eyes: Negative for pain, redness and visual disturbance. Respiratory: Negative for cough, shortness of breath and wheezing. Cardiovascular: Negative for chest pain and leg swelling. Gastrointestinal: Negative for abdominal pain, nausea and vomiting. Genitourinary: Positive for penile pain, scrotal swelling and testicular pain. Negative for difficulty urinating, discharge, dysuria, flank pain, frequency, hematuria and urgency. Musculoskeletal: Negative for back pain, joint swelling and myalgias. Skin: Negative for color change, rash and wound. Neurological: Negative for dizziness, tremors and numbness. Hematological: Negative for adenopathy. Does not bruise/bleed easily.

## 2020-09-02 NOTE — PROGRESS NOTES
Lab/Culture results:     Imaging Reviewed during this Office Visit:   (results were independently reviewed by physician and radiology report verified)    PAST MEDICAL, FAMILY AND SOCIAL HISTORY UPDATE:  Past Medical History:   Diagnosis Date    Anxiety state, unspecified     Asthma     inhalers per pcp    CAD (coronary artery disease)     follows with Dr. Luma Brown at [facility] Carotid artery stenosis     Colon polyp     Depressive disorder, not elsewhere classified     Elevated PSA 05/06/2019    Essential hypertension, benign     meds per pcp    GERD (gastroesophageal reflux disease)     Hearing loss     bilat.  Has hearing aids but does not wear    History of colon polyps     History of hepatitis C     Hyperlipidemia     on rx    Impotence of organic origin     Lipoma of unspecified site     Lumbago     No natural teeth     Prostate CA ([address] 05/2019    Secondary localized osteoarthrosis, shoulder region     Snores     no cpap or sleep apnea    Swelling of limb     Wellness examination     Susan Swartz pcp last seen June 2020     Past Surgical History:   Procedure Laterality Date    ANKLE SURGERY Right     CARPAL TUNNEL RELEASE Bilateral     CERVICAL FUSION  2009    anterior    CHOLECYSTECTOMY      approx 2000    COLONOSCOPY      EXCISION / BIOPSY SKIN LESION OF ARM Left 9/15/2017    EXCISION SKIN LESION LEFT CHEST AND LEFT BUTTOCK, EXCISION LIPOMA LEFT LOWER ABDOMEN performed by Kiara Wong DO at [facility]  08/26/2020    INSERTION INFLATABLE PENILE PROSTHESIS      PENILE PROSTHESIS PLACEMENT N/A 8/26/2020    INSERTION INFLATABLE PENILE PROSTHESIS  (REP NOTIFIED - Estephania Ballard) performed by Shalini Mcneil MD at [facility] NOT  [address] [place] [address] N/A 4/13/2017    COLONOSCOPY performed by Disha Rivera MD at [address] [place] [address] N/A 4/24/2019    PROSTATE BIOPSY WITH ULTRASOUND  (OFFICE TO NOTIFY [place]) performed by Anna Kim Jacqueline Duval MD at [facility] 6/26/2019    XI ROBOTIC LAPAROSCOPIC PROSTATECTOMY , PELVIC LYMPH NODE DISSECTION performed by Mary Horn MD at [address] Right 2015    ULNAR TUNNEL RELEASE Left      Family History   Problem Relation Age of Onset    Stroke Maternal Uncle 80    Heart Surgery Maternal Uncle         Triple Bypass    Other Maternal Grandmother         Pul. Embolism    Cancer Maternal Grandfather         Throat    No Known Problems Mother     Other Father         MVA    No Known Problems Sister     Other Brother         Electric burn at work   [employer] No Known Problems Paternal Grandmother     No Known Problems Paternal Grandfather     Diabetes Brother      Outpatient Medications Marked as Taking for the 9/2/20 encounter (Office Visit) with CARINE Norman CNP   Medication Sig Dispense Refill    docusate sodium (COLACE) 100 MG capsule Take 1 capsule by mouth 2 times daily 60 capsule 0    omeprazole (PRILOSEC) 20 MG delayed release capsule Take 20 mg by mouth daily      calcium citrate (CALCITRATE) 950 MG tablet Take 1 tablet by mouth daily      Aspirin-Salicylamide-Caffeine (BC HEADACHE POWDER PO) Take by mouth Hold 7 days prior to surgery      lisinopril (PRINIVIL;ZESTRIL) 40 MG tablet Take 40 mg by mouth nightly Per pcp      clonazePAM (KLONOPIN) 1 MG tablet Take 1 mg by mouth 2 times daily.  pcp      albuterol sulfate  (90 Base) MCG/ACT inhaler Inhale 2 puffs into the lungs every 6 hours as needed for Wheezing Per pcp      amLODIPine (NORVASC) 5 MG tablet Per pcp  3    sertraline (ZOLOFT) 50 MG tablet Per pcp  2    tiotropium (SPIRIVA) 18 MCG inhalation capsule Inhale 1 capsule into the lungs daily 30 capsule 3    fluticasone (FLONASE) 50 MCG/ACT nasal spray 2 sprays by Nasal route daily (Patient taking differently: 2 sprays by Nasal route daily as needed ) 1 Bottle 0    atorvastatin (LIPITOR) 20 MG tablet TAKE 1 TABLET DAILY (Patient taking differently: Per Dr. Felipa Roberts) 90 tablet 1    Multiple Vitamins-Minerals (CENTRUM SILVER 50+MEN PO) Take 1 tablet by mouth daily         Bee venom; Fentanyl; Morphine; Pcn [penicillins]; and Codeine  Social History     Tobacco Use   Smoking Status Current Every Day Smoker    Packs/day: 1.00    Years: 54.00    Pack years: 54.00    Types: Cigarettes    Start date: 1960   Smokeless Tobacco Never Used     (Ifpatient a smoker, smoking cessation counseling offered)    Social History     Substance and Sexual Activity   Alcohol Use Yes    Comment: 22 oz beer every 2 days       REVIEW OF SYSTEMS:  Review of Systems    Physical Exam:      Vitals:    09/02/20 1534   Temp: 98.1 °F (36.7 °C)     There is no height or weight on file to calculate BMI. Patient is a 77 y.o. male in no acute distress and alert and oriented to person, place and time. Physical Exam  Constitutional: Patient in no acute distress. Neuro: Alert and oriented to person, place and time. Psych: Mood normal, affect normal  Skin: No rash noted  HEENT: Head: Normocephalic andatraumatic  Conjunctivae and EOM are normal. Pupils are equal, round  Nose:Normal  Right External Ear: Normal; Left External Ear: Normal  Mouth: Mucosa Moist  Neck: Supple  Lungs: Respiratory effort is normal  Cardiovascular: Warm & Pink  Abdomen: Soft, non-tender, non-distended  Bladder non-tender and not distended. Musculoskeletal: Normal gait and station  Penis: edema, contusion  Scrotum: sutute line well approximated without drainage. contusion and edema       Assessment and Plan      1. Contusion of penis, initial encounter    2. Contusion of scrotum, initial encounter    3. Scrotal edema    4. S/P insertion of penile implant           Plan:     elevate scrotum    Ice to area 20 min and 20 off     Avoid constipation    Report any fevers or drainage     follow up with questions    Return for as scheduled post op visit.     Prescriptions Ordered:  No orders of the defined

## 2020-09-02 NOTE — LETTER
[address] [address]  Dept: [phone]  Dept Fax: [phone]        9/2/20    Patient: Roula Gonzalez  YOB: 1950    Dear Quiana An,    I had the pleasure of seeing one of your patients, Stephany Lakhani today in the office today. Below are the relevant portions of my assessment and plan of care. IMPRESSION:  1. S/P insertion of penile implant        PLAN:  elevate scrotum    Ice to area 20 min and 20 off     Avoid constipation    Report any fevers or drainage     follow up with questions    Return for as scheduled post op visit. Prescriptions Ordered:  No orders of the defined types were placed in this encounter. Orders Placed:  No orders of the defined types were placed in this encounter. Thank you for allowing me to participate in the care of this patient. I will keep you updated on this patient's follow up and I look forward to serving you and your patients again in the future.     CARINE Betancourt - CNP

## 2020-09-02 NOTE — PATIENT INSTRUCTIONS
elevate scrotum    Ice to area 20 min and 20 off     Avoid constipation    Report any fevers or drainage     follow up with questions

## 2020-09-10 ENCOUNTER — OFFICE VISIT (OUTPATIENT)
Dept: NEUROSURGERY | Age: 70
End: 2020-09-10
Payer: MEDICARE

## 2020-09-10 VITALS
TEMPERATURE: 97.3 F | RESPIRATION RATE: 16 BRPM | SYSTOLIC BLOOD PRESSURE: 148 MMHG | DIASTOLIC BLOOD PRESSURE: 87 MMHG | HEART RATE: 77 BPM

## 2020-09-10 PROCEDURE — 99203 OFFICE O/P NEW LOW 30 MIN: CPT | Performed by: NURSE PRACTITIONER

## 2020-09-11 NOTE — PROGRESS NOTES
Vivian Arias  [facility] [address] [address]  Dept: [phone]    Patient:  Paula Grove  YOB: 1950  Date: 9/11/20    The patient is a 77 y.o. male who presents today for consult of the following problems:     Chief Complaint   Patient presents with    New Patient     right shoulder pain both hands hurt with burning pain patient states. HPI:     Paula Grove is a 77 y.o. male on whom neurosurgical consultation was requested by Sagn Leija for management of wrist pain. Patient states that he was sent here to be evaluated to determine whether his wrist pain was coming from his neck or from median neuropathy. Reports that he has seen a orthopedic specialist for this issue and they wanted him to be evaluated here. Patient states that he has had ACDF C5-6 in the past, has also had 2 left carpal tunnel releases as well as left cubital tunnel release. Has had dysesthetic burning pain to left carpal tunnel incisional area essentially since. Experiences significant pain even to light touch over the area. Did have a EMG of bilateral upper extremities completed in June of this year that were consistent with bilateral carpal tunnel syndrome. Is also had issues with his right shoulder that sometimes radiate up and down. Is unable to fully elevate his right arm above shoulder level. History:     Past Medical History:   Diagnosis Date    Anxiety state, unspecified     Asthma     inhalers per pcp    CAD (coronary artery disease)     follow[facility] with [facility] Tomi [facility]re[facility] at [facility] Carotid artery stenosis     Colon polyp     Depressive disorder, not elsewhere classified     Elevated PSA 05/06/2019    Essential hypertension, benign     meds per pcp    GERD (gastroesophageal reflux disease)     Hearing loss     bilat.  Has hearing aids but does not wear    History of colon polyps     History of hepatitis C     Outpatient Medications on File Prior to Visit   Medication Sig Dispense Refill    docusate sodium (COLACE) 100 MG capsule Take 1 capsule by mouth 2 times daily 60 capsule 0    omeprazole (PRILOSEC) 20 MG delayed release capsule Take 20 mg by mouth daily      calcium citrate (CALCITRATE) 950 MG tablet Take 1 tablet by mouth daily      Aspirin-Salicylamide-Caffeine (BC HEADACHE POWDER PO) Take by mouth Hold 7 days prior to surgery      lisinopril (PRINIVIL;ZESTRIL) 40 MG tablet Take 40 mg by mouth nightly Per pcp      clonazePAM (KLONOPIN) 1 MG tablet Take 1 mg by mouth 2 times daily. pcp      albuterol sulfate  (90 Base) MCG/ACT inhaler Inhale 2 puffs into the lungs every 6 hours as needed for Wheezing Per pcp      amLODIPine (NORVASC) 5 MG tablet Per pcp  3    sertraline (ZOLOFT) 50 MG tablet Per pcp  2    tiotropium (SPIRIVA) 18 MCG inhalation capsule Inhale 1 capsule into the lungs daily 30 capsule 3    fluticasone (FLONASE) 50 MCG/ACT nasal spray 2 sprays by Nasal route daily (Patient taking differently: 2 sprays by Nasal route daily as needed ) 1 Bottle 0    atorvastatin (LIPITOR) 20 MG tablet TAKE 1 TABLET DAILY (Patient taking differently: Per Dr. Devon Andrade) 90 tablet 1    Multiple Vitamins-Minerals (CENTRUM SILVER 50+MEN PO) Take 1 tablet by mouth daily       No current facility-administered medications on file prior to visit.       Social History     Tobacco Use    Smoking status: Current Every Day Smoker     Packs/day: 1.00     Years: 54.00     Pack years: 54.00     Types: Cigarettes     Start date: 1960    Smokeless tobacco: Never Used   Substance Use Topics    Alcohol use: Yes     Comment: 22 oz beer every 2 days    Drug use: Never       Allergies   Allergen Reactions    Bee Venom Anaphylaxis    Fentanyl Shortness Of Breath and Swelling     Tongue swelling    Morphine Hives, Shortness Of Breath and Swelling     Face swelling    Pcn [Penicillins] Hives and Rash    Codeine Hives, Nausea Only, Rash and Other (See Comments)       Review of Systems  Constitutional: Negative for activity change and appetite change. HENT: Negative for ear pain and facial swelling. Eyes: Negative for discharge and itching. Respiratory: Negative for choking and chest tightness. Cardiovascular: Negative for chest pain and leg swelling. Gastrointestinal: Negative for nausea and abdominal pain. Endocrine: Negative for cold intolerance and heat intolerance. Genitourinary: Negative for frequency and flank pain. Musculoskeletal: Negative for myalgias and joint swelling. Skin: Negative for rash and wound. Allergic/Immunologic: Negative for environmental allergies and food allergies. Hematological: Negative for adenopathy. Does not bruise/bleed easily. Psychiatric/Behavioral: Negative for self-injury. The patient is not nervous/anxious. Physical Exam:      BP (!) 148/87 (Site: Right Upper Arm, Position: Sitting, Cuff Size: Large Adult)   Pulse 77   Temp 97.3 °F (36.3 °C)   Resp 16   Estimated body mass index is 30.75 kg/m² as calculated from the following:    Height as of 8/26/20: 5' 9\" (1.753 m). Weight as of 8/26/20: 208 lb 3.2 oz (94.4 kg). General:  Paula Grove is a 77y.o. year old male who appears his stated age. HEENT: Normocephalic atraumatic. Neck supple. Chest: regular rate; pulses equal  Abdomen: Soft nontender nondistended.   Ext: DP and PT pulses 2+, good cap refill  Neuro    Mentation  Appropriate affect  Registration intact  Orientation intact  3 item recall intact  Judgement intact to situation    Cranial Nerves:   Pupils equal and reactive to light  Extraocular motion intact  Face and shrug symmetric  Tongue midline  No dysarthria  v1-3 sensation symmetric, masseter tone symmetric  Hearing symmetric    Sensation: Decreased to hands    Motor  L deltoid 5/5; R deltoid 5/5  L biceps 5/5; R biceps 5/5  L triceps 5/5; R triceps 5/5  L wrist extension 5/5; R wrist extension 5/5  L intrinsics 4-/5; R intrinsics 5/5     L iliopsoas 5/5 , R iliopsoas 5/5  L quadriceps 5/5; R quadriceps 5/5  L Dorsiflexion 5/5; R dorsiflexion 5/5  L Plantarflexion 5/5; R plantarflexion 5/5  L EHL 5/5; R EHL 5/5    Reflexes  L Brachioradialis 2+/4; R brachioradialis 2+/4  L Biceps 2+/4; R Biceps 2+/4  L Triceps 2+/4; R Triceps 2+/4  L Patellar 2+/4: R Patellar 2+/4  L Achilles 2+/4; R Achilles 2+/4    hoffmans L: neg  hoffmans R: neg  Clonus L: neg  Clonus R: neg  Babinski L: neg  Babinski R: neg    Unable to tolerate Tinel's to left wrist  Unable to tolerate Phalen's left wrist  Positive Ring finger split left hand-decreased sensation to medial aspect  Studies Review:     MRI cervical spine 6/30/2020: Impression    1. Changes of prior C5-6 ACDF and right C5 laminectomy with mild residual    C5-6 spinal canal stenosis and moderate right neural foraminal narrowing    secondary to posterior endplate osteophytes. 2. Mild spinal canal stenosis at C6-7 secondary to disc bulge. 3. Multilevel neural foraminal narrowing as detailed above and greatest    involving the right C4 and bilateral C7 neural foramina where it is severe. Assessment and Plan:      1. Cervical spondylosis with radiculopathy    2. Bilateral carpal tunnel syndrome          Plan: Patient presents with several concerns including dysesthetic pain to left upper extremity focused over previous carpal tunnel release site. Patient has exquisite tenderness to light palpation, feels like groundglass in the incision. Additionally has right shoulder chronic pain with limited range of motion. Patient did have MRI complete to evaluate previous ACDF C5-6, does show some persistent central stenosis as well as C6-7 significant foraminal stenosis bilaterally. EMG completed in June of this year does show persistent carpal tunnel syndrome.   At this point recommend referral to pain management for consideration of C6/7 transforaminal blocks to determine what improvement is obtained. Will reevaluate in approximately 8 weeks. Followup: Return in about 8 weeks (around 11/5/2020), or if symptoms worsen or fail to improve. Prescriptions Ordered:  No orders of the defined types were placed in this encounter. Orders Placed:  Orders Placed This Encounter   [address] Pain [facility]     Referral Priority:   Routine     Referral Type:   Eval and Treat     Referral Reason:   Specialty Services Required     Requested Specialty:   Pain Management     Number of Visits Requested:   1        Electronically signed by CARINE Gan CNP on 9/11/2020 at 3:03 PM    Please note that this chart was generated using voice recognition Dragon dictation software. Although every effort was made to ensure the accuracy of this automated transcription, some errors in transcription may have occurred.

## 2020-09-14 ENCOUNTER — OFFICE VISIT (OUTPATIENT)
Dept: UROLOGY | Age: 70
End: 2020-09-14

## 2020-09-14 VITALS — TEMPERATURE: 97.5 F

## 2020-09-14 PROCEDURE — 99024 POSTOP FOLLOW-UP VISIT: CPT | Performed by: UROLOGY

## 2020-09-14 ASSESSMENT — ENCOUNTER SYMPTOMS
BACK PAIN: 0
COUGH: 0
ABDOMINAL PAIN: 0
DIARRHEA: 0
WHEEZING: 0
EYE PAIN: 0
SHORTNESS OF BREATH: 0
EYE REDNESS: 0
NAUSEA: 0
VOMITING: 0
CONSTIPATION: 0

## 2020-09-14 NOTE — PROGRESS NOTES
[address] [address]  Dept: [facility] Urology Office Note - Established    Patient:  Lynnette Warren  YOB: 1950  Date: 9/14/2020    The patient is a 77 y.o. male who presents todayfor evaluation of the following problems:   Chief Complaint   Patient presents with    Post-Op Check     IPP       HPI    Doing well. Summary of old records: N/A    Additional History: N/A    Procedures Today: N/A    Urinalysis today:  No results found for this visit on 09/14/20.   Last several PSA's:  Lab Results   Component Value Date    PSA <0.01 05/30/2020    PSA <0.01 02/22/2020    PSA <0.01 11/16/2019     Last total testosterone:  No results found for: TESTOSTERONE    AUA Symptom Score (9/14/2020):  INCOMPLETE EMPTYING: How often have you had the sensation of not emptying your bladder?: Not at all  FREQUENCY: How often do you have to urinate less than every two hours?: Not at all  INTERMITTENCY: How often have you found you stopped and started again several times when you urinated?: Not at all  URGENCY: How often have you found it difficult to postpone urination?: Not at all  WEAK STREAM: How often have you had a weak urinary stream?: Not at all  STRAINING: How often have you had to strain to start  urination?: Not at all  NOCTURIA: How many times did you typically get up at night to uriniate?: NONE  TOTAL I-PSS SCORE[de-identified] 0  How would you feel if you were to spend the rest of your life with your urinary condition?: Pleased    Last BUN and creatinine:  Lab Results   Component Value Date    BUN 15 08/27/2020     Lab Results   Component Value Date    CREATININE 0.63 (L) 08/27/2020       Additional Lab/Culture results: none    Imaging Reviewed during this Office Visit: none  (results were independently reviewed by physician and radiology report verified)    PAST MEDICAL, FAMILY AND SOCIAL HISTORY UPDATE:  Past Medical History:   Diagnosis Date    Anxiety state, unspecified     Asthma     inhalers per pcp    CAD (coronary artery disease)     follow[facility] with [facility]. [facility]iris Gibbons [facility] Carotid artery stenosis     Colon polyp     Depressive disorder, not elsewhere classified     Elevated PSA 05/06/2019    Essential hypertension, benign     meds per pcp    GERD (gastroesophageal reflux disease)     Hearing loss     bilat.  Has hearing aids but does not wear    History of colon polyps     History of hepatitis C     Hyperlipidemia     on rx    Impotence of organic origin     Lipoma of unspecified site     Lumbago     No natural teeth     Prostate CA ([address] 05/2019    Secondary localized osteoarthrosis, shoulder region     Snores     no cpap or sleep apnea    Swelling of limb     Wellness examination     Mayte Marquis pcp last seen June 2020     Past Surgical History:   Procedure Laterality Date    ANKLE SURGERY Right     CARPAL TUNNEL RELEASE Bilateral     CERVICAL FUSION  2009    anterior    CHOLECYSTECTOMY      approx 2000    COLONOSCOPY      EXCISION / BIOPSY SKIN LESION OF ARM Left 9/15/2017    EXCISION SKIN LESION LEFT CHEST AND LEFT BUTTOCK, EXCISION LIPOMA LEFT LOWER ABDOMEN performed by Glenny Barr DO at [address] [facility]  08/26/2020    INSERTION INFLATABLE PENILE PROSTHESIS      PENILE PROSTHESIS PLACEMENT N/A 8/26/2020    INSERTION INFLATABLE PENILE PROSTHESIS  (REP NOTIFIED - Shazia Mcghee) performed by Blanka Owen MD at [address] [place] [address] NOT  [address] [place] [address] N/A 4/13/2017    COLONOSCOPY performed by Eve Loredo MD at [address] [place] [address] N/A 4/24/2019    PROSTATE BIOPSY WITH ULTRASOUND  (OFFICE TO NOTIFY U.S) performed by Blanka Owen MD at [facility] 6/26/2019    XI ROBOTIC LAPAROSCOPIC PROSTATECTOMY , PELVIC LYMPH NODE DISSECTION performed by Blanka Owen MD at [address] [place] [address] 2015    ULNAR TUNNEL RELEASE Left      Family History   Problem Relation Age of Onset    Stroke Maternal Uncle 80    Heart Surgery Maternal Uncle         Triple Bypass    Other Maternal Grandmother         Pul. Embolism    Cancer Maternal Grandfather         Throat    No Known Problems Mother     Other Father         MVA    No Known Problems Sister     Other Brother         Electric burn at work   [facility] No Known Problems Paternal Grandmother     No Known Problems Paternal Grandfather     Diabetes Brother      Outpatient Medications Marked as Taking for the 9/14/20 encounter (Office Visit) with Yaquelin Mayo MD   Medication Sig Dispense Refill    docusate sodium (COLACE) 100 MG capsule Take 1 capsule by mouth 2 times daily 60 capsule 0    omeprazole (PRILOSEC) 20 MG delayed release capsule Take 20 mg by mouth daily      calcium citrate (CALCITRATE) 950 MG tablet Take 1 tablet by mouth daily      Aspirin-Salicylamide-Caffeine (BC HEADACHE POWDER PO) Take by mouth Hold 7 days prior to surgery      lisinopril (PRINIVIL;ZESTRIL) 40 MG tablet Take 40 mg by mouth nightly Per pcp      clonazePAM (KLONOPIN) 1 MG tablet Take 1 mg by mouth 2 times daily. pcp      albuterol sulfate  (90 Base) MCG/ACT inhaler Inhale 2 puffs into the lungs every 6 hours as needed for Wheezing Per pcp      amLODIPine (NORVASC) 5 MG tablet Per pcp  3    sertraline (ZOLOFT) 50 MG tablet Per pcp  2    tiotropium (SPIRIVA) 18 MCG inhalation capsule Inhale 1 capsule into the lungs daily 30 capsule 3    fluticasone (FLONASE) 50 MCG/ACT nasal spray 2 sprays by Nasal route daily (Patient taking differently: 2 sprays by Nasal route daily as needed ) 1 Bottle 0    atorvastatin (LIPITOR) 20 MG tablet TAKE 1 TABLET DAILY (Patient taking differently: Per Dr. Alta Melendez) 90 tablet 1    Multiple Vitamins-Minerals (CENTRUM SILVER 50+MEN PO) Take 1 tablet by mouth daily         Bee venom;  Fentanyl; Morphine; Pcn [penicillins]; and Codeine  Social History     Tobacco Use   Smoking Status Current Every Day Smoker    Packs/day: 1.00    Years: 54.00    Pack years: 54.00    Types: Cigarettes    Start date: 1960   Smokeless Tobacco Never Used     (Ifpatient a smoker, smoking cessation counseling offered)    Social History     Substance and Sexual Activity   Alcohol Use Yes    Comment: 22 oz beer every 2 days       REVIEW OF SYSTEMS:  Review of Systems    Physical Exam:      Vitals:    09/14/20 1540   Temp: 97.5 °F (36.4 °C)     There is no height or weight on file to calculate BMI. Patient is a 77 y.o. male in no acute distress and alert and oriented to person, place and time. Physical Exam  Constitutional: Patient in no acute distress. Neuro: Alert and oriented to person, place and time. Psych: Mood normal, affect normal  Skin: No rash noted  HEENT: Head: Normocephalic andatraumatic  Conjunctivae and EOM are normal. Pupils are equal, round  Nose:Normal  Right External Ear: Normal; Left External Ear: Normal  Mouth: Mucosa Moist  Neck: Supple  Lungs: Respiratory effort is normal  Cardiovascular: Warm & Pink  Abdomen: Soft, non-tender, non-distended with no CVA,  No flank tenderness,  Or hepatosplenomegaly     Assessment and Plan      1. S/P insertion of penile implant           Plan:   F/u 4 weeks for IPP cycling      Return in about 4 weeks (around 10/12/2020). Prescriptions Ordered:  No orders of the defined types were placed in this encounter. Orders Placed:  No orders of the defined types were placed in this encounter. Garret Sood MD    Agree with the ROS entered by the MA.

## 2020-09-21 ENCOUNTER — TELEPHONE (OUTPATIENT)
Dept: PAIN MANAGEMENT | Age: 70
End: 2020-09-21

## 2020-10-05 ENCOUNTER — TELEPHONE (OUTPATIENT)
Dept: PAIN MANAGEMENT | Age: 70
End: 2020-10-05

## 2020-10-05 NOTE — TELEPHONE ENCOUNTER
Attempted to contact pt to review pre consultation questions. No answer.
PRINCIPAL DISCHARGE DIAGNOSIS  Diagnosis: Lumbar disc herniation with radiculopathy  Assessment and Plan of Treatment: 1. Walk pleny  2. No lifting over 5 lbs  3. Use  LSO brace for comfort (lower back surgery).  4. Keep bandage on, clean and dry. Change to a new one if gets wet or dirty.  Make sure you have a bar to hold onto in the shower. If you had low back surgery, sponge bathe until cleared by your surgeon to shower.  5. Pain meds: eRx sent to your pharmacy electronically, you need to pick it up  6. No driving on pain meds  7. See your surgeon in about 10 days in the office. Call to schedule.

## 2020-10-06 ENCOUNTER — TELEPHONE (OUTPATIENT)
Dept: PAIN MANAGEMENT | Age: 70
End: 2020-10-06

## 2020-10-07 ENCOUNTER — HOSPITAL ENCOUNTER (OUTPATIENT)
Dept: PAIN MANAGEMENT | Age: 70
Discharge: HOME | End: 2020-10-07

## 2020-10-26 ENCOUNTER — OFFICE VISIT (OUTPATIENT)
Dept: UROLOGY | Age: 70
End: 2020-10-26

## 2020-10-26 VITALS — DIASTOLIC BLOOD PRESSURE: 74 MMHG | SYSTOLIC BLOOD PRESSURE: 138 MMHG | HEART RATE: 92 BPM

## 2020-10-26 PROCEDURE — 99024 POSTOP FOLLOW-UP VISIT: CPT | Performed by: UROLOGY

## 2020-10-26 ASSESSMENT — ENCOUNTER SYMPTOMS
CONSTIPATION: 0
ABDOMINAL PAIN: 0
DIARRHEA: 0
EYE PAIN: 0
COUGH: 0
VOMITING: 0
SHORTNESS OF BREATH: 0
WHEEZING: 0
NAUSEA: 0
BACK PAIN: 0
EYE REDNESS: 0

## 2020-10-26 NOTE — PROGRESS NOTES
[address] [address]  Dept: [facility] Urology Office Note - Established    Patient:  Karie Vital  YOB: 1950  Date: 10/26/2020    The patient is a 77 y.o. male who presents todayfor evaluation of the following problems:   Chief Complaint   Patient presents with    Post-Op Check     IPP    Incontinence     stress induced       HPI  Sp ipp doing great    Summary of old records: N/A    Additional History: N/A    Procedures Today: N/A    Urinalysis today:  No results found for this visit on 10/26/20.   Last several PSA's:  Lab Results   Component Value Date    PSA <0.01 05/30/2020    PSA <0.01 02/22/2020    PSA <0.01 11/16/2019     Last total testosterone:  No results found for: TESTOSTERONE    AUA Symptom Score (10/26/2020):  INCOMPLETE EMPTYING: How often have you had the sensation of not emptying your bladder?: Not at all  FREQUENCY: How often do you have to urinate less than every two hours?: Not at all  INTERMITTENCY: How often have you found you stopped and started again several times when you urinated?: Not at all  URGENCY: How often have you found it difficult to postpone urination?: Not at all  WEAK STREAM: How often have you had a weak urinary stream?: Not at all  STRAINING: How often have you had to strain to start  urination?: Not at all  NOCTURIA: How many times did you typically get up at night to uriniate?: 1 Time  TOTAL I-PSS SCORE[de-identified] 1  How would you feel if you were to spend the rest of your life with your urinary condition?: Mostly Satisfied    Last BUN and creatinine:  Lab Results   Component Value Date    BUN 15 08/27/2020     Lab Results   Component Value Date    CREATININE 0.63 (L) 08/27/2020       Additional Lab/Culture results: none    Imaging Reviewed during this Office Visit: none  (results were independently reviewed by physician and radiology report verified)    PAST MEDICAL, FAMILY AND SOCIAL HISTORY UPDATE:  Past Medical History:   Diagnosis Date    Anxiety state, unspecified     Asthma     inhalers per pcp    CAD (coronary artery disease)     [facility] Carotid artery stenosis     Colon polyp     Depressive disorder, not elsewhere classified     Elevated PSA 05/06/2019    Essential hypertension, benign     meds per pcp    GERD (gastroesophageal reflux disease)     Hearing loss     bilat.  Has hearing aids but does not wear    History of colon polyps     History of hepatitis C     Hyperlipidemia     on rx    Impotence of organic origin     Lipoma of unspecified site     Lumbago     No natural teeth     Prostate CA ([address] 05/2019    Secondary localized osteoarthrosis, shoulder region     Snores     no cpap or sleep apnea    Swelling of limb     Wellness examination     Petr Milan pcp last seen June 2020     Past Surgical History:   Procedure Laterality Date    ANKLE SURGERY Right     CARPAL TUNNEL RELEASE Bilateral     CERVICAL FUSION  2009    anterior    CHOLECYSTECTOMY      approx 2000    COLONOSCOPY      EXCISION / BIOPSY SKIN LESION OF ARM Left 9/15/2017    EXCISION SKIN LESION LEFT CHEST AND LEFT BUTTOCK, EXCISION LIPOMA LEFT LOWER ABDOMEN performed by Tay Maldonado DO at [address] [facility]  08/26/2020    INSERTION INFLATABLE PENILE PROSTHESIS      PENILE PROSTHESIS PLACEMENT N/A 8/26/2020    INSERTION INFLATABLE PENILE PROSTHESIS  (REP NOTIFIED - Shearon Number) performed by Taryn Peterson MD at [address] [place] [address] NOT  [address] [place] [address] N/A 4/13/2017    COLONOSCOPY performed by Severiano Good, MD at [address] [place] [address] N/A 4/24/2019    PROSTATE BIOPSY WITH ULTRASOUND  (OFFICE TO NOTIFY U.S) performed by Taryn Peterson MD at [facility] 6/26/2019    XI ROBOTIC LAPAROSCOPIC PROSTATECTOMY , PELVIC LYMPH NODE DISSECTION performed by Maegan Rangel MD at [address] Right 2015    ULNAR TUNNEL RELEASE Left      Family History   Problem Relation Age of Onset    Stroke Maternal Uncle 80    Heart Surgery Maternal Uncle         Triple Bypass    Other Maternal Grandmother         Pul. Embolism    Cancer Maternal Grandfather         Throat    No Known Problems Mother     Other Father         MVA    No Known Problems Sister     Other Brother         Electric burn at work   Kar[employer] Mendiola No Known Problems Paternal Grandmother     No Known Problems Paternal Grandfather     Diabetes Brother      Outpatient Medications Marked as Taking for the 10/26/20 encounter (Office Visit) with Maegan Rangel MD   Medication Sig Dispense Refill    VITAMIN D PO Take by mouth daily      omeprazole (PRILOSEC) 20 MG delayed release capsule Take 20 mg by mouth daily      calcium citrate (CALCITRATE) 950 MG tablet Take 1 tablet by mouth daily      Aspirin-Salicylamide-Caffeine (BC HEADACHE POWDER PO) Take by mouth Hold 7 days prior to surgery      lisinopril (PRINIVIL;ZESTRIL) 40 MG tablet Take 40 mg by mouth nightly Per pcp      clonazePAM (KLONOPIN) 1 MG tablet Take 1 mg by mouth 2 times daily. pcp      albuterol sulfate  (90 Base) MCG/ACT inhaler Inhale 2 puffs into the lungs every 6 hours as needed for Wheezing Per pcp      amLODIPine (NORVASC) 5 MG tablet Per pcp  3    tiotropium (SPIRIVA) 18 MCG inhalation capsule Inhale 1 capsule into the lungs daily 30 capsule 3    fluticasone (FLONASE) 50 MCG/ACT nasal spray 2 sprays by Nasal route daily (Patient taking differently: 2 sprays by Nasal route daily as needed ) 1 Bottle 0    atorvastatin (LIPITOR) 20 MG tablet TAKE 1 TABLET DAILY (Patient taking differently: Per Dr. Ryne Lepe) 90 tablet 1    Multiple Vitamins-Minerals (CENTRUM SILVER 50+MEN PO) Take 1 tablet by mouth daily         Bee venom;  Fentanyl; Morphine; Pcn [penicillins]; and Codeine  Social History     Tobacco Use   Smoking Status Current Every Day Smoker    Packs/day: 1.00    Years: 54.00    Pack years: 54.00    Types: Cigarettes    Start date: 1960   Smokeless Tobacco Never Used     (Ifpatient a smoker, smoking cessation counseling offered)    Social History     Substance and Sexual Activity   Alcohol Use Yes    Comment: 22 oz beer every 2 days       REVIEW OF SYSTEMS:  Review of Systems    Physical Exam:      Vitals:    10/26/20 1504   BP: 138/74   Pulse: 92     There is no height or weight on file to calculate BMI. Patient is a 77 y.o. male in no acute distress and alert and oriented to person, place and time. Physical Exam  Constitutional: Patient in no acute distress. Neuro: Alert and oriented to person, place and time. Psych: Mood normal, affect normal  Skin: No rash noted  HEENT: Head: Normocephalic andatraumatic  Conjunctivae and EOM are normal. Pupils are equal, round  Nose:Normal  Right External Ear: Normal; Left External Ear: Normal  Mouth: Mucosa Moist  Neck: Supple  Lungs: Respiratory effort is normal  Cardiovascular: Warm & Pink  Abdomen: Soft, non-tender, non-distended with no CVA,  No flank tenderness,  Or hepatosplenomegaly   Lymphatics: No palpablelymphadenopathy. ipp cycle well    Assessment and Plan      1. S/P insertion of penile implant    2. Prostate cancer [facility])           Plan:   F/u 3 m psa      Return in about 3 months (around 1/26/2021) for psa. Prescriptions Ordered:  No orders of the defined types were placed in this encounter. Orders Placed:  Orders Placed This Encounter   Procedures    PSA, Diagnostic     Standing Status:   Future     Standing Expiration Date:   10/26/2021           Chanell Mayberry MD    Agree with the ROS entered by the MA.

## 2020-10-27 VITALS — BODY MASS INDEX: 29.62 KG/M2 | WEIGHT: 200 LBS | HEIGHT: 69 IN

## 2020-10-27 ASSESSMENT — PAIN DESCRIPTION - DESCRIPTORS: DESCRIPTORS: SORE

## 2020-10-27 ASSESSMENT — ENCOUNTER SYMPTOMS
EYES NEGATIVE: 1
GASTROINTESTINAL NEGATIVE: 1
ALLERGIC/IMMUNOLOGIC NEGATIVE: 1

## 2020-10-27 ASSESSMENT — PAIN - FUNCTIONAL ASSESSMENT: PAIN_FUNCTIONAL_ASSESSMENT: PREVENTS OR INTERFERES SOME ACTIVE ACTIVITIES AND ADLS

## 2020-10-27 ASSESSMENT — PAIN SCALES - GENERAL: PAINLEVEL_OUTOF10: 8

## 2020-10-27 ASSESSMENT — PAIN DESCRIPTION - FREQUENCY: FREQUENCY: CONTINUOUS

## 2020-10-27 ASSESSMENT — PAIN DESCRIPTION - ORIENTATION: ORIENTATION: LEFT;RIGHT

## 2020-10-27 ASSESSMENT — PAIN DESCRIPTION - PROGRESSION: CLINICAL_PROGRESSION: GRADUALLY WORSENING

## 2020-10-27 ASSESSMENT — PAIN DESCRIPTION - PAIN TYPE: TYPE: CHRONIC PAIN

## 2020-10-27 ASSESSMENT — PAIN DESCRIPTION - ONSET: ONSET: GRADUAL

## 2020-10-27 NOTE — PROGRESS NOTES
[facility] Pain Management  Patient Pain Assessment  Consultation - Dr. London Berkowitz    Primary Care Physician: Ar West    Chief complaint:   Chief Complaint   Patient presents with    Pain   . Yaquelin Watkins is a 77 y.o. male evaluated on 10/28/2020. Patient is referred by Roz Kocher, APRN - CNP   Modality of virtual service provided -via  telephone   Consent:  Patient and/or health care decision maker is aware that that patient may receive a bill for this telephone service, depending on one's insurance coverage, and has provided verbal consent to proceed: Yes    Patient identification was verified at the start of the visit: Yes    Chief complaint: Yaquelin Watkins is 77 y.o.,  male, with  Pain  . HISTORY OF PRESENT ILLNESS:  Yaquelin Watkins is 77 y.o. male with    Referral Diagnosis   M47.22 (ICD-10-CM) - Cervical spondylosis with radiculopathy     Patient is a 69-year-old male referred to the pain clinic with a chief complaint of pain involving the shoulders as well as in the neck. This patient is a poor historian and very difficult to get a good history from him. Patient apparently has a right shoulder pain as well as left wrist pain. He reports his arms go numb at times and he was diagnosed with a bilateral carpal tunnel syndrome and had surgery done bilaterally. He is still complaining of numbness in his arms especially when he right arm goes down and the left arm goes up at night. He also complains of a spot where he had surgery in his left wrist which is extremely painful. He had an MRI of the neck done and was found to have degenerative spine disease and referred to the pain clinic. Patient reports his most of the pain is in the right shoulder with pain radiating towards the right elbow. He has some numbness in his hands. Patient denies any radicular symptoms and he reports his neck movements are not painful.   He apparently had a right carpal tunnel release long time ago and his left one was probably recent. He had arthroscopic surgery done in the shoulder and some of the spurs were removed about 4 years ago. His pain is mostly in the posterior aspect of the shoulder just below the acromion process according to his description. Shoulder Pain    The pain is present in the right shoulder, left wrist, left hand, left fingers, right hand, right fingers and right wrist. This is a chronic problem. The current episode started more than 1 year ago. There has been no history of extremity trauma. The problem occurs constantly. The problem has been unchanged. Quality: Feels like somebody is punching anyone. Pain scale: 6-10. The pain is severe. Associated symptoms include numbness and tingling. Associated symptoms comments: Numbness in the hands bilaterally. The symptoms are aggravated by activity (Leaning on the right elbow, shoulder movements). RX Monitoring 10/28/2020   Periodic Controlled Substance Monitoring Assessed functional status. ;No signs of potential drug abuse or diversion identified.    Current Pain Assessment  Pain Assessment  Pain Assessment: 0-10  Pain Level: 8  Patient's Stated Pain Goal: 2(to decrease pain with increased activity)  Pain Type: Chronic pain  Pain Location: Shoulder, Hand, Neck  Pain Orientation: Left, Right  Pain Radiating Towards: none  Pain Descriptors: Sore  Pain Frequency: Continuous  Pain Onset: Gradual  Clinical Progression: Gradually worsening  Functional Pain Assessment: Prevents or interferes some active activities and ADLs  Non-Pharmaceutical Pain Intervention(s): Repositioned, Rest                    ADVERSE MEDICATION EFFECTS:   Constipation: no  Bowel Regimen: Yes  Diet: common adult  Appetite:  ok  Sedation:  not applicable  Urinary Retention: no    FOCUSED PAIN SCALE:  Highest : 10  Lowest :4  Average: Range-7  When and What  was your last procedure:  Cervical fusion in 2009, right shoulder sx 2015, penile prosthesis placement 8/2020. Prostatectomy 6/2019, carpal tunnel release bilateral  Was your procedureeffective:  Poor historian     ACTIVITY/SOCIAL/EMOTIONAL:  Sleep Pattern: 6 hours per night. difficulty falling asleep  Energy Level: Normal  Currently attending Physical Therapy:  No  Home Exercises: never none  Mobility: no current mobility problem   Do you use assistive devices? No  Have you fallen in the last 30 days?:  No  Currently seeing a Psychiatristor Psychologist:  No  Emotional Issues: normal   Mood: appropriate     ABERRANT BEHAVIORS SINCE LAST VISIT:  Have you ever been treated in another Pain Clinic no  Refills for prescriptions appropriate: not applicable  Lost rx/pills: not applicable  Taking more medication than prescribed:  not applicable  Are you receiving PAIN medications from  other doctors: not applicable  Last Urine/Serum Drug Screen : unable to obtain d/t COVID-19 virtual visit   Was Serum/UDS as anticipated?  not applicable  Brought pill bottles in :not applicable   Was Pill count appropriate? :not applicable   Are currently pregnant? not applicable  Recent ER visits: No             Past Medical History      Diagnosis Date    Anxiety state, unspecified     Asthma     inhalers per pcp    CAD (coronary artery disease)     [facility]io [facility] Carotid artery stenosis     Colon polyp     Depressive disorder, not elsewhere classified     Elevated PSA 05/06/2019    Essential hypertension, benign     meds per pcp    GERD (gastroesophageal reflux disease)     Hearing loss     bilat.  Has hearing aids but does not wear    History of colon polyps     History of hepatitis C     Hyperlipidemia     on rx    Impotence of organic origin     Lipoma of unspecified site     Lumbago     No natural teeth     Prostate CA ([address] 05/2019    Secondary localized osteoarthrosis, shoulder region     Snores     no cpap or sleep apnea    Swelling of limb     Wellness examination Richard Juan pcp last seen June 2020       Surgical History  Past Surgical History:   Procedure Laterality Date    ANKLE SURGERY Right     CARPAL TUNNEL RELEASE Bilateral     CERVICAL FUSION  2009    anterior    CHOLECYSTECTOMY      approx 2000    COLONOSCOPY      EXCISION / BIOPSY SKIN LESION OF ARM Left 9/15/2017    EXCISION SKIN LESION LEFT CHEST AND LEFT BUTTOCK, EXCISION LIPOMA LEFT LOWER ABDOMEN performed by Segun Christopher DO at [address] [facility]  08/26/2020    INSERTION INFLATABLE PENILE PROSTHESIS      PENILE PROSTHESIS PLACEMENT N/A 8/26/2020    INSERTION INFLATABLE PENILE PROSTHESIS  (REP NOTIFIED - Rudi Middleton) performed by Ale Goodman MD at [address] [place] [address] N/A 4/13/2017    COLONOSCOPY performed by Ludivina Posey MD at [address] [place] [address] N/A 4/24/2019    PROSTATE BIOPSY WITH ULTRASOUND  (OFFICE TO NOTIFY [place]) performed by Ale Goodman MD at [facility] 6/26/2019    XI ROBOTIC LAPAROSCOPIC PROSTATECTOMY , PELVIC LYMPH NODE DISSECTION performed by Ale Goodman MD at [address] Right 2015    ULNAR TUNNEL RELEASE Left        Medications  Current Outpatient Medications   Medication Sig Dispense Refill    VITAMIN D PO Take by mouth daily      omeprazole (PRILOSEC) 20 MG delayed release capsule Take 20 mg by mouth daily      calcium citrate (CALCITRATE) 950 MG tablet Take 1 tablet by mouth daily      Aspirin-Salicylamide-Caffeine (BC HEADACHE POWDER PO) Take by mouth Hold 7 days prior to surgery      lisinopril (PRINIVIL;ZESTRIL) 40 MG tablet Take 40 mg by mouth nightly Per pcp      clonazePAM (KLONOPIN) 1 MG tablet Take 1 mg by mouth 2 times daily.  pcp      albuterol sulfate  (90 Base) MCG/ACT inhaler Inhale 2 puffs into the lungs every 6 hours as needed for Wheezing Per pcp      amLODIPine (NORVASC) 5 MG tablet Per pcp  3    tiotropium (SPIRIVA) 18 MCG inhalation capsule Inhale 1 capsule into the lungs daily 30 capsule 3    fluticasone (FLONASE) 50 MCG/ACT nasal spray 2 sprays by Nasal route daily (Patient taking differently: 2 sprays by Nasal route daily as needed ) 1 Bottle 0    atorvastatin (LIPITOR) 20 MG tablet TAKE 1 TABLET DAILY (Patient taking differently: Per Dr. Fowler Handler) 90 tablet 1    Multiple Vitamins-Minerals (CENTRUM SILVER 50+MEN PO) Take 1 tablet by mouth daily       No current facility-administered medications for this encounter. Allergies  Bee venom; Fentanyl; Morphine; Pcn [penicillins]; and Codeine    Family History  family history includes Cancer in his maternal grandfather; Diabetes in his brother; Heart Surgery in his maternal uncle; No Known Problems in his mother, paternal grandfather, paternal grandmother, and sister; Other in his brother, father, and maternal grandmother; Stroke (age of onset: 80) in his maternal uncle. Social History  Social History     Socioeconomic History    Marital status:       Spouse name: None    Number of children: 2    Years of education: None    Highest education level: None   Occupational History    None   Social Needs    Financial resource strain: None    Food insecurity     Worry: None     Inability: None    Transportation needs     Medical: None     Non-medical: None   Tobacco Use    Smoking status: Current Every Day Smoker     Packs/day: 1.00     Years: 54.00     Pack years: 54.00     Types: Cigarettes     Start date: 1960    Smokeless tobacco: Never Used   Substance and Sexual Activity    Alcohol use: Yes     Comment: 22 oz beer every 2 days    Drug use: Never    Sexual activity: Yes     Partners: Female   Lifestyle    Physical activity     Days per week: None     Minutes per session: None    Stress: None   Relationships    Social connections     Talks on phone: None     Gets together: None     Attends Restorationist service: None     Active member of club or organization: None Range Status   02/18/2013 NEGATIVE NEG Final     Comment:           (Positive cutoff 100 ng/mL)                    Imaging:  Radiology Images and Reports reviewed where indicated and necessary     EXAMINATION:    MRI OF THE CERVICAL SPINE WITHOUT CONTRAST 6/30/2020 4:20 pm         TECHNIQUE:    Multiplanar multisequence MRI of the cervical spine was performed without the    administration of intravenous contrast.         COMPARISON:    CT cervical spine 04/03/2020         HISTORY:    ORDERING SYSTEM PROVIDED HISTORY: Bilateral carpal tunnel syndrome    TECHNOLOGIST PROVIDED HISTORY:    bilateral hand numbness, tingling    Reason for Exam: pt states neck pain with hand/wrist numbness, prev neck and    shamika wrist surgeries         FINDINGS:    BONES/ALIGNMENT: Vertebral body heights are maintained.  There is unchanged 2    mm degenerative anterolisthesis of C3 on C4, C4 on C5, and C7 on T1 secondary    to facet arthropathy.  No marrow edema or suspect osseous lesion is evident. The patient is status post prior anterior C5-6 fusion and right C5    laminectomy.         SPINAL CORD: The visualized spinal cord has normal signal and morphology.  No    evidence of mass or abnormal fluid collection within the spinal canal.         SOFT TISSUES: No acute paraspinal soft tissue abnormality is seen.  Posterior    midline surgical scar is noted.         C2-C3: Disc height and signal maintained.  Moderate left neural foraminal    narrowing secondary to facet hypertrophy.  No right neural foraminal    narrowing.  No spinal canal stenosis.         C3-C4: Disc height and signal maintained.  Moderate left and severe right    neural foraminal narrowing secondary to uncovertebral and facet hypertrophy.     No spinal canal stenosis.         C4-C5: Disc height and signal maintained.  Moderate bilateral neural    foraminal narrowing secondary to facet hypertrophy.  No spinal canal stenosis.         C5-C6: Prior anterior fusion and right patient using a bone modal.    Other reports reviewed include    [] Bone scan   [] EMG and nerve conduction studies   [x] Referral reports-  I also discussed with him the following treatment options Including advantages and disadvantages of each:    [x] Physical therapy    [x] Interventional pain treatment    [] Medication management    [] Surgical options    Patient's OARRS were reviewed. It is acceptable and appears patient is not receiving prescriptions from multiple prescribers. Patient is  forthcoming regarding prescriptions for pain medication in the past  Controlled Substances Monitoring: Periodic Controlled Substance Monitoring: Assessed functional status. , No signs of potential drug abuse or diversion identified. (Prem Gilbert MD)    Counselling/Preventive measures for pain  Control:    [x]  Spine strengthening exercises are discussed with patient in detail. Patient is complaining of pain mostly in the right shoulder. He does not have pain in the cervical region. He reports that the neck movements are not painful. Hence we will try right shoulder joint injection and then depending on the response will plan further treatment the injection is mostly for diagnostic as well as for therapeutic purposes. This was explained to the patient as well as other therapies and the patient would like to proceed with the shoulder joint injection for which she will be scheduled in the near future. Orders Placed This Encounter   Procedures    FLUORO FOR SURGICAL PROCEDURES     Standing Status:   Future     Standing Expiration Date:   10/28/2021    SD ARTHROCENTESIS ASPIR&/INJ MAJOR JT/BURSA W/O US    Saline lock IV     Standing Status:   Future     Standing Expiration Date:   4/28/2022       Decision Making Process : Patient's health history and referral records thoroughly reviewed before focused physical examination and discussion with patient.    Over 50% of today's visit is spent on examining the patient and counseling. Level of complexity of date to be reviewed is Moderate. The chart date reviewed include the following: Imaging Reports. Summary of Care. Time spent reviewing with patient the below reports:   Medication safety, Treatment options. Level of diagnosis and management options of this case is multiple: involving the following management options: Interventions as needed, medication management as appropriate, future visits, activity modification, heat/ice as needed, Urine drug screen as required. [x]The patient's questions were answered to the best of my abilities. .    This note was created using voice recognition software. There may be inaccuracies of transcription  that are inadvertently overlooked prior to the signature. There is any questions about the transcription please contact me. Due to the COVID-19 pandemic and the appropriate interventions by [facility], our non-urgent pain management patients will not be seen in the office at this time for their protection and the protection of our staff. To offer continuity of care, their prescriptions will be escribed this month after a careful chart review and review of their OARRS report  Pursuant to the emergency declaration under the [address] and [facility] waiver authority and the [employer] Act, this Virtual Visit was conducted, with patient's consent, to reduce the patient's risk of exposure to COVID-19 and provide continuity of care for an established patient. Services were provided through a video synchronous discussion virtually to substitute for in-person appointment. \"  Documentation:  I communicated with the patient and/or health care decision maker about plan of care  Details of this discussion including any medical advice provided:   Total Time: 40-50 minutes  I affirm this is a Patient Initiated Episode with an Established Patient who has not had a related appointment within my department in the past 7 days or scheduled within the next 24 hours. This note was created using voice recognition software. There may be inaccuracies of transcription  that are inadvertently overlooked prior to the signature. There is any questions about the transcription please contact me.     Electronically signed by Victor Manuel Aaron MD on 10/28/2020 at 6:48 PM

## 2020-10-28 ENCOUNTER — HOSPITAL ENCOUNTER (OUTPATIENT)
Dept: PAIN MANAGEMENT | Age: 70
Discharge: HOME | End: 2020-10-28
Payer: MEDICARE

## 2020-10-28 PROCEDURE — 99204 OFFICE O/P NEW MOD 45 MIN: CPT | Performed by: PAIN MEDICINE

## 2020-10-28 PROCEDURE — 99203 OFFICE O/P NEW LOW 30 MIN: CPT

## 2020-10-28 ASSESSMENT — ENCOUNTER SYMPTOMS
BACK PAIN: 1
SHORTNESS OF BREATH: 1
EYE PAIN: 0
COUGH: 1
BLOOD IN STOOL: 0
DIARRHEA: 0
VOMITING: 0
ABDOMINAL PAIN: 0
EYE REDNESS: 0

## 2020-10-29 ENCOUNTER — TELEPHONE (OUTPATIENT)
Dept: PAIN MANAGEMENT | Age: 70
End: 2020-10-29

## 2020-11-10 ENCOUNTER — TELEPHONE (OUTPATIENT)
Dept: PAIN MANAGEMENT | Age: 70
End: 2020-11-10

## 2020-11-12 ENCOUNTER — HOSPITAL ENCOUNTER (OUTPATIENT)
Dept: PAIN MANAGEMENT | Age: 70
Discharge: HOME | End: 2020-11-12
Payer: MEDICARE

## 2020-11-12 ENCOUNTER — HOSPITAL ENCOUNTER (OUTPATIENT)
Dept: GENERAL RADIOLOGY | Age: 70
Discharge: HOME | End: 2020-11-14
Payer: MEDICARE

## 2020-11-12 VITALS
HEIGHT: 69 IN | RESPIRATION RATE: 20 BRPM | SYSTOLIC BLOOD PRESSURE: 152 MMHG | OXYGEN SATURATION: 98 % | TEMPERATURE: 96.4 F | WEIGHT: 200 LBS | HEART RATE: 76 BPM | DIASTOLIC BLOOD PRESSURE: 91 MMHG | BODY MASS INDEX: 29.62 KG/M2

## 2020-11-12 PROCEDURE — 6360000004 HC RX CONTRAST MEDICATION: Performed by: PAIN MEDICINE

## 2020-11-12 PROCEDURE — 20610 DRAIN/INJ JOINT/BURSA W/O US: CPT

## 2020-11-12 PROCEDURE — 20610 DRAIN/INJ JOINT/BURSA W/O US: CPT | Performed by: PAIN MEDICINE

## 2020-11-12 PROCEDURE — 6360000002 HC RX W HCPCS: Performed by: PAIN MEDICINE

## 2020-11-12 PROCEDURE — 77002 NEEDLE LOCALIZATION BY XRAY: CPT | Performed by: PAIN MEDICINE

## 2020-11-12 PROCEDURE — 3209999900 FLUORO FOR SURGICAL PROCEDURES

## 2020-11-12 PROCEDURE — 77002 NEEDLE LOCALIZATION BY XRAY: CPT

## 2020-11-12 PROCEDURE — 80307 DRUG TEST PRSMV CHEM ANLYZR: CPT

## 2020-11-12 RX ORDER — BETAMETHASONE SODIUM PHOSPHATE AND BETAMETHASONE ACETATE 3; 3 MG/ML; MG/ML
INJECTION, SUSPENSION INTRA-ARTICULAR; INTRALESIONAL; INTRAMUSCULAR; SOFT TISSUE
Status: COMPLETED | OUTPATIENT
Start: 2020-11-12 | End: 2020-11-12

## 2020-11-12 RX ORDER — ROPIVACAINE HYDROCHLORIDE 5 MG/ML
INJECTION, SOLUTION EPIDURAL; INFILTRATION; PERINEURAL
Status: COMPLETED | OUTPATIENT
Start: 2020-11-12 | End: 2020-11-12

## 2020-11-12 RX ADMIN — ROPIVACAINE HYDROCHLORIDE 10 ML: 5 INJECTION, SOLUTION EPIDURAL; INFILTRATION; PERINEURAL at 11:13

## 2020-11-12 RX ADMIN — BETAMETHASONE SODIUM PHOSPHATE AND BETAMETHASONE ACETATE 15 MG: 3; 3 INJECTION, SUSPENSION INTRA-ARTICULAR; INTRALESIONAL; INTRAMUSCULAR at 11:12

## 2020-11-12 RX ADMIN — IOHEXOL 1 ML: 180 INJECTION INTRAVENOUS at 11:12

## 2020-11-12 ASSESSMENT — PAIN DESCRIPTION - DESCRIPTORS: DESCRIPTORS: THROBBING;SHARP

## 2020-11-12 ASSESSMENT — PAIN - FUNCTIONAL ASSESSMENT
PAIN_FUNCTIONAL_ASSESSMENT: 0-10
PAIN_FUNCTIONAL_ASSESSMENT: PREVENTS OR INTERFERES SOME ACTIVE ACTIVITIES AND ADLS

## 2020-11-12 ASSESSMENT — PAIN DESCRIPTION - PAIN TYPE: TYPE: CHRONIC PAIN

## 2020-11-12 ASSESSMENT — PAIN DESCRIPTION - FREQUENCY: FREQUENCY: CONTINUOUS

## 2020-11-12 ASSESSMENT — PAIN DESCRIPTION - LOCATION: LOCATION: SHOULDER

## 2020-11-12 ASSESSMENT — PAIN SCALES - GENERAL: PAINLEVEL_OUTOF10: 7

## 2020-11-12 ASSESSMENT — PAIN DESCRIPTION - ORIENTATION: ORIENTATION: RIGHT

## 2020-11-12 ASSESSMENT — PAIN DESCRIPTION - ONSET: ONSET: ON-GOING

## 2020-11-12 ASSESSMENT — PAIN DESCRIPTION - PROGRESSION: CLINICAL_PROGRESSION: GRADUALLY WORSENING

## 2020-11-12 NOTE — PROGRESS NOTES
Discharge criteria met. Patient alert and oriented x3  Post procedure dressing dry and intact. Sensory and motor function intact as per pre-procedure. Instructions and follow up reviewed with pt at patient at discharge. Patient discharged ambulatory @ 1129. Gait steady, denies questions.  Hannah driving home

## 2020-11-12 NOTE — PROCEDURES
Pre-Procedure Note  Patient Name: Estelita Lopez   YOB: 1950  Room/Bed: Room/bed info not found  Medical Record Number: [id number]  Date: 11/12/2020       Indication:    1. Primary osteoarthritis, right shoulder    2. Bursitis of right shoulder    3. Impingement syndrome of right shoulder        Consent: On file. Vital Signs:   Vitals:    11/12/20 1039   BP: (!) 131/95   Pulse: 64   Resp: 16   Temp: 96.4 °F (35.8 °C)   SpO2: 97%       Past Medical History:   has a past medical history of Anxiety state, unspecified, Asthma, CAD (coronary artery disease), Carotid artery stenosis, Colon polyp, Depressive disorder, not elsewhere classified, Elevated PSA, Essential hypertension, benign, GERD (gastroesophageal reflux disease), Hearing loss, History of colon polyps, History of hepatitis C, Hyperlipidemia, Impotence of organic origin, Lipoma of unspecified site, Lumbago, No natural teeth, Prostate CA ([address] Secondary localized osteoarthrosis, shoulder region, Snores, Swelling of limb, and Wellness examination. Past Surgical History:   has a past surgical history that includes Carpal tunnel release (Bilateral); Ankle surgery (Right); shoulder surgery (Right, 2015); pr colon ca scrn not hi rsk ind (N/A, 4/13/2017); cervical fusion (2009); lumbar fusion; EXCISION / BIOPSY SKIN LESION OF ARM (Left, 9/15/2017); Ulnar tunnel release (Left); Prostate biopsy (N/A, 4/24/2019); Prostatectomy (N/A, 6/26/2019); Colonoscopy; Cholecystectomy; Penile prosthesis placement (08/26/2020); and Penile prosthesis placement (N/A, 8/26/2020). Pre-Sedation Documentation and Exam:   Vital signs have been reviewed (see flow sheet for vitals). Mallampati Airway Assessment:  normal    ASA Classification:  Class 3 - A patient with severe systemic disease that limits activity but is not incapacitating    Sedation/ Anesthesia Plan:   intravenous sedation    as needed.   Medications Planned:   midazolam (Versed) / Fentanyl Intravenously  as needed. Patient is an appropriate candidate for plan of sedation: yes  Patient's History and Physical examination was reviewed and there is no change. Electronically signed by Julio Rock MD on 11/12/2020 at 11:02 AM      Preoperative Diagnosis:    1. Primary osteoarthritis, right shoulder    2. Bursitis of right shoulder    3. Impingement syndrome of right shoulder        Postoperative Diagnosis:    1. Primary osteoarthritis, right shoulder    2. Bursitis of right shoulder    3. Impingement syndrome of right shoulder        Procedure Performed:  right shoulder joint injection with injection of the subacromial bursa under fluoroscopy guidance. Indication for the Procedure: The patient is having pain in right shoulder not responding to conservative management because of severe pain in the shoulder. Physical exam demonstrated tenderness over the right glenohumeral joint and subacromial bursa with limited range of motion of the shoulder. Due to failure of conservative management, we decided to perform a right glenohumeral joint and subacromial bursa injection. The risks, benefits and alternatives were explained, all questions were answered, and written informed consent was obtained   Current Pain Assessment  Pain Assessment  Pain Assessment: 0-10  Pain Level: 7  Patient's Stated Pain Goal: 2(increase activity)  Pain Type: Chronic pain  Pain Location: Shoulder  Pain Orientation: Right  Pain Radiating Towards: right shoulder to right elbow  Pain Descriptors: Throbbing, Sharp  Pain Frequency: Continuous  Pain Onset: On-going  Clinical Progression: Gradually worsening  Functional Pain Assessment: Prevents or interferes some active activities and ADLs  Non-Pharmaceutical Pain Intervention(s): Heat applied   Procedure : Patient is placed in supine position. Skin over the right shoulder joint was prepped with Betadine and draped in sterile manner.  Then using fluoroscopy the shoulder joint was identified. Then skin and deep tissues the groove between coracoid process and humeral head were infiltrated with 2  Ml of 0.5% Marcaine/0.5% Naropin. A #22-gauge, 3-1/2 inch spinal needle was introduced through the skin wheal under fluoroscopy guidance such that the tip of the needle lies in the joint space. This was confirmed by injecting 1 ml of Omnipaque-180 through the needle and observing the spread of the contrast in the joint space under fluoroscopy. Then after negative aspiration a total of 12 mg of Celestone and 6ml of 0.5% Marcaine was injected through the needle. Then the needle is withdrawn subcutaneously and redirected into the subacromial area under fluoroscopy. T  hen after negative aspiration, 0.5 mL of Omnipaque-180 was injected and spread of the contrast in the subacromial  area was observed. Then after negative aspiration a total of 3 ml of 0.5% Marcaine/0.5% Naropin with 3 mg of Celestone was injected through the needle. The needle is withdrawn and a Band-Aid was placed over the needle insertion site. The patient tolerated the procedure well and the vital signs remained stable at the end of the procedure. The patient was discharged home in stable condition. Patient will be seen in the pain clinic in 2 weeks for followup and further planning .   Electronically signed by Marielena Barton MD on 11/13/2020 at 6:35 AM

## 2020-11-12 NOTE — PROGRESS NOTES
COLONOSCOPY performed by Dora Hutchins MD at [address] 4/24/2019    PROSTATE BIOPSY WITH ULTRASOUND  (OFFICE TO NOTIFY [place] performed by Niya Sorensen MD at [address] [facility] N/A 6/26/2019    XI ROBOTIC LAPAROSCOPIC PROSTATECTOMY , PELVIC LYMPH NODE DISSECTION performed by Niya Sorensen MD at [facility] Right 2015    ULNAR TUNNEL RELEASE Left        Family History   Problem Relation Age of Onset    Stroke Maternal Uncle 80    Heart Surgery Maternal Uncle         Triple Bypass    Other Maternal Grandmother         Pul. Embolism    Cancer Maternal Grandfather         Throat    No Known Problems Mother     Other Father         MVA    No Known Problems Sister     Other Brother         Electric burn at work   [place] No Known Problems Paternal Grandmother     No Known Problems Paternal Grandfather     Diabetes Brother        Social History     Socioeconomic History    Marital status:       Spouse name: None    Number of children: 2    Years of education: None    Highest education level: None   Occupational History    None   Social Needs    Financial resource strain: None    Food insecurity     Worry: None     Inability: None    Transportation needs     Medical: None     Non-medical: None   Tobacco Use    Smoking status: Current Every Day Smoker     Packs/day: 0.25     Years: 54.00     Pack years: 13.50     Types: Cigarettes     Start date: 1960    Smokeless tobacco: Never Used   Substance and Sexual Activity    Alcohol use: Not Currently     Comment: 22 oz beer every 2 days    Drug use: Not Currently     Comment: teenager    Sexual activity: Yes     Partners: Female   Lifestyle    Physical activity     Days per week: None     Minutes per session: None    Stress: None   Relationships    Social connections     Talks on phone: None     Gets together: None     Attends Gnosticist service: None     Active member of club or organization: None Attends meetings of clubs or organizations: None     Relationship status: None    Intimate partner violence     Fear of current or ex partner: None     Emotionally abused: None     Physically abused: None     Forced sexual activity: None   Other Topics Concern    None   Social History Narrative    None       Allergies   Allergen Reactions    Bee Venom Anaphylaxis    Fentanyl Shortness Of Breath and Swelling     Tongue swelling    Morphine Hives, Shortness Of Breath and Swelling     Face swelling    Pcn [Penicillins] Hives and Rash    Codeine Hives, Nausea Only, Rash and Other (See Comments)       Current Outpatient Medications on File Prior to Encounter   Medication Sig Dispense Refill    VITAMIN D PO Take by mouth daily      omeprazole (PRILOSEC) 20 MG delayed release capsule Take 20 mg by mouth daily      calcium citrate (CALCITRATE) 950 MG tablet Take 1 tablet by mouth daily      Aspirin-Salicylamide-Caffeine (BC HEADACHE POWDER PO) Take by mouth Hold 7 days prior to surgery      lisinopril (PRINIVIL;ZESTRIL) 40 MG tablet Take 40 mg by mouth nightly Per pcp      clonazePAM (KLONOPIN) 1 MG tablet Take 1 mg by mouth 2 times daily. pcp      albuterol sulfate  (90 Base) MCG/ACT inhaler Inhale 2 puffs into the lungs every 6 hours as needed for Wheezing Per pcp      amLODIPine (NORVASC) 5 MG tablet Per pcp  3    tiotropium (SPIRIVA) 18 MCG inhalation capsule Inhale 1 capsule into the lungs daily 30 capsule 3    atorvastatin (LIPITOR) 20 MG tablet TAKE 1 TABLET DAILY (Patient taking differently: Per Dr. Loredo Mail) 90 tablet 1    Multiple Vitamins-Minerals (CENTRUM SILVER 50+MEN PO) Take 1 tablet by mouth daily      fluticasone (FLONASE) 50 MCG/ACT nasal spray 2 sprays by Nasal route daily (Patient taking differently: 2 sprays by Nasal route daily as needed ) 1 Bottle 0     No current facility-administered medications on file prior to encounter.          Review of Systems   Please refer to review of additional evaluation note for review of systems. Patient denies any changes since then  Physical Exam  Constitutional:       Appearance: Normal appearance. Comments: Somewhat disheveled   HENT:      Head: Normocephalic and atraumatic. Nose: Nose normal.   Eyes:      Extraocular Movements: Extraocular movements intact. Pupils: Pupils are equal, round, and reactive to light. Neck:      Musculoskeletal: Neck rigidity and muscular tenderness present. Cardiovascular:      Rate and Rhythm: Normal rate and regular rhythm. Pulses: Normal pulses. Pulmonary:      Effort: Pulmonary effort is normal. No respiratory distress. Breath sounds: Normal breath sounds. Abdominal:      General: Bowel sounds are normal. There is no distension. Palpations: Abdomen is soft. Musculoskeletal:      Left lower leg: No edema. Lymphadenopathy:      Cervical: No cervical adenopathy. Skin:         Neurological:      Mental Status: He is alert. Cranial Nerves: Cranial nerves are intact. No cranial nerve deficit. Sensory: Sensation is intact. Motor: Weakness present. No tremor. Coordination: Coordination normal.      Comments: Patient's handgrip is weak especially on the left side. He had a carpal tunnel surgery done on the     Psychiatric:         Mood and Affect: Mood is anxious. Speech: Speech normal.         Behavior: Behavior normal.         Thought Content: Thought content normal.         Judgment: Judgment normal.      Back Exam     Tenderness   The patient is experiencing tenderness in the cervical.    Range of Motion   Back extension: Painful and restricted. Back flexion: Painful and restricted. Other   Scars: present    Comments:  Laminectomy scar present posteriorly in the neck. There is muscle spasms involving paraspinal muscles in the cervical area with severe tenderness.       Right Shoulder Exam     Tenderness   The patient is experiencing tenderness in the acromioclavicular joint, acromion, clavicle and biceps tendon. Range of Motion   Right shoulder active abduction: Painful and restricted. Right shoulder passive abduction: Painful and restricted. Right shoulder extension: Painful and restricted. Right shoulder external rotation: Painful and restricted. Right shoulder forward flexion: Painful and restricted. Muscle Strength   Abduction: 4/5   External rotation: 4/5   Supraspinatus: 4/5   Subscapularis: 4/5   Biceps: 4/5     Tests   Apprehension: positive  Impingement: positive    Other   Erythema: absent  Scars: absent    Comments:  Movements of the right shoulder especially abduction beyond 90 degrees is limited and painful. DATA:    X-Ray reports:  X-ray cervical spine Date: 3/2/2020 History: Bilateral upper extremity numbness, neck pain   Comparison: None   Findings: AP, odontoid and lateral views of the cervical spine demonstrates extensive diffuse spondylosis with sydnesmophytes noted at C4-C5, C6-C7. There is a fusion noted at C5-C6 form prior surgery. Loss normal lordosis with kyphosis noted in the lower subaxial cervical spine. No acute fracture or dislocation. No lytic or blastic lesions. No soft tissue abnormalities. Impression: Diffuse spondylosis of the cervical spine   CT OF THE HEAD WITHOUT CONTRAST; CT OF THE CERVICAL SPINE WITHOUT CONTRAST    4/3/2020 8:38 pm        TECHNIQUE:    CT of the head was performed without the administration of intravenous    contrast. Dose modulation, iterative reconstruction, and/or weight based    adjustment of the mA/kV was utilized to reduce the radiation dose to as low    as reasonably achievable.; CT of the cervical spine was performed without the    administration of intravenous contrast. Multiplanar reformatted images are    provided for review.  Dose modulation, iterative reconstruction, and/or weight    based adjustment of the mA/kV was utilized to reduce the radiation dose to as    low as reasonably achievable. COMPARISON:    None. HISTORY:    ORDERING SYSTEM PROVIDED HISTORY: fall head injury    TECHNOLOGIST PROVIDED HISTORY:    fall head injury        Reason for Exam: head injury; pt states he fell off a 7ft. ladder    Acuity: Unknown    Type of Exam: Unknown; ORDERING SYSTEM PROVIDED HISTORY: fall head injury    TECHNOLOGIST PROVIDED HISTORY:    fall head injury    Reason for Exam: pt states he fell off a 7ft. ladder    Acuity: Unknown    Type of Exam: Unknown        FINDINGS:    CT CERVICAL SPINE:        BONES/ALIGNMENT: No acute fracture. Straightening of the normal cervical    lordosis. Trace multilevel degenerative listhesis. The craniocervical    junction is intact. No destructive osseous lesion. Osseous fusion of C5-C6. DEGENERATIVE CHANGES: Multilevel degenerative changes throughout the cervical    spine. SOFT TISSUES: There is no prevertebral soft tissue swelling. CT HEAD:        BRAIN/VENTRICLES: There is no acute intracranial hemorrhage, mass effect or    midline shift. No abnormal extra-axial fluid collection. The gray-white    differentiation is maintained without evidence of an acute infarct. There is    prominence of the ventricles and sulci due to global parenchymal volume loss. There are nonspecific areas of hypoattenuation within the periventricular and    subcortical white matter, which likely represent chronic microvascular    ischemic change. Atherosclerosis. ORBITS: The visualized portion of the orbits demonstrate no acute abnormality. SINUSES: Small left sphenoid and right ethmoid sinus mucous retention cysts. The other visualized paranasal sinuses and mastoid air cells demonstrate no    acute abnormality. SOFT TISSUES/SKULL: Right frontal scalp soft tissue swelling/laceration. No    radiopaque foreign body. No acute displaced skull fracture. Impression    1.   No acute abnormality of the cervical spine. Multilevel degenerative    changes throughout the cervical spine. 2.  No acute intracranial abnormality. Mild chronic small vessel ischemic    disease. 3.  Right frontal scalp soft tissue swelling/laceration. No acute displaced    skull fracture. EXAMINATION:    CT OF THE HEAD WITHOUT CONTRAST  5/30/2020 3:40 pm        TECHNIQUE:    CT of the head was performed without the administration of intravenous    contrast. Dose modulation, iterative reconstruction, and/or weight based    adjustment of the mA/kV was utilized to reduce the radiation dose to as low    as reasonably achievable. COMPARISON:    04/03/2020        HISTORY:    ORDERING SYSTEM PROVIDED HISTORY: headache, dizziness    TECHNOLOGIST PROVIDED HISTORY:    headache, dizziness        Reason for Exam: patient states he fell 1-2 months ago off a ladder and has    been dizzy from moving different positions since        FINDINGS:    BRAIN/VENTRICLES: There is no acute intracranial hemorrhage, mass effect or    midline shift. No abnormal extra-axial fluid collection. The gray-white    differentiation is maintained without evidence of an acute infarct. There is    no evidence of hydrocephalus. Few scattered subcortical and periventricular    white matter hypodensities are most compatible with chronic small vessel    disease. ORBITS: The visualized portion of the orbits demonstrate no acute abnormality. SINUSES: The visualized paranasal sinuses and mastoid air cells demonstrate    no acute abnormality. SOFT TISSUES/SKULL:  No acute abnormality of the visualized skull or soft    tissues. Impression    No acute intracranial abnormality. Chronic small vessel disease.          Narrative    EXAMINATION:    MRI OF THE CERVICAL SPINE WITHOUT CONTRAST 6/30/2020 4:20 pm        TECHNIQUE:    Multiplanar multisequence MRI of the cervical spine was performed without the administration of intravenous contrast.        COMPARISON:    CT cervical spine 04/03/2020        HISTORY:    ORDERING SYSTEM PROVIDED HISTORY: Bilateral carpal tunnel syndrome    TECHNOLOGIST PROVIDED HISTORY:    bilateral hand numbness, tingling    Reason for Exam: pt states neck pain with hand/wrist numbness, prev neck and    shamika wrist surgeries        FINDINGS:    BONES/ALIGNMENT: Vertebral body heights are maintained. There is unchanged 2    mm degenerative anterolisthesis of C3 on C4, C4 on C5, and C7 on T1 secondary    to facet arthropathy. No marrow edema or suspect osseous lesion is evident. The patient is status post prior anterior C5-6 fusion and right C5    laminectomy. SPINAL CORD: The visualized spinal cord has normal signal and morphology. No    evidence of mass or abnormal fluid collection within the spinal canal.        SOFT TISSUES: No acute paraspinal soft tissue abnormality is seen. Posterior    midline surgical scar is noted. C2-C3: Disc height and signal maintained. Moderate left neural foraminal    narrowing secondary to facet hypertrophy. No right neural foraminal    narrowing. No spinal canal stenosis. C3-C4: Disc height and signal maintained. Moderate left and severe right    neural foraminal narrowing secondary to uncovertebral and facet hypertrophy. No spinal canal stenosis. C4-C5: Disc height and signal maintained. Moderate bilateral neural    foraminal narrowing secondary to facet hypertrophy. No spinal canal stenosis. C5-C6: Prior anterior fusion and right laminectomy. No left neural foraminal    narrowing. Moderate right neural foraminal narrowing and mild spinal canal    stenosis secondary to a right posterior paracentral endplate osteophytes. C6-C7: Mild disc height loss and desiccation. Severe bilateral neural    foraminal narrowing secondary to uncovertebral hypertrophy.   Mild spinal    canal stenosis secondary to disc bulge.        C7-T1: Disc height and signal maintained. Mild bilateral neural foraminal    narrowing secondary to facet hypertrophy. No spinal canal stenosis. Impression    1. Changes of prior C5-6 ACDF and right C5 laminectomy with mild residual    C5-6 spinal canal stenosis and moderate right neural foraminal narrowing    secondary to posterior endplate osteophytes. 2. Mild spinal canal stenosis at C6-7 secondary to disc bulge. 3. Multilevel neural foraminal narrowing as detailed above and greatest    involving the right C4 and bilateral C7 neural foramina where it is severe. Clinical  impression:  1. Primary osteoarthritis, right shoulder    2. Bursitis of right shoulder    3. Impingement syndrome of right shoulder    4. Degenerative disc disease, cervical    5. Cervical spondylosis    6. Cervical post-laminectomy syndrome    7. Status post cervical spinal fusion        Plan of care:  Patient is having severe pain in the left shoulder and the movements of the shoulder joint are is in full unrestricted. Hence we will try left shoulder joint injection for the pain relief. The procedure risks as well as alternate therapies were discussed with the patient and patient would like to proceed with the left shoulder joint injection. PDMP Monitoring:    Last PDMP Shirley Loco as Reviewed [facility]):  Review User Review Instant Review Result   Terrence Ordoñez 10/28/2020  6:47 PM Reviewed PDMP [1]     Counselling/Preventive measures for pain  Control:    [x]  Spine strengthening exercises are discussed with patient in detail. Orders Placed This Encounter   Procedures    DRUG SCREEN, PAIN     Standing Status:   Standing     Number of Occurrences:   1       Decision Making Process : Patient's health history and referral records thoroughly reviewed before focused physical examination and discussion with patient. Level of complexity of date to be reviewed is Moderate.  The chart date reviewed include the following: Imaging Reports. Summary of Care. Time spent reviewing with patient the below reports:   Medication safety, Treatment options. Level of diagnosis and management options of this case is multiple: involving the following management options: Interventions as needed, medication management as appropriate, future visits, activity modification, heat/ice as needed, Urine drug screen as required. [x]The patient's questions were answered to the best of my abilities. This note was created using voice recognition software. There may be inaccuracies of transcription  that are inadvertently overlooked prior to the signature. There is any questions about the transcription please contact me.       Electronically signed by Fernando Salazar MD on 11/13/2020 at 6:49 AM

## 2020-11-15 LAB
6-ACETYLMORPHINE, UR: NOT DETECTED
7-AMINOCLONAZEPAM, URINE: NOT DETECTED
ALPHA-OH-ALPRAZ, URINE: NOT DETECTED
ALPRAZOLAM, URINE: NOT DETECTED
AMPHETAMINES, URINE: NOT DETECTED
BARBITURATES, URINE: NOT DETECTED
BENZOYLECGONINE, UR: NOT DETECTED
BUPRENORPHINE URINE: NOT DETECTED
CARISOPRODOL, UR: NOT DETECTED
CLONAZEPAM, URINE: NOT DETECTED
CODEINE, URINE: NOT DETECTED
CREATININE URINE: 95.4 MG/DL (ref 20–400)
DIAZEPAM, URINE: NOT DETECTED
DRUGS EXPECTED, UR: NORMAL
EER HI RES INTERP UR: NORMAL
ETHYL GLUCURONIDE UR: NOT DETECTED
FENTANYL URINE: NOT DETECTED
HYDROCODONE, URINE: NOT DETECTED
HYDROMORPHONE, URINE: NOT DETECTED
LORAZEPAM, URINE: NOT DETECTED
MARIJUANA METAB, UR: NOT DETECTED
MDA, UR: NOT DETECTED
MDEA, EVE, UR: NOT DETECTED
MDMA URINE: NOT DETECTED
MEPERIDINE METAB, UR: NOT DETECTED
METHADONE, URINE: NOT DETECTED
METHAMPHETAMINE, URINE: NOT DETECTED
METHYLPHENIDATE: NOT DETECTED
MIDAZOLAM, URINE: NOT DETECTED
MORPHINE URINE: NOT DETECTED
NORBUPRENORPHINE, URINE: NOT DETECTED
NORDIAZEPAM, URINE: NOT DETECTED
NORFENTANYL, URINE: NOT DETECTED
NORHYDROCODONE, URINE: NOT DETECTED
NOROXYCODONE, URINE: NOT DETECTED
NOROXYMORPHONE, URINE: NOT DETECTED
OXAZEPAM, URINE: NOT DETECTED
OXYCODONE URINE: NOT DETECTED
OXYMORPHONE, URINE: NOT DETECTED
PAIN MANAGEMENT DRUG PANEL INTERP, URINE: NORMAL
PAIN MGT DRUG PANEL, HI RES, UR: NORMAL
PCP,URINE: NOT DETECTED
PHENTERMINE, UR: NOT DETECTED
PROPOXYPHENE, URINE: NOT DETECTED
TAPENTADOL, URINE: NOT DETECTED
TAPENTADOL-O-SULFATE, URINE: NOT DETECTED
TEMAZEPAM, URINE: NOT DETECTED
TRAMADOL, URINE: NOT DETECTED
ZOLPIDEM, URINE: NOT DETECTED

## 2020-11-16 ENCOUNTER — TELEPHONE (OUTPATIENT)
Dept: PAIN MANAGEMENT | Age: 70
End: 2020-11-16

## 2020-12-03 ENCOUNTER — HOSPITAL ENCOUNTER (OUTPATIENT)
Dept: PAIN MANAGEMENT | Age: 70
Discharge: HOME | End: 2020-12-03
Payer: MEDICARE

## 2020-12-03 PROCEDURE — 99443 PR PHYS/QHP TELEPHONE EVALUATION 21-30 MIN: CPT | Performed by: PAIN MEDICINE

## 2020-12-03 PROCEDURE — 99213 OFFICE O/P EST LOW 20 MIN: CPT

## 2020-12-03 RX ORDER — METAXALONE 800 MG/1
800 TABLET ORAL 3 TIMES DAILY
Qty: 30 TABLET | Refills: 0 | Status: SHIPPED | OUTPATIENT
Start: 2020-12-03 | End: 2020-12-13

## 2020-12-03 ASSESSMENT — ENCOUNTER SYMPTOMS
BLOOD IN STOOL: 0
COUGH: 1
COLOR CHANGE: 0
EYES NEGATIVE: 1
SHORTNESS OF BREATH: 1
SORE THROAT: 0
EYE PAIN: 0
ABDOMINAL PAIN: 0
ALLERGIC/IMMUNOLOGIC NEGATIVE: 1
BACK PAIN: 1
EYE REDNESS: 0
DIARRHEA: 0
GASTROINTESTINAL NEGATIVE: 1
VOMITING: 0

## 2020-12-03 NOTE — PROGRESS NOTES
Jessica Boateng is a 72 y.o. male evaluated on 12/3/2020. Modality of virtual service provided -via telephone   Consent:  Patient and/or health care decision maker is aware that that patient may receive a bill for this telephone service, depending on one's insurance coverage, and has provided verbal consent to proceed: Yes    Patient identification was verified at the start of the visit: Yes    Chief complaint: Jessica Boateng is 72 y.o.,  male, with complaint of pain in the neck shoulder and left hand. Referral Diagnosis   M47.22 (ICD-10-CM) - Cervical spondylosis with radiculopathy     Patient is complaining of pain involving the cervical region as well as in the hands and in the neck area. Patient had right shoulder joint injection done on 11/12/2020 from which she had good relief of pain in the shoulder area. He reports he is able to move his shoulder with less pain and his range of movements are increasing in the shoulder. He still having some weakness in his right upper extremity. Patient apparently had undergone carpal tunnel surgery the left side since then having severe pain in his left hand especially in the thumb. He reports he is having burning pain. Patient previously had undergone cervical fusion and he reports he had good improvement in the pain following the surgery but the pain in the neck is slowly returning. The pain is mostly axial bilaterally at the base of the neck. He reports he is having muscle spasms in the area. Patient would like to see a neurosurgeon and he has seen Pan Lowe in the past and would like to see him again. Patient is complaining of muscle spasms in the neck especially at the base and is requesting a muscle relaxer. Shoulder Pain    The pain is present in the right shoulder, left wrist, left hand, left fingers, right hand, right fingers and right wrist. This is a chronic problem. The current episode started more than 1 year ago.  There has been no history of extremity trauma. The problem occurs intermittently. The problem has been gradually improving. Quality: Feels like somebody is punching anyone. The pain is at a severity of 2/10 (0-5). The pain is mild. Associated symptoms include numbness and tingling. Associated symptoms comments: Numbness in the hands bilaterally. The symptoms are aggravated by activity (Leaning on the right elbow, shoulder movements). Neck Pain    This is a chronic problem. The current episode started more than 1 year ago. The problem occurs constantly. The problem has been unchanged. The pain is associated with nothing. The pain is present in the left side, midline and right side. The quality of the pain is described as aching and cramping. The pain is at a severity of 7/10. The pain is severe. The symptoms are aggravated by twisting and bending (Movements). Associated symptoms include chest pain, numbness and tingling. Alleviating factors:heat and rest   Lifestyle changes experienced with pain: Wakes from sleep, Prevents or limits ADLs, Increases w/activity. Increases w/prolonged sitting/standing/walking  Mood changes none  Patient currently unemployed. Physical therapy did not help the pain. Are you under psychological counseling at present: No  Goals for treatment include:  Decrease in pain  Enjoy daily and recreational activities, return to previous status. Last procedure was right shoulder joint injection on 11/12/2020 with about 90% improvement in the pain in the shoulder    Patient relates current medications are helping the pain. Patient reports taking pain medications as prescribed, denies obtaining medications from different sources and denies use of illegal drugs. Patient denies side effects from medications like nausea, vomiting, constipation or drowsiness. Patient reports current activities of daily living ar possible due to medications and would like to continue them. ACTIVITY/SOCIAL/EMOTIONAL:  Sleep Pattern: 6 hours per night. generally restful sleep  Home Exercises:  no regular exercise  Activity:increased  Emotional Issues: normal.   Currently seeing a Psychiatrist or Psychologist:  No    Past Medical History:   Diagnosis Date    Anxiety state, unspecified     Asthma     inhalers per pcp    CAD (coronary artery disease)     follow[facility] with Dr. [facility]han [facility] Carotid artery stenosis     Colon polyp     Depressive disorder, not elsewhere classified     Elevated PSA 05/06/2019    Essential hypertension, benign     meds per pcp    GERD (gastroesophageal reflux disease)     Hearing loss     bilat.  Has hearing aids but does not wear    History of colon polyps     History of hepatitis C     Hyperlipidemia     on rx    Impotence of organic origin     Lipoma of unspecified site     Lumbago     No natural teeth     Prostate CA ([address] 05/2019    Secondary localized osteoarthrosis, shoulder region     Snores     no cpap or sleep apnea    Swelling of limb     Wellness examination     Kelly Hammond pcp last seen June 2020       Past Surgical History:   Procedure Laterality Date    ANKLE SURGERY Right     CARPAL TUNNEL RELEASE Bilateral     CERVICAL FUSION  2009    anterior    CHOLECYSTECTOMY      approx 2000    COLONOSCOPY      EXCISION / BIOPSY SKIN LESION OF ARM Left 9/15/2017    EXCISION SKIN LESION LEFT CHEST AND LEFT BUTTOCK, EXCISION LIPOMA LEFT LOWER ABDOMEN performed by Brandi Salinas DO at [facility]  08/26/2020    INSERTION INFLATABLE PENILE PROSTHESIS      PENILE PROSTHESIS PLACEMENT N/A 8/26/2020    INSERTION INFLATABLE PENILE PROSTHESIS  (REP NOTIFIED - Raji Nino) performed by Barbie Nava MD at [address] [place] [address] N/A 4/13/2017    COLONOSCOPY performed by Geoff Echols MD at [address] [place] [address] N/A 4/24/2019    PROSTATE BIOPSY WITH ULTRASOUND  (OFFICE TO NOTIFY [place]) performed by Brooklyn Patel MD at [address] N/A 6/26/2019    XI ROBOTIC LAPAROSCOPIC PROSTATECTOMY , PELVIC LYMPH NODE DISSECTION performed by Brooklyn Patel MD at [address] Right 2015    ULNAR TUNNEL RELEASE Left        Family History   Problem Relation Age of Onset    Stroke Maternal Uncle 80    Heart Surgery Maternal Uncle         Triple Bypass    Other Maternal Grandmother         Pul. Embolism    Cancer Maternal Grandfather         Throat    No Known Problems Mother     Other Father         MVA    No Known Problems Sister     Other Brother         Electric burn at work   Prashanth Loser No Known Problems Paternal Grandmother     No Known Problems Paternal Grandfather     Diabetes Brother        Social History     Socioeconomic History    Marital status:       Spouse name: None    Number of children: 2    Years of education: None    Highest education level: None   Occupational History    None   Social Needs    Financial resource strain: None    Food insecurity     Worry: None     Inability: None    Transportation needs     Medical: None     Non-medical: None   Tobacco Use    Smoking status: Current Every Day Smoker     Packs/day: 0.25     Years: 54.00     Pack years: 13.50     Types: Cigarettes     Start date: 1960    Smokeless tobacco: Never Used   Substance and Sexual Activity    Alcohol use: Not Currently     Comment: 22 oz beer every 2 days    Drug use: Not Currently     Comment: teenager    Sexual activity: Yes     Partners: Female   Lifestyle    Physical activity     Days per week: None     Minutes per session: None    Stress: None   Relationships    Social connections     Talks on phone: None     Gets together: None     Attends Latter-day service: None     Active member of club or organization: None     Attends meetings of clubs or organizations: None     Relationship status: None    Intimate partner violence     Fear of current or ex partner: None     Emotionally abused: None     Physically abused: None     Forced sexual activity: None   Other Topics Concern    None   Social History Narrative    None       Allergies   Allergen Reactions    Bee Venom Anaphylaxis    Fentanyl Shortness Of Breath and Swelling     Tongue swelling    Morphine Hives, Shortness Of Breath and Swelling     Face swelling    Pcn [Penicillins] Hives and Rash    Codeine Hives, Nausea Only, Rash and Other (See Comments)       Current Outpatient Medications on File Prior to Encounter   Medication Sig Dispense Refill    VITAMIN D PO Take by mouth daily      omeprazole (PRILOSEC) 20 MG delayed release capsule Take 20 mg by mouth daily      calcium citrate (CALCITRATE) 950 MG tablet Take 1 tablet by mouth daily      Aspirin-Salicylamide-Caffeine (BC HEADACHE POWDER PO) Take by mouth Hold 7 days prior to surgery      lisinopril (PRINIVIL;ZESTRIL) 40 MG tablet Take 40 mg by mouth nightly Per pcp      clonazePAM (KLONOPIN) 1 MG tablet Take 1 mg by mouth 2 times daily. pcp      albuterol sulfate  (90 Base) MCG/ACT inhaler Inhale 2 puffs into the lungs every 6 hours as needed for Wheezing Per pcp      amLODIPine (NORVASC) 5 MG tablet Per pcp  3    tiotropium (SPIRIVA) 18 MCG inhalation capsule Inhale 1 capsule into the lungs daily 30 capsule 3    fluticasone (FLONASE) 50 MCG/ACT nasal spray 2 sprays by Nasal route daily (Patient taking differently: 2 sprays by Nasal route daily as needed ) 1 Bottle 0    atorvastatin (LIPITOR) 20 MG tablet TAKE 1 TABLET DAILY (Patient taking differently: Per Dr. Janel Broussard) 90 tablet 1    Multiple Vitamins-Minerals (CENTRUM SILVER 50+MEN PO) Take 1 tablet by mouth daily       No current facility-administered medications on file prior to encounter. Review of Systems   Constitutional: Negative. Negative for activity change, appetite change, fatigue and unexpected weight change. HENT: Positive for hearing loss.  Negative for congestion, ear pain and sore throat. Eyes: Negative. Negative for pain, redness and visual disturbance. Respiratory: Positive for cough and shortness of breath. COPD   Cardiovascular: Positive for chest pain. Negative for palpitations. CAD   Gastrointestinal: Negative. Negative for abdominal pain, blood in stool, diarrhea and vomiting. GERD   Endocrine: Negative. Negative for cold intolerance and polyuria. Genitourinary: Negative. Negative for dysuria and hematuria. Hx of prostate sx. Penile prosthesis placement 8/2020   Musculoskeletal: Positive for arthralgias, back pain and neck pain. Skin: Negative for color change and rash. Allergic/Immunologic: Negative. Negative for immunocompromised state. Neurological: Positive for tingling and numbness. Hematological: Negative. Does not bruise/bleed easily. Psychiatric/Behavioral: Negative for self-injury, sleep disturbance and suicidal ideas. The patient is nervous/anxious. The patient is not hyperactive. Physical Exam  Skin:         Neurological:      Mental Status: He is alert and oriented to person, place, and time.    Psychiatric:         Mood and Affect: Mood normal.         DATA:  LAB.:  11/15/2020 10:11 AM - Alexis Neely Incoming Lab Results From [employer]     Component  Value  Ref Range & Units  Status  Collected  Lab    Pain Management Drug Panel Interp, Urine  Consistent   Final  11/12/2020 10:55 AM  [facility]    (NOTE)   ________________________________________________________________   DRUGS EXPECTED:   NO DRUGS EXPECTED   ________________________________________________________________   CONSISTENT with medications provided:   NO DRUGS DETECTED        X-Ray reports:  X-ray cervical spine Date: 3/2/2020 History: Bilateral upper extremity numbness, neck pain   Comparison: None   Findings: AP, odontoid and lateral views of the cervical spine demonstrates extensive diffuse spondylosis with sydnesmophytes noted at C4-C5, C6-C7. There is a fusion noted at C5-C6 form prior surgery. Loss normal lordosis with kyphosis noted in the lower subaxial cervical spine. No acute fracture or dislocation. No lytic or blastic lesions. No soft tissue abnormalities. Impression: Diffuse spondylosis of the cervical spine   CT OF THE HEAD WITHOUT CONTRAST; CT OF THE CERVICAL SPINE WITHOUT CONTRAST    4/3/2020 8:38 pm        TECHNIQUE:    CT of the head was performed without the administration of intravenous    contrast. Dose modulation, iterative reconstruction, and/or weight based    adjustment of the mA/kV was utilized to reduce the radiation dose to as low    as reasonably achievable.; CT of the cervical spine was performed without the    administration of intravenous contrast. Multiplanar reformatted images are    provided for review. Dose modulation, iterative reconstruction, and/or weight    based adjustment of the mA/kV was utilized to reduce the radiation dose to as    low as reasonably achievable. COMPARISON:    None. HISTORY:    ORDERING SYSTEM PROVIDED HISTORY: fall head injury    TECHNOLOGIST PROVIDED HISTORY:    fall head injury        Reason for Exam: head injury; pt states he fell off a 7ft. ladder    Acuity: Unknown    Type of Exam: Unknown; ORDERING SYSTEM PROVIDED HISTORY: fall head injury    TECHNOLOGIST PROVIDED HISTORY:    fall head injury    Reason for Exam: pt states he fell off a 7ft. ladder    Acuity: Unknown    Type of Exam: Unknown        FINDINGS:    CT CERVICAL SPINE:        BONES/ALIGNMENT: No acute fracture. Straightening of the normal cervical    lordosis. Trace multilevel degenerative listhesis. The craniocervical    junction is intact. No destructive osseous lesion. Osseous fusion of C5-C6. DEGENERATIVE CHANGES: Multilevel degenerative changes throughout the cervical    spine. SOFT TISSUES: There is no prevertebral soft tissue swelling.         CT HEAD: BRAIN/VENTRICLES: There is no acute intracranial hemorrhage, mass effect or    midline shift. No abnormal extra-axial fluid collection. The gray-white    differentiation is maintained without evidence of an acute infarct. There is    prominence of the ventricles and sulci due to global parenchymal volume loss. There are nonspecific areas of hypoattenuation within the periventricular and    subcortical white matter, which likely represent chronic microvascular    ischemic change. Atherosclerosis. ORBITS: The visualized portion of the orbits demonstrate no acute abnormality. SINUSES: Small left sphenoid and right ethmoid sinus mucous retention cysts. The other visualized paranasal sinuses and mastoid air cells demonstrate no    acute abnormality. SOFT TISSUES/SKULL: Right frontal scalp soft tissue swelling/laceration. No    radiopaque foreign body. No acute displaced skull fracture. Impression    1. No acute abnormality of the cervical spine. Multilevel degenerative    changes throughout the cervical spine. 2.  No acute intracranial abnormality. Mild chronic small vessel ischemic    disease. 3.  Right frontal scalp soft tissue swelling/laceration. No acute displaced    skull fracture. EXAMINATION:    CT OF THE HEAD WITHOUT CONTRAST  5/30/2020 3:40 pm        TECHNIQUE:    CT of the head was performed without the administration of intravenous    contrast. Dose modulation, iterative reconstruction, and/or weight based    adjustment of the mA/kV was utilized to reduce the radiation dose to as low    as reasonably achievable.         COMPARISON:    04/03/2020        HISTORY:    ORDERING SYSTEM PROVIDED HISTORY: headache, dizziness    TECHNOLOGIST PROVIDED HISTORY:    headache, dizziness        Reason for Exam: patient states he fell 1-2 months ago off a ladder and has    been dizzy from moving different positions since        FINDINGS: BRAIN/VENTRICLES: There is no acute intracranial hemorrhage, mass effect or    midline shift. No abnormal extra-axial fluid collection. The gray-white    differentiation is maintained without evidence of an acute infarct. There is    no evidence of hydrocephalus. Few scattered subcortical and periventricular    white matter hypodensities are most compatible with chronic small vessel    disease. ORBITS: The visualized portion of the orbits demonstrate no acute abnormality. SINUSES: The visualized paranasal sinuses and mastoid air cells demonstrate    no acute abnormality. SOFT TISSUES/SKULL:  No acute abnormality of the visualized skull or soft    tissues. Impression    No acute intracranial abnormality. Chronic small vessel disease. Narrative    EXAMINATION:    MRI OF THE CERVICAL SPINE WITHOUT CONTRAST 6/30/2020 4:20 pm        TECHNIQUE:    Multiplanar multisequence MRI of the cervical spine was performed without the    administration of intravenous contrast.        COMPARISON:    CT cervical spine 04/03/2020        HISTORY:    ORDERING SYSTEM PROVIDED HISTORY: Bilateral carpal tunnel syndrome    TECHNOLOGIST PROVIDED HISTORY:    bilateral hand numbness, tingling    Reason for Exam: pt states neck pain with hand/wrist numbness, prev neck and    shamika wrist surgeries        FINDINGS:    BONES/ALIGNMENT: Vertebral body heights are maintained. There is unchanged 2    mm degenerative anterolisthesis of C3 on C4, C4 on C5, and C7 on T1 secondary    to facet arthropathy. No marrow edema or suspect osseous lesion is evident. The patient is status post prior anterior C5-6 fusion and right C5    laminectomy. SPINAL CORD: The visualized spinal cord has normal signal and morphology. No    evidence of mass or abnormal fluid collection within the spinal canal.        SOFT TISSUES: No acute paraspinal soft tissue abnormality is seen.   Posterior    midline surgical scar is noted. C2-C3: Disc height and signal maintained. Moderate left neural foraminal    narrowing secondary to facet hypertrophy. No right neural foraminal    narrowing. No spinal canal stenosis. C3-C4: Disc height and signal maintained. Moderate left and severe right    neural foraminal narrowing secondary to uncovertebral and facet hypertrophy. No spinal canal stenosis. C4-C5: Disc height and signal maintained. Moderate bilateral neural    foraminal narrowing secondary to facet hypertrophy. No spinal canal stenosis. C5-C6: Prior anterior fusion and right laminectomy. No left neural foraminal    narrowing. Moderate right neural foraminal narrowing and mild spinal canal    stenosis secondary to a right posterior paracentral endplate osteophytes. C6-C7: Mild disc height loss and desiccation. Severe bilateral neural    foraminal narrowing secondary to uncovertebral hypertrophy. Mild spinal    canal stenosis secondary to disc bulge. C7-T1: Disc height and signal maintained. Mild bilateral neural foraminal    narrowing secondary to facet hypertrophy. No spinal canal stenosis. Impression    1. Changes of prior C5-6 ACDF and right C5 laminectomy with mild residual    C5-6 spinal canal stenosis and moderate right neural foraminal narrowing    secondary to posterior endplate osteophytes. 2. Mild spinal canal stenosis at C6-7 secondary to disc bulge. 3. Multilevel neural foraminal narrowing as detailed above and greatest    involving the right C4 and bilateral C7 neural foramina where it is severe. Clinical  impression:  1. Primary osteoarthritis, right shoulder    2. Degenerative disc disease, cervical    3. Cervical spondylosis    4. Cervical post-laminectomy syndrome    5. S/P carpal tunnel release-left with continued pain        Plan of care: Patient is complaining of muscle spasms in the neck.   Hence we will try Flexeril to see if that could help to control some of. He would like to see neurosurgeon again about his neck. He is advised to talk to his primary care physician for referral to the neurosurgeon. We can try facet joint injections which was discussed with the patient after she is evaluated by neurosurgeon and if they agree. I did offer the patient both pregabalin as well as gabapentin but patient apparently tried gabapentin without any improvement and he would not like to try any other medications. Orders Placed This Encounter   Medications    metaxalone (SKELAXIN) 800 MG tablet     Sig: Take 1 tablet by mouth 3 times daily for 10 days     Dispense:  30 tablet     Refill:  0   Patient is complaining of muscle spasms in the neck. Hence we will try  Urine drug screens have been appropriate. No aberrant activity noted. Analgesia is achieved. Activities of daily living are possible because of medications. Safe use of medications explained to patient. PDMP Monitoring:    Last PDMP Yeni Godoy as Reviewed [facility]):  Review User Review Instant Review Result   Tanmay Farooq 12/3/2020  4:48 AM Reviewed PDMP [1]     Counselling/Preventive measures for pain  Control:    [x]  Spine strengthening exercises are discussed with patient in detail. Patient is advised to call the pain clinic if he needs her services again    Decision Making Process : Patient's health history and referral records thoroughly reviewed before focused physical examination and discussion with patient. I have spent 20 mins. Over 50% of today's visit is spent on examining the patient and counseling and coordinating the care. Level of complexity of date to be reviewed is Moderate. The chart date reviewed include the following: Imaging Reports. Summary of Care. Time spent reviewing with patient the below reports:   Medication safety, Treatment options.     Level of diagnosis and management options of this case is multiple: involving the following management options: Interventions as needed, medication management as appropriate, future visits, activity modification, heat/ice as needed, Urine drug screen as required. [x]The patient's questions were answered to the best of my abilities. This note was created using voice recognition software. There may be inaccuracies of transcription  that are inadvertently overlooked prior to the signature. There is any questions about the transcription please contact me. Return as needed with physician / CNP  for further plan of treatment. Due to the COVID-19 pandemic and the appropriate interventions by Jarret Galvan, our non-urgent pain management patients will not be seen in the office at this time for their protection and the protection of our staff. To offer continuity of care, their prescriptions will be escribed this month after a careful chart review and review of their OARRS report  Pursuant to the emergency declaration under the [employer] and [facility], 1135 waiver authority and the [employer] and [employer] Act, this Virtual Visit was conducted, with patient's consent, to reduce the patient's risk of exposure to COVID-19 and provide continuity of care for an established patient. Services were provided through a video synchronous discussion virtually to substitute for in-person appointment. \"  Documentation:  I communicated with the patient and/or health care decision maker about plan of care  Details of this discussion including any medical advice provided: Total Time: minutes: 21-30 minutes    I affirm this is a Patient Initiated Episode with an Established Patient who has not had a related appointment within my department in the past 7 days or scheduled within the next 24 hours.     Electronically signed by Eusebio Lawson MD on 12/3/2020 at 7:28 PM

## 2020-12-05 RX ORDER — TIZANIDINE 4 MG/1
4 TABLET ORAL EVERY 8 HOURS PRN
Status: DISCONTINUED | OUTPATIENT
Start: 2020-12-05 | End: 2020-12-05 | Stop reason: HOSPADM

## 2020-12-21 ENCOUNTER — HOSPITAL ENCOUNTER (OUTPATIENT)
Age: 70
Setting detail: SPECIMEN
Discharge: HOME | End: 2020-12-21
Payer: MEDICARE

## 2020-12-21 LAB
ABSOLUTE EOS #: 0.08 K/UL (ref 0–0.44)
ABSOLUTE IMMATURE GRANULOCYTE: 0.04 K/UL (ref 0–0.3)
ABSOLUTE LYMPH #: 2.81 K/UL (ref 1.1–3.7)
ABSOLUTE MONO #: 0.8 K/UL (ref 0.1–1.2)
ALBUMIN SERPL-MCNC: 4.2 G/DL (ref 3.5–5.2)
ALBUMIN/GLOBULIN RATIO: 1.3 (ref 1–2.5)
ALP BLD-CCNC: 79 U/L (ref 40–129)
ALT SERPL-CCNC: 26 U/L (ref 5–41)
ANION GAP SERPL CALCULATED.3IONS-SCNC: 16 MMOL/L (ref 9–17)
AST SERPL-CCNC: 26 U/L
BASOPHILS # BLD: 1 % (ref 0–2)
BASOPHILS ABSOLUTE: 0.05 K/UL (ref 0–0.2)
BILIRUB SERPL-MCNC: 0.37 MG/DL (ref 0.3–1.2)
BUN BLDV-MCNC: 14 MG/DL (ref 8–23)
BUN/CREAT BLD: ABNORMAL (ref 9–20)
CALCIUM SERPL-MCNC: 9.1 MG/DL (ref 8.6–10.4)
CHLORIDE BLD-SCNC: 104 MMOL/L (ref 98–107)
CHOLESTEROL/HDL RATIO: 3.5
CHOLESTEROL: 146 MG/DL
CO2: 19 MMOL/L (ref 20–31)
CREAT SERPL-MCNC: 0.71 MG/DL (ref 0.7–1.2)
DIFFERENTIAL TYPE: ABNORMAL
EOSINOPHILS RELATIVE PERCENT: 1 % (ref 1–4)
GFR AFRICAN AMERICAN: >60 ML/MIN
GFR NON-AFRICAN AMERICAN: >60 ML/MIN
GFR SERPL CREATININE-BSD FRML MDRD: ABNORMAL ML/MIN/{1.73_M2}
GFR SERPL CREATININE-BSD FRML MDRD: ABNORMAL ML/MIN/{1.73_M2}
GLUCOSE BLD-MCNC: 103 MG/DL (ref 70–99)
HCT VFR BLD CALC: 52.3 % (ref 40.7–50.3)
HDLC SERPL-MCNC: 42 MG/DL
HEMOGLOBIN: 16.9 G/DL (ref 13–17)
IMMATURE GRANULOCYTES: 0 %
LDL CHOLESTEROL: 75 MG/DL (ref 0–130)
LYMPHOCYTES # BLD: 29 % (ref 24–43)
MCH RBC QN AUTO: 32.1 PG (ref 25.2–33.5)
MCHC RBC AUTO-ENTMCNC: 32.3 G/DL (ref 28.4–34.8)
MCV RBC AUTO: 99.4 FL (ref 82.6–102.9)
MONOCYTES # BLD: 8 % (ref 3–12)
NRBC AUTOMATED: 0 PER 100 WBC
PDW BLD-RTO: 11.7 % (ref 11.8–14.4)
PLATELET # BLD: 187 K/UL (ref 138–453)
PLATELET ESTIMATE: ABNORMAL
PMV BLD AUTO: 11.5 FL (ref 8.1–13.5)
POTASSIUM SERPL-SCNC: 4.7 MMOL/L (ref 3.7–5.3)
RBC # BLD: 5.26 M/UL (ref 4.21–5.77)
RBC # BLD: ABNORMAL 10*6/UL
SEG NEUTROPHILS: 61 % (ref 36–65)
SEGMENTED NEUTROPHILS ABSOLUTE COUNT: 6.01 K/UL (ref 1.5–8.1)
SODIUM BLD-SCNC: 139 MMOL/L (ref 135–144)
TOTAL PROTEIN: 7.4 G/DL (ref 6.4–8.3)
TRIGL SERPL-MCNC: 146 MG/DL
TSH SERPL DL<=0.05 MIU/L-ACNC: 1.45 MIU/L (ref 0.3–5)
VLDLC SERPL CALC-MCNC: NORMAL MG/DL (ref 1–30)
WBC # BLD: 9.8 K/UL (ref 3.5–11.3)
WBC # BLD: ABNORMAL 10*3/UL

## 2020-12-22 ENCOUNTER — HOSPITAL ENCOUNTER (OUTPATIENT)
Age: 70
Discharge: HOME | End: 2020-12-24
Payer: MEDICARE

## 2020-12-22 ENCOUNTER — HOSPITAL ENCOUNTER (OUTPATIENT)
Dept: GENERAL RADIOLOGY | Age: 70
Discharge: HOME | End: 2020-12-24
Payer: MEDICARE

## 2020-12-22 LAB — VITAMIN D 25-HYDROXY: 40.3 NG/ML (ref 30–100)

## 2020-12-22 PROCEDURE — 70360 X-RAY EXAM OF NECK: CPT

## 2021-01-28 ENCOUNTER — HOSPITAL ENCOUNTER (OUTPATIENT)
Age: 71
Discharge: HOME | End: 2021-01-28
Payer: MEDICARE

## 2021-01-28 DIAGNOSIS — Z85.46 HISTORY OF MALIGNANT NEOPLASM OF PROSTATE: ICD-10-CM

## 2021-01-28 LAB — PROSTATE SPECIFIC ANTIGEN: <0.01 UG/L

## 2021-01-28 PROCEDURE — 36415 COLL VENOUS BLD VENIPUNCTURE: CPT

## 2021-01-28 PROCEDURE — 84153 ASSAY OF PSA TOTAL: CPT

## 2021-02-01 ENCOUNTER — OFFICE VISIT (OUTPATIENT)
Dept: UROLOGY | Age: 71
End: 2021-02-01
Payer: MEDICARE

## 2021-02-01 VITALS
BODY MASS INDEX: 29.62 KG/M2 | DIASTOLIC BLOOD PRESSURE: 78 MMHG | HEIGHT: 69 IN | WEIGHT: 200 LBS | SYSTOLIC BLOOD PRESSURE: 128 MMHG | TEMPERATURE: 98.5 F | HEART RATE: 83 BPM

## 2021-02-01 DIAGNOSIS — N39.3 STRESS INCONTINENCE, MALE: ICD-10-CM

## 2021-02-01 DIAGNOSIS — Z96.0 S/P INSERTION OF PENILE IMPLANT: ICD-10-CM

## 2021-02-01 DIAGNOSIS — C61 PROSTATE CANCER (HCC): Primary | ICD-10-CM

## 2021-02-01 PROCEDURE — 99214 OFFICE O/P EST MOD 30 MIN: CPT | Performed by: UROLOGY

## 2021-02-01 ASSESSMENT — ENCOUNTER SYMPTOMS
EYE REDNESS: 0
WHEEZING: 0
ABDOMINAL PAIN: 0
VOMITING: 0
COUGH: 0
DIARRHEA: 0
BACK PAIN: 1
CONSTIPATION: 0
NAUSEA: 0
SHORTNESS OF BREATH: 0
EYE PAIN: 0

## 2021-02-01 NOTE — PROGRESS NOTES
[address] [address]  Dept: [address] [facility] Urology Office Note - Established    Patient:  Annelise Harry  YOB: 1950  Date: 2/1/2021    The patient is a 72 y.o. male who presents todayfor evaluation of the following problems:   Chief Complaint   Patient presents with    Prostate Cancer     3 months with PSA        HPI  Is a very pleasant 50-year-old gentleman with a history of prostate cancer. He did have a robot-assisted laparoscopic radical prostatectomy last year. He does also have an inflatable penile prosthesis. He continues to have mild stress incontinence. He does wear 1 undergarment every 24 hours. It is just damp at the end of the day. Summary of old records: N/A    Additional History: N/A    Procedures Today: N/A    Urinalysis today:  No results found for this visit on 02/01/21. Last several PSA's:  Lab Results   Component Value Date    PSA <0.01 01/28/2021    PSA <0.01 05/30/2020    PSA <0.01 02/22/2020     Last total testosterone:  No results found for: TESTOSTERONE    AUA Symptom Score (2/1/2021):   INCOMPLETE EMPTYING: How often have you had the sensation of not emptying your bladder?: Less than 1 to 5 times  FREQUENCY: How often do you have to urinate less than every two hours?: Less than Half the time  INTERMITTENCY: How often have you found you stopped and started again several times when you urinated?: Not at all  URGENCY: How often have you found it difficult to postpone urination?: Not at all  WEAK STREAM: How often have you had a weak urinary stream?: Not at all  STRAINING: How often have you had to strain to start  urination?: Not at all  NOCTURIA: How many times did you typically get up at night to uriniate?: 1 Time  TOTAL I-PSS SCORE[de-identified] 4  How would you feel if you were to spend the rest of your life with your urinary condition?: Mixe    Last BUN and creatinine:  Lab Results   Component Value Date    BUN 14 12/21/2020     Lab Results   Component Value Date    CREATININE 0.71 12/21/2020       Additional Lab/Culture results: none    Imaging Reviewed during this Office Visit: none  (results were independently reviewed by physician and radiology report verified)    PAST MEDICAL, FAMILY AND SOCIAL HISTORY UPDATE:  Past Medical History:   Diagnosis Date    Anxiety state, unspecified     Asthma     inhalers per pcp    CAD (coronary artery disease)     [facility]arlos [facility] Carotid artery stenosis     Colon polyp     Depressive disorder, not elsewhere classified     Elevated PSA 05/06/2019    Essential hypertension, benign     meds per pcp    GERD (gastroesophageal reflux disease)     Hearing loss     bilat.  Has hearing aids but does not wear    History of colon polyps     History of hepatitis C     Hyperlipidemia     on rx    Impotence of organic origin     Lipoma of unspecified site     Lumbago     No natural teeth     Prostate CA ([address] 05/2019    Secondary localized osteoarthrosis, shoulder region     Snores     no cpap or sleep apnea    Swelling of limb     Wellness examination     Tequila Stiles pcp last seen June 2020     Past Surgical History:   Procedure Laterality Date    ANKLE SURGERY Right     CARPAL TUNNEL RELEASE Bilateral     CERVICAL FUSION  2009    anterior    CHOLECYSTECTOMY      approx 2000    COLONOSCOPY      EXCISION / BIOPSY SKIN LESION OF ARM Left 9/15/2017    EXCISION SKIN LESION LEFT CHEST AND LEFT BUTTOCK, EXCISION LIPOMA LEFT LOWER ABDOMEN performed by Huong Ryder DO at [address]  08/26/2020    INSERTION INFLATABLE PENILE PROSTHESIS      PENILE PROSTHESIS PLACEMENT N/A 8/26/2020    INSERTION INFLATABLE PENILE PROSTHESIS  (REP NOTIFIED - Elizabeth Norris) performed by Carl Genao MD at [address] [facility] [address] NOT  [address] [place] [address] N/A 4/13/2017 atorvastatin (LIPITOR) 20 MG tablet TAKE 1 TABLET DAILY (Patient taking differently: Per Dr. Fili Jauregui) 90 tablet 1    Multiple Vitamins-Minerals (CENTRUM SILVER 50+MEN PO) Take 1 tablet by mouth daily         Bee venom, Fentanyl, Morphine, Pcn [penicillins], and Codeine  Social History     Tobacco Use   Smoking Status Current Every Day Smoker    Packs/day: 0.25    Years: 54.00    Pack years: 13.50    Types: Cigarettes    Start date: 1960   Smokeless Tobacco Never Used     (Ifpatient a smoker, smoking cessation counseling offered)    Social History     Substance and Sexual Activity   Alcohol Use Not Currently    Comment: 22 oz beer every 2 days       REVIEW OF SYSTEMS:  Review of Systems    Physical Exam:      Vitals:    02/01/21 1523   BP: 128/78   Pulse: 83   Temp: 98.5 °F (36.9 °C)     Body mass index is 29.53 kg/m². Patient is a 72 y.o. male in no acute distress and alert and oriented to person, place and time. Physical Exam  Constitutional: Patient in no acute distress. Neuro: Alert and oriented to person, place and time. Psych: Mood normal, affect normal  Skin: No rash noted  HEENT: Head: Normocephalic andatraumatic  Conjunctivae and EOM are normal. Pupils are equal, round  Nose:Normal  Right External Ear: Normal; Left External Ear: Normal  Mouth: Mucosa Moist  Neck: Supple  Lungs: Respiratory effort is normal  Cardiovascular: [place]      Assessment and Plan      1. Prostate cancer ([address]    2. Stress incontinence, male    3. S/P insertion of penile implant           Plan:   F/u 6 mo psa  Cont kegels      Return in about 6 months (around 8/1/2021) for psa. Prescriptions Ordered:  No orders of the defined types were placed in this encounter. Orders Placed:  Orders Placed This Encounter   Procedures    PSA, Diagnostic     Standing Status:   Future     Standing Expiration Date:   2/1/2022           Prema Villegas MD    Agree with the ROS entered by the MA.

## 2021-04-09 ENCOUNTER — APPOINTMENT (OUTPATIENT)
Dept: GENERAL RADIOLOGY | Age: 71
End: 2021-04-09
Payer: MEDICARE

## 2021-04-09 ENCOUNTER — HOSPITAL ENCOUNTER (EMERGENCY)
Age: 71
Discharge: LEFT BEFORE DISCHARGE | End: 2021-04-09
Attending: EMERGENCY MEDICINE
Payer: MEDICARE

## 2021-04-09 VITALS
SYSTOLIC BLOOD PRESSURE: 133 MMHG | HEIGHT: 69 IN | RESPIRATION RATE: 16 BRPM | TEMPERATURE: 98 F | DIASTOLIC BLOOD PRESSURE: 90 MMHG | WEIGHT: 208 LBS | BODY MASS INDEX: 30.81 KG/M2 | OXYGEN SATURATION: 98 % | HEART RATE: 81 BPM

## 2021-04-09 DIAGNOSIS — M25.511 ACUTE PAIN OF RIGHT SHOULDER: Primary | ICD-10-CM

## 2021-04-09 PROCEDURE — 99283 EMERGENCY DEPT VISIT LOW MDM: CPT

## 2021-04-09 PROCEDURE — 73030 X-RAY EXAM OF SHOULDER: CPT

## 2021-04-09 RX ORDER — ACETAMINOPHEN 500 MG
1000 TABLET ORAL ONCE
Status: DISCONTINUED | OUTPATIENT
Start: 2021-04-09 | End: 2021-04-10 | Stop reason: HOSPADM

## 2021-04-09 RX ORDER — LIDOCAINE 4 G/G
1 PATCH TOPICAL ONCE
Status: DISCONTINUED | OUTPATIENT
Start: 2021-04-09 | End: 2021-04-10 | Stop reason: HOSPADM

## 2021-04-10 NOTE — ED PROVIDER NOTES
EMERGENCY DEPARTMENT ENCOUNTER    Pt Name: Alma Smith  MRN: [id number]  [place] 1950  Date of evaluation: 4/9/21  CHIEF COMPLAINT       Chief Complaint   Patient presents with    Shoulder Pain     right    Arm Pain     HISTORY OF PRESENT ILLNESS     Shoulder Problem  Location:  Shoulder  Shoulder location:  R shoulder  Injury: no    Pain details:     Quality:  Aching    Radiates to:  Does not radiate    Severity:  Moderate    Onset quality:  Gradual    Duration: 1 year. Timing:  Constant    Progression:  Worsening (worse this am)  Handedness:  Right-handed  Dislocation: no    Prior injury to area:  No  Relieved by:  Rest  Worsened by: Movement  Associated symptoms: decreased range of motion, stiffness and swelling    Associated symptoms: no fever, no neck pain, no numbness and no tingling            REVIEW OF SYSTEMS     Review of Systems   Constitutional: Negative for fever. Musculoskeletal: Positive for stiffness. Negative for neck pain. All other systems reviewed and are negative. PASTMEDICAL HISTORY     Past Medical History:   Diagnosis Date    Anxiety state, unspecified     Asthma     inhalers per pcp    CAD (coronary artery disease)     [facility] Carotid artery stenosis     Colon polyp     Depressive disorder, not elsewhere classified     Elevated PSA 05/06/2019    Essential hypertension, benign     meds per pcp    GERD (gastroesophageal reflux disease)     Hearing loss     bilat.  Has hearing aids but does not wear    History of colon polyps     History of hepatitis C     Hyperlipidemia     on rx    Impotence of organic origin     Lipoma of unspecified site     Lumbago     No natural teeth     Prostate CA ([address] 05/2019    Secondary localized osteoarthrosis, shoulder region     Snores     no cpap or sleep apnea    Swelling of limb     Wellness examination     Marco Coronado pcp last seen June 2020     Past Problem List  Patient Active Problem List   Diagnosis Code    Colon polyps K63.5    Viral hepatitis C without hepatic coma B19.20    GERD (gastroesophageal reflux disease) K21.9    Anxiety F41.9    COPD (chronic obstructive pulmonary disease) ([facility] J44.9    Hyperlipidemia E78.5    Essential hypertension I10    Chest pain R07.9    Tobacco abuse Z72.0    Osteoarthritis of right glenohumeral joint M19.011    Prostate cancer ([facility] C61    Erectile dysfunction after radical prostatectomy N52.31    Primary osteoarthritis, right shoulder M19.011     SURGICAL HISTORY       Past Surgical History:   Procedure Laterality Date    ANKLE SURGERY Right     CARPAL TUNNEL RELEASE Bilateral     CERVICAL FUSION  2009    anterior    CHOLECYSTECTOMY      approx 2000    COLONOSCOPY      EXCISION / BIOPSY SKIN LESION OF ARM Left 9/15/2017    EXCISION SKIN LESION LEFT CHEST AND LEFT BUTTOCK, EXCISION LIPOMA LEFT LOWER ABDOMEN performed by Lieutenant Ralph DO at [address] [facility]  08/26/2020    INSERTION INFLATABLE PENILE PROSTHESIS      PENILE PROSTHESIS PLACEMENT N/A 8/26/2020    INSERTION INFLATABLE PENILE PROSTHESIS  (REP NOTIFIED - Leonela Vora) performed by Bartolo Forrester MD at [address] [place] [address] N/A 4/13/2017    COLONOSCOPY performed by Adele Mejia MD at [address] [place] [address] N/A 4/24/2019    PROSTATE BIOPSY WITH ULTRASOUND  (OFFICE TO NOTIFY U.S) performed by Bartolo Forrester MD at [facility] 6/26/2019    XI ROBOTIC LAPAROSCOPIC PROSTATECTOMY , PELVIC LYMPH NODE DISSECTION performed by Bartolo Forrester MD at [address] Right 2015    ULNAR TUNNEL RELEASE Left      CURRENT MEDICATIONS       Previous Medications    ALBUTEROL SULFATE  (90 BASE) MCG/ACT INHALER    Inhale 2 puffs into the lungs every 6 hours as needed for Wheezing Per pcp    AMLODIPINE (NORVASC) 5 MG TABLET    Per pcp    ASPIRIN-SALICYLAMIDE-CAFFEINE (BC HEADACHE POWDER PO)    Take by mouth Hold 7 days prior to surgery    ATORVASTATIN (LIPITOR) 20 MG TABLET    TAKE 1 TABLET DAILY    CALCIUM CITRATE (CALCITRATE) 950 MG TABLET    Take 1 tablet by mouth daily    CLONAZEPAM (KLONOPIN) 1 MG TABLET    Take 1 mg by mouth 2 times daily. pcp    FLUTICASONE (FLONASE) 50 MCG/ACT NASAL SPRAY    2 sprays by Nasal route daily    LISINOPRIL (PRINIVIL;ZESTRIL) 40 MG TABLET    Take 40 mg by mouth nightly Per pcp    MULTIPLE VITAMINS-MINERALS (CENTRUM SILVER 50+MEN PO)    Take 1 tablet by mouth daily    OMEPRAZOLE (PRILOSEC) 20 MG DELAYED RELEASE CAPSULE    Take 20 mg by mouth daily    TIOTROPIUM (SPIRIVA) 18 MCG INHALATION CAPSULE    Inhale 1 capsule into the lungs daily    VITAMIN D PO    Take by mouth daily     ALLERGIES     is allergic to bee venom; fentanyl; morphine; pcn [penicillins]; and codeine. FAMILY HISTORY     He indicated that his mother is alive. He indicated that his father is . He indicated that the status of his sister is unknown. He indicated that only one of his two brothers is alive. He indicated that his maternal grandmother is . He indicated that his maternal grandfather is . He indicated that the status of his paternal grandmother is unknown. He indicated that the status of his paternal grandfather is unknown. He indicated that his daughter is alive. He indicated that his son is alive. He indicated that the status of his maternal uncle is unknown.      SOCIAL HISTORY       Social History     Tobacco Use    Smoking status: Current Every Day Smoker     Packs/day: 0.25     Years: 54.00     Pack years: 13.50     Types: Cigarettes     Start date:     Smokeless tobacco: Never Used   Substance Use Topics    Alcohol use: Not Currently     Comment: 22 oz beer every 2 days    Drug use: Not Currently     Comment: teenager     PHYSICAL EXAM     INITIAL VITALS: BP (!) 133/90   Pulse 81   Temp 98 °F (36.7 °C) (Oral)   Resp 16   Ht 5' 9\" (1.753 m) Wt 208 lb (94.3 kg)   SpO2 98%   BMI 30.72 kg/m²    Physical Exam  Constitutional:       General: He is not in acute distress. Appearance: Normal appearance. He is well-developed. He is not diaphoretic. HENT:      Head: Normocephalic and atraumatic. Right Ear: External ear normal.      Left Ear: External ear normal.      Nose: Nose normal. No congestion. Mouth/Throat:      Mouth: Mucous membranes are moist.      Pharynx: Oropharynx is clear. Eyes:      General:         Right eye: No discharge. Left eye: No discharge. Conjunctiva/sclera: Conjunctivae normal.      Pupils: Pupils are equal, round, and reactive to light. Neck:      Musculoskeletal: Normal range of motion and neck supple. Trachea: No tracheal deviation. Cardiovascular:      Rate and Rhythm: Normal rate and regular rhythm. Pulses: Normal pulses. Heart sounds: Normal heart sounds. Comments: Radial pulses 2+ BL  Pulmonary:      Effort: Pulmonary effort is normal. No respiratory distress. Breath sounds: Normal breath sounds. No stridor. No wheezing or rales. Abdominal:      Palpations: Abdomen is soft. Tenderness: There is no abdominal tenderness. There is no guarding or rebound. Musculoskeletal: Normal range of motion. General: No deformity. Comments: Tenderness right AC joint with muscle spasm right trapezius   Skin:     General: Skin is warm and dry. Capillary Refill: Capillary refill takes less than 2 seconds. Findings: No erythema or rash. Neurological:      General: No focal deficit present. Mental Status: He is alert and oriented to person, place, and time. Cranial Nerves: No cranial nerve deficit. Coordination: Coordination normal.      Comments: RUE neuro intact motor and sensory   Psychiatric:         Mood and Affect: Mood normal.         Behavior: Behavior normal.         Thought Content:  Thought content normal.         Judgment:

## 2021-04-23 ENCOUNTER — TELEPHONE (OUTPATIENT)
Dept: UROLOGY | Age: 71
End: 2021-04-23

## 2021-04-23 NOTE — TELEPHONE ENCOUNTER
Liza from surgery scheduling called over to urology asking for information on penile stent procedure that was done on rhea. Liza could not see the op note so I faxed it over to [facility] -to the MRI.

## 2021-05-28 NOTE — PROGRESS NOTES
5. 6.2021 Discussed spasm, discussed fatigue and stress, try to avoid these.  Discussed if doesn't relieve there are injection treatments. Review of Systems   Constitutional: Negative for appetite change, chills and fever. Eyes: Negative for pain, redness and visual disturbance. Respiratory: Negative for cough, shortness of breath and wheezing. Cardiovascular: Negative for chest pain and leg swelling. Gastrointestinal: Negative for abdominal pain, constipation, diarrhea, nausea and vomiting. Genitourinary: Negative for difficulty urinating, dysuria, flank pain, frequency, hematuria and urgency. Musculoskeletal: Negative for back pain, joint swelling and myalgias. Skin: Negative for rash and wound. Neurological: Negative for dizziness, tremors and numbness. Hematological: Does not bruise/bleed easily.

## 2021-08-02 ENCOUNTER — TELEPHONE (OUTPATIENT)
Dept: UROLOGY | Age: 71
End: 2021-08-02

## 2021-08-02 DIAGNOSIS — C61 PROSTATE CANCER (HCC): Primary | ICD-10-CM

## 2021-08-02 NOTE — TELEPHONE ENCOUNTER
Owen called in to notify office that patient tried to get his lab work done but had a very hard time doing so. Patient would like to have an order placed so he is able to get done prior to rescheduled appt on 08/30.

## 2021-08-26 ENCOUNTER — HOSPITAL ENCOUNTER (OUTPATIENT)
Age: 71
Discharge: HOME | End: 2021-08-26
Payer: MEDICARE

## 2021-08-26 DIAGNOSIS — C61 PROSTATE CANCER (HCC): ICD-10-CM

## 2021-08-26 LAB — PROSTATE SPECIFIC ANTIGEN: <0.02 UG/L

## 2021-08-26 PROCEDURE — 84153 ASSAY OF PSA TOTAL: CPT

## 2021-08-26 PROCEDURE — 36415 COLL VENOUS BLD VENIPUNCTURE: CPT

## 2021-08-30 ENCOUNTER — OFFICE VISIT (OUTPATIENT)
Dept: UROLOGY | Age: 71
End: 2021-08-30
Payer: MEDICARE

## 2021-08-30 VITALS
SYSTOLIC BLOOD PRESSURE: 123 MMHG | DIASTOLIC BLOOD PRESSURE: 84 MMHG | WEIGHT: 198 LBS | HEART RATE: 75 BPM | HEIGHT: 69 IN | TEMPERATURE: 95 F | BODY MASS INDEX: 29.33 KG/M2

## 2021-08-30 DIAGNOSIS — C61 PROSTATE CANCER (HCC): Primary | ICD-10-CM

## 2021-08-30 DIAGNOSIS — Z96.0 S/P INSERTION OF PENILE IMPLANT: ICD-10-CM

## 2021-08-30 DIAGNOSIS — N39.3 STRESS INCONTINENCE, MALE: ICD-10-CM

## 2021-08-30 PROCEDURE — 99213 OFFICE O/P EST LOW 20 MIN: CPT | Performed by: UROLOGY

## 2021-08-30 ASSESSMENT — ENCOUNTER SYMPTOMS
EYES NEGATIVE: 1
COUGH: 0
DIARRHEA: 0
RESPIRATORY NEGATIVE: 1
VOMITING: 0
SHORTNESS OF BREATH: 0
GASTROINTESTINAL NEGATIVE: 1
BACK PAIN: 0
ABDOMINAL PAIN: 0
CONSTIPATION: 0
NAUSEA: 0
EYE PAIN: 0
WHEEZING: 0
EYE REDNESS: 0

## 2021-08-30 NOTE — PROGRESS NOTES
[address] [address]  Dept: 92 [address] [facility] Urology Office Note - Established    Patient:  Samanta Tapia  YOB: 1950  Date: 8/30/2021    The patient is a 72 y.o. male who presents todayfor evaluation of the following problems:   Chief Complaint   Patient presents with    6 Month Follow-Up     6 months w PSA       HPI  This is a 71-year-old gentleman with a history of prostate cancer. He was treated with robotic prostatectomy. He does also have a penile prosthesis for erectile dysfunction. He is doing quite well. He has minimal stress incontinence. He is generally satisfied with how he is doing. Summary of old records: N/A    Additional History: N/A    Procedures Today: N/A    Urinalysis today:  No results found for this visit on 08/30/21. Last several PSA's:  Lab Results   Component Value Date    PSA <0.02 08/26/2021    PSA <0.01 01/28/2021    PSA <0.01 05/30/2020     Last total testosterone:  No results found for: TESTOSTERONE    AUA Symptom Score (8/30/2021):                               Last BUN and creatinine:  Lab Results   Component Value Date    BUN 14 12/21/2020     Lab Results   Component Value Date    CREATININE 0.71 12/21/2020       Additional Lab/Culture results: none    Imaging Reviewed during this Office Visit: none  (results were independently reviewed by physician and radiology report verified)    PAST MEDICAL, FAMILY AND SOCIAL HISTORY UPDATE:  Past Medical History:   Diagnosis Date    Anxiety state, unspecified     Asthma     inhalers per pcp    CAD (coronary artery disease)     [facility] Med De La [facility] Carotid artery stenosis     Colon polyp     Depressive disorder, not elsewhere classified     Elevated PSA 05/06/2019    Essential hypertension, benign     meds per pcp    GERD (gastroesophageal reflux disease)     Hearing loss     bilat. Has hearing aids but does not wear    History of colon polyps     History of hepatitis C     Hyperlipidemia     on rx    Impotence of organic origin     Lipoma of unspecified site     Lumbago     No natural teeth     Prostate CA ([address] 05/2019    Secondary localized osteoarthrosis, shoulder region     Snores     no cpap or sleep apnea    Swelling of limb     Wellness examination     Ivonne Villegas pcp last seen June 2020     Past Surgical History:   Procedure Laterality Date    ANKLE SURGERY Right     CARPAL TUNNEL RELEASE Bilateral     CERVICAL FUSION  2009    anterior    CHOLECYSTECTOMY      approx 2000    COLONOSCOPY      EXCISION / BIOPSY SKIN LESION OF ARM Left 9/15/2017    EXCISION SKIN LESION LEFT CHEST AND LEFT BUTTOCK, EXCISION LIPOMA LEFT LOWER ABDOMEN performed by Dorcas Quintana DO at [address] [facility]  08/26/2020    INSERTION INFLATABLE PENILE PROSTHESIS      PENILE PROSTHESIS PLACEMENT N/A 8/26/2020    INSERTION INFLATABLE PENILE PROSTHESIS  (REP NOTIFIED - Carol Lee) performed by Yon Blandon MD at [address] [facility] [address] NOT  [address] [place] [address] N/A 4/13/2017    COLONOSCOPY performed by Karyn Lam MD at [address] [place] [address] N/A 4/24/2019    PROSTATE BIOPSY WITH ULTRASOUND  (OFFICE TO NOTIFY U.S) performed by Yon Blandon MD at [address] N/A 6/26/2019    XI ROBOTIC LAPAROSCOPIC PROSTATECTOMY , PELVIC LYMPH NODE DISSECTION performed by Yon Blandon MD at [facility] Right 2015    ULNAR TUNNEL RELEASE Left      Family History   Problem Relation Age of Onset    Stroke Maternal Uncle 80    Heart Surgery Maternal Uncle         Triple Bypass    Other Maternal Grandmother         Pul.  Embolism    Cancer Maternal Grandfather         Throat    No Known Problems Mother     Other Father         MVA    No Known Problems Sister     Other Brother         Electric burn at work    No Known Problems Paternal Grandmother     No Known Problems Paternal Grandfather     Diabetes Brother      Outpatient Medications Marked as Taking for the 8/30/21 encounter (Office Visit) with Isiah Magallon MD   Medication Sig Dispense Refill    VITAMIN D PO Take by mouth daily      omeprazole (PRILOSEC) 20 MG delayed release capsule Take 20 mg by mouth daily      calcium citrate (CALCITRATE) 950 MG tablet Take 1 tablet by mouth daily      Aspirin-Salicylamide-Caffeine (BC HEADACHE POWDER PO) Take by mouth Hold 7 days prior to surgery      lisinopril (PRINIVIL;ZESTRIL) 40 MG tablet Take 40 mg by mouth nightly Per pcp      clonazePAM (KLONOPIN) 1 MG tablet Take 1 mg by mouth 2 times daily.  pcp      albuterol sulfate  (90 Base) MCG/ACT inhaler Inhale 2 puffs into the lungs every 6 hours as needed for Wheezing Per pcp      amLODIPine (NORVASC) 5 MG tablet Per pcp  3    tiotropium (SPIRIVA) 18 MCG inhalation capsule Inhale 1 capsule into the lungs daily 30 capsule 3    fluticasone (FLONASE) 50 MCG/ACT nasal spray 2 sprays by Nasal route daily (Patient taking differently: 2 sprays by Nasal route daily as needed ) 1 Bottle 0    atorvastatin (LIPITOR) 20 MG tablet TAKE 1 TABLET DAILY (Patient taking differently: Per Dr. Adilson Owen) 90 tablet 1    Multiple Vitamins-Minerals (CENTRUM SILVER 50+MEN PO) Take 1 tablet by mouth daily         Bee venom, Fentanyl, Morphine, Pcn [penicillins], and Codeine  Social History     Tobacco Use   Smoking Status Current Every Day Smoker    Packs/day: 0.25    Years: 54.00    Pack years: 13.50    Types: Cigarettes    Start date: 1960   Smokeless Tobacco Never Used     (Ifpatient a smoker, smoking cessation counseling offered)    Social History     Substance and Sexual Activity   Alcohol Use Not Currently    Comment: 22 oz beer every 2 days       REVIEW OF SYSTEMS:  Review of Systems    Physical Exam:      Vitals:    08/30/21 1435   BP: 123/84   Pulse: 75   Temp: 95 °F (35 °C)     Body mass index is 29.24 kg/m². Patient is a 72 y.o. male in no acute distress and alert and oriented to person, place and time. Physical Exam  Constitutional: Patient in no acute distress. Neuro: Alert and oriented to person, place and time. Assessment and Plan      1. Prostate cancer ([facility]    2. Stress incontinence, male    3. S/P insertion of penile implant           Plan:   F/u 6 mo psa  Doing well with IPP      Return in about 6 months (around 2/28/2022) for psa. Prescriptions Ordered:  No orders of the defined types were placed in this encounter. Orders Placed:  Orders Placed This Encounter   Procedures    PSA, Diagnostic     Standing Status:   Future     Standing Expiration Date:   8/30/2022           Rupal Hoff MD    Agree with the ROS entered by the MA.

## 2021-10-13 ENCOUNTER — HOSPITAL ENCOUNTER (INPATIENT)
Age: 71
LOS: 3 days | Discharge: HOME | DRG: 177 | End: 2021-10-16
Attending: EMERGENCY MEDICINE | Admitting: INTERNAL MEDICINE
Payer: MEDICARE

## 2021-10-13 ENCOUNTER — APPOINTMENT (OUTPATIENT)
Dept: GENERAL RADIOLOGY | Age: 71
DRG: 177 | End: 2021-10-13
Payer: MEDICARE

## 2021-10-13 ENCOUNTER — APPOINTMENT (OUTPATIENT)
Dept: CT IMAGING | Age: 71
DRG: 177 | End: 2021-10-13
Payer: MEDICARE

## 2021-10-13 DIAGNOSIS — U07.1 ACUTE RESPIRATORY FAILURE DUE TO COVID-19 (HCC): Primary | ICD-10-CM

## 2021-10-13 DIAGNOSIS — J96.00 ACUTE RESPIRATORY FAILURE DUE TO COVID-19 (HCC): Primary | ICD-10-CM

## 2021-10-13 LAB
ABSOLUTE EOS #: <0.03 K/UL (ref 0–0.44)
ABSOLUTE IMMATURE GRANULOCYTE: <0.03 K/UL (ref 0–0.3)
ABSOLUTE LYMPH #: 1.53 K/UL (ref 1.1–3.7)
ABSOLUTE MONO #: 0.48 K/UL (ref 0.1–1.2)
ALBUMIN SERPL-MCNC: 4.3 G/DL (ref 3.5–5.2)
ALBUMIN/GLOBULIN RATIO: 1.3 (ref 1–2.5)
ALP BLD-CCNC: 99 U/L (ref 40–129)
ALT SERPL-CCNC: 26 U/L (ref 5–41)
ANION GAP SERPL CALCULATED.3IONS-SCNC: 13 MMOL/L (ref 9–17)
AST SERPL-CCNC: 35 U/L
BASOPHILS # BLD: 0 % (ref 0–2)
BASOPHILS ABSOLUTE: <0.03 K/UL (ref 0–0.2)
BILIRUB SERPL-MCNC: 0.41 MG/DL (ref 0.3–1.2)
BILIRUBIN URINE: NEGATIVE
BNP INTERPRETATION: NORMAL
BUN BLDV-MCNC: 12 MG/DL (ref 8–23)
BUN/CREAT BLD: ABNORMAL (ref 9–20)
CALCIUM SERPL-MCNC: 9 MG/DL (ref 8.6–10.4)
CHLORIDE BLD-SCNC: 98 MMOL/L (ref 98–107)
CO2: 23 MMOL/L (ref 20–31)
COLOR: YELLOW
COMMENT UA: ABNORMAL
CREAT SERPL-MCNC: 0.79 MG/DL (ref 0.7–1.2)
D-DIMER QUANTITATIVE: 3.8 MG/L FEU
DIFFERENTIAL TYPE: ABNORMAL
EOSINOPHILS RELATIVE PERCENT: 0 % (ref 1–4)
FIBRINOGEN: 388 MG/DL (ref 140–420)
GFR AFRICAN AMERICAN: >60 ML/MIN
GFR NON-AFRICAN AMERICAN: >60 ML/MIN
GFR SERPL CREATININE-BSD FRML MDRD: ABNORMAL ML/MIN/{1.73_M2}
GFR SERPL CREATININE-BSD FRML MDRD: ABNORMAL ML/MIN/{1.73_M2}
GLUCOSE BLD-MCNC: 101 MG/DL (ref 70–99)
GLUCOSE URINE: NEGATIVE
HCT VFR BLD CALC: 53.1 % (ref 40.7–50.3)
HEMOGLOBIN: 17.3 G/DL (ref 13–17)
IMMATURE GRANULOCYTES: 0 %
KETONES, URINE: NEGATIVE
LACTIC ACID, WHOLE BLOOD: 1.8 MMOL/L (ref 0.7–2.1)
LACTIC ACID: NORMAL MMOL/L
LEUKOCYTE ESTERASE, URINE: NEGATIVE
LYMPHOCYTES # BLD: 24 % (ref 24–43)
MCH RBC QN AUTO: 31.3 PG (ref 25.2–33.5)
MCHC RBC AUTO-ENTMCNC: 32.6 G/DL (ref 28.4–34.8)
MCV RBC AUTO: 96.2 FL (ref 82.6–102.9)
MONOCYTES # BLD: 8 % (ref 3–12)
NITRITE, URINE: NEGATIVE
NRBC AUTOMATED: 0 PER 100 WBC
PDW BLD-RTO: 12.4 % (ref 11.8–14.4)
PH UA: 5.5 (ref 5–8)
PLATELET # BLD: 142 K/UL (ref 138–453)
PLATELET ESTIMATE: ABNORMAL
PMV BLD AUTO: 10.7 FL (ref 8.1–13.5)
POTASSIUM SERPL-SCNC: 4.3 MMOL/L (ref 3.7–5.3)
PRO-BNP: 191 PG/ML
PROTEIN UA: NEGATIVE
RBC # BLD: 5.52 M/UL (ref 4.21–5.77)
RBC # BLD: ABNORMAL 10*6/UL
SARS-COV-2, RAPID: DETECTED
SEG NEUTROPHILS: 68 % (ref 36–65)
SEGMENTED NEUTROPHILS ABSOLUTE COUNT: 4.37 K/UL (ref 1.5–8.1)
SODIUM BLD-SCNC: 134 MMOL/L (ref 135–144)
SPECIFIC GRAVITY UA: 1.06 (ref 1–1.03)
SPECIMEN DESCRIPTION: ABNORMAL
TOTAL PROTEIN: 7.6 G/DL (ref 6.4–8.3)
TROPONIN INTERP: NORMAL
TROPONIN INTERP: NORMAL
TROPONIN T: NORMAL NG/ML
TROPONIN T: NORMAL NG/ML
TROPONIN, HIGH SENSITIVITY: 9 NG/L (ref 0–22)
TROPONIN, HIGH SENSITIVITY: 9 NG/L (ref 0–22)
TURBIDITY: CLEAR
URINE HGB: NEGATIVE
UROBILINOGEN, URINE: NORMAL
WBC # BLD: 6.4 K/UL (ref 3.5–11.3)
WBC # BLD: ABNORMAL 10*3/UL

## 2021-10-13 PROCEDURE — 2580000003 HC RX 258: Performed by: STUDENT IN AN ORGANIZED HEALTH CARE EDUCATION/TRAINING PROGRAM

## 2021-10-13 PROCEDURE — 99223 1ST HOSP IP/OBS HIGH 75: CPT | Performed by: INTERNAL MEDICINE

## 2021-10-13 PROCEDURE — 96365 THER/PROPH/DIAG IV INF INIT: CPT

## 2021-10-13 PROCEDURE — 85379 FIBRIN DEGRADATION QUANT: CPT

## 2021-10-13 PROCEDURE — 85384 FIBRINOGEN ACTIVITY: CPT

## 2021-10-13 PROCEDURE — 96375 TX/PRO/DX INJ NEW DRUG ADDON: CPT

## 2021-10-13 PROCEDURE — 87635 SARS-COV-2 COVID-19 AMP PRB: CPT

## 2021-10-13 PROCEDURE — 36415 COLL VENOUS BLD VENIPUNCTURE: CPT

## 2021-10-13 PROCEDURE — 99283 EMERGENCY DEPT VISIT LOW MDM: CPT

## 2021-10-13 PROCEDURE — 1200000000 HC SEMI PRIVATE

## 2021-10-13 PROCEDURE — 82728 ASSAY OF FERRITIN: CPT

## 2021-10-13 PROCEDURE — 6370000000 HC RX 637 (ALT 250 FOR IP): Performed by: STUDENT IN AN ORGANIZED HEALTH CARE EDUCATION/TRAINING PROGRAM

## 2021-10-13 PROCEDURE — 81003 URINALYSIS AUTO W/O SCOPE: CPT

## 2021-10-13 PROCEDURE — 87040 BLOOD CULTURE FOR BACTERIA: CPT

## 2021-10-13 PROCEDURE — 2500000003 HC RX 250 WO HCPCS: Performed by: STUDENT IN AN ORGANIZED HEALTH CARE EDUCATION/TRAINING PROGRAM

## 2021-10-13 PROCEDURE — 6360000002 HC RX W HCPCS: Performed by: STUDENT IN AN ORGANIZED HEALTH CARE EDUCATION/TRAINING PROGRAM

## 2021-10-13 PROCEDURE — 85025 COMPLETE CBC W/AUTO DIFF WBC: CPT

## 2021-10-13 PROCEDURE — 93005 ELECTROCARDIOGRAM TRACING: CPT | Performed by: STUDENT IN AN ORGANIZED HEALTH CARE EDUCATION/TRAINING PROGRAM

## 2021-10-13 PROCEDURE — 83605 ASSAY OF LACTIC ACID: CPT

## 2021-10-13 PROCEDURE — 6360000004 HC RX CONTRAST MEDICATION: Performed by: STUDENT IN AN ORGANIZED HEALTH CARE EDUCATION/TRAINING PROGRAM

## 2021-10-13 PROCEDURE — 83615 LACTATE (LD) (LDH) ENZYME: CPT

## 2021-10-13 PROCEDURE — 71260 CT THORAX DX C+: CPT

## 2021-10-13 PROCEDURE — 71045 X-RAY EXAM CHEST 1 VIEW: CPT

## 2021-10-13 PROCEDURE — 83880 ASSAY OF NATRIURETIC PEPTIDE: CPT

## 2021-10-13 PROCEDURE — 84484 ASSAY OF TROPONIN QUANT: CPT

## 2021-10-13 PROCEDURE — 80053 COMPREHEN METABOLIC PANEL: CPT

## 2021-10-13 PROCEDURE — 84145 PROCALCITONIN (PCT): CPT

## 2021-10-13 PROCEDURE — 86140 C-REACTIVE PROTEIN: CPT

## 2021-10-13 RX ORDER — SODIUM CHLORIDE 0.9 % (FLUSH) 0.9 %
5-40 SYRINGE (ML) INJECTION EVERY 12 HOURS SCHEDULED
Status: DISCONTINUED | OUTPATIENT
Start: 2021-10-13 | End: 2021-10-16 | Stop reason: HOSPADM

## 2021-10-13 RX ORDER — ALBUTEROL SULFATE 90 UG/1
2 AEROSOL, METERED RESPIRATORY (INHALATION) EVERY 6 HOURS PRN
Status: DISCONTINUED | OUTPATIENT
Start: 2021-10-13 | End: 2021-10-16 | Stop reason: HOSPADM

## 2021-10-13 RX ORDER — VITAMIN B COMPLEX
2000 TABLET ORAL DAILY
Status: DISCONTINUED | OUTPATIENT
Start: 2021-10-14 | End: 2021-10-16 | Stop reason: HOSPADM

## 2021-10-13 RX ORDER — ACETAMINOPHEN 325 MG/1
650 TABLET ORAL ONCE
Status: COMPLETED | OUTPATIENT
Start: 2021-10-13 | End: 2021-10-13

## 2021-10-13 RX ORDER — SODIUM CHLORIDE 0.9 % (FLUSH) 0.9 %
5-40 SYRINGE (ML) INJECTION PRN
Status: DISCONTINUED | OUTPATIENT
Start: 2021-10-13 | End: 2021-10-16 | Stop reason: HOSPADM

## 2021-10-13 RX ORDER — CLONAZEPAM 0.5 MG/1
1 TABLET ORAL 2 TIMES DAILY
Status: DISCONTINUED | OUTPATIENT
Start: 2021-10-13 | End: 2021-10-15

## 2021-10-13 RX ORDER — ONDANSETRON 4 MG/1
4 TABLET, ORALLY DISINTEGRATING ORAL EVERY 8 HOURS PRN
Status: DISCONTINUED | OUTPATIENT
Start: 2021-10-13 | End: 2021-10-16 | Stop reason: HOSPADM

## 2021-10-13 RX ORDER — POLYETHYLENE GLYCOL 3350 17 G/17G
17 POWDER, FOR SOLUTION ORAL DAILY PRN
Status: DISCONTINUED | OUTPATIENT
Start: 2021-10-13 | End: 2021-10-16 | Stop reason: HOSPADM

## 2021-10-13 RX ORDER — ATORVASTATIN CALCIUM 20 MG/1
20 TABLET, FILM COATED ORAL NIGHTLY
Status: DISCONTINUED | OUTPATIENT
Start: 2021-10-13 | End: 2021-10-16 | Stop reason: HOSPADM

## 2021-10-13 RX ORDER — FAMOTIDINE 20 MG/1
20 TABLET, FILM COATED ORAL 2 TIMES DAILY
Status: DISCONTINUED | OUTPATIENT
Start: 2021-10-13 | End: 2021-10-16 | Stop reason: HOSPADM

## 2021-10-13 RX ORDER — ACETAMINOPHEN 325 MG/1
650 TABLET ORAL EVERY 6 HOURS PRN
Status: DISCONTINUED | OUTPATIENT
Start: 2021-10-13 | End: 2021-10-16 | Stop reason: HOSPADM

## 2021-10-13 RX ORDER — FLUTICASONE PROPIONATE 50 MCG
2 SPRAY, SUSPENSION (ML) NASAL DAILY
Status: DISCONTINUED | OUTPATIENT
Start: 2021-10-14 | End: 2021-10-16 | Stop reason: HOSPADM

## 2021-10-13 RX ORDER — LISINOPRIL 20 MG/1
40 TABLET ORAL NIGHTLY
Status: DISCONTINUED | OUTPATIENT
Start: 2021-10-13 | End: 2021-10-16 | Stop reason: HOSPADM

## 2021-10-13 RX ORDER — ONDANSETRON 2 MG/ML
4 INJECTION INTRAMUSCULAR; INTRAVENOUS EVERY 6 HOURS PRN
Status: DISCONTINUED | OUTPATIENT
Start: 2021-10-13 | End: 2021-10-16 | Stop reason: HOSPADM

## 2021-10-13 RX ORDER — ACETAMINOPHEN 650 MG/1
650 SUPPOSITORY RECTAL EVERY 6 HOURS PRN
Status: DISCONTINUED | OUTPATIENT
Start: 2021-10-13 | End: 2021-10-16 | Stop reason: HOSPADM

## 2021-10-13 RX ORDER — CHOLECALCIFEROL (VITAMIN D3) 1250 MCG
CAPSULE ORAL
COMMUNITY

## 2021-10-13 RX ORDER — AMLODIPINE BESYLATE 5 MG/1
5 TABLET ORAL DAILY
Status: DISCONTINUED | OUTPATIENT
Start: 2021-10-14 | End: 2021-10-16 | Stop reason: HOSPADM

## 2021-10-13 RX ORDER — SODIUM CHLORIDE 9 MG/ML
25 INJECTION, SOLUTION INTRAVENOUS PRN
Status: DISCONTINUED | OUTPATIENT
Start: 2021-10-13 | End: 2021-10-16 | Stop reason: HOSPADM

## 2021-10-13 RX ORDER — DEXAMETHASONE SODIUM PHOSPHATE 10 MG/ML
6 INJECTION INTRAMUSCULAR; INTRAVENOUS ONCE
Status: COMPLETED | OUTPATIENT
Start: 2021-10-13 | End: 2021-10-13

## 2021-10-13 RX ADMIN — CLONAZEPAM 1 MG: 0.5 TABLET ORAL at 22:19

## 2021-10-13 RX ADMIN — IOPAMIDOL 75 ML: 755 INJECTION, SOLUTION INTRAVENOUS at 16:42

## 2021-10-13 RX ADMIN — ATORVASTATIN CALCIUM 20 MG: 20 TABLET, FILM COATED ORAL at 22:12

## 2021-10-13 RX ADMIN — SODIUM CHLORIDE, PRESERVATIVE FREE 10 ML: 5 INJECTION INTRAVENOUS at 22:13

## 2021-10-13 RX ADMIN — ACETAMINOPHEN 650 MG: 325 TABLET ORAL at 23:41

## 2021-10-13 RX ADMIN — ACETAMINOPHEN 650 MG: 325 TABLET ORAL at 16:34

## 2021-10-13 RX ADMIN — DOXYCYCLINE 100 MG: 100 INJECTION, POWDER, LYOPHILIZED, FOR SOLUTION INTRAVENOUS at 16:34

## 2021-10-13 RX ADMIN — DEXAMETHASONE SODIUM PHOSPHATE 6 MG: 10 INJECTION INTRAMUSCULAR; INTRAVENOUS at 16:34

## 2021-10-13 RX ADMIN — ENOXAPARIN SODIUM 30 MG: 30 INJECTION SUBCUTANEOUS at 22:12

## 2021-10-13 ASSESSMENT — ENCOUNTER SYMPTOMS
PHOTOPHOBIA: 0
WHEEZING: 0
COUGH: 1
SHORTNESS OF BREATH: 1
VOMITING: 0
EYES NEGATIVE: 1
DIARRHEA: 0
SINUS PRESSURE: 0
GASTROINTESTINAL NEGATIVE: 1
CHEST TIGHTNESS: 1
ABDOMINAL PAIN: 0
ALLERGIC/IMMUNOLOGIC NEGATIVE: 1
NAUSEA: 0
SINUS PAIN: 0
SORE THROAT: 0
WHEEZING: 1
RHINORRHEA: 0
BACK PAIN: 1

## 2021-10-13 ASSESSMENT — PAIN SCALES - GENERAL
PAINLEVEL_OUTOF10: 0
PAINLEVEL_OUTOF10: 10
PAINLEVEL_OUTOF10: 3

## 2021-10-13 NOTE — ED PROVIDER NOTES
[facility] ED  Emergency Department Encounter  EmergencyMedicine Resident     Pt Name:Alvino Baker  MRN: [id number]  [place] 1950  Date of evaluation: 10/13/21  PCP:  Jens Dorado    This patient was evaluated in the Emergency Department for symptoms described in the history of present illness. The patient was evaluated in the context of the global COVID-19 pandemic, which necessitated consideration that the patient might be at risk for infection with the SARS-CoV-2 virus that causes COVID-19. Institutional protocols and algorithms that pertain to the evaluation of patients at risk for COVID-19 are in a state of rapid change based on information released by regulatory bodies including the CDC and federal and state organizations. These policies and algorithms were followed during the patient's care in the ED. CHIEF COMPLAINT       Chief Complaint   Patient presents with    Shortness of Breath    Headache       HISTORY OF PRESENT ILLNESS  (Location/Symptom, Timing/Onset, Context/Setting, Quality, Duration, Modifying Factors, Severity.)      Joey Paul is a 72 y.o. male past medical history COPD, hypertension, hyperlipidemia who presents with 4 days of headache, shortness of breath, chest pain, right flank pain. Patient states he is vaccinated for Covid. Patient describes a headache is constant in the posterior part of his head denies any visual disturbances or neurological deficits with this. Patient shortness of breath worse with exertion. Patient's flank pain worsened with movement and standing up. Patient's chest pain located in the center of his chest as pressure nonradiating comes and goes nothing makes it better or worse rates it 8 out of 10. She states he has also had a dry cough and fever. Last stress test 2018 low risk.   Last echo 2018 normal ejection fraction 60%     PAST MEDICAL / SURGICAL / SOCIAL / FAMILY HISTORY      has a past medical history of Anxiety state, unspecified, Asthma, CAD (coronary artery disease), Carotid artery stenosis, Colon polyp, Depressive disorder, not elsewhere classified, Elevated PSA, Essential hypertension, benign, GERD (gastroesophageal reflux disease), Hearing loss, History of colon polyps, History of hepatitis C, Hyperlipidemia, Impotence of organic origin, Lipoma of unspecified site, Lumbago, No natural teeth, Prostate CA ([address] Secondary localized osteoarthrosis, shoulder region, Snores, Swelling of limb, and Wellness examination. has a past surgical history that includes Carpal tunnel release (Bilateral); Ankle surgery (Right); shoulder surgery (Right, 2015); pr colon ca scrn not hi rsk ind (N/A, 4/13/2017); cervical fusion (2009); lumbar fusion; EXCISION / BIOPSY SKIN LESION OF ARM (Left, 9/15/2017); Ulnar tunnel release (Left); Prostate biopsy (N/A, 4/24/2019); Prostatectomy (N/A, 6/26/2019); Colonoscopy; Cholecystectomy; Penile prosthesis placement (08/26/2020); and Penile prosthesis placement (N/A, 8/26/2020). Social History     Socioeconomic History    Marital status:       Spouse name: Not on file    Number of children: 2    Years of education: Not on file    Highest education level: Not on file   Occupational History    Not on file   Tobacco Use    Smoking status: Current Every Day Smoker     Packs/day: 0.25     Years: 54.00     Pack years: 13.50     Types: Cigarettes     Start date: 57    Smokeless tobacco: Never Used   Vaping Use    Vaping Use: Never used   Substance and Sexual Activity    Alcohol use: Not Currently     Comment: 22 oz beer every 2 days    Drug use: Not Currently     Comment: teenager    Sexual activity: Yes     Partners: Female   Other Topics Concern    Not on file   Social History Narrative    Not on file     Social Determinants of Health     Financial Resource Strain:     Difficulty of Paying Living Expenses:    Food Insecurity:     Worried About Running Out of [place] in the Last Year:    [address] in the Last Year:    Transportation Needs:     Lack of Transportation (Medical):  Lack of Transportation (Non-Medical):    Physical Activity:     Days of Exercise per Week:     Minutes of Exercise per Session:    Stress:     Feeling of Stress :    Social Connections:     Frequency of Communication with Friends and Family:     Frequency of Social Gatherings with Friends and Family:     Attends [employer] Services:     Active Member of Clubs or Organizations:     Attends Club or Organization Meetings:     Marital Status:    Intimate Partner Violence:     Fear of Current or Ex-Partner:     Emotionally Abused:     Physically Abused:     Sexually Abused:        Family History   Problem Relation Age of Onset    Stroke Maternal Uncle 80    Heart Surgery Maternal Uncle         Triple Bypass    Other Maternal Grandmother         Pul. Embolism    Cancer Maternal Grandfather         Throat    No Known Problems Mother     Other Father         MVA    No Known Problems Sister     Other Brother         Electric burn at work   [facility] No Known Problems Paternal Grandmother     No Known Problems Paternal Grandfather     Diabetes Brother        Allergies:  Bee venom, Fentanyl, Morphine, Pcn [penicillins], and Codeine    Home Medications:  Prior to Admission medications    Medication Sig Start Date End Date Taking? Authorizing Provider   VITAMIN D PO Take by mouth daily   Yes Historical Provider, MD   omeprazole (PRILOSEC) 20 MG delayed release capsule Take 20 mg by mouth daily   Yes Historical Provider, MD   lisinopril (PRINIVIL;ZESTRIL) 40 MG tablet Take 40 mg by mouth nightly Per pcp   Yes Historical Provider, MD   clonazePAM (KLONOPIN) 1 MG tablet Take 1 mg by mouth 2 times daily.  pcp   Yes Historical Provider, MD   Multiple Vitamins-Minerals (CENTRUM SILVER 50+MEN PO) Take 1 tablet by mouth daily   Yes Historical Provider, MD   calcium citrate (CALCITRATE) 950 MG tablet Take 1 tablet by mouth daily    Historical Provider, MD   Aspirin-Salicylamide-Caffeine (BC HEADACHE POWDER PO) Take by mouth Hold 7 days prior to surgery    Historical Provider, MD   albuterol sulfate  (90 Base) MCG/ACT inhaler Inhale 2 puffs into the lungs every 6 hours as needed for Wheezing Per pcp    Historical Provider, MD   amLODIPine (NORVASC) 5 MG tablet Per pcp 12/12/18   Historical Provider, MD   tiotropium (SPIRIVA) 18 MCG inhalation capsule Inhale 1 capsule into the lungs daily 8/24/18   Jonny Garcia MD   fluticasone (FLONASE) 50 MCG/ACT nasal spray 2 sprays by Nasal route daily  Patient taking differently: 2 sprays by Nasal route daily as needed  7/2/18   Leda Ware MD   atorvastatin (LIPITOR) 20 MG tablet TAKE 1 TABLET DAILY  Patient taking differently: Per Dr. Shelbie William 3/27/18   Leda Ware MD       REVIEW OF SYSTEMS    (2-9 systems for level 4, 10 or more for level 5)      Review of Systems   Constitutional: Positive for fever. Negative for chills and diaphoresis. HENT: Negative for congestion, rhinorrhea and sore throat. Eyes: Negative for photophobia and visual disturbance. Respiratory: Positive for cough and shortness of breath. Negative for wheezing. Cardiovascular: Positive for chest pain. Negative for palpitations and leg swelling. Gastrointestinal: Negative for abdominal pain, diarrhea, nausea and vomiting. Endocrine: Negative for polyuria. Genitourinary: Positive for flank pain. Negative for difficulty urinating, dysuria, hematuria and urgency. Musculoskeletal: Negative for arthralgias, myalgias and neck stiffness. Skin: Negative for rash and wound. Neurological: Positive for headaches. Negative for dizziness, syncope, speech difficulty, weakness, light-headedness and numbness.        PHYSICAL EXAM   (up to 7 for level 4, 8 or more for level 5)      INITIAL VITALS:   /86   Pulse 81   Temp 101.8 °F (38.8 °C) (Tympanic)   Resp 15   Ht 5' 9\" (1.753 m)   Wt 198 lb (89.8 kg)   SpO2 92%   BMI 29.24 kg/m²     Physical Exam  Constitutional:       Comments: Moderate distress nontoxic-appearing   HENT:      Head: Normocephalic and atraumatic. Right Ear: There is no impacted cerumen. Left Ear: There is no impacted cerumen. Nose: No congestion or rhinorrhea. Mouth/Throat:      Pharynx: No oropharyngeal exudate or posterior oropharyngeal erythema. Eyes:      General: No scleral icterus. Pupils: Pupils are equal, round, and reactive to light. Cardiovascular:      Rate and Rhythm: Normal rate. Heart sounds: No murmur heard. No friction rub. No gallop. Pulmonary:      Effort: No respiratory distress. Breath sounds: No wheezing, rhonchi or rales. Abdominal:      General: Abdomen is flat. There is no distension. Palpations: Abdomen is soft. Tenderness: There is no abdominal tenderness. There is no right CVA tenderness, left CVA tenderness, guarding or rebound. Musculoskeletal:      Right lower leg: No edema. Left lower leg: No edema. Skin:     Coloration: Skin is not jaundiced. Findings: No rash. Neurological:      General: No focal deficit present. Mental Status: He is oriented to person, place, and time. Cranial Nerves: No cranial nerve deficit.       Comments: Cranial nerves II through XII intact         DIFFERENTIAL  DIAGNOSIS     PLAN (LABS / IMAGING / EKG):  Orders Placed This Encounter   Procedures    COVID-19, Rapid    Culture, Blood 1    Culture, Blood 1    XR CHEST PORTABLE    CT CHEST PULMONARY EMBOLISM W CONTRAST    CBC Auto Differential    Comprehensive Metabolic Panel w/ Reflex to MG    Urinalysis Reflex to Culture    Troponin    Brain Natriuretic Peptide    Lactic Acid, Plasma    Troponin    LACTATE DEHYDROGENASE    FERRITIN    C-REACTIVE PROTEIN    Procalcitonin    FIBRINOGEN    D-DIMER, QUANTITATIVE    Inpatient consult to Internal Medicine    EKG 12 Lead    PATIENT STATUS (FROM ED OR OR/PROCEDURAL) Inpatient       MEDICATIONS ORDERED:  Orders Placed This Encounter   Medications    acetaminophen (TYLENOL) tablet 650 mg    dexamethasone (DECADRON) injection 6 mg    doxycycline (VIBRAMYCIN) 100 mg in dextrose 5 % 100 mL IVPB     Order Specific Question:   Antimicrobial Indications     Answer:   Pneumonia (CAP)    iopamidol (ISOVUE-370) 76 % injection 75 mL       DDX: Covid pneumonia, bacterial pneumonia, pneumothorax, pulmonary embolism, acute coronary syndrome, CHF    DIAGNOSTIC RESULTS / EMERGENCY DEPARTMENT COURSE / MDM   LAB RESULTS:  Results for orders placed or performed during the hospital encounter of 10/13/21   COVID-19, Rapid    Specimen: Nasopharyngeal Swab   Result Value Ref Range    Specimen Description . NASOPHARYNGEAL SWAB     SARS-CoV-2, Rapid DETECTED (A) Not Detected   CBC Auto Differential   Result Value Ref Range    WBC 6.4 3.5 - 11.3 k/uL    RBC 5.52 4.21 - 5.77 m/uL    Hemoglobin 17.3 (H) 13.0 - 17.0 g/dL    Hematocrit 53.1 (H) 40.7 - 50.3 %    MCV 96.2 82.6 - 102.9 fL    MCH 31.3 25.2 - 33.5 pg    MCHC 32.6 28.4 - 34.8 g/dL    RDW 12.4 11.8 - 14.4 %    Platelets 444 696 - 458 k/uL    MPV 10.7 8.1 - 13.5 fL    NRBC Automated 0.0 0.0 per 100 WBC    Differential Type NOT REPORTED     Seg Neutrophils 68 (H) 36 - 65 %    Lymphocytes 24 24 - 43 %    Monocytes 8 3 - 12 %    Eosinophils % 0 (L) 1 - 4 %    Basophils 0 0 - 2 %    Immature Granulocytes 0 0 %    Segs Absolute 4.37 1.50 - 8.10 k/uL    Absolute Lymph # 1.53 1.10 - 3.70 k/uL    Absolute Mono # 0.48 0.10 - 1.20 k/uL    Absolute Eos # <0.03 0.00 - 0.44 k/uL    Basophils Absolute <0.03 0.00 - 0.20 k/uL    Absolute Immature Granulocyte <0.03 0.00 - 0.30 k/uL    WBC Morphology NOT REPORTED     RBC Morphology NOT REPORTED     Platelet Estimate NOT REPORTED    Comprehensive Metabolic Panel w/ Reflex to MG   Result Value Ref Range    Glucose 101 (H) 70 - 99 mg/dL    BUN 12 8 - 23 mg/dL CREATININE 0.79 0.70 - 1.20 mg/dL    Bun/Cre Ratio NOT REPORTED 9 - 20    Calcium 9.0 8.6 - 10.4 mg/dL    Sodium 134 (L) 135 - 144 mmol/L    Potassium 4.3 3.7 - 5.3 mmol/L    Chloride 98 98 - 107 mmol/L    CO2 23 20 - 31 mmol/L    Anion Gap 13 9 - 17 mmol/L    Alkaline Phosphatase 99 40 - 129 U/L    ALT 26 5 - 41 U/L    AST 35 <40 U/L    Total Bilirubin 0.41 0.3 - 1.2 mg/dL    Total Protein 7.6 6.4 - 8.3 g/dL    Albumin 4.3 3.5 - 5.2 g/dL    Albumin/Globulin Ratio 1.3 1.0 - 2.5    GFR Non-African American >60 >60 mL/min    GFR African American >60 >60 mL/min    GFR Comment          GFR Staging NOT REPORTED    Troponin   Result Value Ref Range    Troponin, High Sensitivity 9 0 - 22 ng/L    Troponin T NOT REPORTED <0.03 ng/mL    Troponin Interp NOT REPORTED    Brain Natriuretic Peptide   Result Value Ref Range    Pro- <300 pg/mL    BNP Interpretation Pro-BNP Reference Range:    Lactic Acid, Plasma   Result Value Ref Range    Lactic Acid NOT REPORTED mmol/L    Lactic Acid, Whole Blood 1.8 0.7 - 2.1 mmol/L   Troponin   Result Value Ref Range    Troponin, High Sensitivity 9 0 - 22 ng/L    Troponin T NOT REPORTED <0.03 ng/mL    Troponin Interp NOT REPORTED        RADIOLOGY:  No results found. EKG Interpretation    Interpreted by myself    Rhythm: normal sinus   Rate: normal  Axis: normal  Ectopy: none  Conduction: normal  ST Segments: normal  T Waves: normal  Q Waves: none    Clinical Impression: sinus arrhythmia    All EKG's are interpreted by the Emergency Department Physician who either signs or Co-signs this chart in the absence of a cardiologist.    EMERGENCY DEPARTMENT COURSE:  ED Course as of Oct 13 1827   Wed Oct 13, 2021   1515 Patient evaluated at bedside. Patient is short of breath with some chest pain concerning for Covid. Patient was also febrile requiring oxygen. Will get sepsis work-up as well as chest x-ray. [ZE]   1534 Patient found to be Covid positive.   Patient's chest x-ray concerning for pneumonia or mass in left lower lobe. Will get follow-up chest CT patient will need to be admitted. Covid labs pending    [ZE]   2000 Patient reevaluated at bedside chest CT still pending Covid labs still pending. Patient still satting above 95% on 5L nasal cannula. [ZE]   4723 Chest CT shows left lobar pneumonia without evidence of pulmonary embolism. Will admit patient to medicine    [ZE]   1825 Patient admitted to medicine. Patient still tolerating 5 L nasal cannula and tachypneic. However patient states he feels better with oxygen    [ZE]      ED Course User Index  [ZE] Rony Landin DO       PROCEDURES:  None    CONSULTS:  IP CONSULT TO INTERNAL MEDICINE    CRITICAL CARE:  None    MDM  Cardiac work-up negative for patient's chest pain or shortness of breath. CT negative for acute pulmonary embolism. Patient is vaccinated however still tested positive for COVID-19. Given patient's symptoms and imaging patient has Covid pneumonia. Patient given Decadron and doxycycline for concern for possible superimposed bacterial pneumonia given left upper lobe lobar pneumonia found on CT imaging. FINAL IMPRESSION      1. Acute respiratory failure due to COVID-19 ([address]        DISPOSITION / PLAN     DISPOSITION  Admit to medicine      PATIENT REFERRED TO:  No follow-up provider specified.     DISCHARGE MEDICATIONS:  New Prescriptions    No medications on file       Monica Staples DO  Emergency Medicine Resident    (Please note that portions of thisnote were completed with a voice recognition program.  Efforts were made to edit the dictations but occasionally words are mis-transcribed.)     Rony Landin DO  Resident  10/13/21 7129

## 2021-10-13 NOTE — ED NOTES
Pt presented to ED via triage for the complaint of shortness of breath, headache, and generalized body aches. Pt states having both covid vaccines. Pt denies chest pain. Pt presents with fever and body aches x 3 days. Pt alert and oriented x 4. RR even and labored. Pt placed on 3L NC which increased SpO2 to 97%.       Sonya Rosenbaum RN  10/13/21 2031

## 2021-10-13 NOTE — Clinical Note
Patient Class: Inpatient [101]   REQUIRED: Diagnosis: Acute respiratory failure due to COVID-19 [facility]) [[id number]]   Estimated Length of Stay: Estimated stay of more than 2 midnights   Admitting Provider: Tiffani Hayward [[id number]]   Preferred Department: med surg covid+ 3rd floor

## 2021-10-13 NOTE — ED PROVIDER NOTES
[facility] ED  eMERGENCY dEPARTMENT eNCOUnter   Attending Attestation     Pt Name: Ivon Hernández  MRN: [id number]  [place] 1950  Date of evaluation: 10/13/21       Ivon Hernández is a 72 y.o. male who presents with Shortness of Breath and Headache      History: Patient presents with shortness of breath and headache with cough. Patient said he has had both vaccinations. Patient has no other complaints. Patient is side pain and hip pain from coughing. Patient been wearing an abdominal binder because of the pain. Exam: Heart rate and rhythm are regular. Lungs are clear to station bilaterally. Abdomen is soft, nontender. Patient is coughing. Plan for symptomatic treatment, concern for Covid viral this. Will get chest x-ray, labs, ACS screening, blood cultures. Will ensure patient has appropriate saturation if does have Covid and will discharge with symptomatic treatment if otherwise improved and investigation does not warrant any further treatment or admission. EKG shows normal sinus rhythm with a rate of 76 beats minute. No ST elevations or depressions. Q wave in lead III. No blocks or arrhythmias. T wave inversion in lead III nonspecific EKG. I performed a history and physical examination of the patient and discussed management with the resident. I reviewed the residents note and agree with the documented findings and plan of care. Any areas of disagreement are noted on the chart. I was personally present for the key portions of any procedures. I have documented in the chart those procedures where I was not present during the key portions. I have personally reviewed all images and agree with the resident's interpretation. I have reviewed the emergency nurses triage note. I agree with the chief complaint, past medical history, past surgical history, allergies, medications, social and family history as documented unless otherwise noted below.  Documentation of the HPI, Physical Exam and Medical Decision Making performed by medical students or scribes is based on my personal performance of the HPI, PE and MDM. For Phys Assistant/ Nurse Practitioner cases/documentation I have had a face to face evaluation of this patient and have completed at least one if not all key elements of the E/M (history, physical exam, and MDM). Additional findings are as noted. For APC cases I have personally evaluated and examined the patient in conjunction with the APC and agree with the treatment plan and disposition of the patient as recorded by the APC.     Cam Ariza MD  Attending Emergency  Physician       Lazarus Chase MD  10/13/21 0116

## 2021-10-13 NOTE — ED NOTES
The following labs labeled with pt sticker and tubed to lab:     [x] Blue     [x] Lavender   [] on ice  [x] Green/yellow  [x] Green/black [x] on ice  [x] Yellow  [] Red  [] Pink      [x] COVID-19 swab    [x] Rapid  [] PCR  [] Peds Viral Panel     [] Urine Sample  [] Pelvic Cultures  [] Blood Cultures          Rolanda Cruz RN  10/13/21 7552

## 2021-10-13 NOTE — H&P
[address]     Department of Internal Medicine - Staff Internal Medicine Teaching Service          ADMISSION NOTE/HISTORY AND PHYSICAL EXAMINATION   Date: 10/13/2021  Patient Name: Ivonne Collazo  Date of admission: 10/13/2021  2:18 PM  YOB: 1950  PCP: Alexandre Spivey  History Obtained From:  patient, electronic medical record    CHIEF COMPLAINT     Chief complaint: Shortness of breath     HISTORY OF PRESENTING ILLNESS     The patient is a pleasant 72 y.o. male with a PMHx significant for anxiety, COPD, HTN, GERD and hyperlipidemia who presents with a chief complaint of shortness of breath. Pt states he was at home when he \"stopped breathing\" and told his girlfriend Hannah to bring him into the ED. Pt states he has had 4 days of headache, shortness of breath, chest pain and right flank pain. Shortness of breath is worse with exertion. Pt was febrile (101.8F). Pt says flank pain worsens with movement. Chest pain is in the center of the chest, does not radiate anywhere. Pt denies loss of taste and smell. Pt stated he is vaccinated against COVID-19 with Moderna vaccine. Review of Systems:  Review of Systems   Constitutional: Positive for diaphoresis and fever. Negative for chills. HENT: Negative for congestion, mouth sores, postnasal drip, rhinorrhea, sinus pressure, sinus pain and tinnitus. Eyes: Negative. Respiratory: Positive for cough, chest tightness, shortness of breath and wheezing. Cardiovascular: Positive for chest pain. Negative for leg swelling. Gastrointestinal: Negative. Endocrine: Negative. Genitourinary: Negative. Musculoskeletal: Positive for back pain and neck pain. Skin: Negative. Allergic/Immunologic: Negative. Neurological: Positive for tremors and numbness. Negative for weakness. Hematological: Negative. Psychiatric/Behavioral: Negative.             PAST MEDICAL HISTORY     Past Medical History:   Diagnosis TUNNEL RELEASE Left        ALLERGIES     Bee venom, Fentanyl, Morphine, Pcn [penicillins], and Codeine    MEDICATIONS PRIOR TO ADMISSION     Prior to Admission medications    Medication Sig Start Date End Date Taking? Authorizing Provider   VITAMIN D PO Take by mouth daily   Yes Historical Provider, MD   omeprazole (PRILOSEC) 20 MG delayed release capsule Take 20 mg by mouth daily   Yes Historical Provider, MD   lisinopril (PRINIVIL;ZESTRIL) 40 MG tablet Take 40 mg by mouth nightly Per pcp   Yes Historical Provider, MD   clonazePAM (KLONOPIN) 1 MG tablet Take 1 mg by mouth 2 times daily.  pcp   Yes Historical Provider, MD   Multiple Vitamins-Minerals (CENTRUM SILVER 50+MEN PO) Take 1 tablet by mouth daily   Yes Historical Provider, MD   calcium citrate (CALCITRATE) 950 MG tablet Take 1 tablet by mouth daily    Historical Provider, MD   Aspirin-Salicylamide-Caffeine (BC HEADACHE POWDER PO) Take by mouth Hold 7 days prior to surgery    Historical Provider, MD   albuterol sulfate  (90 Base) MCG/ACT inhaler Inhale 2 puffs into the lungs every 6 hours as needed for Wheezing Per pcp    Historical Provider, MD   amLODIPine (NORVASC) 5 MG tablet Per pcp 12/12/18   Historical Provider, MD   tiotropium (SPIRIVA) 18 MCG inhalation capsule Inhale 1 capsule into the lungs daily 8/24/18   Brandie Delgadillo MD   fluticasone (FLONASE) 50 MCG/ACT nasal spray 2 sprays by Nasal route daily  Patient taking differently: 2 sprays by Nasal route daily as needed  7/2/18   Tori Jolley MD   atorvastatin (LIPITOR) 20 MG tablet TAKE 1 TABLET DAILY  Patient taking differently: Per Dr. Aleyda Thomas 3/27/18   Tori Jolley MD       SOCIAL HISTORY     Tobacco: Current PPD smoker   Alcohol: 2 shots of moonshine per day  Illicits: Denies   Occupation: [employer] motorcycles in his garage    FAMILY HISTORY     Family History   Problem Relation Age of Onset    Stroke Maternal Uncle 80    Heart Surgery Maternal Uncle         Triple Bypass    Other Maternal Grandmother         Pul. Embolism    Cancer Maternal Grandfather         Throat    No Known Problems Mother     Other Father         MVA    No Known Problems Sister     Other Brother         Electric burn at work   [employer] No Known Problems Paternal Grandmother     No Known Problems Paternal Grandfather     Diabetes Brother        PHYSICAL EXAM     Vitals: /83   Pulse 69   Temp 98.8 °F (37.1 °C) (Oral)   Resp 15   Ht 5' 9\" (1.753 m)   Wt 198 lb (89.8 kg)   SpO2 98%   BMI 29.24 kg/m²   Tmax: Temp (24hrs), Av.3 °F (37.9 °C), Min:98.8 °F (37.1 °C), Max:101.8 °F (38.8 °C)    Last Body weight:   Wt Readings from Last 3 Encounters:   10/13/21 198 lb (89.8 kg)   21 198 lb (89.8 kg)   21 208 lb (94.3 kg)     Body Mass Index : Body mass index is 29.24 kg/m². PHYSICAL EXAMINATION:  Physical Exam  Constitutional:       Appearance: Normal appearance. He is obese. HENT:      Head: Normocephalic and atraumatic. Nose: Nose normal.      Mouth/Throat:      Mouth: Mucous membranes are moist.      Pharynx: Oropharynx is clear. Eyes:      Extraocular Movements: Extraocular movements intact. Conjunctiva/sclera: Conjunctivae normal.      Pupils: Pupils are equal, round, and reactive to light. Cardiovascular:      Rate and Rhythm: Normal rate and regular rhythm. Pulses: Normal pulses. Heart sounds: Normal heart sounds. Pulmonary:      Effort: Pulmonary effort is normal.      Breath sounds: Wheezing present. Abdominal:      General: Abdomen is flat. Bowel sounds are normal.      Palpations: Abdomen is soft. Musculoskeletal:         General: Normal range of motion. Cervical back: Normal range of motion. Skin:     General: Skin is warm. Neurological:      General: No focal deficit present. Mental Status: He is alert and oriented to person, place, and time. Mental status is at baseline.    Psychiatric:         Mood and Affect: Mood normal. Behavior: Behavior normal.         Thought Content:  Thought content normal.         Judgment: Judgment normal.         INVESTIGATIONS     Laboratory Testing:     Recent Results (from the past 24 hour(s))   CBC Auto Differential    Collection Time: 10/13/21  2:46 PM   Result Value Ref Range    WBC 6.4 3.5 - 11.3 k/uL    RBC 5.52 4.21 - 5.77 m/uL    Hemoglobin 17.3 (H) 13.0 - 17.0 g/dL    Hematocrit 53.1 (H) 40.7 - 50.3 %    MCV 96.2 82.6 - 102.9 fL    MCH 31.3 25.2 - 33.5 pg    MCHC 32.6 28.4 - 34.8 g/dL    RDW 12.4 11.8 - 14.4 %    Platelets 773 554 - 492 k/uL    MPV 10.7 8.1 - 13.5 fL    NRBC Automated 0.0 0.0 per 100 WBC    Differential Type NOT REPORTED     Seg Neutrophils 68 (H) 36 - 65 %    Lymphocytes 24 24 - 43 %    Monocytes 8 3 - 12 %    Eosinophils % 0 (L) 1 - 4 %    Basophils 0 0 - 2 %    Immature Granulocytes 0 0 %    Segs Absolute 4.37 1.50 - 8.10 k/uL    Absolute Lymph # 1.53 1.10 - 3.70 k/uL    Absolute Mono # 0.48 0.10 - 1.20 k/uL    Absolute Eos # <0.03 0.00 - 0.44 k/uL    Basophils Absolute <0.03 0.00 - 0.20 k/uL    Absolute Immature Granulocyte <0.03 0.00 - 0.30 k/uL    WBC Morphology NOT REPORTED     RBC Morphology NOT REPORTED     Platelet Estimate NOT REPORTED    Comprehensive Metabolic Panel w/ Reflex to MG    Collection Time: 10/13/21  2:46 PM   Result Value Ref Range    Glucose 101 (H) 70 - 99 mg/dL    BUN 12 8 - 23 mg/dL    CREATININE 0.79 0.70 - 1.20 mg/dL    Bun/Cre Ratio NOT REPORTED 9 - 20    Calcium 9.0 8.6 - 10.4 mg/dL    Sodium 134 (L) 135 - 144 mmol/L    Potassium 4.3 3.7 - 5.3 mmol/L    Chloride 98 98 - 107 mmol/L    CO2 23 20 - 31 mmol/L    Anion Gap 13 9 - 17 mmol/L    Alkaline Phosphatase 99 40 - 129 U/L    ALT 26 5 - 41 U/L    AST 35 <40 U/L    Total Bilirubin 0.41 0.3 - 1.2 mg/dL    Total Protein 7.6 6.4 - 8.3 g/dL    Albumin 4.3 3.5 - 5.2 g/dL    Albumin/Globulin Ratio 1.3 1.0 - 2.5    GFR Non-African American >60 >60 mL/min    GFR African American >60 >60 mL/min    GFR Comment          GFR Staging NOT REPORTED    Brain Natriuretic Peptide    Collection Time: 10/13/21  2:46 PM   Result Value Ref Range    Pro- <300 pg/mL    BNP Interpretation Pro-BNP Reference Range:    Lactic Acid, Plasma    Collection Time: 10/13/21  2:46 PM   Result Value Ref Range    Lactic Acid NOT REPORTED mmol/L    Lactic Acid, Whole Blood 1.8 0.7 - 2.1 mmol/L   Troponin    Collection Time: 10/13/21  2:46 PM   Result Value Ref Range    Troponin, High Sensitivity 9 0 - 22 ng/L    Troponin T NOT REPORTED <0.03 ng/mL    Troponin Interp NOT REPORTED    COVID-19, Rapid    Collection Time: 10/13/21  2:52 PM    Specimen: Nasopharyngeal Swab   Result Value Ref Range    Specimen Description . NASOPHARYNGEAL SWAB     SARS-CoV-2, Rapid DETECTED (A) Not Detected   Troponin    Collection Time: 10/13/21  4:43 PM   Result Value Ref Range    Troponin, High Sensitivity 9 0 - 22 ng/L    Troponin T NOT REPORTED <0.03 ng/mL    Troponin Interp NOT REPORTED        Imaging:   XR CHEST PORTABLE    Result Date: 10/13/2021  4 cm area of hazy opacity overlying the lateral aspect of the left mid lung field may reflect focal area of pneumonia; however, follow-up to imaging resolution is recommended to rule out other pathology. CT CHEST PULMONARY EMBOLISM W CONTRAST    Result Date: 10/13/2021  No central to segmental pulmonary emboli. Left upper lobe airspace opacities suggestive of pneumonia. Radiographic follow-up is recommended document resolution following medical treatment course. Scattered calcified pleural plaques pleural thickening. Please correlate with prior history and exposures. ASSESSMENT & PLAN     ASSESSMENT / PLAN:     IMPRESSION  This is a 72 y.o. male who presented with shortness of breath and found to have COVID-19.  Patient admitted to inpatient status for management of COVID-19    Principal Problem:    Acute respiratory failure due to COVID-19 ([facility])  - Monitor O2 saturation   - Monitor O2 requirement - Follow up on CXR  - Trend inflammatory markers   - Received 6mg Decadron  - ID consulted - we appreciate recommendations        Active Problems:    Viral hepatitis C without hepatic coma  - Stable   - Continue to monitor       GERD (gastroesophageal reflux disease)  - Pepcid  - Continue to monitor         Anxiety  - Klonopin   - Continue to monitor       COPD (chronic obstructive pulmonary disease) (HCC)  - RT aerosol protocol   - Continue to monitor       Hyperlipidemia  - Lipitor       Essential hypertension  - Antihypertensive medications held due to BP being on the lower end   - Resume when clinically appropriate       Tobacco abuse  - Educated on the importance of cessation       Prostate cancer ([facility] [address]  - Stable   - Provide pt with briefs     Diet: Regular   DVT ppx: Lovenox  GI ppx: Pepcid     PT/OT/SW: Consulted   Discharge Planning: In process     Geovany Conte MD  Internal Medicine Resident, PGY-1  [facility]; [place], [place]  10/13/2021,     Attestation and add on       I have discussed the care of Rosa Metzger , including pertinent history and exam findings,      10/13/21    with the resident. I have seen and examined the patient and the key elements of all parts of the encounter have been performed by me . I agree with the assessment, plan and orders as documented by the resident. Principal Problem:    Acute respiratory failure due to COVID-19 [facility])  Active Problems:    Viral hepatitis C without hepatic coma    GERD (gastroesophageal reflux disease)    Anxiety    COPD (chronic obstructive pulmonary disease) (HCC)    Hyperlipidemia    Essential hypertension    Tobacco abuse    Prostate cancer (HCC)    Tremor of B/L hands  Resolved Problems:    * No resolved hospital problems. *            ---- ;        Medications: Allergies:     Allergies   Allergen Reactions    Bee Venom Anaphylaxis    Fentanyl Shortness Of Breath and Swelling     Tongue swelling    Morphine

## 2021-10-13 NOTE — ED PROVIDER NOTES
FACULTY SIGN-OUT  ADDENDUM       Patient: Radha Menon   MRN: [id number]  PCP:  Will Olea  Attestation  I was available and discussed any additional care issues that arose and coordinated the management plans with the resident(s) caring for the patient during my duty period. Any areas of disagreement with resident's documentation of care or procedures are noted on the chart. I was personally present for the key portions of any/all procedures during my duty period. I have documented in the chart those procedures where I was not present during the key portions. The patient's initial evaluation and plan have been discussed with the prior provider who initially evaluated the patient. Pertinent Comments: The patient is a 72 y.o. male taken in signout with what appears to be Covid pneumonia requiring nasal cannula oxygen. We are awaiting laboratories and admission    ED COURSE      The patient was given the following medications:  No orders of the defined types were placed in this encounter.       RECENT VITALS:   BP: 126/79  Pulse: 78  Resp: 15  Temp: 101.8 °F (38.8 °C) SpO2: 96 %    (Please note that portions of this note were completed with a voice recognition program.  Efforts were made to edit the dictations but occasionally words are mis-transcribed.)    MD Isauro Tyler  Attending Emergency Medicine Physician       Isael Georges MD  10/13/21 [id number]

## 2021-10-13 NOTE — ED NOTES
PT resting on stretcher. Nonfebrile. RR even and unlabored. No distress noted.       Keli Melendez RN  10/13/21 [id number]

## 2021-10-14 ENCOUNTER — APPOINTMENT (OUTPATIENT)
Dept: GENERAL RADIOLOGY | Age: 71
DRG: 177 | End: 2021-10-14
Payer: MEDICARE

## 2021-10-14 PROBLEM — R25.1 TREMOR: Status: ACTIVE | Noted: 2021-10-14

## 2021-10-14 LAB
ABSOLUTE EOS #: <0.03 K/UL (ref 0–0.44)
ABSOLUTE IMMATURE GRANULOCYTE: <0.03 K/UL (ref 0–0.3)
ABSOLUTE LYMPH #: 2.43 K/UL (ref 1.1–3.7)
ABSOLUTE MONO #: 0.3 K/UL (ref 0.1–1.2)
ANION GAP SERPL CALCULATED.3IONS-SCNC: 12 MMOL/L (ref 9–17)
BASOPHILS # BLD: 0 % (ref 0–2)
BASOPHILS ABSOLUTE: <0.03 K/UL (ref 0–0.2)
BUN BLDV-MCNC: 16 MG/DL (ref 8–23)
BUN/CREAT BLD: ABNORMAL (ref 9–20)
C-REACTIVE PROTEIN: 5 MG/L (ref 0–5)
CALCIUM SERPL-MCNC: 8.7 MG/DL (ref 8.6–10.4)
CHLORIDE BLD-SCNC: 103 MMOL/L (ref 98–107)
CO2: 22 MMOL/L (ref 20–31)
CREAT SERPL-MCNC: 0.74 MG/DL (ref 0.7–1.2)
DIFFERENTIAL TYPE: ABNORMAL
EOSINOPHILS RELATIVE PERCENT: 0 % (ref 1–4)
FERRITIN: 514 UG/L (ref 30–400)
GFR AFRICAN AMERICAN: >60 ML/MIN
GFR NON-AFRICAN AMERICAN: >60 ML/MIN
GFR SERPL CREATININE-BSD FRML MDRD: ABNORMAL ML/MIN/{1.73_M2}
GFR SERPL CREATININE-BSD FRML MDRD: ABNORMAL ML/MIN/{1.73_M2}
GLUCOSE BLD-MCNC: 164 MG/DL (ref 70–99)
HCT VFR BLD CALC: 50.4 % (ref 40.7–50.3)
HEMOGLOBIN: 17 G/DL (ref 13–17)
IMMATURE GRANULOCYTES: 0 %
LACTATE DEHYDROGENASE: 254 U/L (ref 135–225)
LYMPHOCYTES # BLD: 39 % (ref 24–43)
MCH RBC QN AUTO: 31.5 PG (ref 25.2–33.5)
MCHC RBC AUTO-ENTMCNC: 33.7 G/DL (ref 28.4–34.8)
MCV RBC AUTO: 93.5 FL (ref 82.6–102.9)
MONOCYTES # BLD: 5 % (ref 3–12)
NRBC AUTOMATED: 0 PER 100 WBC
PDW BLD-RTO: 12.4 % (ref 11.8–14.4)
PLATELET # BLD: ABNORMAL K/UL (ref 138–453)
PLATELET ESTIMATE: ABNORMAL
PLATELET, FLUORESCENCE: 110 K/UL (ref 138–453)
PLATELET, IMMATURE FRACTION: 5.3 % (ref 1.1–10.3)
PMV BLD AUTO: ABNORMAL FL (ref 8.1–13.5)
POTASSIUM SERPL-SCNC: 4.9 MMOL/L (ref 3.7–5.3)
PROCALCITONIN: 0.04 NG/ML
RBC # BLD: 5.39 M/UL (ref 4.21–5.77)
RBC # BLD: ABNORMAL 10*6/UL
SEG NEUTROPHILS: 56 % (ref 36–65)
SEGMENTED NEUTROPHILS ABSOLUTE COUNT: 3.47 K/UL (ref 1.5–8.1)
SODIUM BLD-SCNC: 137 MMOL/L (ref 135–144)
WBC # BLD: 6.2 K/UL (ref 3.5–11.3)
WBC # BLD: ABNORMAL 10*3/UL

## 2021-10-14 PROCEDURE — 85055 RETICULATED PLATELET ASSAY: CPT

## 2021-10-14 PROCEDURE — 6370000000 HC RX 637 (ALT 250 FOR IP): Performed by: STUDENT IN AN ORGANIZED HEALTH CARE EDUCATION/TRAINING PROGRAM

## 2021-10-14 PROCEDURE — 36415 COLL VENOUS BLD VENIPUNCTURE: CPT

## 2021-10-14 PROCEDURE — 97116 GAIT TRAINING THERAPY: CPT

## 2021-10-14 PROCEDURE — 1200000000 HC SEMI PRIVATE

## 2021-10-14 PROCEDURE — 97530 THERAPEUTIC ACTIVITIES: CPT

## 2021-10-14 PROCEDURE — 85025 COMPLETE CBC W/AUTO DIFF WBC: CPT

## 2021-10-14 PROCEDURE — 2580000003 HC RX 258: Performed by: STUDENT IN AN ORGANIZED HEALTH CARE EDUCATION/TRAINING PROGRAM

## 2021-10-14 PROCEDURE — 94761 N-INVAS EAR/PLS OXIMETRY MLT: CPT

## 2021-10-14 PROCEDURE — 71045 X-RAY EXAM CHEST 1 VIEW: CPT

## 2021-10-14 PROCEDURE — 6360000002 HC RX W HCPCS: Performed by: STUDENT IN AN ORGANIZED HEALTH CARE EDUCATION/TRAINING PROGRAM

## 2021-10-14 PROCEDURE — 99222 1ST HOSP IP/OBS MODERATE 55: CPT | Performed by: INTERNAL MEDICINE

## 2021-10-14 PROCEDURE — 80048 BASIC METABOLIC PNL TOTAL CA: CPT

## 2021-10-14 PROCEDURE — 2700000000 HC OXYGEN THERAPY PER DAY

## 2021-10-14 PROCEDURE — 99233 SBSQ HOSP IP/OBS HIGH 50: CPT | Performed by: INTERNAL MEDICINE

## 2021-10-14 PROCEDURE — 94640 AIRWAY INHALATION TREATMENT: CPT

## 2021-10-14 PROCEDURE — 97161 PT EVAL LOW COMPLEX 20 MIN: CPT

## 2021-10-14 RX ORDER — GUAIFENESIN DEXTROMETHORPHAN HYDROBROMIDE ORAL SOLUTION 10; 100 MG/5ML; MG/5ML
10 SOLUTION ORAL EVERY 4 HOURS PRN
Status: DISCONTINUED | OUTPATIENT
Start: 2021-10-14 | End: 2021-10-16 | Stop reason: HOSPADM

## 2021-10-14 RX ORDER — LANOLIN ALCOHOL/MO/W.PET/CERES
3 CREAM (GRAM) TOPICAL NIGHTLY PRN
Status: DISCONTINUED | OUTPATIENT
Start: 2021-10-14 | End: 2021-10-16 | Stop reason: HOSPADM

## 2021-10-14 RX ORDER — NICOTINE 21 MG/24HR
1 PATCH, TRANSDERMAL 24 HOURS TRANSDERMAL DAILY
Status: DISCONTINUED | OUTPATIENT
Start: 2021-10-14 | End: 2021-10-16 | Stop reason: HOSPADM

## 2021-10-14 RX ORDER — LANOLIN ALCOHOL/MO/W.PET/CERES
3 CREAM (GRAM) TOPICAL NIGHTLY PRN
Status: DISCONTINUED | OUTPATIENT
Start: 2021-10-15 | End: 2021-10-14

## 2021-10-14 RX ADMIN — CLONAZEPAM 1 MG: 0.5 TABLET ORAL at 20:33

## 2021-10-14 RX ADMIN — TIOTROPIUM BROMIDE INHALATION SPRAY 2 PUFF: 3.12 SPRAY, METERED RESPIRATORY (INHALATION) at 10:53

## 2021-10-14 RX ADMIN — DEXAMETHASONE 6 MG: 4 TABLET ORAL at 08:39

## 2021-10-14 RX ADMIN — FAMOTIDINE 20 MG: 20 TABLET, FILM COATED ORAL at 08:39

## 2021-10-14 RX ADMIN — ATORVASTATIN CALCIUM 20 MG: 20 TABLET, FILM COATED ORAL at 20:33

## 2021-10-14 RX ADMIN — GUAIFENESIN DEXTROMETHORPHAN HYDROBROMIDE ORAL SOLUTION 10 ML: 200; 20 SOLUTION ORAL at 23:57

## 2021-10-14 RX ADMIN — Medication 2000 UNITS: at 08:38

## 2021-10-14 RX ADMIN — FLUTICASONE PROPIONATE 2 SPRAY: 50 SPRAY, METERED NASAL at 08:40

## 2021-10-14 RX ADMIN — SODIUM CHLORIDE, PRESERVATIVE FREE 10 ML: 5 INJECTION INTRAVENOUS at 20:34

## 2021-10-14 RX ADMIN — CLONAZEPAM 1 MG: 0.5 TABLET ORAL at 08:40

## 2021-10-14 RX ADMIN — SODIUM CHLORIDE, PRESERVATIVE FREE 10 ML: 5 INJECTION INTRAVENOUS at 08:39

## 2021-10-14 RX ADMIN — ENOXAPARIN SODIUM 30 MG: 30 INJECTION SUBCUTANEOUS at 08:39

## 2021-10-14 RX ADMIN — FAMOTIDINE 20 MG: 20 TABLET, FILM COATED ORAL at 20:33

## 2021-10-14 RX ADMIN — ACETAMINOPHEN 650 MG: 325 TABLET ORAL at 23:57

## 2021-10-14 RX ADMIN — ENOXAPARIN SODIUM 30 MG: 30 INJECTION SUBCUTANEOUS at 20:33

## 2021-10-14 RX ADMIN — ACETAMINOPHEN 650 MG: 325 TABLET ORAL at 18:03

## 2021-10-14 ASSESSMENT — PAIN SCALES - GENERAL
PAINLEVEL_OUTOF10: 0
PAINLEVEL_OUTOF10: 3
PAINLEVEL_OUTOF10: 6
PAINLEVEL_OUTOF10: 3
PAINLEVEL_OUTOF10: 0
PAINLEVEL_OUTOF10: 3

## 2021-10-14 ASSESSMENT — PAIN DESCRIPTION - DESCRIPTORS: DESCRIPTORS: ACHING

## 2021-10-14 ASSESSMENT — PAIN DESCRIPTION - LOCATION
LOCATION: HEAD
LOCATION: HEAD

## 2021-10-14 ASSESSMENT — PAIN DESCRIPTION - PAIN TYPE
TYPE: ACUTE PAIN
TYPE: ACUTE PAIN

## 2021-10-14 NOTE — CARE COORDINATION
Case Management Initial Discharge Plan  Jose Armando Rivas,             Met with:patient via telephone to discuss discharge plans. Information verified: address, contacts, phone number, , insurance Yes  Insurance Provider: [facility]    Emergency Contact/Next of Kin name & number: Jorge Friedman (s.o.) [phone]  Who are involved in patient's support system? S.o. PCP: Malvin Garzon MD  Date of last visit: 2 months      Discharge Planning    Living Arrangements:  Spouse/Significant Other     Home has 1 stories  0 stairs to climb to get into front door, stairs to climb to reach second floor  Location of bedroom/bathroom in home     Patient able to perform ADL's:Independent    Current Services (outpatient & in home)   DME equipment: has DME available but does not use  DME provider:     Is patient receiving oral anticoagulation therapy? No    If indicated:   Physician managing anticoagulation treatment:   Where does patient obtain lab work for ATC treatment? Potential Assistance Needed:  N/A    Patient agreeable to home care: No  [place] of choice provided:  n/a    Prior SNF/Rehab Placement and Facility:   Agreeable to SNF/Rehab: No  [place] of choice provided: n/a     Evaluation: no    Expected Discharge date:       Patient expects to be discharged to: If home: is the family and/or caregiver wiling & able to provide support at home? N/a, independent  Who will be providing this support? Follow Up Appointment: Best Day/ Time:      Transportation provider:   Transportation arrangements needed for discharge: No    Readmission Risk              Risk of Unplanned Readmission:  13             Does patient have a readmission risk score greater than 14?: No  If yes, follow-up appointment must be made within 7 days of discharge.      Goals of Care: breathe easier      Educated patient on transitional options, provided freedom of choice and are agreeable with plan      Discharge Plan: home independently          Electronically signed by Jose Ramon Streeter RN on 10/14/21 at 9:13 AM EDT

## 2021-10-14 NOTE — CONSULTS
PULMONARY & CRITICAL CARE MEDICINE CONSULT NOTE     Patient:  Jose Armando Rivas  MRN: [id number]  6 [facility] date: 10/13/2021  Primary Care Physician: Deanne Murguia  Consulting Physician: Nj Najera MD  CODE Status: Full Code  LOS: 1     SUBJECTIVE     CHIEF COMPLAINT/REASON FOR CONSULT: Acute respiratory failure/COVID-19    HISTORY OF PRESENT ILLNESS:  The patient is a 72 y.o. male with history of hypertension, hyperlipidemia, COPD, anxiety presented with worsening shortness of breath. He reported headache, shortness of breath chest pain for about 4 days. He was febrile on presentation. He has been deviously vaccinated with Lexdir for COVID-19. He is on nasal cannula at 2 L/min. He tested positive for COVID-19 on 10/13. Currently getting remdesivir and Decadron    PAST MEDICAL HISTORY:        Diagnosis Date    Anxiety state, unspecified     Asthma     inhalers per pcp    CAD (coronary artery disease)     [facility] Carotid artery stenosis     Colon polyp     Depressive disorder, not elsewhere classified     Elevated PSA 05/06/2019    Essential hypertension, benign     meds per pcp    GERD (gastroesophageal reflux disease)     Hearing loss     bilat.  Has hearing aids but does not wear    History of colon polyps     History of hepatitis C     Hyperlipidemia     on rx    Impotence of organic origin     Lipoma of unspecified site     Lumbago     No natural teeth     Prostate CA ([address] 05/2019    Secondary localized osteoarthrosis, shoulder region     Snores     no cpap or sleep apnea    Swelling of limb     Wellness examination     Deanne Murguia pcp last seen June 2020     PAST SURGICAL HISTORY:        Procedure Laterality Date    ANKLE SURGERY Right     CARPAL TUNNEL RELEASE Bilateral     CERVICAL FUSION  2009    anterior    CHOLECYSTECTOMY      approx 2000    COLONOSCOPY      EXCISION / BIOPSY SKIN LESION OF ARM Left 9/15/2017    EXCISION SKIN LESION LEFT CHEST AND LEFT BUTTOCK, EXCISION LIPOMA LEFT LOWER ABDOMEN performed by Karla Carson DO at [facility]  08/26/2020    INSERTION INFLATABLE PENILE PROSTHESIS      PENILE PROSTHESIS PLACEMENT N/A 8/26/2020    INSERTION INFLATABLE PENILE PROSTHESIS  (REP NOTIFIED - Juancarlos Maravilla) performed by Toby Freeman MD at [address] IND N/A 4/13/2017    COLONOSCOPY performed by Arnie Linares MD at [address] N/A 4/24/2019    PROSTATE BIOPSY WITH ULTRASOUND  (OFFICE TO NOTIFY [place]) performed by Toby Freeman MD at [address] N/A 6/26/2019    XI ROBOTIC LAPAROSCOPIC PROSTATECTOMY , PELVIC LYMPH NODE DISSECTION performed by Toby Freeman MD at [facility] Right 2015    ULNAR TUNNEL RELEASE Left      FAMILY HISTORY:       Problem Relation Age of Onset    Stroke Maternal Uncle 80    Heart Surgery Maternal Uncle         Triple Bypass    Other Maternal Grandmother         Pul. Embolism    Cancer Maternal Grandfather         Throat    No Known Problems Mother     Other Father         MVA    No Known Problems Sister     Other Brother         Electric burn at work   [employer] No Known Problems Paternal Grandmother     No Known Problems Paternal Grandfather     Diabetes Brother      SOCIAL HISTORY:   TOBACCO:   reports that he has been smoking cigarettes. He started smoking about 61 years ago. He has a 13.50 pack-year smoking history. He has never used smokeless tobacco.  ETOH:  reports previous alcohol use. DRUGS: reports previous drug use.     ALLERGIES:    Allergies   Allergen Reactions    Bee Venom Anaphylaxis    Fentanyl Shortness Of Breath and Swelling     Tongue swelling    Morphine Hives, Shortness Of Breath and Swelling     Face swelling    Pcn [Penicillins] Hives and Rash    Codeine Hives, Nausea Only, Rash and Other (See Comments)         HOME MEDICATIONS:  Prior to Admission medications Medication Sig Start Date End Date Taking? Authorizing Provider   Cholecalciferol (VITAMIN D3) 1.25 MG (13836 UT) CAPS Take by mouth   Yes Historical Provider, MD   omeprazole (PRILOSEC) 20 MG delayed release capsule Take 20 mg by mouth daily   Yes Historical Provider, MD   lisinopril (PRINIVIL;ZESTRIL) 40 MG tablet Take 40 mg by mouth nightly Per pcp   Yes Historical Provider, MD   clonazePAM (KLONOPIN) 1 MG tablet Take 1 mg by mouth 2 times daily. pcp   Yes Historical Provider, MD   Multiple Vitamins-Minerals (CENTRUM SILVER 50+MEN PO) Take 1 tablet by mouth daily   Yes Historical Provider, MD   albuterol sulfate  (90 Base) MCG/ACT inhaler Inhale 2 puffs into the lungs every 6 hours as needed for Wheezing Per pcp    Historical Provider, MD   tiotropium (SPIRIVA) 18 MCG inhalation capsule Inhale 1 capsule into the lungs daily 18   Heber Keith MD   fluticasone (FLONASE) 50 MCG/ACT nasal spray 2 sprays by Nasal route daily  Patient taking differently: 2 sprays by Nasal route daily as needed  18   Claudeen Clark, MD   atorvastatin (LIPITOR) 20 MG tablet TAKE 1 TABLET DAILY  Patient taking differently: Per Dr. King Chapman 3/27/18   Claudeen Clark, MD     IMMUNIZATIONS:  Most Recent Immunizations   Administered Date(s) Administered    Influenza, Quadv, 6-35 Months, IM (Fluzone,Afluria) 2017    Pneumococcal Conjugate 13-valent ([id number]) 2017    Pneumococcal Polysaccharide ([id number]) 10/20/2014    Tdap (Boostrix, Adacel) 2020     REVIEW OF SYSTEMS:  Review of Systems    OBJECTIVE     PaO2/FiO2 RATIO:  No results for input(s): POCPO2 in the last 72 hours.          VITAL SIGNS:   LAST:  BP (!) 164/93   Pulse 85   Temp 99.5 °F (37.5 °C) (Oral)   Resp 16   Ht 5' 9\" (1.753 m)   Wt 198 lb (89.8 kg)   SpO2 94%   BMI 29.24 kg/m²   8-24 HR RANGE:  TEMP Temp  Av.6 °F (37 °C)  Min: 98.2 °F (36.8 °C)  Max: 99.5 °F (62.3 °C)   BP Systolic (47UHY), XZ , Min:88 , Max:164 Diastolic (06OEQ), LBL:52, Min:54, Max:93     PULSE Pulse  Av.1  Min: 45  Max: 85   RR Resp  Av  Min: 16  Max: 16   O2 SAT SpO2  Av.3 %  Min: 92 %  Max: 94 %   OXYGEN DELIVERY O2 Flow Rate (L/min)  Av L/min  Min: 2 L/min  Max: 2 L/min        SYSTEMIC EXAMINATION:   General appearance - Ill-appearing, no acute distress  Mental status - awake & alert, follows commands  Eyes - pupils equal and reactive, sclera anicteric  Mouth - mucous membranes moist  Neck - supple, thick  Chest - Breath sounds bilaterally were dimnished to auscultation at bases. There were no wheezes, rhonchi or rales.     Heart - normal rate, regular rhythm, normal S1, S2, no murmurs, rubs, clicks or gallops  Abdomen - soft, nontender, nondistended, no masses or organomegaly  Neurological - non-focal  Extremities - peripheral pulses normal, no pedal edema, no clubbing or cyanosis  Skin - normal coloration and turgor, no rashes, no suspicious skin lesions noted     DATA REVIEW     Medications: Current Inpatient  Scheduled Meds:   nicotine  1 patch TransDERmal Daily    atorvastatin  20 mg Oral Nightly    [Held by provider] amLODIPine  5 mg Oral Daily    clonazePAM  1 mg Oral BID    fluticasone  2 spray Nasal Daily    [Held by provider] lisinopril  40 mg Oral Nightly    tiotropium  2 puff Inhalation Daily    sodium chloride flush  5-40 mL IntraVENous 2 times per day    enoxaparin  30 mg SubCUTAneous BID    famotidine  20 mg Oral BID    dexamethasone  6 mg Oral Daily    Vitamin D  2,000 Units Oral Daily     Continuous Infusions:   sodium chloride         INPUT/OUTPUT:  In: 600 [P.O.:600]  Out: 750 [Urine:750]  Date 10/14/21 0000 - 10/14/21 2359   Shift 7881-6914 0680-0897 5324-6765 24 Hour Total   INTAKE   P.O.(mL/kg/hr)  300(0.4) 300 600   Shift Total(mL/kg)  300(3.3) 300(3.3) 600(6.7)   OUTPUT   Urine(mL/kg/hr)  350(0.5) 400 750   Shift Total(mL/kg)  350(3.9) 400(4.5) 750(8.4)   Weight (kg) 89.8 89.8 89.8 89.8 LABS:  ABGs:   No results for input(s): POCPH, POCPCO2, POCPO2, POCHCO3, [id number] in the last 72 hours. CBC:   Recent Labs     10/13/21  1446 10/14/21  0332   WBC 6.4 6.2   HGB 17.3* 17.0   HCT 53.1* 50.4*   MCV 96.2 93.5    See Reflexed IPF Result   LYMPHOPCT 24 39   RBC 5.52 5.39   MCH 31.3 31.5   MCHC 32.6 33.7   RDW 12.4 12.4     CRP:   Recent Labs     10/13/21  2309   CRP 5.0     LDH:   Recent Labs     10/13/21  2309   *     BMP:   Recent Labs     10/13/21  1446 10/14/21  0332   * 137   K 4.3 4.9   CL 98 103   CO2 23 22   BUN 12 16   CREATININE 0.79 0.74   GLUCOSE 101* 164*     Liver Function Test:   Recent Labs     10/13/21  1446   PROT 7.6   LABALBU 4.3   ALT 26   AST 35   ALKPHOS 99   BILITOT 0.41     Coagulation Profile:   No results for input(s): INR, PROTIME, APTT in the last 72 hours. D-Dimer:  Recent Labs     10/13/21  2215   DDIMER 3.80     Ferritin:    Recent Labs     10/13/21  2309   FERRITIN 514*     Lactic Acid:  Recent Labs     10/13/21  1446   LACTA NOT REPORTED     Cardiac Enzymes:  No results for input(s): CKTOTAL, CKMB, CKMBINDEX, TROPONINI in the last 72 hours. Invalid input(s): TROPONIN, HSTROP  BNP/ProBNP:   Recent Labs     10/13/21  1446   PROBNP 191     Triglycerides:  No results for input(s): TRIG in the last 72 hours. Microbiology:  Urine Culture:  No components found for: CURINE  Blood Culture:  No components found for: CBLOOD, CFUNGUSBL  Sputum Culture:  No components found for: [facility]  Recent Labs     10/13/21  1431 10/13/21  1431 10/13/21  1452   SPECDESC . BLOOD   < > .NASOPHARYNGEAL SWAB   SPECIAL 10CC R FOREARM  --   --    CULTURE NO GROWTH 1 DAY  --   --     < > = values in this interval not displayed.      Recent Labs     10/13/21  1425 10/13/21  1431   SPECIAL 10CC L AC 10CC R FOREARM   CULTURE NO GROWTH 1 DAY NO GROWTH 1 DAY        Radiology Reports:  XR CHEST PORTABLE   Final Result   Bilateral pulmonary infiltrates consistent with pneumonia that are similar   compared to prior. CT CHEST PULMONARY EMBOLISM W CONTRAST   Final Result   No central to segmental pulmonary emboli. Left upper lobe airspace opacities suggestive of pneumonia. Radiographic   follow-up is recommended document resolution following medical treatment   course. Scattered calcified pleural plaques pleural thickening. Please correlate   with prior history and exposures. XR CHEST PORTABLE   Final Result   4 cm area of hazy opacity overlying the lateral aspect of the left mid lung   field may reflect focal area of pneumonia; however, follow-up to imaging   resolution is recommended to rule out other pathology. Echocardiogram:   Results for orders placed during the hospital encounter of 08/22/18    ECHO Complete 2D W Doppler W Color    Summary  Left ventricle is normal in size and wall thickness. Global left ventricular systolic function is normal. Estimated LV EF 60-65  %. Both atria are normal in size. Normal right ventricular size and function. No significant valvular regurgitation or stenosis seen. No significant pericardial effusion is seen. ASSESSMENT AND PLAN     Assessment:    // Acute hypoxic respiratory failure  // Breakthrough COVID-19 infection  // Fever  // COPD, severity to be determined  // Obesity  // Essential hypertension  // Dyslipidemia  // Suspected sleep apnea  // Coronary artery disease    Plan:    I personally interviewed/examined the patient; reviewed interval history, interpreted all available radiographic and laboratory data at the time of service.      Patient is hemodynamically stable and is currently saturating well on nasal cannula 2 L/min   Continue supplemental oxygen to keep oxygen saturation >90%   Encourage prone position when sleeping, incentive spirometry   Continue pulmonary toilet, aspiration precautions and bronchodilators   Monitor I/O, electrolytes with a goal of even/negative fluid balance   Tolerating oral diet   Stress ulcer prophylaxis   Continue to monitor CBC, coagulation profile, transfuse as indicated   Chemical DVT prophylaxis as per protocol   Antimicrobials reviewed; continue remdesivir as per ID recommendations   Monitor CRP, LDH, AST/ALT, D-Dimer, Ferritin   Glycemic control appropriate   Continue IV Decadron 6 mg daily   Skin/wound care reviewed with the nursing staff   Physical/occupational therapy    I would like to thank you for allowing me to participate in the care of this patient. Please feel free to call with any further questions or concerns. Ernesto Hernandes MD  Pulmonary and Critical Care Medicine           10/14/2021     This patient was evaluated in the context of the global SARS-CoV-2 (COVID-19) pandemic, which necessitated considerations that the patient either has COVID-19 infection or is at risk of infection with COVID-19. Institutional protocols and algorithms that pertain to the evaluation & management of patients with COVID-19 or those at risk for COVID-19 are in a state of rapid changes based on information released by regulatory bodies including the CDC and federal and state organizations. These policies and algorithms were followed during the patient's care. Please note that this chart was generated using voice recognition Dragon dictation software. Although every effort was made to ensure the accuracy of this automated transcription, some errors in transcription may have occurred.

## 2021-10-14 NOTE — PROGRESS NOTES
[facility]  Internal Medicine Teaching Residency Program  Inpatient Daily Progress Note  ______________________________________________________________________________    Patient: Ivon Hernández  YOB: 1950   SD    Acct: [de-identified]     Room: [facility]3008-01  Admit date: 10/13/2021  Today's date: 10/14/21  Number of days in the hospital: 1    SUBJECTIVE   Admitting Diagnosis: Acute respiratory failure due to COVID-19 [facility])  CC: Shortness of breath     - Pt examined at bedside. Chart & results reviewed. - Overnight, I was notified that pt had a HR in the 40's and 50's while sleeping. BP was stable and I told his nurse to notify me if there was persistent bradycardia below 40 BPM. Lowest HR recorded 45 BPM  - Pt requiring less O2 this morning (5L to 2L this morning via NC)  - Pt has no other complaints   - Continue with current treatment   - Continue to monitor O2 saturation and requirement - wean off as tolerated        ROS:  Constitutional:  negative for chills, fevers, sweats  Respiratory:  negative for cough, dyspnea on exertion, hemoptysis, shortness of breath, wheezing  Cardiovascular:  negative for chest pain, chest pressure/discomfort, lower extremity edema, palpitations  Gastrointestinal:  negative for abdominal pain, constipation, diarrhea, nausea, vomiting  Neurological:  negative for dizziness, headache    BRIEF HISTORY     The patient is a pleasant 72 y.o. male with a PMHx significant for anxiety, COPD, HTN, GERD and hyperlipidemia who presents with a chief complaint of shortness of breath. Pt states he was at home when he \"stopped breathing\" and told his girlfriend Hannah to bring him into the ED. Pt states he has had 4 days of headache, shortness of breath, chest pain and right flank pain. Shortness of breath is worse with exertion. Pt was febrile (101.8F). Pt says flank pain worsens with movement.  Chest pain is in the center of the chest, does not radiate anywhere. Pt denies loss of taste and smell. Pt stated he is vaccinated against COVID-19 with Moderna vaccine. OBJECTIVE     Vital Signs:  BP (!) 125/57   Pulse (!) 45   Temp 98.4 °F (36.9 °C) (Oral)   Resp 17   Ht 5' 9\" (1.753 m)   Wt 198 lb (89.8 kg)   SpO2 97%   BMI 29.24 kg/m²     Temp (24hrs), Av.2 °F (37.3 °C), Min:98.4 °F (36.9 °C), Max:101.8 °F (38.8 °C)    In: 340   Out: 300 [Urine:300]    Physical Exam:  Physical Exam  Constitutional:       Appearance: Normal appearance. He is obese. HENT:      Head: Normocephalic and atraumatic. Nose: Nose normal.      Mouth/Throat:      Mouth: Mucous membranes are moist.      Pharynx: Oropharynx is clear. Eyes:      Extraocular Movements: Extraocular movements intact. Conjunctiva/sclera: Conjunctivae normal.      Pupils: Pupils are equal, round, and reactive to light. Cardiovascular:      Rate and Rhythm: Normal rate and regular rhythm. Pulses: Normal pulses. Heart sounds: Normal heart sounds. Pulmonary:      Effort: Pulmonary effort is normal.      Breath sounds: Wheezing present. Abdominal:      General: Abdomen is flat. Bowel sounds are normal.      Palpations: Abdomen is soft. Musculoskeletal:         General: Normal range of motion. Cervical back: Normal range of motion. Skin:     General: Skin is warm. Neurological:      General: No focal deficit present. Mental Status: He is alert and oriented to person, place, and time. Mental status is at baseline. Psychiatric:         Mood and Affect: Mood normal.         Behavior: Behavior normal.         Thought Content:  Thought content normal.         Judgment: Judgment normal.           Medications:  Scheduled Medications:    atorvastatin  20 mg Oral Nightly    [Held by provider] amLODIPine  5 mg Oral Daily    clonazePAM  1 mg Oral BID    fluticasone  2 spray Nasal Daily    [Held by provider] lisinopril  40 mg Oral Nightly    tiotropium  2 puff Inhalation Daily    sodium chloride flush  5-40 mL IntraVENous 2 times per day    enoxaparin  30 mg SubCUTAneous BID    famotidine  20 mg Oral BID    dexamethasone  6 mg Oral Daily    Vitamin D  2,000 Units Oral Daily     Continuous Infusions:    sodium chloride       PRN Medicationsalbuterol sulfate HFA, 2 puff, Q6H PRN  sodium chloride flush, 5-40 mL, PRN  sodium chloride, 25 mL, PRN  ondansetron, 4 mg, Q8H PRN   Or  ondansetron, 4 mg, Q6H PRN  polyethylene glycol, 17 g, Daily PRN  acetaminophen, 650 mg, Q6H PRN   Or  acetaminophen, 650 mg, Q6H PRN        Diagnostic Labs:  CBC:   Recent Labs     10/13/21  1446 10/14/21  0332   WBC 6.4 6.2   RBC 5.52 5.39   HGB 17.3* 17.0   HCT 53.1* 50.4*   MCV 96.2 93.5   RDW 12.4 12.4    See Reflexed IPF Result     BMP:   Recent Labs     10/13/21  1446   *   K 4.3   CL 98   CO2 23   BUN 12   CREATININE 0.79     BNP: No results for input(s): BNP in the last 72 hours. PT/INR: No results for input(s): PROTIME, INR in the last 72 hours. APTT: No results for input(s): APTT in the last 72 hours. CARDIAC ENZYMES: No results for input(s): CKMB, CKMBINDEX, TROPONINI in the last 72 hours. Invalid input(s): CKTOTAL;3  FASTING LIPID PANEL:  Lab Results   Component Value Date    CHOL 146 12/21/2020    HDL 42 12/21/2020    TRIG 146 12/21/2020     LIVER PROFILE:   Recent Labs     10/13/21  1446   AST 35   ALT 26   BILITOT 0.41   ALKPHOS 99      MICROBIOLOGY:   Lab Results   Component Value Date/Time    CULTURE NO GROWTH 14 HOURS 10/13/2021 02:31 PM       Imaging:    XR CHEST PORTABLE    Result Date: 10/13/2021  4 cm area of hazy opacity overlying the lateral aspect of the left mid lung field may reflect focal area of pneumonia; however, follow-up to imaging resolution is recommended to rule out other pathology. CT CHEST PULMONARY EMBOLISM W CONTRAST    Result Date: 10/13/2021  No central to segmental pulmonary emboli.  Left upper lobe airspace opacities suggestive of pneumonia. Radiographic follow-up is recommended document resolution following medical treatment course. Scattered calcified pleural plaques pleural thickening. Please correlate with prior history and exposures. ASSESSMENT & PLAN     Assessment and Plan:    Principal Problem:    Acute respiratory failure due to COVID-19 [facility])  Active Problems:    Viral hepatitis C without hepatic coma    GERD (gastroesophageal reflux disease)    Anxiety    COPD (chronic obstructive pulmonary disease) (HCC)    Hyperlipidemia    Essential hypertension    Tobacco abuse    Prostate cancer ([facility] [address]  Resolved Problems:    * No resolved hospital problems. *        Principal Problem:    Acute respiratory failure due to COVID-19 (HCC)  - Monitor O2 saturation   - Monitor O2 requirement   - Follow up on CXR  - Trend inflammatory markers   - Received 6mg Decadron  - Continue to wean off O2 as tolerated   - Monitor for improvement in wheezing   - ID consulted - we appreciate recommendations          Active Problems:    Viral hepatitis C without hepatic coma  - Stable   - Continue to monitor        GERD (gastroesophageal reflux disease)  - Pepcid  - Continue to monitor         Anxiety  - Klonopin   - Continue to monitor        Tremor of B/L hands  - Klonopin       COPD (chronic obstructive pulmonary disease) (HCC)  - RT aerosol protocol   - Continue to monitor        Hyperlipidemia  - Lipitor       Essential hypertension  - Antihypertensive medications held due to BP being on the lower end   - Resume when clinically appropriate       Tobacco abuse  - Educated on the importance of cessation        Prostate cancer ([facility] [address]  - Stable   - Provide pt with briefs      Diet: Regular   DVT ppx: Lovenox  GI ppx: Pepcid      PT/OT/SW: Consulted   Discharge Planning: In process     Georgette Estrella MD  Internal Medicine Resident, PGY-1  [address] [place], [place]   5:43 AM 10/14/2021     Attestation and add on I have discussed the care of Surinder Meadows , including pertinent history and exam findings,      10/14/21    with the resident. I have seen and examined the patient and the key elements of all parts of the encounter have been performed by me . I agree with the assessment, plan and orders as documented by the resident. Principal Problem:    Acute respiratory failure due to COVID-19 [facility])  Active Problems:    Viral hepatitis C without hepatic coma    GERD (gastroesophageal reflux disease)    Anxiety    COPD (chronic obstructive pulmonary disease) (HCC)    Hyperlipidemia    Essential hypertension    Tobacco abuse    Prostate cancer (HCC)    Tremor of B/L hands  Resolved Problems:    * No resolved hospital problems. *            ---- ;        Medications: Allergies: Allergies   Allergen Reactions    Bee Venom Anaphylaxis    Fentanyl Shortness Of Breath and Swelling     Tongue swelling    Morphine Hives, Shortness Of Breath and Swelling     Face swelling    Pcn [Penicillins] Hives and Rash    Codeine Hives, Nausea Only, Rash and Other (See Comments)       Current Meds:   Scheduled Meds:    nicotine  1 patch TransDERmal Daily    atorvastatin  20 mg Oral Nightly    [Held by provider] amLODIPine  5 mg Oral Daily    clonazePAM  1 mg Oral BID    fluticasone  2 spray Nasal Daily    [Held by provider] lisinopril  40 mg Oral Nightly    tiotropium  2 puff Inhalation Daily    sodium chloride flush  5-40 mL IntraVENous 2 times per day    enoxaparin  30 mg SubCUTAneous BID    famotidine  20 mg Oral BID    dexamethasone  6 mg Oral Daily    Vitamin D  2,000 Units Oral Daily     Continuous Infusions:    sodium chloride       PRN Meds: albuterol sulfate HFA, sodium chloride flush, sodium chloride, ondansetron **OR** ondansetron, polyethylene glycol, acetaminophen **OR** acetaminophen        MD CURLY Hughes [address], [address] [place] [address].    Phone [phone]   Fax: [phone]  Answering Service: [phone]

## 2021-10-14 NOTE — PROGRESS NOTES
Physical Therapy    Facility/Department: [facility] CAR 3  Initial Assessment    NAME: Surinder Meadows  : 1950  MRN: [id number]    Date of Service: 10/14/2021    Discharge Recommendations:  Patient would benefit from continued therapy after discharge   PT Equipment Recommendations  Equipment Needed: No    Assessment   Body structures, Functions, Activity limitations: Decreased functional mobility ; Decreased endurance;Decreased strength  Assessment: Patient was able to get out of bed and ambulate in room, minimally short of breath. Denies any pain at current, though he notes that he's been using his back support which helps his pain. Patient needs further PT to regain functional independence. Prognosis: Good  Decision Making: Low Complexity  Clinical Presentation: stable  PT Education: Goals; General Safety;Gait Training;PT Role;Plan of Care;Precautions; Functional Mobility Training;Disease Specific Education  REQUIRES PT FOLLOW UP: Yes  Activity Tolerance  Activity Tolerance: Patient Tolerated treatment well       Patient Diagnosis(es): The encounter diagnosis was Acute respiratory failure due to COVID-19 [facility]). has a past medical history of Anxiety state, unspecified, Asthma, CAD (coronary artery disease), Carotid artery stenosis, Colon polyp, Depressive disorder, not elsewhere classified, Elevated PSA, Essential hypertension, benign, GERD (gastroesophageal reflux disease), Hearing loss, History of colon polyps, History of hepatitis C, Hyperlipidemia, Impotence of organic origin, Lipoma of unspecified site, Lumbago, No natural teeth, Prostate CA ([address] Secondary localized osteoarthrosis, shoulder region, Snores, Swelling of limb, and Wellness examination. has a past surgical history that includes Carpal tunnel release (Bilateral);  Ankle surgery (Right); shoulder surgery (Right, 2015); pr colon ca scrn not hi rsk ind (N/A, 2017); cervical fusion (); lumbar fusion; EXCISION / BIOPSY SKIN LESION OF assistance  Transfers  Sit to Stand: Stand by assistance  Stand to sit: Stand by assistance  Ambulation  Ambulation?: Yes  Ambulation 1  Surface: level tile  Device: No Device  Assistance: Stand by assistance  Quality of Gait: Denies pain, mildly short of breath  Gait Deviations: Slow [place]  Distance: 45'     Balance  Sitting - Static: Good  Sitting - Dynamic: Good  Standing - Static: Fair  Standing - Dynamic: [address]  Times per week: 5x/wk  Current Treatment Recommendations: Home Exercise Program, Endurance Training, Patient/Caregiver Education & Training, Functional Mobility Training, Transfer Training, Gait Training, Safety Education & Training  Safety Devices  Type of devices: All fall risk precautions in place, Bed alarm in place, Gait belt, Left in bed, Call light within reach           AM-PAC Score  AM-PAC Inpatient Mobility Raw Score : 22 (10/14/21 1308)  AM-PAC Inpatient T-Scale Score : 53.28 (10/14/21 1308)  Mobility Inpatient CMS 0-100% Score: 20.91 (10/14/21 1308)  Mobility Inpatient CMS G-Code Modifier : CJ (10/14/21 1308)          Goals  Short term goals  Time Frame for Short term goals: 14 visits  Short term goal 1: Ambulate 150' without device with SBA. Short term goal 2: Improve LE strength and endurance to support function as seen by completion of 15 reps standing LE exercise. Short term goal 3: Transfer with independence.        Therapy Time   Individual Concurrent Group Co-treatment   Time In 1120         Time Out 1155         Minutes 35         Timed Code Treatment Minutes: 25 Minutes       Xander Rm, PT

## 2021-10-14 NOTE — PROGRESS NOTES
78-year-old male with past medical history of COPD now presented with headache, shortness of breath, chest pain and right flank pain for last 4 days. Patient received Moderna Covid vaccine in February March 2021. PMH:  COPD  Hypertension  CAD  Hyperlipidemia  Prostate cancer s/p prostatectomy. Start is able to have never heard of penile  Stress incontinence    In ED patient was tested for Covid with positive rapid Covid test.  Labs show hemoglobin 17.3 with hematocrit 53.1, no leukocytosis. Hyperglycemia glucose 101, hyponatremia with sodium 134. Negative troponins. Chest x-ray show 4 cm area of obesity lateral aspect of the left midlung field  -pneumonia? ?  CT PE negative for pulmonary embolism. Left upper lobe airspace opacity. Scattered calcified pleural plaque. We will consult pulmonology. Assessment and plan  Patient started on Decadron 6 mg daily for Covid pneumonia, patient received one-time dose of doxycycline 100 mg IV in ED for pneumonia. Home dose Norvasc 5 lisinopril 40 held, will resume when needed. We will continue to monitor blood pressure. Low-dose Klonopin 1 mg resumed. Follow-up on chest x-ray  Patient started regular diet  DVT and GI prophylaxis ordered.       Alberta Christie MD  Internal Medicine Resident, PGY-2  [facility], [facility]         10/13/2021, 11:17 PM

## 2021-10-14 NOTE — PLAN OF CARE
Problem: Airway Clearance - Ineffective  Goal: Achieve or maintain patent airway  10/14/2021 1755 by Evangelina Dinero RN  Outcome: Ongoing  10/14/2021 1148 by Evangelina Dinero RN  Outcome: Ongoing     Problem:  Body Temperature -  Risk of, Imbalanced  Goal: Ability to maintain a body temperature within defined limits  10/14/2021 1755 by Evangelina Dinero RN  Outcome: Ongoing  10/14/2021 1148 by Evangelina Dinero RN  Outcome: Ongoing  Goal: Will regain or maintain usual level of consciousness  10/14/2021 1755 by Evangelina Dinero RN  Outcome: Ongoing  10/14/2021 1148 by Evangelina Dinero RN  Outcome: Ongoing  Goal: Complications related to the disease process, condition or treatment will be avoided or minimized  10/14/2021 1755 by Evangelina Dinero RN  Outcome: Ongoing  10/14/2021 1148 by Evangelina Dinero RN  Outcome: Ongoing     Problem: Isolation Precautions - Risk of Spread of Infection  Goal: Prevent transmission of infection  10/14/2021 1755 by Evangelina Dinero RN  Outcome: Ongoing  10/14/2021 1148 by Evangelina Dinero RN  Outcome: Ongoing     Problem: Nutrition Deficits  Goal: Optimize nutritional status  10/14/2021 1755 by Evangelina Dinero RN  Outcome: Ongoing  10/14/2021 1148 by Evangelina Dinero RN  Outcome: Ongoing     Problem: Risk for Fluid Volume Deficit  Goal: Maintain normal heart rhythm  10/14/2021 1755 by Evangelina Dinero RN  Outcome: Ongoing  10/14/2021 1148 by Evangelina Dinero RN  Outcome: Ongoing  Goal: Maintain absence of muscle cramping  10/14/2021 1755 by Evangelina Dinero RN  Outcome: Ongoing  10/14/2021 1148 by Evangelina Dinero RN  Outcome: Ongoing  Goal: Maintain normal serum potassium, sodium, calcium, phosphorus, and pH  10/14/2021 1755 by Evangelina Dinero RN  Outcome: Ongoing  10/14/2021 1148 by Evangelina Dinero RN  Outcome: Ongoing     Problem: Loneliness or Risk for Loneliness  Goal: Demonstrate positive use of time alone when socialization is not possible  10/14/2021 1755 by Evangelina Dinero RN  Outcome: Ongoing  10/14/2021 1148 by Tejal Medina RN  Outcome: Ongoing     Problem: Fatigue  Goal: Verbalize increase energy and improved vitality  10/14/2021 1755 by Tejal Medina RN  Outcome: Ongoing  10/14/2021 1148 by Tejal Medina RN  Outcome: Ongoing     Problem: Patient Education: Go to Patient Education Activity  Goal: Patient/Family Education  10/14/2021 1755 by Tejal Medina RN  Outcome: Ongoing  10/14/2021 1148 by Tejal Medina RN  Outcome: Ongoing     Problem: Falls - Risk of:  Goal: Will remain free from falls  Description: Will remain free from falls  10/14/2021 1755 by Tejal Medina RN  Outcome: Ongoing  10/14/2021 1148 by Tejal Medina RN  Outcome: Ongoing  Goal: Absence of physical injury  Description: Absence of physical injury  10/14/2021 1755 by Tejal Medina RN  Outcome: Ongoing  10/14/2021 1148 by Tejal Medina RN  Outcome: Ongoing     Problem: Pain:  Goal: Pain level will decrease  Description: Pain level will decrease  10/14/2021 1755 by Tejal Medina RN  Outcome: Ongoing  10/14/2021 1148 by Tejal Medina RN  Outcome: Ongoing  Goal: Control of acute pain  Description: Control of acute pain  10/14/2021 1755 by Tejal Medina RN  Outcome: Ongoing  10/14/2021 1148 by Tejal Medina RN  Outcome: Ongoing  Goal: Control of chronic pain  Description: Control of chronic pain  10/14/2021 1755 by Tejal Medina RN  Outcome: Ongoing  10/14/2021 1148 by Tejal Medina RN  Outcome: Ongoing

## 2021-10-14 NOTE — PROGRESS NOTES
Patient HR 40/50s while sleeping. HR occasionally dipping down the 39. Patient current BP is 125/57(77). Patient is asymptomatic. Dr. Isabelle Kuhn notified.

## 2021-10-15 LAB
ABSOLUTE EOS #: <0.03 K/UL (ref 0–0.44)
ABSOLUTE IMMATURE GRANULOCYTE: 0.05 K/UL (ref 0–0.3)
ABSOLUTE LYMPH #: 3.24 K/UL (ref 1.1–3.7)
ABSOLUTE MONO #: 0.92 K/UL (ref 0.1–1.2)
ALBUMIN SERPL-MCNC: 3.7 G/DL (ref 3.5–5.2)
ALBUMIN/GLOBULIN RATIO: 1.1 (ref 1–2.5)
ALP BLD-CCNC: 87 U/L (ref 40–129)
ALT SERPL-CCNC: 23 U/L (ref 5–41)
ANION GAP SERPL CALCULATED.3IONS-SCNC: 13 MMOL/L (ref 9–17)
AST SERPL-CCNC: 31 U/L
BASOPHILS # BLD: 0 % (ref 0–2)
BASOPHILS ABSOLUTE: <0.03 K/UL (ref 0–0.2)
BILIRUB SERPL-MCNC: 0.28 MG/DL (ref 0.3–1.2)
BILIRUBIN DIRECT: 0.1 MG/DL
BILIRUBIN, INDIRECT: 0.18 MG/DL (ref 0–1)
BUN BLDV-MCNC: 20 MG/DL (ref 8–23)
BUN/CREAT BLD: ABNORMAL (ref 9–20)
C-REACTIVE PROTEIN: 3.4 MG/L (ref 0–5)
CALCIUM SERPL-MCNC: 8.5 MG/DL (ref 8.6–10.4)
CHLORIDE BLD-SCNC: 103 MMOL/L (ref 98–107)
CO2: 19 MMOL/L (ref 20–31)
CREAT SERPL-MCNC: 0.76 MG/DL (ref 0.7–1.2)
DIFFERENTIAL TYPE: ABNORMAL
EKG ATRIAL RATE: 76 BPM
EKG P AXIS: 37 DEGREES
EKG P-R INTERVAL: 134 MS
EKG Q-T INTERVAL: 370 MS
EKG QRS DURATION: 82 MS
EKG QTC CALCULATION (BAZETT): 416 MS
EKG R AXIS: 44 DEGREES
EKG T AXIS: 16 DEGREES
EKG VENTRICULAR RATE: 76 BPM
EOSINOPHILS RELATIVE PERCENT: 0 % (ref 1–4)
GFR AFRICAN AMERICAN: >60 ML/MIN
GFR NON-AFRICAN AMERICAN: >60 ML/MIN
GFR SERPL CREATININE-BSD FRML MDRD: ABNORMAL ML/MIN/{1.73_M2}
GFR SERPL CREATININE-BSD FRML MDRD: ABNORMAL ML/MIN/{1.73_M2}
GLOBULIN: ABNORMAL G/DL (ref 1.5–3.8)
GLUCOSE BLD-MCNC: 83 MG/DL (ref 70–99)
HCT VFR BLD CALC: 53.5 % (ref 40.7–50.3)
HEMOGLOBIN: 16.7 G/DL (ref 13–17)
IMMATURE GRANULOCYTES: 0 %
LACTIC ACID, WHOLE BLOOD: 1.8 MMOL/L (ref 0.7–2.1)
LYMPHOCYTES # BLD: 24 % (ref 24–43)
MCH RBC QN AUTO: 31.4 PG (ref 25.2–33.5)
MCHC RBC AUTO-ENTMCNC: 31.2 G/DL (ref 28.4–34.8)
MCV RBC AUTO: 100.6 FL (ref 82.6–102.9)
MONOCYTES # BLD: 7 % (ref 3–12)
NRBC AUTOMATED: 0 PER 100 WBC
PDW BLD-RTO: 12.3 % (ref 11.8–14.4)
PLATELET # BLD: 161 K/UL (ref 138–453)
PLATELET ESTIMATE: ABNORMAL
PMV BLD AUTO: 11 FL (ref 8.1–13.5)
POTASSIUM SERPL-SCNC: 4.7 MMOL/L (ref 3.7–5.3)
RBC # BLD: 5.32 M/UL (ref 4.21–5.77)
RBC # BLD: ABNORMAL 10*6/UL
SEG NEUTROPHILS: 69 % (ref 36–65)
SEGMENTED NEUTROPHILS ABSOLUTE COUNT: 9.44 K/UL (ref 1.5–8.1)
SODIUM BLD-SCNC: 135 MMOL/L (ref 135–144)
TOTAL PROTEIN: 7.2 G/DL (ref 6.4–8.3)
WBC # BLD: 13.7 K/UL (ref 3.5–11.3)
WBC # BLD: ABNORMAL 10*3/UL

## 2021-10-15 PROCEDURE — 97116 GAIT TRAINING THERAPY: CPT

## 2021-10-15 PROCEDURE — 6370000000 HC RX 637 (ALT 250 FOR IP): Performed by: STUDENT IN AN ORGANIZED HEALTH CARE EDUCATION/TRAINING PROGRAM

## 2021-10-15 PROCEDURE — 80048 BASIC METABOLIC PNL TOTAL CA: CPT

## 2021-10-15 PROCEDURE — 97110 THERAPEUTIC EXERCISES: CPT

## 2021-10-15 PROCEDURE — 87150 DNA/RNA AMPLIFIED PROBE: CPT

## 2021-10-15 PROCEDURE — XW033E5 INTRODUCTION OF REMDESIVIR ANTI-INFECTIVE INTO PERIPHERAL VEIN, PERCUTANEOUS APPROACH, NEW TECHNOLOGY GROUP 5: ICD-10-PCS | Performed by: INTERNAL MEDICINE

## 2021-10-15 PROCEDURE — 99222 1ST HOSP IP/OBS MODERATE 55: CPT | Performed by: INTERNAL MEDICINE

## 2021-10-15 PROCEDURE — 1200000000 HC SEMI PRIVATE

## 2021-10-15 PROCEDURE — 87205 SMEAR GRAM STAIN: CPT

## 2021-10-15 PROCEDURE — 2580000003 HC RX 258: Performed by: INTERNAL MEDICINE

## 2021-10-15 PROCEDURE — 80076 HEPATIC FUNCTION PANEL: CPT

## 2021-10-15 PROCEDURE — 87040 BLOOD CULTURE FOR BACTERIA: CPT

## 2021-10-15 PROCEDURE — 99233 SBSQ HOSP IP/OBS HIGH 50: CPT | Performed by: INTERNAL MEDICINE

## 2021-10-15 PROCEDURE — 99232 SBSQ HOSP IP/OBS MODERATE 35: CPT | Performed by: INTERNAL MEDICINE

## 2021-10-15 PROCEDURE — 2580000003 HC RX 258: Performed by: STUDENT IN AN ORGANIZED HEALTH CARE EDUCATION/TRAINING PROGRAM

## 2021-10-15 PROCEDURE — 85025 COMPLETE CBC W/AUTO DIFF WBC: CPT

## 2021-10-15 PROCEDURE — 2500000003 HC RX 250 WO HCPCS: Performed by: INTERNAL MEDICINE

## 2021-10-15 PROCEDURE — 93010 ELECTROCARDIOGRAM REPORT: CPT | Performed by: INTERNAL MEDICINE

## 2021-10-15 PROCEDURE — 6360000002 HC RX W HCPCS: Performed by: STUDENT IN AN ORGANIZED HEALTH CARE EDUCATION/TRAINING PROGRAM

## 2021-10-15 PROCEDURE — 83605 ASSAY OF LACTIC ACID: CPT

## 2021-10-15 PROCEDURE — 36415 COLL VENOUS BLD VENIPUNCTURE: CPT

## 2021-10-15 PROCEDURE — 86140 C-REACTIVE PROTEIN: CPT

## 2021-10-15 RX ORDER — 0.9 % SODIUM CHLORIDE 0.9 %
30 INTRAVENOUS SOLUTION INTRAVENOUS PRN
Status: DISCONTINUED | OUTPATIENT
Start: 2021-10-15 | End: 2021-10-16 | Stop reason: HOSPADM

## 2021-10-15 RX ORDER — CLONAZEPAM 0.5 MG/1
1 TABLET ORAL EVERY 8 HOURS SCHEDULED
Status: DISCONTINUED | OUTPATIENT
Start: 2021-10-15 | End: 2021-10-16 | Stop reason: HOSPADM

## 2021-10-15 RX ORDER — CLONIDINE HYDROCHLORIDE 0.1 MG/1
0.1 TABLET ORAL 2 TIMES DAILY
Status: DISCONTINUED | OUTPATIENT
Start: 2021-10-15 | End: 2021-10-16 | Stop reason: HOSPADM

## 2021-10-15 RX ORDER — HYDROXYZINE HYDROCHLORIDE 10 MG/1
10 TABLET, FILM COATED ORAL 3 TIMES DAILY PRN
Status: DISCONTINUED | OUTPATIENT
Start: 2021-10-15 | End: 2021-10-16 | Stop reason: HOSPADM

## 2021-10-15 RX ADMIN — CLONIDINE HYDROCHLORIDE 0.1 MG: 0.1 TABLET ORAL at 13:43

## 2021-10-15 RX ADMIN — Medication 2000 UNITS: at 08:09

## 2021-10-15 RX ADMIN — FAMOTIDINE 20 MG: 20 TABLET, FILM COATED ORAL at 20:55

## 2021-10-15 RX ADMIN — SODIUM CHLORIDE, PRESERVATIVE FREE 10 ML: 5 INJECTION INTRAVENOUS at 11:48

## 2021-10-15 RX ADMIN — SODIUM CHLORIDE, PRESERVATIVE FREE 10 ML: 5 INJECTION INTRAVENOUS at 20:56

## 2021-10-15 RX ADMIN — LISINOPRIL 40 MG: 20 TABLET ORAL at 21:01

## 2021-10-15 RX ADMIN — HYDROXYZINE HYDROCHLORIDE 10 MG: 10 TABLET ORAL at 08:09

## 2021-10-15 RX ADMIN — ACETAMINOPHEN 650 MG: 325 TABLET ORAL at 11:45

## 2021-10-15 RX ADMIN — ATORVASTATIN CALCIUM 20 MG: 20 TABLET, FILM COATED ORAL at 20:55

## 2021-10-15 RX ADMIN — ENOXAPARIN SODIUM 30 MG: 30 INJECTION SUBCUTANEOUS at 08:10

## 2021-10-15 RX ADMIN — DEXAMETHASONE 6 MG: 4 TABLET ORAL at 08:09

## 2021-10-15 RX ADMIN — ENOXAPARIN SODIUM 30 MG: 30 INJECTION SUBCUTANEOUS at 20:55

## 2021-10-15 RX ADMIN — CLONIDINE HYDROCHLORIDE 0.1 MG: 0.1 TABLET ORAL at 20:55

## 2021-10-15 RX ADMIN — FLUTICASONE PROPIONATE 2 SPRAY: 50 SPRAY, METERED NASAL at 08:14

## 2021-10-15 RX ADMIN — AMLODIPINE BESYLATE 5 MG: 5 TABLET ORAL at 11:45

## 2021-10-15 RX ADMIN — CLONAZEPAM 1 MG: 0.5 TABLET ORAL at 13:43

## 2021-10-15 RX ADMIN — REMDESIVIR 200 MG: 100 INJECTION, POWDER, LYOPHILIZED, FOR SOLUTION INTRAVENOUS at 01:00

## 2021-10-15 RX ADMIN — FAMOTIDINE 20 MG: 20 TABLET, FILM COATED ORAL at 08:09

## 2021-10-15 ASSESSMENT — PAIN SCALES - GENERAL
PAINLEVEL_OUTOF10: 0
PAINLEVEL_OUTOF10: 5
PAINLEVEL_OUTOF10: 0

## 2021-10-15 ASSESSMENT — ENCOUNTER SYMPTOMS
EYE ITCHING: 0
SHORTNESS OF BREATH: 0
COLOR CHANGE: 0
COUGH: 1
ABDOMINAL DISTENTION: 0

## 2021-10-15 NOTE — PLAN OF CARE
Spoke with pt this morning. Dr. Tory Bhatti spoke with him and he agreered to stay for three more doses of Remdesivir. Pt is a daily drinker and there are new concerns for symptoms of withdrawal. Discussed with Dr. Kasey Grover. Started Clonidine 0.1mg > hold for SBP less than 110    Will change Klonopin 1mg to q8h     Progress note to follow. Zuleika Resendiz MD  Internal Medicine Resident  [facility];  [facility]

## 2021-10-15 NOTE — PROGRESS NOTES
[facility])  Occupational Therapy Not Seen Note    DATE: 10/15/2021    NAME: Edwin Alba  MRN: [id number]   : 1950      Patient not seen this date for Occupational Therapy due to:    OT spoke directly with patient, Patient reports no acute needs at this time or difficulty completing functional tasks. Patient independent with ADLs and functional tasks with no acute OT needs. Will defer OT evaluation at this time. Pt educated on notifying medical team if deficits arise, Please reorder OT if future needs arise.      Next Scheduled Treatment:     Electronically signed by Sy Jackson OT on 10/15/2021 at 9:25 AM

## 2021-10-15 NOTE — CONSULTS
[facility] -   Infectious diseases evaluation  admission date 10/13/2021    reason for consultation:   covid pneumonia     Impression :   Current:  · covid pneumonia  · Mild elevation of the infl markers  · vaccinated  ·   Discussion / summary of stay / plan of care   ·   Recommendations   · Start remdesevir 10/14/-10/18  · FU LFT  · Decadron  · lovenox  · Watch crp  · Pt accepted to stay till 10/16- would get his remdesevir in am and decadron and leave  ·     Infection Control Recommendations   · [place] Precautions  · Contact Isolation   · Airborne isolation  · Droplet Isolation    Antimicrobial Stewardship Recommendations   · Simplification of therapy  · Targeted therapy    Coordination ofOutpatient Care:   · Estimated Length of IV antimicrobials:  · Patient will need Midline / picc Catheter Insertion:   · Patient will need SNF:  · Patient will need outpatient wound care:     History of Present Illness:   Initial history:  Rosa Metzger is a 72y.o.-year-old male presents with shortness of breath getting worse and a 4-day history of headache chest pain right flank pain and fever cough. He denies loss of taste and smell  He is vaccinated for Covid. He is found diaphoretic with a fever in the ER.   Underlying COPD hypertension smoking  Chest x-ray shows bilateral pneumonia suggestive of Covid  infl markers moderately elevated  WBC and creatinine normal        Interval changes  10/15/2021   Patient Vitals for the past 8 hrs:   BP Temp Temp src Pulse Resp SpO2   10/15/21 2050 (!) 105/58 97.9 °F (36.6 °C) Oral 52 18 95 %   10/15/21 1847 107/67 98.4 °F (36.9 °C) Oral (!) 47 20 93 %       Summary of relevant labs:  Labs:  [id number]   D-Dimer, Quant3.80   [id number]   CRP5.0   Procalcitonin0.04   [id number]   WBC and creatinine normal    Micro:    Imaging:  Chest x-ray 10/14 bilateral pneumonia looking like Covid        I have personally reviewed the past medical history, past surgical history, medications, social history, and family history, and I haveupdated the database accordingly. Allergies:   Bee venom, Fentanyl, Morphine, Pcn [penicillins], and Codeine     Review of Systems:     Review of Systems   Constitutional: Negative for activity change. HENT: Negative for congestion. Eyes: Negative for itching. Respiratory: Positive for cough. Negative for shortness of breath. Cardiovascular: Negative for chest pain. Gastrointestinal: Negative for abdominal distention. Endocrine: Negative for polydipsia. Genitourinary: Negative for dysuria. Musculoskeletal: Negative for arthralgias. Skin: Negative for color change. Allergic/Immunologic: Negative for immunocompromised state. Neurological: Negative for dizziness. Hematological: Negative for adenopathy. Psychiatric/Behavioral: Negative for agitation. Physical Examination :       Physical Exam  Constitutional:       Appearance: Normal appearance. He is not ill-appearing. HENT:      Head: Atraumatic. Nose: Nose normal.      Mouth/Throat:      Pharynx: No posterior oropharyngeal erythema. Eyes:      General: No scleral icterus. Right eye: No discharge. Left eye: No discharge. Cardiovascular:      Rate and Rhythm: Normal rate and regular rhythm. Heart sounds: Normal heart sounds. Pulmonary:      Breath sounds: Normal breath sounds. No stridor. Abdominal:      Palpations: There is no mass. Genitourinary:     Comments: No trevino  Musculoskeletal:         General: No swelling or deformity. Cervical back: Neck supple. No rigidity. Skin:     Coloration: Skin is not jaundiced or pale. Neurological:      General: No focal deficit present. Mental Status: Mental status is at baseline. Psychiatric:         Mood and Affect: Mood normal.         Thought Content:  Thought content normal.         Past Medical History:     Past Medical History: Diagnosis Date    Anxiety state, unspecified     Asthma     inhalers per pcp    CAD (coronary artery disease)     follow[facility] with [facility] Toni Uribe [facility] Carotid artery stenosis     Colon polyp     Depressive disorder, not elsewhere classified     Elevated PSA 05/06/2019    Essential hypertension, benign     meds per pcp    GERD (gastroesophageal reflux disease)     Hearing loss     bilat.  Has hearing aids but does not wear    History of colon polyps     History of hepatitis C     Hyperlipidemia     on rx    Impotence of organic origin     Lipoma of unspecified site     Lumbago     No natural teeth     Prostate CA ([address] 05/2019    Secondary localized osteoarthrosis, shoulder region     Snores     no cpap or sleep apnea    Swelling of limb     Wellness examination     Nettie Ontiveros pcp last seen June 2020       Past Surgical  History:     Past Surgical History:   Procedure Laterality Date    ANKLE SURGERY Right     CARPAL TUNNEL RELEASE Bilateral     CERVICAL FUSION  2009    anterior    CHOLECYSTECTOMY      approx 2000    COLONOSCOPY      EXCISION / BIOPSY SKIN LESION OF ARM Left 9/15/2017    EXCISION SKIN LESION LEFT CHEST AND LEFT BUTTOCK, EXCISION LIPOMA LEFT LOWER ABDOMEN performed by Molina Mendez DO at [address] [facility]  08/26/2020    INSERTION INFLATABLE PENILE PROSTHESIS      PENILE PROSTHESIS PLACEMENT N/A 8/26/2020    INSERTION INFLATABLE PENILE PROSTHESIS  (REP NOTIFIED - Chidi Ritchie) performed by Alexsandra Moe MD at [address] [facility] NOT  [address] [place] [address] N/A 4/13/2017    COLONOSCOPY performed by Lorena Patel MD at [address] [place] [address] N/A 4/24/2019    PROSTATE BIOPSY WITH ULTRASOUND  (OFFICE TO NOTIFY [place]) performed by Alexsandra Moe MD at [facility] 6/26/2019    XI ROBOTIC LAPAROSCOPIC PROSTATECTOMY , PELVIC LYMPH NODE DISSECTION performed by Alexsandra Moe MD at [facility] 2015    ULNAR TUNNEL RELEASE Left        Medications:      [START ON 10/16/2021] remdesivir IVPB  100 mg IntraVENous Q24H    cloNIDine  0.1 mg Oral BID    clonazePAM  1 mg Oral 3 times per day    nicotine  1 patch TransDERmal Daily    atorvastatin  20 mg Oral Nightly    amLODIPine  5 mg Oral Daily    fluticasone  2 spray Nasal Daily    lisinopril  40 mg Oral Nightly    tiotropium  2 puff Inhalation Daily    sodium chloride flush  5-40 mL IntraVENous 2 times per day    enoxaparin  30 mg SubCUTAneous BID    famotidine  20 mg Oral BID    dexamethasone  6 mg Oral Daily    Vitamin D  2,000 Units Oral Daily       Social History:     Social History     Socioeconomic History    Marital status:      Spouse name: Not on file    Number of children: 2    Years of education: Not on file    Highest education level: Not on file   Occupational History    Not on file   Tobacco Use    Smoking status: Current Every Day Smoker     Packs/day: 0.25     Years: 54.00     Pack years: 13.50     Types: Cigarettes     Start date: 57    Smokeless tobacco: Never Used   Vaping Use    Vaping Use: Never used   Substance and Sexual Activity    Alcohol use: Not Currently     Comment: 22 oz beer every 2 days    Drug use: Not Currently     Comment: teenager    Sexual activity: Yes     Partners: Female   Other Topics Concern    Not on file   Social History Narrative    Not on file     Social Determinants of Health     Financial Resource Strain:     Difficulty of Paying Living Expenses:    Food Insecurity:     Worried About Running Out of Food in the Last Year:     [address] in the Last Year:    Transportation Needs:     Lack of Transportation (Medical):      Lack of Transportation (Non-Medical):    Physical Activity:     Days of Exercise per Week:     Minutes of Exercise per Session:    Stress:     Feeling of Stress :    Social Connections:     Frequency of Communication with Friends and Family:     Frequency of Social Gatherings with Friends and Family:     Attends Sikhism Services:     Active Member of Clubs or Organizations:     Attends Club or Organization Meetings:     Marital Status:    Intimate Partner Violence:     Fear of Current or Ex-Partner:     Emotionally Abused:     Physically Abused:     Sexually Abused:        Family History:     Family History   Problem Relation Age of Onset    Stroke Maternal Uncle 80    Heart Surgery Maternal Uncle         [address]    Other Maternal Grandmother         Pul. Embolism    Cancer Maternal Grandfather         Throat    No Known Problems Mother     Other Father         MVA    No Known Problems Sister     Other Brother         Electric burn at work   [address] No Known Problems Paternal Grandmother     No Known Problems Paternal Grandfather     Diabetes Brother       Medical Decision Making:   I have independently reviewed/ordered the following labs:    CBC with Differential:   Recent Labs     10/14/21  0332 10/15/21  0543   WBC 6.2 13.7*   HGB 17.0 16.7   HCT 50.4* 53.5*   PLT See Reflexed IPF Result 161   LYMPHOPCT 39 24   MONOPCT 5 7     BMP:  Recent Labs     10/14/21  0332 10/15/21  0543    135   K 4.9 4.7    103   CO2 22 19*   BUN 16 20   CREATININE 0.74 0.76     Hepatic Function Panel:   Recent Labs     10/13/21  1446 10/15/21  0543   PROT 7.6 7.2   LABALBU 4.3 3.7   BILIDIR  --  0.10   IBILI  --  0.18   BILITOT 0.41 0.28*   ALKPHOS 99 87   ALT 26 23   AST 35 31     No results for input(s): RPR in the last 72 hours. No results for input(s): HIV in the last 72 hours. No results for input(s): BC in the last 72 hours. Lab Results   Component Value Date    CREATININE 0.76 10/15/2021    GLUCOSE 83 10/15/2021       Detailed results: Thank you for allowing us to participate in the care of this patient. Please call with questions.     This note is created with the assistance of a speech recognition program.  While intending to generate adocument that actually reflects the content of the visit, the document can still have some errors including those of syntax and sound a like substitutions which may escape proof reading. It such instances, actual meaningcan be extrapolated by contextual diversion.     Casa Sams MD  Office: [phone]  Perfect serve / office [phone]

## 2021-10-15 NOTE — PLAN OF CARE
Problem: Gas Exchange - Impaired  Goal: Absence of hypoxia  10/15/2021 1816 by Randal Elizabeth RN  Outcome: Ongoing  10/15/2021 1728 by Randal Elizabeth RN  Outcome: Ongoing  Goal: Promote optimal lung function  10/15/2021 1816 by Randal Elizabeth RN  Outcome: Ongoing  10/15/2021 1728 by Randal Elizabeth RN  Outcome: Ongoing     Problem: Breathing Pattern - Ineffective  Goal: Ability to achieve and maintain a regular respiratory rate  10/15/2021 1816 by Randal Elizabeth RN  Outcome: Ongoing  10/15/2021 1728 by Ranadl Elizabeth RN  Outcome: Ongoing     Problem:  Body Temperature -  Risk of, Imbalanced  Goal: Ability to maintain a body temperature within defined limits  10/15/2021 1816 by Randal Elizabeth RN  Outcome: Ongoing  10/15/2021 1728 by Randal Elizabeth RN  Outcome: Ongoing  Goal: Will regain or maintain usual level of consciousness  10/15/2021 1816 by Randal Elizabeth RN  Outcome: Ongoing  10/15/2021 1728 by Randal Elizabeth RN  Outcome: Ongoing  Goal: Complications related to the disease process, condition or treatment will be avoided or minimized  10/15/2021 1816 by Randal Elizabeth RN  Outcome: Ongoing  10/15/2021 1728 by Randal Elizabeth RN  Outcome: Ongoing     Problem: Isolation Precautions - Risk of Spread of Infection  Goal: Prevent transmission of infection  10/15/2021 1816 by Randal Elizabeth RN  Outcome: Ongoing  10/15/2021 1728 by Randal Elizabeth RN  Outcome: Ongoing     Problem: Nutrition Deficits  Goal: Optimize nutritional status  10/15/2021 1816 by Randal Elizabeth RN  Outcome: Ongoing  10/15/2021 1728 by Randal Elizabeth RN  Outcome: Ongoing     Problem: Risk for Fluid Volume Deficit  Goal: Maintain normal heart rhythm  10/15/2021 1816 by Randal Elizabeth RN  Outcome: Ongoing  10/15/2021 1728 by Randal Elizabeth RN  Outcome: Ongoing  Goal: Maintain absence of muscle cramping  10/15/2021 1816 by Randal Elizabeth RN  Outcome: Ongoing  10/15/2021 1728 by Karolee Glenn, RN  Outcome: Ongoing  Goal: Maintain normal serum potassium, sodium, calcium, phosphorus, and pH  10/15/2021 1816 by Deandre Billingsley RN  Outcome: Ongoing  10/15/2021 1728 by Deandre Billingsley RN  Outcome: Ongoing     Problem: Loneliness or Risk for Loneliness  Goal: Demonstrate positive use of time alone when socialization is not possible  10/15/2021 1816 by Deandre Billingsley RN  Outcome: Ongoing  10/15/2021 1728 by Deandre Billingsley RN  Outcome: Ongoing     Problem: Fatigue  Goal: Verbalize increase energy and improved vitality  10/15/2021 1816 by Deandre Billingsley RN  Outcome: Ongoing  10/15/2021 1728 by Deandre Billingsley RN  Outcome: Ongoing     Problem: Patient Education: Go to Patient Education Activity  Goal: Patient/Family Education  10/15/2021 1816 by Deandre Billingsley RN  Outcome: Ongoing  10/15/2021 1728 by Deandre Billingsley RN  Outcome: Ongoing     Problem: Falls - Risk of:  Goal: Will remain free from falls  Description: Will remain free from falls  10/15/2021 1816 by Deandre Billingsley RN  Outcome: Ongoing  10/15/2021 1728 by Deandre Billingsley RN  Outcome: Ongoing  Goal: Absence of physical injury  Description: Absence of physical injury  10/15/2021 1816 by Deandre Billingsley RN  Outcome: Ongoing  10/15/2021 1728 by Deandre Billingsley RN  Outcome: Ongoing     Problem: Pain:  Goal: Pain level will decrease  Description: Pain level will decrease  10/15/2021 1816 by Deandre Billingsley RN  Outcome: Ongoing  10/15/2021 1728 by Deandre Billingsley RN  Outcome: Ongoing  Goal: Control of acute pain  Description: Control of acute pain  10/15/2021 1816 by Deandre Billingsley RN  Outcome: Ongoing  10/15/2021 1728 by Deandre Billingsley RN  Outcome: Ongoing  Goal: Control of chronic pain  Description: Control of chronic pain  10/15/2021 1816 by Deandre Billingsley RN  Outcome: Ongoing  10/15/2021 1728 by Deandre Billingsley RN  Outcome: Ongoing

## 2021-10-15 NOTE — PROGRESS NOTES
Tyrese [facility]  Internal Medicine Teaching Residency Program  Inpatient Daily Progress Note  ______________________________________________________________________________    Patient: Jessica Hartman  YOB: 1950   EVD:[id number]    Acct: [de-identified]     Room: 30083008-01  Admit date: 10/13/2021  Today's date: 10/15/21  Number of days in the hospital: 2    SUBJECTIVE   Admitting Diagnosis: Acute respiratory failure due to COVID-19 [facility])  CC: Shortness of breath     - Pt examined at bedside. Chart & results reviewed. - No acute events overnight   - Pt on room air this moring wanting to leave AMA after receiving Actemra   - Dr. Miguel Avendano spoke with the pt and he agreed to get three more doses of Remdesivir   - Will contiune to monitor   - Appreciate ID recommendations     ROS:  Constitutional:  negative for chills, fevers, sweats  Respiratory:  negative for cough, dyspnea on exertion, hemoptysis, shortness of breath, wheezing  Cardiovascular:  negative for chest pain, chest pressure/discomfort, lower extremity edema, palpitations  Gastrointestinal:  negative for abdominal pain, constipation, diarrhea, nausea, vomiting  Neurological:  negative for dizziness, headache    BRIEF HISTORY        The patient is a pleasant 70 y. o. male with a PMHx significant for anxiety, COPD, HTN, Marly Lied presents with a chief complaint of shortness of breath. Pt states he was at home when he \"stopped breathing\" and told his girlfriend Hannah to bring him into the ED. Pt states he has had 4 days of headache, shortness of breath, chest pain and right flank pain. Shortness of breath is worse with exertion. Pt was febrile (101.8F). Pt says flank pain worsens with movement. Chest pain is in the center of the chest, does not radiate anywhere. Pt denies loss of taste and smell.  Pt stated he is vaccinated against COVID-19 with Xuan Lied vaccine.     OBJECTIVE     Vital Signs: /69   Pulse 84   Temp 100.8 °F (38.2 °C) (Oral)   Resp 20   Ht 5' 9\" (1.753 m)   Wt 198 lb (89.8 kg)   SpO2 95%   BMI 29.24 kg/m²     Temp (24hrs), Av.9 °F (37.7 °C), Min:99.2 °F (37.3 °C), Max:100.8 °F (38.2 °C)    In: 310   Out: 750 [Urine:750]    Physical Exam:  Physical Exam  Constitutional:       Appearance: Normal appearance. HENT:      Head: Normocephalic and atraumatic. Nose: Nose normal.      Mouth/Throat:      Mouth: Mucous membranes are moist.      Pharynx: Oropharynx is clear. Eyes:      Extraocular Movements: Extraocular movements intact. Conjunctiva/sclera: Conjunctivae normal.      Pupils: Pupils are equal, round, and reactive to light. Cardiovascular:      Rate and Rhythm: Normal rate and regular rhythm. Pulses: Normal pulses. Heart sounds: Normal heart sounds. Pulmonary:      Effort: Pulmonary effort is normal.      Breath sounds: Normal breath sounds. Abdominal:      General: Abdomen is flat. Bowel sounds are normal.      Palpations: Abdomen is soft. Musculoskeletal:         General: Normal range of motion. Cervical back: Normal range of motion. Skin:     General: Skin is warm. Neurological:      General: No focal deficit present. Mental Status: He is alert and oriented to person, place, and time. Mental status is at baseline. Psychiatric:         Mood and Affect: Mood normal.         Behavior: Behavior normal.         Thought Content:  Thought content normal.         Judgment: Judgment normal.           Medications:  Scheduled Medications:    [START ON 10/16/2021] remdesivir IVPB  100 mg IntraVENous Q24H    cloNIDine  0.1 mg Oral BID    clonazePAM  1 mg Oral 3 times per day    nicotine  1 patch TransDERmal Daily    atorvastatin  20 mg Oral Nightly    amLODIPine  5 mg Oral Daily    fluticasone  2 spray Nasal Daily    lisinopril  40 mg Oral Nightly    tiotropium  2 puff Inhalation Daily    sodium chloride flush  5-40 mL IntraVENous 2 times per day    enoxaparin  30 mg SubCUTAneous BID    famotidine  20 mg Oral BID    dexamethasone  6 mg Oral Daily    Vitamin D  2,000 Units Oral Daily     Continuous Infusions:    sodium chloride       PRN Medicationssodium chloride, 30 mL, PRN  hydrOXYzine, 10 mg, TID PRN  dextromethorphan-guaiFENesin, 10 mL, Q4H PRN  melatonin, 3 mg, Nightly PRN  albuterol sulfate HFA, 2 puff, Q6H PRN  sodium chloride flush, 5-40 mL, PRN  sodium chloride, 25 mL, PRN  ondansetron, 4 mg, Q8H PRN   Or  ondansetron, 4 mg, Q6H PRN  polyethylene glycol, 17 g, Daily PRN  acetaminophen, 650 mg, Q6H PRN   Or  acetaminophen, 650 mg, Q6H PRN        Diagnostic Labs:  CBC:   Recent Labs     10/13/21  1446 10/14/21  0332 10/15/21  0543   WBC 6.4 6.2 13.7*   RBC 5.52 5.39 5.32   HGB 17.3* 17.0 16.7   HCT 53.1* 50.4* 53.5*   MCV 96.2 93.5 100.6   RDW 12.4 12.4 12.3    See Reflexed IPF Result 161     BMP:   Recent Labs     10/13/21  1446 10/14/21  0332 10/15/21  0543   * 137 135   K 4.3 4.9 4.7   CL 98 103 103   CO2 23 22 19*   BUN 12 16 20   CREATININE 0.79 0.74 0.76     BNP: No results for input(s): BNP in the last 72 hours. PT/INR: No results for input(s): PROTIME, INR in the last 72 hours. APTT: No results for input(s): APTT in the last 72 hours. CARDIAC ENZYMES: No results for input(s): CKMB, CKMBINDEX, TROPONINI in the last 72 hours. Invalid input(s): CKTOTAL;3  FASTING LIPID PANEL:  Lab Results   Component Value Date    CHOL 146 12/21/2020    HDL 42 12/21/2020    TRIG 146 12/21/2020     LIVER PROFILE:   Recent Labs     10/13/21  1446 10/15/21  0543   AST 35 31   ALT 26 23   BILIDIR  --  0.10   BILITOT 0.41 0.28*   ALKPHOS 99 87      MICROBIOLOGY:   Lab Results   Component Value Date/Time    CULTURE NO GROWTH 2 DAYS 10/13/2021 02:31 PM       Imaging:    XR CHEST PORTABLE    Result Date: 10/14/2021  Bilateral pulmonary infiltrates consistent with pneumonia that are similar compared to prior.      XR to BP being on the lower end   - Resume when clinically appropriate       Tobacco abuse  - Educated on the importance of cessation        Prostate cancer ([facility] [address]  - Stable   - Provide pt with briefs      Diet: Regular   DVT ppx: Lovenox  GI ppx: Pepcid      PT/OT/SW: Consulted   Discharge Planning: In process     Mima Booker MD  Internal Medicine Resident, PGY-1  [place]; [place], [place]   12:52 PM 10/15/2021       Attestation and add on       I have discussed the care of Avila Ceron , including pertinent history and exam findings,      10/15/21    with the resident. I have seen and examined the patient and the key elements of all parts of the encounter have been performed by me . I agree with the assessment, plan and orders as documented by the resident. Principal Problem:    Acute respiratory failure due to COVID-19 [facility])  Active Problems:    Viral hepatitis C without hepatic coma    GERD (gastroesophageal reflux disease)    Anxiety    COPD (chronic obstructive pulmonary disease) (HCC)    Hyperlipidemia    Essential hypertension    Tobacco abuse    Prostate cancer (HCC)    Tremor of B/L hands  Resolved Problems:    * No resolved hospital problems. *            ---improving  - ; On dexamethasone and remdesivir        Medications: Allergies:     Allergies   Allergen Reactions    Bee Venom Anaphylaxis    Fentanyl Shortness Of Breath and Swelling     Tongue swelling    Morphine Hives, Shortness Of Breath and Swelling     Face swelling    Pcn [Penicillins] Hives and Rash    Codeine Hives, Nausea Only, Rash and Other (See Comments)       Current Meds:   Scheduled Meds:    [START ON 10/16/2021] remdesivir IVPB  100 mg IntraVENous Q24H    cloNIDine  0.1 mg Oral BID    clonazePAM  1 mg Oral 3 times per day    nicotine  1 patch TransDERmal Daily    atorvastatin  20 mg Oral Nightly    amLODIPine  5 mg Oral Daily    fluticasone  2 spray Nasal Daily    lisinopril  40 mg Oral Nightly    tiotropium  2 puff Inhalation Daily    sodium chloride flush  5-40 mL IntraVENous 2 times per day    enoxaparin  30 mg SubCUTAneous BID    famotidine  20 mg Oral BID    dexamethasone  6 mg Oral Daily    Vitamin D  2,000 Units Oral Daily     Continuous Infusions:    sodium chloride       PRN Meds: sodium chloride, hydrOXYzine, dextromethorphan-guaiFENesin, melatonin, albuterol sulfate HFA, sodium chloride flush, sodium chloride, ondansetron **OR** ondansetron, polyethylene glycol, acetaminophen **OR** acetaminophen        MD CURLY Devine [address], [address] [place] [address].    Phone [phone]   Fax: [phone]  Answering Service: [phone]

## 2021-10-15 NOTE — PLAN OF CARE
Problem:  Body Temperature -  Risk of, Imbalanced  Goal: Ability to maintain a body temperature within defined limits  Outcome: Ongoing  Goal: Will regain or maintain usual level of consciousness  Outcome: Ongoing  Goal: Complications related to the disease process, condition or treatment will be avoided or minimized  Outcome: Ongoing     Problem: Isolation Precautions - Risk of Spread of Infection  Goal: Prevent transmission of infection  Outcome: Ongoing     Problem: Nutrition Deficits  Goal: Optimize nutritional status  Outcome: Ongoing     Problem: Risk for Fluid Volume Deficit  Goal: Maintain normal heart rhythm  Outcome: Ongoing  Goal: Maintain absence of muscle cramping  Outcome: Ongoing  Goal: Maintain normal serum potassium, sodium, calcium, phosphorus, and pH  Outcome: Ongoing     Problem: Loneliness or Risk for Loneliness  Goal: Demonstrate positive use of time alone when socialization is not possible  Outcome: Ongoing     Problem: Fatigue  Goal: Verbalize increase energy and improved vitality  Outcome: Ongoing     Problem: Patient Education: Go to Patient Education Activity  Goal: Patient/Family Education  Outcome: Ongoing     Problem: Falls - Risk of:  Goal: Will remain free from falls  Description: Will remain free from falls  Outcome: Ongoing  Goal: Absence of physical injury  Description: Absence of physical injury  Outcome: Ongoing     Problem: Pain:  Goal: Pain level will decrease  Description: Pain level will decrease  Outcome: Ongoing  Goal: Control of acute pain  Description: Control of acute pain  Outcome: Ongoing  Goal: Control of chronic pain  Description: Control of chronic pain  Outcome: Ongoing

## 2021-10-15 NOTE — PLAN OF CARE
Problem: Airway Clearance - Ineffective  Goal: Achieve or maintain patent airway  10/14/2021 2141 by Luke Galvan RN  Outcome: Ongoing  10/14/2021 1755 by Evangelina Dinero RN  Outcome: Ongoing  10/14/2021 1148 by Evangelina Dinero RN  Outcome: Ongoing     Problem: Gas Exchange - Impaired  Goal: Absence of hypoxia  10/14/2021 2141 by Luke Galvan RN  Outcome: Ongoing  10/14/2021 1755 by Evangelina Dinero RN  Outcome: Ongoing  10/14/2021 1148 by Evangelina Dinero RN  Outcome: Ongoing  Goal: Promote optimal lung function  10/14/2021 2141 by Luke Galvan RN  Outcome: Ongoing  10/14/2021 1755 by Evangelina Dinero RN  Outcome: Ongoing  10/14/2021 1148 by Evangelina Dinero RN  Outcome: Ongoing     Problem: Breathing Pattern - Ineffective  Goal: Ability to achieve and maintain a regular respiratory rate  10/14/2021 2141 by Luke Galvan RN  Outcome: Ongoing  10/14/2021 1755 by Evangelina Dniero RN  Outcome: Ongoing  10/14/2021 1148 by Evangelina Dinero RN  Outcome: Ongoing     Problem: Isolation Precautions - Risk of Spread of Infection  Goal: Prevent transmission of infection  10/14/2021 2141 by Luke Galvan RN  Outcome: Ongoing  10/14/2021 1755 by Evangelina Dinero RN  Outcome: Ongoing  10/14/2021 1148 by Evangelina Dinero RN  Outcome: Ongoing     Problem: Nutrition Deficits  Goal: Optimize nutritional status  10/14/2021 2141 by Luke Galvan RN  Outcome: Ongoing  10/14/2021 1755 by Evangelina Dinero RN  Outcome: Ongoing  10/14/2021 1148 by Evangelina Dinero RN  Outcome: Ongoing     Problem: Risk for Fluid Volume Deficit  Goal: Maintain normal heart rhythm  10/14/2021 2141 by Luke Galvan RN  Outcome: Ongoing  10/14/2021 1755 by Evangelina Dinero RN  Outcome: Ongoing  10/14/2021 1148 by Evangelina Dinero RN  Outcome: Ongoing  Goal: Maintain absence of muscle cramping  10/14/2021 2141 by Luke Galvan RN  Outcome: Ongoing  10/14/2021 1755 by Evangelina Dinero RN  Outcome: Ongoing  10/14/2021 1148 by Evangelina Dinero RN  Outcome: Ongoing  Goal: Maintain normal serum potassium, sodium, calcium, phosphorus, and pH  10/14/2021 2141 by Nathen Olszewski, RN  Outcome: Ongoing  10/14/2021 1755 by Wild Cuevas RN  Outcome: Ongoing  10/14/2021 1148 by Wild Cuevas RN  Outcome: Ongoing     Problem: Loneliness or Risk for Loneliness  Goal: Demonstrate positive use of time alone when socialization is not possible  10/14/2021 2141 by Nathen Olszewski, RN  Outcome: Ongoing  10/14/2021 1755 by Wild Cuevas RN  Outcome: Ongoing  10/14/2021 1148 by Wild Cuevas RN  Outcome: Ongoing     Problem: Fatigue  Goal: Verbalize increase energy and improved vitality  10/14/2021 2141 by Nathen Olszewski, RN  Outcome: Ongoing  10/14/2021 1755 by Wild Cuevas RN  Outcome: Ongoing  10/14/2021 1148 by Wild Cuevas RN  Outcome: Ongoing     Problem: Falls - Risk of:  Goal: Will remain free from falls  Description: Will remain free from falls  10/14/2021 2141 by Nathen Olszewski, RN  Outcome: Ongoing  10/14/2021 1755 by Wild Cuevas RN  Outcome: Ongoing  10/14/2021 1148 by Wild Cuevas RN  Outcome: Ongoing  Goal: Absence of physical injury  Description: Absence of physical injury  10/14/2021 2141 by Nathen Olszewski, RN  Outcome: Ongoing  10/14/2021 1755 by Wild Cuevas RN  Outcome: Ongoing  10/14/2021 1148 by Wild Cuevas RN  Outcome: Ongoing     Problem: Pain:  Description: Pain management should include both nonpharmacologic and pharmacologic interventions.   Goal: Pain level will decrease  Description: Pain level will decrease  10/14/2021 2141 by Nathen Olszewski, RN  Outcome: Ongoing  10/14/2021 1755 by Wild Cuevas RN  Outcome: Ongoing  10/14/2021 1148 by Wild Cuevas RN  Outcome: Ongoing  Goal: Control of acute pain  Description: Control of acute pain  10/14/2021 2141 by Nathen Olszewski, RN  Outcome: Ongoing  10/14/2021 1755 by Wild Elsmere, RN  Outcome: Ongoing  10/14/2021 1148 by Wild Cuevas, RN  Outcome: Ongoing  Goal: Control of chronic pain  Description: Control of chronic pain  10/14/2021 2141 by Marielena Vance RN  Outcome: Ongoing  10/14/2021 1755 by Iker Mejia RN  Outcome: Ongoing  10/14/2021 1148 by Iker Mejia RN  Outcome: Ongoing     Problem: IP BALANCE  Goal: BALANCE EDUCATION  Description: Educate patients on maintaining dynamic/static standing/sitting balance, with/without upper extremity support.   10/14/2021 2141 by Marielena Vance RN  Outcome: Ongoing  10/14/2021 1755 by Iker Mejia RN  Outcome: Ongoing     Problem: IP MOBILITY  Goal: LTG - patient will ambulate community distance  10/14/2021 2141 by Marielena Vance RN  Outcome: Ongoing  10/14/2021 1755 by Iker Mejia RN  Outcome: Ongoing

## 2021-10-15 NOTE — PROGRESS NOTES
Physical Therapy  Facility/Department: [facility] 3  Daily Treatment Note  NAME: Vince Cee  : 1950  MRN: [id number]    Date of Service: 10/15/2021    Discharge Recommendations:  Patient would benefit from continued therapy after discharge   PT Equipment Recommendations  Equipment Needed: No    Assessment   Assessment: Pt ambulated 40 ft in the room with CGA using no AD. Pt demos poor safety awareness and requires frequent verbal cues to stay on task. Pt is limited by decreased endurance and cognitive status. Prognosis: Good  Decision Making: Low Complexity  Clinical Presentation: stable  PT Education: Goals; General Safety;Gait Training;PT Role;Plan of Care;Precautions; Functional Mobility Training;Disease Specific Education  REQUIRES PT FOLLOW UP: Yes  Activity Tolerance  Activity Tolerance: Patient Tolerated treatment well     Patient Diagnosis(es): The encounter diagnosis was Acute respiratory failure due to COVID-19 [facility]). has a past medical history of Anxiety state, unspecified, Asthma, CAD (coronary artery disease), Carotid artery stenosis, Colon polyp, Depressive disorder, not elsewhere classified, Elevated PSA, Essential hypertension, benign, GERD (gastroesophageal reflux disease), Hearing loss, History of colon polyps, History of hepatitis C, Hyperlipidemia, Impotence of organic origin, Lipoma of unspecified site, Lumbago, No natural teeth, Prostate CA ([address] Secondary localized osteoarthrosis, shoulder region, Snores, Swelling of limb, and Wellness examination. has a past surgical history that includes Carpal tunnel release (Bilateral); Ankle surgery (Right); shoulder surgery (Right, ); pr colon ca scrn not hi rsk ind (N/A, 2017); cervical fusion (); lumbar fusion; EXCISION / BIOPSY SKIN LESION OF ARM (Left, 9/15/2017); Ulnar tunnel release (Left); Prostate biopsy (N/A, 2019); Prostatectomy (N/A, 2019); Colonoscopy; Cholecystectomy;  Penile prosthesis placement (08/26/2020); and Penile prosthesis placement (N/A, 8/26/2020). Restrictions  Restrictions/Precautions  Restrictions/Precautions: Isolation  Position Activity Restriction  Other position/activity restrictions: Up with assist  Subjective   General  Chart Reviewed: Yes  Family / Caregiver Present: No  Subjective  Subjective: pt sitting upright in chair at start of session. RN and pt agreeable for therapy. General Comment  Comments: Pt remain sitting upright in chair post therapy, RN notified. Pain Screening  Patient Currently in Pain: Denies  Vital Signs  Patient Currently in Pain: Denies       Orientation  Orientation  Overall Orientation Status: Within Functional Limits  Cognition   Cognition  Overall Cognitive Status: WFL  Objective   Bed mobility  Supine to Sit: Stand by assistance  Sit to Supine: Stand by assistance  Transfers  Sit to Stand: Stand by assistance  Stand to sit: Stand by assistance  Comment: pt requires verbal cues for sequencing and hand placement to increase safety awareness. Ambulation  Ambulation?: Yes  Ambulation 1  Surface: level tile  Device: No Device  Assistance: Stand by assistance  Quality of Gait: Denies pain, mildly short of breath  Gait Deviations: Slow Opal  Distance: 40 Ft     Balance  Sitting - Static: Good  Sitting - Dynamic: Good  Standing - Static: Fair  Standing - Dynamic: Fair  Exercises  Hip Flexion: x15 BLE  Hip Abduction: x15 BLE  Knee Long Arc Quad: x15 BLE  Ankle Pumps: x15 BLE                                                                     AM-PAC Score  AM-PAC Inpatient Mobility Raw Score : 22 (10/15/21 1457)  AM-PAC Inpatient T-Scale Score : 53.28 (10/15/21 1457)  Mobility Inpatient CMS 0-100% Score: 20.91 (10/15/21 1457)  Mobility Inpatient CMS G-Code Modifier : CJ (10/15/21 1457)          Goals  Short term goals  Time Frame for Short term goals: 14 visits  Short term goal 1: Ambulate 150' without device with SBA.   Short term goal 2: Improve LE strength and endurance to support function as seen by completion of 15 reps standing LE exercise. Short term goal 3: Transfer with independence. Plan    Plan  Times per week: 5x/wk  Current Treatment Recommendations: Home Exercise Program, Endurance Training, Patient/Caregiver Education & Training, Functional Mobility Training, Transfer Training, Gait Training, Safety Education & Training  Safety Devices  Type of devices:  All fall risk precautions in place, Gait belt, Call light within reach, Left in chair     Therapy Time   Individual Concurrent Group Co-treatment   Time In 0957         Time Out 1020         Minutes 23            Variance: [address] [place], [place]

## 2021-10-16 VITALS
DIASTOLIC BLOOD PRESSURE: 67 MMHG | TEMPERATURE: 98.6 F | BODY MASS INDEX: 29.33 KG/M2 | OXYGEN SATURATION: 91 % | RESPIRATION RATE: 17 BRPM | HEART RATE: 60 BPM | HEIGHT: 69 IN | SYSTOLIC BLOOD PRESSURE: 125 MMHG | WEIGHT: 198 LBS

## 2021-10-16 LAB
ABSOLUTE EOS #: <0.03 K/UL (ref 0–0.44)
ABSOLUTE IMMATURE GRANULOCYTE: <0.03 K/UL (ref 0–0.3)
ABSOLUTE LYMPH #: 3.24 K/UL (ref 1.1–3.7)
ABSOLUTE MONO #: 0.84 K/UL (ref 0.1–1.2)
ALBUMIN SERPL-MCNC: 3.3 G/DL (ref 3.5–5.2)
ALBUMIN/GLOBULIN RATIO: 1.1 (ref 1–2.5)
ALP BLD-CCNC: 76 U/L (ref 40–129)
ALT SERPL-CCNC: 21 U/L (ref 5–41)
ANION GAP SERPL CALCULATED.3IONS-SCNC: 11 MMOL/L (ref 9–17)
AST SERPL-CCNC: 28 U/L
BASOPHILS # BLD: 0 % (ref 0–2)
BASOPHILS ABSOLUTE: <0.03 K/UL (ref 0–0.2)
BILIRUB SERPL-MCNC: 0.38 MG/DL (ref 0.3–1.2)
BILIRUBIN DIRECT: 0.13 MG/DL
BILIRUBIN, INDIRECT: 0.25 MG/DL (ref 0–1)
BUN BLDV-MCNC: 18 MG/DL (ref 8–23)
BUN/CREAT BLD: ABNORMAL (ref 9–20)
C-REACTIVE PROTEIN: 4.3 MG/L (ref 0–5)
CALCIUM SERPL-MCNC: 8.5 MG/DL (ref 8.6–10.4)
CHLORIDE BLD-SCNC: 104 MMOL/L (ref 98–107)
CO2: 22 MMOL/L (ref 20–31)
CREAT SERPL-MCNC: 0.5 MG/DL (ref 0.7–1.2)
CULTURE: ABNORMAL
DIFFERENTIAL TYPE: ABNORMAL
EOSINOPHILS RELATIVE PERCENT: 0 % (ref 1–4)
FERRITIN: 591 UG/L (ref 30–400)
GFR AFRICAN AMERICAN: >60 ML/MIN
GFR NON-AFRICAN AMERICAN: >60 ML/MIN
GFR SERPL CREATININE-BSD FRML MDRD: ABNORMAL ML/MIN/{1.73_M2}
GFR SERPL CREATININE-BSD FRML MDRD: ABNORMAL ML/MIN/{1.73_M2}
GLOBULIN: ABNORMAL G/DL (ref 1.5–3.8)
GLUCOSE BLD-MCNC: 98 MG/DL (ref 70–99)
HCT VFR BLD CALC: 48 % (ref 40.7–50.3)
HEMOGLOBIN: 15.5 G/DL (ref 13–17)
IMMATURE GRANULOCYTES: 0 %
LYMPHOCYTES # BLD: 46 % (ref 24–43)
Lab: ABNORMAL
MCH RBC QN AUTO: 31.3 PG (ref 25.2–33.5)
MCHC RBC AUTO-ENTMCNC: 32.3 G/DL (ref 28.4–34.8)
MCV RBC AUTO: 97 FL (ref 82.6–102.9)
MONOCYTES # BLD: 12 % (ref 3–12)
NRBC AUTOMATED: 0 PER 100 WBC
PDW BLD-RTO: 12.5 % (ref 11.8–14.4)
PLATELET # BLD: 155 K/UL (ref 138–453)
PLATELET ESTIMATE: ABNORMAL
PMV BLD AUTO: 11.2 FL (ref 8.1–13.5)
POTASSIUM SERPL-SCNC: 4.2 MMOL/L (ref 3.7–5.3)
RBC # BLD: 4.95 M/UL (ref 4.21–5.77)
RBC # BLD: ABNORMAL 10*6/UL
SEG NEUTROPHILS: 42 % (ref 36–65)
SEGMENTED NEUTROPHILS ABSOLUTE COUNT: 2.92 K/UL (ref 1.5–8.1)
SODIUM BLD-SCNC: 137 MMOL/L (ref 135–144)
SPECIMEN DESCRIPTION: ABNORMAL
TOTAL PROTEIN: 6.3 G/DL (ref 6.4–8.3)
WBC # BLD: 7 K/UL (ref 3.5–11.3)
WBC # BLD: ABNORMAL 10*3/UL

## 2021-10-16 PROCEDURE — 87186 SC STD MICRODIL/AGAR DIL: CPT

## 2021-10-16 PROCEDURE — APPSS30 APP SPLIT SHARED TIME 16-30 MINUTES: Performed by: NURSE PRACTITIONER

## 2021-10-16 PROCEDURE — 6370000000 HC RX 637 (ALT 250 FOR IP): Performed by: STUDENT IN AN ORGANIZED HEALTH CARE EDUCATION/TRAINING PROGRAM

## 2021-10-16 PROCEDURE — 87077 CULTURE AEROBIC IDENTIFY: CPT

## 2021-10-16 PROCEDURE — 6360000002 HC RX W HCPCS: Performed by: STUDENT IN AN ORGANIZED HEALTH CARE EDUCATION/TRAINING PROGRAM

## 2021-10-16 PROCEDURE — 94640 AIRWAY INHALATION TREATMENT: CPT

## 2021-10-16 PROCEDURE — 86140 C-REACTIVE PROTEIN: CPT

## 2021-10-16 PROCEDURE — 99232 SBSQ HOSP IP/OBS MODERATE 35: CPT | Performed by: INTERNAL MEDICINE

## 2021-10-16 PROCEDURE — 87040 BLOOD CULTURE FOR BACTERIA: CPT

## 2021-10-16 PROCEDURE — 36415 COLL VENOUS BLD VENIPUNCTURE: CPT

## 2021-10-16 PROCEDURE — 2500000003 HC RX 250 WO HCPCS: Performed by: INTERNAL MEDICINE

## 2021-10-16 PROCEDURE — 85025 COMPLETE CBC W/AUTO DIFF WBC: CPT

## 2021-10-16 PROCEDURE — 80048 BASIC METABOLIC PNL TOTAL CA: CPT

## 2021-10-16 PROCEDURE — 80076 HEPATIC FUNCTION PANEL: CPT

## 2021-10-16 PROCEDURE — 82728 ASSAY OF FERRITIN: CPT

## 2021-10-16 PROCEDURE — 2580000003 HC RX 258: Performed by: INTERNAL MEDICINE

## 2021-10-16 PROCEDURE — 87086 URINE CULTURE/COLONY COUNT: CPT

## 2021-10-16 RX ORDER — GUAIFENESIN DEXTROMETHORPHAN HYDROBROMIDE ORAL SOLUTION 10; 100 MG/5ML; MG/5ML
10 SOLUTION ORAL EVERY 4 HOURS PRN
Qty: 1 EACH | Refills: 0 | Status: SHIPPED | OUTPATIENT
Start: 2021-10-16 | End: 2021-11-15

## 2021-10-16 RX ORDER — AMLODIPINE BESYLATE 5 MG/1
TABLET ORAL
Qty: 30 TABLET | Refills: 3 | Status: SHIPPED | OUTPATIENT
Start: 2021-10-16 | End: 2021-11-08

## 2021-10-16 RX ADMIN — Medication 2000 UNITS: at 08:47

## 2021-10-16 RX ADMIN — CLONIDINE HYDROCHLORIDE 0.1 MG: 0.1 TABLET ORAL at 10:34

## 2021-10-16 RX ADMIN — REMDESIVIR 100 MG: 100 INJECTION, POWDER, LYOPHILIZED, FOR SOLUTION INTRAVENOUS at 15:08

## 2021-10-16 RX ADMIN — REMDESIVIR 100 MG: 100 INJECTION, POWDER, LYOPHILIZED, FOR SOLUTION INTRAVENOUS at 00:36

## 2021-10-16 RX ADMIN — FAMOTIDINE 20 MG: 20 TABLET, FILM COATED ORAL at 08:48

## 2021-10-16 RX ADMIN — CLONAZEPAM 1 MG: 0.5 TABLET ORAL at 05:34

## 2021-10-16 RX ADMIN — DEXAMETHASONE 6 MG: 4 TABLET ORAL at 08:48

## 2021-10-16 RX ADMIN — ENOXAPARIN SODIUM 30 MG: 30 INJECTION SUBCUTANEOUS at 08:47

## 2021-10-16 RX ADMIN — HYDROXYZINE HYDROCHLORIDE 10 MG: 10 TABLET ORAL at 08:57

## 2021-10-16 RX ADMIN — TIOTROPIUM BROMIDE INHALATION SPRAY 2 PUFF: 3.12 SPRAY, METERED RESPIRATORY (INHALATION) at 09:43

## 2021-10-16 ASSESSMENT — ENCOUNTER SYMPTOMS
DIARRHEA: 0
WHEEZING: 0
SHORTNESS OF BREATH: 1
TROUBLE SWALLOWING: 0
EYE ITCHING: 0
SHORTNESS OF BREATH: 0
NAUSEA: 0
CONSTIPATION: 0
SORE THROAT: 0
ABDOMINAL PAIN: 0
COUGH: 1
ABDOMINAL DISTENTION: 0
COLOR CHANGE: 0
VOMITING: 0

## 2021-10-16 ASSESSMENT — PAIN SCALES - GENERAL
PAINLEVEL_OUTOF10: 0
PAINLEVEL_OUTOF10: 0

## 2021-10-16 NOTE — PROGRESS NOTES
Discharge instructions explained to patient Pt finished last dose of remdesivir  Patient verbalized understanding meds to beds received Left per w/c for discharge

## 2021-10-16 NOTE — PLAN OF CARE
Problem: Airway Clearance - Ineffective  Goal: Achieve or maintain patent airway  10/16/2021 0622 by Skye Bruce RN  Outcome: Ongoing    Problem: Gas Exchange - Impaired  Goal: Absence of hypoxia  10/16/2021 0622 by Skye Bruce RN  Outcome: Ongoing  Goal: Promote optimal lung function  10/16/2021 0622 by Skye Bruce RN  Outcome: Ongoing     Problem: Breathing Pattern - Ineffective  Goal: Ability to achieve and maintain a regular respiratory rate  10/16/2021 0622 by Skye Bruce RN  Outcome: Ongoing     Problem:  Body Temperature -  Risk of, Imbalanced  Goal: Ability to maintain a body temperature within defined limits  10/16/2021 0622 by Skye Bruce RN  Outcome: Ongoing  Goal: Will regain or maintain usual level of consciousness  10/16/2021 0622 by Skye Bruce RN  Outcome: Ongoing  Goal: Complications related to the disease process, condition or treatment will be avoided or minimized  10/16/2021 0622 by Skye Bruce RN  Outcome: Ongoing     Problem: Isolation Precautions - Risk of Spread of Infection  Goal: Prevent transmission of infection  10/16/2021 0622 by Skye Bruce RN  Outcome: Ongoing     Problem: Nutrition Deficits  Goal: Optimize nutritional status  10/16/2021 0622 by Skye Bruce RN  Outcome: Ongoing     Problem: Risk for Fluid Volume Deficit  Goal: Maintain normal heart rhythm  10/16/2021 0622 by Skye Bruce RN  Outcome: Ongoing  Goal: Maintain absence of muscle cramping  10/16/2021 0622 by Skye Bruce RN  Outcome: Ongoing  Goal: Maintain normal serum potassium, sodium, calcium, phosphorus, and pH  10/16/2021 0622 by Skye Bruce RN  Outcome: Ongoing     Problem: Loneliness or Risk for Loneliness  Goal: Demonstrate positive use of time alone when socialization is not possible  10/16/2021 0622 by Skye Bruce RN  Outcome: Ongoing     Problem: Fatigue  Goal: Verbalize increase energy and improved vitality  10/16/2021 0622 by Skye Bruce RN  Outcome: Ongoing     Problem: Patient Education: Go to Patient Education Activity  Goal: Patient/Family Education  10/16/2021 0622 by Shira Estrada RN  Outcome: Ongoing     Problem: Falls - Risk of:  Goal: Will remain free from falls  Description: Will remain free from falls  10/16/2021 0622 by Skye Bruce RN  Outcome: Ongoing  Goal: Absence of physical injury  Description: Absence of physical injury  10/16/2021 0622 by Skye Bruce RN  Outcome: Ongoing     Problem: Pain:  Goal: Pain level will decrease  Description: Pain level will decrease  10/16/2021 0622 by Skye Bruce RN  Outcome: Ongoing  Goal: Control of acute pain  Description: Control of acute pain  10/16/2021 0622 by Skye Bruce RN  Outcome: Ongoing  Goal: Control of chronic pain  Description: Control of chronic pain  10/16/2021 0622 by Skye Bruce RN  Outcome: Ongoing

## 2021-10-16 NOTE — PROGRESS NOTES
PULMONARY & CRITICAL CARE MEDICINE PROGRESS NOTE     Patient:  Cait Ferrera  MRN: [id number]  [facility] date: 10/13/2021  Primary Care Physician: Swapnil Siu  Consulting Physician: Viji Taylor MD  CODE Status: Full Code  LOS: 2     SUBJECTIVE     CHIEF COMPLAINT/REASON FOR INITIAL CONSULT: Acute respiratory failure/COVID-19 pneumonia    BRIEF HOSPITAL COURSE:   The patient is a 72 y.o. male with history of hypertension, hyperlipidemia, COPD, anxiety presented with worsening shortness of breath. He reported headache, shortness of breath chest pain for about 4 days. He was febrile on presentation. He has been deviously vaccinated with Jaya Bran for COVID-19. He is on nasal cannula at 2 L/min. He tested positive for COVID-19 on 10/13. Currently getting remdesivir and Decadron    INTERVAL HISTORY:  10/15/21  Patient is currently on room air  No overnight issues reported  White count is 13.7  T-max 100.8  He is receiving remdesivir and IV Decadron 6 mg daily    REVIEW OF SYSTEMS:  Review of Systems   Constitutional: Negative for appetite change, chills, fever and unexpected weight change. HENT: Negative for congestion, postnasal drip, sore throat and trouble swallowing. Eyes: Negative for visual disturbance. Respiratory: Positive for cough and shortness of breath. Negative for wheezing. Cardiovascular: Negative for chest pain, palpitations and leg swelling. Gastrointestinal: Negative for abdominal pain, constipation, diarrhea, nausea and vomiting. Genitourinary: Negative for difficulty urinating, dysuria and frequency. Musculoskeletal: Negative for arthralgias and joint swelling. Skin: Negative for rash. Allergic/Immunologic: Negative for immunocompromised state. Neurological: Positive for weakness. Negative for dizziness, speech difficulty and headaches. Hematological: Negative for adenopathy. Psychiatric/Behavioral: Negative for behavioral problems and sleep disturbance. OBJECTIVE     VITAL SIGNS:   LAST:  BP (!) 105/58   Pulse 52   Temp 97.9 °F (36.6 °C) (Oral)   Resp 18   Ht 5' 9\" (1.753 m)   Wt 198 lb (89.8 kg)   SpO2 95%   BMI 29.24 kg/m²   8-24 HR RANGE:  TEMP Temp  Av.2 °F (37.3 °C)  Min: 97.9 °F (36.6 °C)  Max: 100.8 °F (01.5 °C)   BP Systolic (32DQE), CNB:978 , Min:105 , CAREY:645      Diastolic (40HKW), ELU:69, Min:58, Max:105     PULSE Pulse  Av.7  Min: 47  Max: 84   RR Resp  Av  Min: 18  Max: 20   O2 SAT SpO2  Av %  Min: 93 %  Max: 95 %   OXYGEN DELIVERY No data recorded        SYSTEMIC EXAMINATION:   General appearance - Ill-appearing, comfortable, no distress  Mental status - awake & alert, follows commands  Eyes - pupils equal and reactive, sclera anicteric  Mouth - mucous membranes moist, pharynx normal without lesions  Neck - supple, no significant adenopathy, carotids upstroke normal bilaterally, no bruits  Chest - Breath sounds bilaterally were dimnished to auscultation at bases. There were no wheezes, rhonchi or rales.     Heart - normal rate, regular rhythm, normal S1, S2, no murmurs, rubs, clicks or gallops  Abdomen - soft, nontender, nondistended, no masses or organomegaly  Neurological - non-focal  Extremities - peripheral pulses normal, no pedal edema, no clubbing or cyanosis  Skin - normal coloration and turgor, no rashes, no suspicious skin lesions noted     DATA REVIEW     Medications: Current Inpatient  Scheduled Meds:   [START ON 10/16/2021] remdesivir IVPB  100 mg IntraVENous Q24H    cloNIDine  0.1 mg Oral BID    clonazePAM  1 mg Oral 3 times per day    nicotine  1 patch TransDERmal Daily    atorvastatin  20 mg Oral Nightly    amLODIPine  5 mg Oral Daily    fluticasone  2 spray Nasal Daily    lisinopril  40 mg Oral Nightly    tiotropium  2 puff Inhalation Daily    sodium chloride flush  5-40 mL IntraVENous 2 times per day    enoxaparin  30 mg SubCUTAneous BID    famotidine  20 mg Oral BID    dexamethasone  6 mg Oral Daily    Vitamin D  2,000 Units Oral Daily     Continuous Infusions:   sodium chloride         INPUT/OUTPUT:  In: 700 [P.O.:700]  Out: -   Date 10/15/21 0000 - 10/15/21 2359   Shift 0000-0759 2126-5709 1600-2359 24 Hour Total   INTAKE   P.O.(mL/kg/hr)  700(1)  700   Shift Total(mL/kg)  700(7.8)  700(7.8)   OUTPUT   Urine(mL/kg/hr) 350(0.5)   350   Shift Total(mL/kg) 350(3.9)   350(3.9)   Weight (kg) 89.8 89.8 89.8 89.8        LABS:  ABGs:   No results for input(s): POCPH, POCPCO2, POCPO2, POCHCO3, XLIN1SMC in the last 72 hours. CBC:   Recent Labs     10/13/21  1446 10/14/21  0332 10/15/21  0543   WBC 6.4 6.2 13.7*   HGB 17.3* 17.0 16.7   HCT 53.1* 50.4* 53.5*   MCV 96.2 93.5 100.6    See Reflexed IPF Result 161   LYMPHOPCT 24 39 24   RBC 5.52 5.39 5.32   MCH 31.3 31.5 31.4   MCHC 32.6 33.7 31.2   RDW 12.4 12.4 12.3     CRP:   Recent Labs     10/13/21  2309 10/15/21  0543   CRP 5.0 3.4     LDH:   Recent Labs     10/13/21  2309   *     BMP:   Recent Labs     10/13/21  1446 10/14/21  0332 10/15/21  0543   * 137 135   K 4.3 4.9 4.7   CL 98 103 103   CO2 23 22 19*   BUN 12 16 20   CREATININE 0.79 0.74 0.76   GLUCOSE 101* 164* 83     Liver Function Test:   Recent Labs     10/13/21  1446 10/15/21  0543   PROT 7.6 7.2   LABALBU 4.3 3.7   ALT 26 23   AST 35 31   ALKPHOS 99 87   BILITOT 0.41 0.28*     Coagulation Profile:   No results for input(s): INR, PROTIME, APTT in the last 72 hours. D-Dimer:  Recent Labs     10/13/21  2215   DDIMER 3.80     Ferritin:    Recent Labs     10/13/21  2309   FERRITIN 514*     Lactic Acid:  Recent Labs     10/13/21  1446   LACTA NOT REPORTED     Cardiac Enzymes:  No results for input(s): CKTOTAL, CKMB, CKMBINDEX, TROPONINI in the last 72 hours. Invalid input(s): TROPONIN, HSTROP  BNP/ProBNP:   Recent Labs     10/13/21  1446   PROBNP 191     Triglycerides:  No results for input(s): TRIG in the last 72 hours.      Microbiology:  Urine Culture:  No components found for: CURINE  Blood Culture:  No components found for: CBLOOD, CFUNGUSBL  Sputum Culture:  No components found for: CSPUTUM  Recent Labs     10/15/21  1217   SPECDESC . BLOOD  . BLOOD   SPECIAL NOT REPORTED  NOT REPORTED   CULTURE NO GROWTH 6 HOURS  NO GROWTH 6 HOURS     Recent Labs     10/13/21  1425 10/13/21  1425 10/13/21  1431 10/13/21  1452 10/15/21  1217   SPECDESC . BLOOD   < > .BLOOD . NASOPHARYNGEAL SWAB . BLOOD  . BLOOD   SPECIAL 10CC L AC  --  10CC R FOREARM  --  NOT REPORTED  NOT REPORTED   CULTURE NO GROWTH 2 DAYS  --  NO GROWTH 2 DAYS  --  NO GROWTH 6 HOURS  NO GROWTH 6 HOURS    < > = values in this interval not displayed. Pathology:    Radiology Reports:  XR CHEST PORTABLE   Final Result   Bilateral pulmonary infiltrates consistent with pneumonia that are similar   compared to prior. CT CHEST PULMONARY EMBOLISM W CONTRAST   Final Result   No central to segmental pulmonary emboli. Left upper lobe airspace opacities suggestive of pneumonia. Radiographic   follow-up is recommended document resolution following medical treatment   course. Scattered calcified pleural plaques pleural thickening. Please correlate   with prior history and exposures. XR CHEST PORTABLE   Final Result   4 cm area of hazy opacity overlying the lateral aspect of the left mid lung   field may reflect focal area of pneumonia; however, follow-up to imaging   resolution is recommended to rule out other pathology. Echocardiogram:   Results for orders placed during the hospital encounter of 08/22/18    ECHO Complete 2D W Doppler W Color    Summary  Left ventricle is normal in size and wall thickness. Global left ventricular systolic function is normal. Estimated LV EF 60-65  %. Both atria are normal in size. Normal right ventricular size and function. No significant valvular regurgitation or stenosis seen. No significant pericardial effusion is seen.        ASSESSMENT AND PLAN algorithms were followed during the patient's care. Please note that this chart was generated using voice recognition Dragon dictation software. Although every effort was made to ensure the accuracy of this automated transcription, some errors in transcription may have occurred.

## 2021-10-16 NOTE — PROGRESS NOTES
[facility] -   Infectious diseases evaluation  admission date 10/13/2021    reason for consultation:   covid pneumonia     Impression :   Current:  covid pneumonia  Mild elevation of the infl markers  vaccinated    Discussion / summary of stay / plan of care   Blood culture 1/2 staph epi-almost certainly a contaminant  Repeat cultures ordered  Recommendations   Start remdesevir 10/14/-10/18  FU LFT  Decadron  lovenox  Watch crp  Pt accepted to stay till 10/16- would get his remdesevir in am and decadron and leave    Infection Control Recommendations   [place] Precautions  Contact Isolation   Airborne isolation  Droplet Isolation    Antimicrobial Stewardship Recommendations   Simplification of therapy  Targeted therapy    Coordination ofOutpatient Care:   Estimated Length of IV antimicrobials:  Patient will need Midline / picc Catheter Insertion:   Patient will need SNF:  Patient will need outpatient wound care:     History of Present Illness:   Initial history:  Wendy Meng is a 72y.o.-year-old male presents with shortness of breath getting worse and a 4-day history of headache chest pain right flank pain and fever cough. He denies loss of taste and smell  He is vaccinated for Covid. He is found diaphoretic with a fever in the ER. Underlying COPD hypertension smoking  Chest x-ray shows bilateral pneumonia suggestive of Covid  infl markers moderately elevated  WBC and creatinine normal        Interval changes  10/16/2021   Patient Vitals for the past 8 hrs:   BP Temp Temp src Pulse Resp SpO2   10/16/21 0845 125/67 98.6 °F (37 °C) -- 60 16 95 %   10/16/21 0530 119/72 98.6 °F (37 °C) Oral (!) 47 15 --     10/16  Afebrile  VS stable  The patient is oxygenating well on room air.   SpO2: 95  RR: 16  Staph epi on blood culture 1/2-very likely contaminant  Monitor off abx  Repeat cultures ordered--follow-up if repeat cultures show any growth  Patient is wanting to leave AMA-he will need to monitor his SpO2 closely at home. Summary of relevant labs:  Labs:  [id number]   D-Dimer, Quant3.80   [id number]   CRP5.0-->3.4  Procalcitonin0.04   [id number]   WBC and creatinine normal    Micro:    Imaging:  Chest x-ray 10/14 bilateral pneumonia looking like Covid        I have personally reviewed the past medical history, past surgical history, medications, social history, and family history, and I haveupdated the database accordingly. Allergies:   Bee venom, Fentanyl, Morphine, Pcn [penicillins], and Codeine     Review of Systems:     Review of Systems   Constitutional: Negative for activity change. HENT: Negative for congestion. Eyes: Negative for itching. Respiratory: Positive for cough. Negative for shortness of breath. Cardiovascular: Negative for chest pain. Gastrointestinal: Negative for abdominal distention. Endocrine: Negative for polydipsia. Genitourinary: Negative for dysuria. Musculoskeletal: Negative for arthralgias. Skin: Negative for color change. Allergic/Immunologic: Negative for immunocompromised state. Neurological: Negative for dizziness. Hematological: Negative for adenopathy. Psychiatric/Behavioral: Negative for agitation. Physical Examination :       Physical Exam  Constitutional:       Appearance: Normal appearance. He is not ill-appearing. HENT:      Head: Atraumatic. Nose: Nose normal.      Mouth/Throat:      Pharynx: No posterior oropharyngeal erythema. Eyes:      General: No scleral icterus. Right eye: No discharge. Left eye: No discharge. Cardiovascular:      Rate and Rhythm: Normal rate and regular rhythm. Heart sounds: Normal heart sounds. Pulmonary:      Breath sounds: Normal breath sounds. No stridor. Abdominal:      Palpations: There is no mass. Genitourinary:     Comments: No trevino  Musculoskeletal:         General: No swelling or deformity.       Cervical back: Neck PROSTATE BIOPSY N/A 4/24/2019    PROSTATE BIOPSY WITH ULTRASOUND  (OFFICE TO NOTIFY [place]) [facility] N/A 6/26/2019    XI ROBOTIC LAPAROSCOPIC PROSTATECTOMY , PELVIC LYMPH NODE DISSECTION performed by Javad Hills MD at [address] [facility] Right 2015    ULNAR TUNNEL RELEASE Left        Medications:      remdesivir IVPB  100 mg IntraVENous Q24H    cloNIDine  0.1 mg Oral BID    clonazePAM  1 mg Oral 3 times per day    nicotine  1 patch TransDERmal Daily    atorvastatin  20 mg Oral Nightly    amLODIPine  5 mg Oral Daily    fluticasone  2 spray Nasal Daily    lisinopril  40 mg Oral Nightly    tiotropium  2 puff Inhalation Daily    sodium chloride flush  5-40 mL IntraVENous 2 times per day    enoxaparin  30 mg SubCUTAneous BID    famotidine  20 mg Oral BID    dexamethasone  6 mg Oral Daily    Vitamin D  2,000 Units Oral Daily       Social History:     Social History     Socioeconomic History    Marital status:       Spouse name: Not on file    Number of children: 2    Years of education: Not on file    Highest education level: Not on file   Occupational History    Not on file   Tobacco Use    Smoking status: Current Every Day Smoker     Packs/day: 0.25     Years: 54.00     Pack years: 13.50     Types: Cigarettes     Start date: 57    Smokeless tobacco: Never Used   Vaping Use    Vaping Use: Never used   Substance and Sexual Activity    Alcohol use: Not Currently     Comment: 22 oz beer every 2 days    Drug use: Not Currently     Comment: teenager    Sexual activity: Yes     Partners: Female   Other Topics Concern    Not on file   Social History Narrative    Not on file     Social Determinants of Health     Financial Resource Strain:     Difficulty of Paying Living Expenses:    Food Insecurity:     Worried About [facility] in the Last Year:     Ran Out of Food in the Last Year:    Transportation Needs:     Lack of Transportation (Medical):     Lack of Transportation (Non-Medical):    Physical Activity:     Days of Exercise per Week:     Minutes of Exercise per Session:    Stress:     Feeling of Stress :    Social Connections:     Frequency of Communication with Friends and Family:     Frequency of Social Gatherings with Friends and Family:     Attends Jew Services: Active Member of Clubs or Organizations:     Attends Club or Organization Meetings:     Marital Status:    Intimate Partner Violence:     Fear of Current or Ex-Partner:     Emotionally Abused:     Physically Abused:     Sexually Abused:        Family History:     Family History   Problem Relation Age of Onset    Stroke Maternal Uncle 94    Heart Surgery Maternal Uncle         Triple Bypass    Other Maternal Grandmother         Pul. Embolism    Cancer Maternal Grandfather         Throat    No Known Problems Mother     Other Father         MVA    No Known Problems Sister     Other Brother         Electric burn at work    No Known Problems Paternal Grandmother     No Known Problems Paternal Grandfather     Diabetes Brother       Medical Decision Making:   I have independently reviewed/ordered the following labs:    CBC with Differential:   Recent Labs     10/15/21  0543 10/16/21  0623   WBC 13.7* 7.0   HGB 16.7 15.5   HCT 53.5* 48.0    155   LYMPHOPCT 24 46*   MONOPCT 7 12     BMP:  Recent Labs     10/15/21  0543 10/16/21  0623    137   K 4.7 4.2    104   CO2 19* 22   BUN 20 18   CREATININE 0.76 0.50*     Hepatic Function Panel:   Recent Labs     10/15/21  0543 10/16/21  0623   PROT 7.2 6.3*   LABALBU 3.7 3.3*   BILIDIR 0.10 0.13   IBILI 0.18 0.25   BILITOT 0.28* 0.38   ALKPHOS 87 76   ALT 23 21   AST 31 28     No results for input(s): RPR in the last 72 hours. No results for input(s): HIV in the last 72 hours. No results for input(s): BC in the last 72 hours.   Lab Results   Component Value Date    CREATININE 0.50 10/16/2021    GLUCOSE 98 10/16/2021       Detailed results: Thank you for allowing us to participate in the care of this patient. Please call with questions. This note is created with the assistance of a speech recognition program.  While intending to generate adocument that actually reflects the content of the visit, the document can still have some errors including those of syntax and sound a like substitutions which may escape proof reading. It such instances, actual meaningcan be extrapolated by contextual diversion. CARINE Walters - CNP  Office: [phone]  Perfect serve / office [phone]    ATTESTATION:    I have discussed the case, including pertinent history and exam findings with the APRN. I have evaluated the  History, physical findings and pictures of the patient and the key elements of the encounter have been performed by me. I have reviewed the laboratory data, other diagnostic studies and discussed them with the APRN. I have updated the medical record where necessary. I agree with the assessment, plan and orders as documented by the APRN.     Jose Aguilar MD.

## 2021-10-16 NOTE — PLAN OF CARE
Spoke with pt this morning regarding staying for treatment. He is still adamant on leaving AMA. Will allow pt to leave AMA after signing [place] paperwork. Zuleika Resendiz MD  Internal Medicine Resident  [id number] [facility]

## 2021-10-16 NOTE — PROGRESS NOTES
[facility]  Internal Medicine Teaching Residency Program  Inpatient Daily Progress Note  ______________________________________________________________________________    Patient: Radha Menon  YOB: 1950   ICD:[id number]    Acct: [de-identified]     Room: 30083008-01  Admit date: 10/13/2021  Today's date: 10/16/21  Number of days in the hospital: 3    SUBJECTIVE   Admitting Diagnosis: Acute respiratory failure due to COVID-19 [facility])  CC: Shortness of breath     - Pt examined at bedside. Chart & results reviewed. - No acute events overnight  - Pt breathing on room air   - On Remdesivir and decadron   - Pt will reconsider leaving AMA if his friend can bring him his moonshine - will explore this option    Plan for today:  - Will get one more dose of Remdesivir and then be discharged   - Continue with current treatment   - Continue to monitor       ROS:  Constitutional:  negative for chills, fevers, sweats  Respiratory:  negative for cough, dyspnea on exertion, hemoptysis, shortness of breath, wheezing  Cardiovascular:  negative for chest pain, chest pressure/discomfort, lower extremity edema, palpitations  Gastrointestinal:  negative for abdominal pain, constipation, diarrhea, nausea, vomiting  Neurological:  negative for dizziness, headache    BRIEF HISTORY     The patient is a pleasant 72 y.o. male with a PMHx significant for anxiety, COPD, HTN, GERD and hyperlipidemia who presents with a chief complaint of shortness of breath. Pt states he was at home when he \"stopped breathing\" and told his girlfriend Hannah to bring him into the ED. Pt states he has had 4 days of headache, shortness of breath, chest pain and right flank pain. Shortness of breath is worse with exertion. Pt was febrile (101.8F). Pt says flank pain worsens with movement. Chest pain is in the center of the chest, does not radiate anywhere. Pt denies loss of taste and smell.  Pt stated he is vaccinated against COVID-19 with Moderna vaccine. OBJECTIVE     Vital Signs:  /67   Pulse 60   Temp 98.6 °F (37 °C)   Resp 16   Ht 5' 9\" (1.753 m)   Wt 198 lb (89.8 kg)   SpO2 95%   BMI 29.24 kg/m²     Temp (24hrs), Av.8 °F (37.1 °C), Min:97.9 °F (36.6 °C), Max:100.8 °F (38.2 °C)    In: 460   Out: 250 [Urine:250]    Physical Exam:  Physical Exam  Vitals and nursing note reviewed. Constitutional:       Appearance: Normal appearance. He is obese. HENT:      Head: Normocephalic and atraumatic. Nose: Nose normal.      Mouth/Throat:      Mouth: Mucous membranes are moist.      Pharynx: Oropharynx is clear. Eyes:      Extraocular Movements: Extraocular movements intact. Conjunctiva/sclera: Conjunctivae normal.      Pupils: Pupils are equal, round, and reactive to light. Cardiovascular:      Rate and Rhythm: Normal rate and regular rhythm. Pulses: Normal pulses. Heart sounds: Normal heart sounds. Pulmonary:      Effort: Pulmonary effort is normal.      Breath sounds: Normal breath sounds. Abdominal:      General: Abdomen is flat. Bowel sounds are normal.      Palpations: Abdomen is soft. Musculoskeletal:         General: Normal range of motion. Cervical back: Normal range of motion. Skin:     General: Skin is warm. Neurological:      General: No focal deficit present. Mental Status: He is alert and oriented to person, place, and time. Mental status is at baseline. Psychiatric:         Mood and Affect: Mood normal.         Behavior: Behavior normal.         Thought Content:  Thought content normal.           Medications:  Scheduled Medications:    remdesivir IVPB  100 mg IntraVENous Q24H    cloNIDine  0.1 mg Oral BID    clonazePAM  1 mg Oral 3 times per day    nicotine  1 patch TransDERmal Daily    atorvastatin  20 mg Oral Nightly    amLODIPine  5 mg Oral Daily    fluticasone  2 spray Nasal Daily    lisinopril  40 mg Oral Nightly    tiotropium  2 puff Inhalation Daily    sodium chloride flush  5-40 mL IntraVENous 2 times per day    enoxaparin  30 mg SubCUTAneous BID    famotidine  20 mg Oral BID    dexamethasone  6 mg Oral Daily    Vitamin D  2,000 Units Oral Daily     Continuous Infusions:    sodium chloride       PRN Medicationssodium chloride, 30 mL, PRN  hydrOXYzine, 10 mg, TID PRN  dextromethorphan-guaiFENesin, 10 mL, Q4H PRN  melatonin, 3 mg, Nightly PRN  albuterol sulfate HFA, 2 puff, Q6H PRN  sodium chloride flush, 5-40 mL, PRN  sodium chloride, 25 mL, PRN  ondansetron, 4 mg, Q8H PRN   Or  ondansetron, 4 mg, Q6H PRN  polyethylene glycol, 17 g, Daily PRN  acetaminophen, 650 mg, Q6H PRN   Or  acetaminophen, 650 mg, Q6H PRN        Diagnostic Labs:  CBC:   Recent Labs     10/14/21  0332 10/15/21  0543 10/16/21  0623   WBC 6.2 13.7* 7.0   RBC 5.39 5.32 4.95   HGB 17.0 16.7 15.5   HCT 50.4* 53.5* 48.0   MCV 93.5 100.6 97.0   RDW 12.4 12.3 12.5   PLT See Reflexed IPF Result 161 155     BMP:   Recent Labs     10/14/21  0332 10/15/21  0543 10/16/21  0623    135 137   K 4.9 4.7 4.2    103 104   CO2 22 19* 22   BUN 16 20 18   CREATININE 0.74 0.76 0.50*     BNP: No results for input(s): BNP in the last 72 hours. PT/INR: No results for input(s): PROTIME, INR in the last 72 hours. APTT: No results for input(s): APTT in the last 72 hours. CARDIAC ENZYMES: No results for input(s): CKMB, CKMBINDEX, TROPONINI in the last 72 hours.     Invalid input(s): CKTOTAL;3  FASTING LIPID PANEL:  Lab Results   Component Value Date    CHOL 146 12/21/2020    HDL 42 12/21/2020    TRIG 146 12/21/2020     LIVER PROFILE:   Recent Labs     10/13/21  1446 10/15/21  0543 10/16/21  0623   AST 35 31 28   ALT 26 23 21   BILIDIR  --  0.10 0.13   BILITOT 0.41 0.28* 0.38   ALKPHOS 99 87 76      MICROBIOLOGY:   Lab Results   Component Value Date/Time    CULTURE (A) 10/15/2021 12:17 PM     POSITIVE Blood Culture Results called to and read back by: ROSALIND ZEPEDA 10/16/21 0554    CULTURE 10/15/2021 12:17 PM     DIRECT GRAM STAIN FROM BOTTLE: GRAM POSITIVE COCCI IN CLUSTERS    CULTURE (A) 10/15/2021 12:17 PM     Staphylococcus epidermidis Detected: Methodology- Polymerase Chain Reaction (PCR)    CULTURE NO GROWTH 20 HOURS 10/15/2021 12:17 PM       Imaging:    XR CHEST PORTABLE    Result Date: 10/14/2021  Bilateral pulmonary infiltrates consistent with pneumonia that are similar compared to prior. XR CHEST PORTABLE    Result Date: 10/13/2021  4 cm area of hazy opacity overlying the lateral aspect of the left mid lung field may reflect focal area of pneumonia; however, follow-up to imaging resolution is recommended to rule out other pathology. CT CHEST PULMONARY EMBOLISM W CONTRAST    Result Date: 10/13/2021  No central to segmental pulmonary emboli. Left upper lobe airspace opacities suggestive of pneumonia. Radiographic follow-up is recommended document resolution following medical treatment course. Scattered calcified pleural plaques pleural thickening. Please correlate with prior history and exposures. ASSESSMENT & PLAN     Assessment and Plan:    Principal Problem:    Acute respiratory failure due to COVID-19 [facility])  Active Problems:    Viral hepatitis C without hepatic coma    GERD (gastroesophageal reflux disease)    Anxiety    COPD (chronic obstructive pulmonary disease) (HCC)    Hyperlipidemia    Essential hypertension    Tobacco abuse    Prostate cancer (HCC)    Tremor of B/L hands  Resolved Problems:    * No resolved hospital problems.  *    Principal Problem:    Acute respiratory failure due to COVID-19 (HCC)  - Monitor O2 saturation   - Monitor O2 requirement   - Follow up on CXR  - Trend inflammatory markers   - Received 6mg Decadron  - Started on Remdesivir 10/14 - 10/18  - Continue to wean off O2 as tolerated   - Monitor for improvement in wheezing - pt on room air   - ID consulted - we appreciate recommendations          Active Problems:    Viral hepatitis C without hepatic coma  - Stable   - Continue to monitor        GERD (gastroesophageal reflux disease)  - Pepcid  - Continue to monitor         Anxiety  - Klonopin  - Atarax    - Continue to monitor        Tremor of B/L hands  - Klonopin   - Provide pt with moonshine from home        COPD (chronic obstructive pulmonary disease) (HCC)  - RT aerosol protocol   - Continue to monitor        Hyperlipidemia  - Lipitor       Essential hypertension  - Antihypertensive medications held due to BP being on the lower end   - Resume when clinically appropriate       Tobacco abuse  - Educated on the importance of cessation        Prostate cancer ([facility] [address]  - Stable   - Provide pt with briefs      Diet: Regular   DVT ppx: Lovenox  GI ppx: Pepcid      PT/OT/SW: Consulted   Discharge Planning: In process     Willie Edmondson MD  Internal Medicine Resident, PGY-1  [facility] [facility]   9:40 AM 10/16/2021     Attestation and add on       I have discussed the care of Ivon Hernández , including pertinent history and exam findings,      10/16/21    with the resident. I have seen and examined the patient and the key elements of all parts of the encounter have been performed by me . I agree with the assessment, plan and orders as documented by the resident. Principal Problem:    Acute respiratory failure due to COVID-19 [facility])  Active Problems:    Viral hepatitis C without hepatic coma    GERD (gastroesophageal reflux disease)    Anxiety    COPD (chronic obstructive pulmonary disease) (HCC)    Hyperlipidemia    Essential hypertension    Tobacco abuse    Prostate cancer (HCC)    Tremor of B/L hands  Resolved Problems:    * No resolved hospital problems. *            ---- ;        Medications: Allergies:     Allergies   Allergen Reactions    Bee Venom Anaphylaxis    Fentanyl Shortness Of Breath and Swelling     Tongue swelling    Morphine Hives, Shortness Of Breath and Swelling     Face swelling    Pcn [Penicillins] Hives and Rash    Codeine Hives, Nausea Only, Rash and Other (See Comments)       Current Meds:   Scheduled Meds:    remdesivir IVPB  100 mg IntraVENous Q24H    cloNIDine  0.1 mg Oral BID    clonazePAM  1 mg Oral 3 times per day    nicotine  1 patch TransDERmal Daily    atorvastatin  20 mg Oral Nightly    amLODIPine  5 mg Oral Daily    fluticasone  2 spray Nasal Daily    lisinopril  40 mg Oral Nightly    tiotropium  2 puff Inhalation Daily    sodium chloride flush  5-40 mL IntraVENous 2 times per day    enoxaparin  30 mg SubCUTAneous BID    famotidine  20 mg Oral BID    dexamethasone  6 mg Oral Daily    Vitamin D  2,000 Units Oral Daily     Continuous Infusions:    sodium chloride       PRN Meds: sodium chloride, hydrOXYzine, dextromethorphan-guaiFENesin, melatonin, albuterol sulfate HFA, sodium chloride flush, sodium chloride, ondansetron **OR** ondansetron, polyethylene glycol, acetaminophen **OR** acetaminophen        Trish Carroll MD      [address]    Phone [phone]   Fax: [phone]  Answering Service: [phone]

## 2021-10-16 NOTE — PLAN OF CARE
Notified by nurse that pt can have a friend bring him his moonshine and he will re-consider staying for treatment. Bertrand Rosenbaum MD  Internal Medicine Resident  [facility];  [place], [place]

## 2021-10-16 NOTE — PROGRESS NOTES
PULMONARY & CRITICAL CARE MEDICINE PROGRESS NOTE     Patient:  Ivon Hernández  MRN: [id number]  [facility] date: 10/13/2021  Primary Care Physician: Alec Kuhn  Consulting Physician: Felice Saldivar MD  CODE Status: Full Code  LOS: 3     SUBJECTIVE     CHIEF COMPLAINT/REASON FOR INITIAL CONSULT: Acute respiratory failure/COVID-19 pneumonia    BRIEF HOSPITAL COURSE:   The patient is a 72 y.o. male with history of hypertension, hyperlipidemia, COPD, anxiety presented with worsening shortness of breath. He reported headache, shortness of breath chest pain for about 4 days. He was febrile on presentation. He has been deviously vaccinated with Karissa Phoenix for COVID-19. He is on nasal cannula at 2 L/min. He tested positive for COVID-19 on 10/13. Currently getting remdesivir and Decadron    INTERVAL HISTORY:  10/16/21  Patient seen and examined  No overnight issues reported  Remains on RA this morning saturating 95%  Afebrile, hemodynamically stable  Labs reviewed. WBC 7.0  He is receiving remdesivir and IV Decadron 6 mg daily    REVIEW OF SYSTEMS:  Review of Systems   Constitutional: Negative for appetite change, chills, fever and unexpected weight change. HENT: Negative for congestion, postnasal drip, sore throat and trouble swallowing. Eyes: Negative for visual disturbance. Respiratory: Positive for cough and shortness of breath. Negative for wheezing. Cardiovascular: Negative for chest pain, palpitations and leg swelling. Gastrointestinal: Negative for abdominal pain, constipation, diarrhea, nausea and vomiting. Genitourinary: Negative for difficulty urinating, dysuria and frequency. Musculoskeletal: Negative for arthralgias and joint swelling. Skin: Negative for rash. Allergic/Immunologic: Negative for immunocompromised state. Neurological: Positive for weakness. Negative for dizziness, speech difficulty and headaches. Hematological: Negative for adenopathy.    Psychiatric/Behavioral: Negative for behavioral problems and sleep disturbance. OBJECTIVE     VITAL SIGNS:   LAST:  /67   Pulse 60   Temp 98.6 °F (37 °C)   Resp 16   Ht 5' 9\" (1.753 m)   Wt 198 lb (89.8 kg)   SpO2 95%   BMI 29.24 kg/m²   8-24 HR RANGE:  TEMP Temp  Av.8 °F (37.1 °C)  Min: 97.9 °F (36.6 °C)  Max: 100.8 °F (75.6 °C)   BP Systolic (16PHJ), EE , Min:98 , HGB:591      Diastolic (12UDQ), ALTAGRACIA:39, Min:58, Max:72     PULSE Pulse  Av  Min: 47  Max: 60   RR Resp  Avg: 15.5  Min: 15  Max: 16   O2 SAT SpO2  Av %  Min: 95 %  Max: 95 %   OXYGEN DELIVERY No data recorded        SYSTEMIC EXAMINATION:   General appearance - Ill-appearing, comfortable, no distress  Mental status - awake & alert, follows commands  Eyes - pupils equal and reactive, sclera anicteric  Mouth - mucous membranes moist, pharynx normal without lesions  Neck - supple, no significant adenopathy, carotids upstroke normal bilaterally, no bruits  Chest - Breath sounds bilaterally were dimnished to auscultation at bases. There were no wheezes, rhonchi or rales.     Heart - normal rate, regular rhythm, normal S1, S2, no murmurs, rubs, clicks or gallops  Abdomen - soft, nontender, nondistended, no masses or organomegaly  Neurological - non-focal  Extremities - peripheral pulses normal, no pedal edema, no clubbing or cyanosis  Skin - normal coloration and turgor, no rashes, no suspicious skin lesions noted     DATA REVIEW     Medications: Current Inpatient  Scheduled Meds:   remdesivir IVPB  100 mg IntraVENous Q24H    cloNIDine  0.1 mg Oral BID    clonazePAM  1 mg Oral 3 times per day    nicotine  1 patch TransDERmal Daily    atorvastatin  20 mg Oral Nightly    amLODIPine  5 mg Oral Daily    fluticasone  2 spray Nasal Daily    lisinopril  40 mg Oral Nightly    tiotropium  2 puff Inhalation Daily    sodium chloride flush  5-40 mL IntraVENous 2 times per day    enoxaparin  30 mg SubCUTAneous BID    famotidine  20 mg Oral BID    dexamethasone  6 mg Oral Daily    Vitamin D  2,000 Units Oral Daily     Continuous Infusions:   sodium chloride         INPUT/OUTPUT:  In: 460 [P.O.:200; I.V.:10]  Out: 250 [Urine:250]  Date 10/16/21 0000 - 10/16/21 2359   Shift [id number] 2682-7273 24 Hour Total   INTAKE   P.O.(mL/kg/hr) 100(0.1)   100   IV Piggyback(mL/kg) 250(2.8)   250(2.8)   Shift Total(mL/kg) 350(3.9)   350(3.9)   OUTPUT   Urine(mL/kg/hr) 250(0.3)   250   Shift Total(mL/kg) 250(2.8)   250(2.8)   Weight (kg) 89.8 89.8 89.8 89.8        LABS:  ABGs:   No results for input(s): POCPH, POCPCO2, POCPO2, POCHCO3, LMPN6DJB in the last 72 hours. CBC:   Recent Labs     10/13/21  1446 10/14/21  0332 10/15/21  0543 10/16/21  0623   WBC 6.4 6.2 13.7* 7.0   HGB 17.3* 17.0 16.7 15.5   HCT 53.1* 50.4* 53.5* 48.0   MCV 96.2 93.5 100.6 97.0    See Reflexed IPF Result 161 155   LYMPHOPCT 24 39 24 46*   RBC 5.52 5.39 5.32 4.95   MCH 31.3 31.5 31.4 31.3   MCHC 32.6 33.7 31.2 32.3   RDW 12.4 12.4 12.3 12.5     CRP:   Recent Labs     10/13/21  2309 10/15/21  0543   CRP 5.0 3.4     LDH:   Recent Labs     10/13/21  2309   *     BMP:   Recent Labs     10/13/21  1446 10/14/21  0332 10/15/21  0543 10/16/21  0623   * 137 135 137   K 4.3 4.9 4.7 4.2   CL 98 103 103 104   CO2 23 22 19* 22   BUN 12 16 20 18   CREATININE 0.79 0.74 0.76 0.50*   GLUCOSE 101* 164* 83 98     Liver Function Test:   Recent Labs     10/13/21  1446 10/15/21  0543 10/16/21  0623   PROT 7.6 7.2 6.3*   LABALBU 4.3 3.7 3.3*   ALT 26 23 21   AST 35 31 28   ALKPHOS 99 87 76   BILITOT 0.41 0.28* 0.38     Coagulation Profile:   No results for input(s): INR, PROTIME, APTT in the last 72 hours.   D-Dimer:  Recent Labs     10/13/21  2215   DDIMER 3.80     Ferritin:    Recent Labs     10/13/21  2309 10/16/21  0623   FERRITIN 514* 591*     Lactic Acid:  Recent Labs     10/13/21  1446   LACTA NOT REPORTED     Cardiac Enzymes:  No results for input(s): CKTOTAL, CKMB, CKMBINDEX, TROPONINI in the last 72 hours. Invalid input(s): TROPONIN, HSTROP  BNP/ProBNP:   Recent Labs     10/13/21  1446   PROBNP 191     Triglycerides:  No results for input(s): TRIG in the last 72 hours. Microbiology:  Urine Culture:  No components found for: CURINE  Blood Culture:  No components found for: CBLOOD, CFUNGUSBL  Sputum Culture:  No components found for: CSPUTUM  Recent Labs     10/15/21  1217   [facility] . BLOOD  . BLOOD   SPECIAL NOT REPORTED  NOT REPORTED   CULTURE NO GROWTH 20 HOURS  POSITIVE Blood Culture Results called to and read back by: ROSALIND ZEPEDA 10/16/21 0554*  DIRECT GRAM STAIN FROM BOTTLE: GRAM POSITIVE COCCI IN CLUSTERS  Staphylococcus epidermidis Detected: Methodology- Polymerase Chain Reaction (PCR)*     Recent Labs     10/13/21  1425 10/13/21  1425 10/13/21  1431 10/13/21  1452 10/15/21  1217   SPECDESC . BLOOD   < > .BLOOD . NASOPHARYNGEAL SWAB . BLOOD  . BLOOD   SPECIAL 10CC L AC  --  10CC R FOREARM  --  NOT REPORTED  NOT REPORTED   CULTURE NO GROWTH 3 DAYS  --  NO GROWTH 3 DAYS  --  NO GROWTH 20 HOURS  POSITIVE Blood Culture Results called to and read back by: ROSALIND ZEPEDA 10/16/21 0554*  DIRECT GRAM STAIN FROM BOTTLE: GRAM POSITIVE COCCI IN CLUSTERS  Staphylococcus epidermidis Detected: Methodology- Polymerase Chain Reaction (PCR)*    < > = values in this interval not displayed. Pathology:    Radiology Reports:  XR CHEST PORTABLE   Final Result   Bilateral pulmonary infiltrates consistent with pneumonia that are similar   compared to prior. CT CHEST PULMONARY EMBOLISM W CONTRAST   Final Result   No central to segmental pulmonary emboli. Left upper lobe airspace opacities suggestive of pneumonia. Radiographic   follow-up is recommended document resolution following medical treatment   course. Scattered calcified pleural plaques pleural thickening. Please correlate   with prior history and exposures.          XR CHEST PORTABLE   Final Result   4 cm area of hazy opacity overlying the lateral aspect of the left mid lung   field may reflect focal area of pneumonia; however, follow-up to imaging   resolution is recommended to rule out other pathology. Echocardiogram:   Results for orders placed during the hospital encounter of 08/22/18    ECHO Complete 2D W Doppler W Color    Summary  Left ventricle is normal in size and wall thickness. Global left ventricular systolic function is normal. Estimated LV EF 60-65  %. Both atria are normal in size. Normal right ventricular size and function. No significant valvular regurgitation or stenosis seen. No significant pericardial effusion is seen.        ASSESSMENT AND PLAN     Assessment:    // Breakthrough COVID-19 infection  // Fever, improved  // COPD, severity to be determined  // Obesity  // Essential hypertension  // Dyslipidemia  // Suspected sleep apnea  // Coronary artery disease     Plan:     I personally interviewed/examined the patient; reviewed interval history, interpreted all available radiographic and laboratory data at the time of service.     · Patient is hemodynamically stable and is currently saturating well on room air  · Use supplemental oxygen if needed, to keep oxygen saturation >90%  · Encourage prone position when sleeping, incentive spirometry  · Continue pulmonary toilet, aspiration precautions and bronchodilators  · Monitor I/O, electrolytes with a goal of even/negative fluid balance  · Tolerating oral diet  · Stress ulcer prophylaxis  · Continue to monitor CBC, coagulation profile, transfuse as indicated  · Chemical DVT prophylaxis as per protocol  · Antimicrobials reviewed; continue remdesivir as per ID recommendations  · Continue decadron IV 6mg  · Monitor CRP, LDH, AST/ALT, D-Dimer, Ferritin  · Glycemic control appropriate    · Skin/wound care reviewed with the nursing staff  · Physical/occupational therapy     I would like to thank you for allowing me to participate in the care of this patient. Please feel free to call with any further questions or concerns. Shannon East MD   Pulmonary and Critical Care Medicine           10/16/2021     This patient was evaluated in the context of the global SARS-CoV-2 (COVID-19) pandemic, which necessitated considerations that the patient either has COVID-19 infection or is at risk of infection with COVID-19. Institutional protocols and algorithms that pertain to the evaluation & management of patients with COVID-19 or those at risk for COVID-19 are in a state of rapid changes based on information released by regulatory bodies including the CDC and federal and state organizations. These policies and algorithms were followed during the patient's care. Please note that this chart was generated using voice recognition Dragon dictation software. Although every effort was made to ensure the accuracy of this automated transcription, some errors in transcription may have occurred.

## 2021-10-16 NOTE — PLAN OF CARE
Pt refused to allow writer put tele leads back on him. Leads were found on the side of the bed. Pt stated he's been trying to get some sleep all day and can't rest while hooked up to the monitor. Writer educated the pt on the importance of following his POC but still refused. Pt became angry, loud and yelled that he just fell asleep and wants to get some rest. Writer asked if he could be hooked up for his last set of vitals in the morning so that a tele strip could be printed, and pt agreed.

## 2021-10-16 NOTE — PROGRESS NOTES
Patient expressing his feelings on wanting to leave MD in with patient and explaining the need to stay and finish treatment and also about risks on leaving AMA  Patient requesting his emerita MD will check and see if this is possible Patient seems to rethink leaving AMA

## 2021-10-17 LAB
CULTURE: ABNORMAL
Lab: ABNORMAL
SPECIMEN DESCRIPTION: ABNORMAL

## 2021-10-17 NOTE — PROGRESS NOTES
CLINICAL PHARMACY NOTE: MEDS TO BEDS    Total # of Prescriptions Filled: 2   The following medications were delivered to the patient:  · Robafen DM  mg/5mL  · Amlodipine 5 mg    Additional Documentation:

## 2021-10-18 ENCOUNTER — CARE COORDINATION (OUTPATIENT)
Dept: CASE MANAGEMENT | Age: 71
End: 2021-10-18

## 2021-10-18 NOTE — CARE COORDINATION
[facility] Transitions Initial Follow Up Call- .covdiri     Call within 2 business days of discharge: Yes    Patient: Ivon Hernández Patient : 1950   MRN: [id number]  Reason for Admission: COVID-10 pneumonia with acute respiratory failure  Discharge Date: 10/16/21 RARS: Readmission Risk Score: 16      Last Discharge [address] [address]       Complaint Diagnosis Description Type Department Provider    10/13/21 Shortness of Breath; Headache Acute respiratory failure due to COVID-19 [facility]) ED to Hosp-Admission (Discharged) (ADMITTED) [facility] 3 Felice Saldivar MD; Kira Ashs. .. Spoke with: Jarocho Barkley and his girlfriend, Rajat Her (no HIPAA but pt gave verbal communication to speak with her)    Call to pt girlfriend who states he is doing well. States he is no longer complaining of feeling short of breath. They don't have a pulse oximeter- education provided on having one at home to monitor oxygen levels at home   States he has not needed inhaler (albuterol) since getting home   States he is eating and drinking well     Patient contacted regarding COVID-19 diagnosis. Discussed COVID-19 related testing which was available at this time. Test results were positive. Patient informed of results, if available? Yes. Care Transition Nurse contacted the family by telephone to perform post discharge assessment. Call within 2 business days of discharge: Yes. Verified name and  with family as identifiers. Provided introduction to self, and explanation of the CTN/ACM role, and reason for call due to risk factors for infection and/or exposure to COVID-19. Symptoms reviewed with family who verbalized the following symptoms: fatigue, no new symptoms and no worsening symptoms. Due to no new or worsening symptoms encounter was not routed to provider for escalation. Discussed follow-up appointments. If no appointment was previously scheduled, appointment scheduling offered: Yes.   [facility] follow up appointment(s):   Future Appointments   Date Time Provider Mj Karly   2/28/2022  2:30 PM Hafsa Metzger MD [facility]     Non-[facility] follow up appointment(s):     Non-face-to-face services provided:  Scheduled appointment with PCP-pt girlfriend to call and schedule. Writer encouraged call back if help needed with schedling  Scheduled appointment with Specialist-Writer encouraged call back if help needed with scheduling with pulm and ID   Obtained and reviewed discharge summary and/or continuity of care documents  Assessment and support for treatment adherence and medication management-confirms new meds but he has been off amlodipine for a while now     Advance Care Planning:   Does patient have an Advance Directive:  reviewed and current. Educated patient about risk for severe COVID-19 due to risk factors according to CDC guidelines. CTN reviewed discharge instructions, medical action plan and red flag symptoms with the family who verbalized understanding. Discussed COVID vaccination status: Yes and has been vaccinated. Education provided on COVID-19 vaccination as appropriate. Discussed exposure protocols and quarantine with CDC Guidelines. Family was given an opportunity to verbalize any questions and concerns and agrees to contact CTN or health care provider for questions related to their healthcare. Reviewed and educated family on any new and changed medications related to discharge diagnosis     Was patient discharged with a pulse oximeter? No but educated on need and use. Discussed and confirmed pulse oximeter discharge instructions and when to notify provider or seek emergency care. CTN provided contact information. Plan for follow-up call in 5-7 days based on severity of symptoms and risk factors.          Care Transitions 24 Hour Call    Do you have any ongoing symptoms?: No  Do you have a copy of your discharge instructions?: Yes  Do you have all of your prescriptions and are they filled?: Yes  Have you been contacted by a [address]?: No  Have you scheduled your follow up appointment?: Yes  How are you going to get to your appointment?: Car - family or friend to transport  Were you discharged with any Home Care or Post Acute Services: No  Do you feel like you have everything you need to keep you well at home?: Yes  Care Transitions Interventions         Follow Up  Future Appointments   Date Time Provider Mj Brandt   2/28/2022  2:30 PM Javad Hills MD [address] [place] [address]       Keny Perales RN

## 2021-10-19 LAB
CULTURE: NORMAL
CULTURE: NORMAL
Lab: NORMAL
Lab: NORMAL
SPECIMEN DESCRIPTION: NORMAL
SPECIMEN DESCRIPTION: NORMAL

## 2021-10-21 LAB
CULTURE: NORMAL
Lab: NORMAL
SPECIMEN DESCRIPTION: NORMAL

## 2021-10-23 NOTE — DISCHARGE SUMMARY
[address] [facility]     Department of Internal Medicine - Staff Internal Medicine Teaching Service    INPATIENT DISCHARGE SUMMARY      Patient Identification:  Rosa Metzger is a 72 y.o. male. :  1950  MRN: [id number]     Acct: [de-identified]   PCP: Cesar Chung  Admit Date:  10/13/2021  Discharge date and time: 10/16/2021  4:52 PM   Attending Provider: No att. providers found                                     [address] Problem Lists:  Principal Problem:    Acute respiratory failure due to COVID-19 [facility])  Active Problems:    Viral hepatitis C without hepatic coma    GERD (gastroesophageal reflux disease)    Anxiety    COPD (chronic obstructive pulmonary disease) ([facility] [address]    Hyperlipidemia    Essential hypertension    Tobacco abuse    Prostate cancer ([facility] [address]    Tremor of B/L hands  Resolved Problems:    * No resolved hospital problems. *      HOSPITAL STAY     Brief Inpatient course:   Rosa Metzger is a 72 y.o. male who was admitted for the management of Acute respiratory failure due to COVID-19 [facility]), presented to the emergency department with shortness of breath. PMHx significant for anxiety, COPD, HTN, GERD and hyperlipidemia who presents with a chief complaint of shortness of breath. Pt states he was at home when he \"stopped breathing\" and told his girlfriend Hannah to bring him into the ED. Pt states he has had 4 days of headache, shortness of breath, chest pain and right flank pain. Shortness of breath is worse with exertion. Pt was febrile (101.8F) in the ED. Pt says flank pain worsens with movement. Chest pain is in the center of the chest, does not radiate anywhere. Pt denies loss of taste and smell.  Pt stated he is vaccinated against COVID-19 with Moderna vaccine.         Procedures/ Significant Interventions:    Remdesivir   Decadron    Consults:     Consults:     Final Specialist Recommendations/Findings:   IP CONSULT TO INTERNAL MEDICINE  IP CONSULT TO PULMONOLOGY  IP CONSULT TO CASE MANAGEMENT  IP CONSULT TO INFECTIOUS DISEASES  IP CONSULT TO PHARMACY      Any Hospital Acquired Infections: none    Discharge Functional Status:  stable    DISCHARGE PLAN     Disposition: home    Patient Instructions:   Discharge Medication List as of 10/16/2021  3:06 PM      START taking these medications    Details   dextromethorphan-guaiFENesin (ROBITUSSIN-DM)  MG/5ML syrup Take 10 mLs by mouth every 4 hours as needed for Cough, Disp-1 each, R-0Normal         CONTINUE these medications which have CHANGED    Details   amLODIPine (NORVASC) 5 MG tablet Per pcp, Disp-30 tablet, R-3Normal         CONTINUE these medications which have NOT CHANGED    Details   Cholecalciferol (VITAMIN D3) 1.25 MG (81565 UT) CAPS Take by mouthHistorical Med      omeprazole (PRILOSEC) 20 MG delayed release capsule Take 20 mg by mouth dailyHistorical Med      lisinopril (PRINIVIL;ZESTRIL) 40 MG tablet Take 40 mg by mouth nightly Per pcpHistorical Med      clonazePAM (KLONOPIN) 1 MG tablet Take 1 mg by mouth 2 times daily.  pcpHistorical Med      albuterol sulfate  (90 Base) MCG/ACT inhaler Inhale 2 puffs into the lungs every 6 hours as needed for Wheezing Per pcpHistorical Med      tiotropium (SPIRIVA) 18 MCG inhalation capsule Inhale 1 capsule into the lungs daily, Disp-30 capsule, R-3Normal      fluticasone (FLONASE) 50 MCG/ACT nasal spray 2 sprays by Nasal route daily, Disp-1 Bottle, R-0Normal      atorvastatin (LIPITOR) 20 MG tablet TAKE 1 TABLET DAILY, Disp-90 tablet, R-1Normal      Multiple Vitamins-Minerals (CENTRUM SILVER 50+MEN PO) Take 1 tablet by mouth dailyHistorical Med         STOP taking these medications       calcium citrate (CALCITRATE) 950 MG tablet Comments:   Reason for Stopping:         Aspirin-Salicylamide-Caffeine (BC HEADACHE POWDER PO) Comments:   Reason for Stopping:               Activity: activity as tolerated    Diet: regular diet    Follow-up:  Gricelad Johnson MD  [address]. [address] [place]  [address]  [phone]    In 1 week  post hosp follow up    Ellen Suazo MD  [address] [place] [address],  [address], [address] [address] Justo [place]ina    In 1 week  COVID pneumonia    [address]  [phone]    In 2 weeks        Patient Instructions: Follow up with PCP  Follow up ID  Follow up with Pulmonary. Droplet precaution, wear mask, avoid social gathering. Home dose Norvasc resumed, pt reported that he is not taking this medication. PCP to reconcile BP meds. Follow up labs: None   Follow up imaging: None    Note that over 30 minutes was spent in preparing discharge papers, discussing discharge with patient, medication review, etc.    Thank you for allowing me to participate in your care. Gricelda Johnson MD  Internal Medicine Resident, PGY-1  [address], [place]  10/23/2021, 9:53 AM

## 2021-10-27 ENCOUNTER — CARE COORDINATION (OUTPATIENT)
Dept: CASE MANAGEMENT | Age: 71
End: 2021-10-27

## 2021-10-27 NOTE — CARE COORDINATION
[facility] Transitions Follow Up Call    10/27/2021    Patient: Eh Alaniz  Patient : 1950   MRN: [id number]  Reason for Admission: COVID  Discharge Date: 10/16/21 RARS: Readmission Risk Score: 16         Spoke with: PADMINI Young Spoke with patient's girlfriend, patient sleeping right now. She states he is \"fine\". Denies any worsening shortness of breath, cough, f/c, n/v or other issues. He has his follow up scheduled with ID on . Per S.O. he has no needs or issues at this time. Call was brief, television very loud in background, difficult to hear person on phone. She was advised to call if any questions or concerns arise. Care Transitions Follow Up Call    Needs to be reviewed by the provider   Additional needs identified to be addressed with provider: No  none             Method of communication with provider : none      Care Transition Nurse (CTN) contacted the patient by telephone to follow up after admission on 10/13. Verified name and  with family as identifiers. Addressed changes since last contact: none  Discussed follow-up appointments. If no appointment was previously scheduled, appointment scheduling offered: Yes. Is follow up appointment scheduled within 7 days of discharge? No.    Advance Care Planning:   Does patient have an Advance Directive: reviewed and current. CTN reviewed discharge instructions, medical action plan and red flags with family and discussed any barriers to care and/or understanding of plan of care after discharge. Discussed appropriate site of care based on symptoms and resources available to patient including: PCP and Specialist. The family agrees to contact the PCP office for questions related to their healthcare.      Patients top risk factors for readmission: medical condition-COVID  Interventions to address risk factors: Scheduled appointment with Specialist- with ID and Education of patient/family/caregiver/guardian to support self-management-Discussed aftercare of COVID, monitoring for sudden onset of shortness of breath, any new symptoms, f/c      Non-[facility] follow up appointment(s): n/a    CTN provided contact information for future needs. Plan for follow-up call in 7-10 days based on severity of symptoms and risk factors. Plan for next call: Routine COVID follow up            Care Transitions Subsequent and Final Call    Schedule Follow Up Appointment with PCP: Completed  Subsequent and Final Calls  Do you have any ongoing symptoms?: Yes  Onset of Patient-reported symptoms: In the past 7 days  Patient-reported symptoms: Fatigue  Have your medications changed?: No  Do you have any questions related to your medications?: No  Do you currently have any active services?: No  Do you have any needs or concerns that I can assist you with?: No  Identified Barriers: Lack of Education  Care Transitions Interventions  Other Interventions:            Follow Up  Future Appointments   Date Time Provider Mj Brandt   11/8/2021  2:15 PM Kofi Hurley MD INFT DISEASE [facility]   2/28/2022  2:30 PM Caitlin Ozuna MD [address] [place] [address] [place]ag[place], ROSALIND

## 2021-11-04 ENCOUNTER — CARE COORDINATION (OUTPATIENT)
Dept: CASE MANAGEMENT | Age: 71
End: 2021-11-04

## 2021-11-04 NOTE — CARE COORDINATION
[facility] Trans[facility] Follow Up Call- COVID risk follow up call      2021    Patient: Lucie Andrade  Patient : 1950   MRN: [id number]  Reason for Admission: COVID-10 pneumonia with acute respiratory failure  Discharge Date: 10/16/21 RARS: Readmission Risk Score: 16         Spoke with: pt SO Hannah     Call to pt SO who states pt is doing great. States he is not having any trouble feeling short of breath nor does he appear to be  States he still has not had to use inhaler  States appetite is great  Confirms appt with Dr Chito Smith   Denies needs  Encouraged to call writer/ CTC or providers if questions or concerns- v/u     Care Transitions Subsequent and Final Call    Subsequent and Final Calls  Do you have any ongoing symptoms?: No  Have your medications changed?: No  Do you have any questions related to your medications?: No  Do you currently have any active services?: No  Do you have any needs or concerns that I can assist you with?: No  Identified Barriers: Lack of Education  Care Transitions Interventions  Other Interventions:            Follow Up  Future Appointments   Date Time Provider Mj Brandt   2021  2:15 PM Stanislaw Horton MD INFT DISEASE [facility]   2022  2:30 PM MD Jagdeep Nagy, RN

## 2021-11-08 ENCOUNTER — OFFICE VISIT (OUTPATIENT)
Dept: INFECTIOUS DISEASES | Age: 71
End: 2021-11-08
Payer: MEDICARE

## 2021-11-08 VITALS
DIASTOLIC BLOOD PRESSURE: 83 MMHG | BODY MASS INDEX: 28.64 KG/M2 | SYSTOLIC BLOOD PRESSURE: 130 MMHG | OXYGEN SATURATION: 98 % | WEIGHT: 193.4 LBS | HEIGHT: 69 IN | HEART RATE: 81 BPM | RESPIRATION RATE: 18 BRPM

## 2021-11-08 DIAGNOSIS — U07.1 COVID: Primary | ICD-10-CM

## 2021-11-08 PROCEDURE — 99214 OFFICE O/P EST MOD 30 MIN: CPT | Performed by: INTERNAL MEDICINE

## 2021-11-08 RX ORDER — MAGNESIUM 30 MG
30 TABLET ORAL 2 TIMES DAILY
COMMUNITY

## 2021-11-08 ASSESSMENT — ENCOUNTER SYMPTOMS
COLOR CHANGE: 0
APNEA: 0
EYE DISCHARGE: 0
ABDOMINAL DISTENTION: 0
BACK PAIN: 1

## 2021-11-08 NOTE — PROGRESS NOTES
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 COVID         Return if symptoms worsen or fail to improve. History of Present Illness:   Sony Wilson is a 72y.o.-year-old  male who presents with   Chief Complaint   Patient presents with    Follow-Up from Hospital     covid pneumonia     Post ST[facility] visit , covid illness 10/13/21  Vaccinated Megan Goldsteins 2-3/2021  Post remdesevir 4 doses, decadron lovenox  Isolation lifted end oct 2021  Now looks great and no o2 and energy back  Will get the booster in some time  Back to etoh drinker  Needs a dr visit  Ortho for right shoulder and C and L spine pain,  but they want neg covid test  Will give him a letter of clearance        I have personally reviewed the past medical history, past surgical history, medications, social history, and family history, and I haveupdated the database accordingly. Past Medical History:     Past Medical History:   Diagnosis Date    Anxiety state, unspecified     Asthma     inhalers per pcp    CAD (coronary artery disease)     follow[facility] Leda Wright [facility] Carotid artery stenosis     Colon polyp     Depressive disorder, not elsewhere classified     Elevated PSA 05/06/2019    Essential hypertension, benign     meds per pcp    GERD (gastroesophageal reflux disease)     Hearing loss     bilat.  Has hearing aids but does not wear    History of colon polyps     History of hepatitis C     Hyperlipidemia     on rx    Impotence of organic origin     Lipoma of unspecified site     Lumbago     No natural teeth     Prostate CA ([facility] [address] 05/2019    Secondary localized osteoarthrosis, shoulder region     Snores     no cpap or sleep apnea    Swelling of limb     Wellness examination     Joel Mahajan pcp last seen June 2020       Past Surgical  History:     Past Surgical History:   Procedure Laterality Date    ANKLE SURGERY Right     CARPAL TUNNEL RELEASE Bilateral     CERVICAL FUSION  2009    anterior    CHOLECYSTECTOMY      approx 2000    COLONOSCOPY      EXCISION / BIOPSY SKIN LESION OF ARM Left 9/15/2017    EXCISION SKIN LESION LEFT CHEST AND LEFT BUTTOCK, EXCISION LIPOMA LEFT LOWER ABDOMEN performed by Karla Carson DO at [address]  08/26/2020    INSERTION INFLATABLE PENILE PROSTHESIS      PENILE PROSTHESIS PLACEMENT N/A 8/26/2020    INSERTION INFLATABLE PENILE PROSTHESIS  (REP NOTIFIED - Juancarlos Maravilla) performed by Toby Freeman MD at [address] N/A 4/13/2017    COLONOSCOPY performed by Arnie Linares MD at [address] [place] [address] N/A 4/24/2019    PROSTATE BIOPSY WITH ULTRASOUND  (OFFICE TO NOTIFY U.S) performed by Toby Freeman MD at [facility] 6/26/2019    XI ROBOTIC LAPAROSCOPIC PROSTATECTOMY , PELVIC LYMPH NODE DISSECTION performed by Toby Freeman MD at [address] Right 2015    ULNAR TUNNEL RELEASE Left        Medications:     Current Outpatient Medications:     magnesium 30 MG tablet, Take 30 mg by mouth 2 times daily, Disp: , Rfl:     Cholecalciferol (VITAMIN D3) 1.25 MG (26329 UT) CAPS, Take by mouth, Disp: , Rfl:     omeprazole (PRILOSEC) 20 MG delayed release capsule, Take 20 mg by mouth daily, Disp: , Rfl:     lisinopril (PRINIVIL;ZESTRIL) 40 MG tablet, Take 40 mg by mouth nightly Per pcp, Disp: , Rfl:     clonazePAM (KLONOPIN) 1 MG tablet, Take 1 mg by mouth 2 times daily.  pcp, Disp: , Rfl:     albuterol sulfate  (90 Base) MCG/ACT inhaler, Inhale 2 puffs into the lungs every 6 hours as needed for Wheezing Per pcp, Disp: , Rfl:     tiotropium (SPIRIVA) 18 MCG inhalation capsule, Inhale 1 capsule into the lungs daily, Disp: 30 capsule, Rfl: 3    atorvastatin (LIPITOR) 20 MG tablet, TAKE 1 TABLET DAILY (Patient taking differently: Per Dr. Brenda Garcia), Disp: 90 tablet, Rfl: 1    Multiple Vitamins-Minerals (CENTRUM SILVER 50+MEN PO), Take 1 tablet by mouth daily, Disp: , Rfl:     dextromethorphan-guaiFENesin (ROBITUSSIN-DM)  MG/5ML syrup, Take 10 mLs by mouth every 4 hours as needed for Cough (Patient not taking: Reported on 11/8/2021), Disp: 1 each, Rfl: 0    fluticasone (FLONASE) 50 MCG/ACT nasal spray, 2 sprays by Nasal route daily (Patient not taking: Reported on 11/8/2021), Disp: 1 Bottle, Rfl: 0      Social History:     Social History     Socioeconomic History    Marital status:      Spouse name: Not on file    Number of children: 2    Years of education: Not on file    Highest education level: Not on file   Occupational History    Not on file   Tobacco Use    Smoking status: Current Every Day Smoker     Packs/day: 0.25     Years: 54.00     Pack years: 13.50     Types: Cigarettes     Start date: 57    Smokeless tobacco: Never Used   Vaping Use    Vaping Use: Never used   Substance and Sexual Activity    Alcohol use: Not Currently     Comment: 22 oz beer every 2 days    Drug use: Not Currently     Comment: teenager    Sexual activity: Yes     Partners: Female   Other Topics Concern    Not on file   Social History Narrative    Not on file     Social Determinants of Health     Financial Resource Strain:     Difficulty of Paying Living Expenses: Not on file   Food Insecurity:     Worried About Running Out of Food in the Last Year: Not on file    José of Food in the Last Year: Not on file   Transportation Needs:     Lack of Transportation (Medical): Not on file    Lack of Transportation (Non-Medical):  Not on file   Physical Activity:     Days of Exercise per Week: Not on file    Minutes of Exercise per Session: Not on file   Stress:     Feeling of Stress : Not on file   Social Connections:     Frequency of Communication with Friends and Family: Not on file    Frequency of Social Gatherings with Friends and Family: Not on file    Attends Cheondoism Services: Not on file    Active Member of [address] [place] [address] [employer] or Organizations: Not on file    Attends Club or Organization Meetings: Not on file    Marital Status: Not on file   Intimate Partner Violence:     Fear of Current or Ex-Partner: Not on file    Emotionally Abused: Not on file    Physically Abused: Not on file    Sexually Abused: Not on file   Housing Stability:     Unable to Pay for Housing in the Last Year: Not on file    Number of [place] in the Last Year: Not on file    Unstable Housing in the Last Year: Not on file       Family History:     Family History   Problem Relation Age of Onset    Stroke Maternal Uncle 80    Heart Surgery Maternal Uncle         Triple Bypass    Other Maternal Grandmother         Pul. Embolism    Cancer Maternal Grandfather         Throat    No Known Problems Mother     Other Father         MVA    No Known Problems Sister     Other Brother         Electric burn at work   iDiDiD No Known Problems Paternal Grandmother     No Known Problems Paternal Grandfather     Diabetes Brother         Allergies:   Bee venom, Fentanyl, Morphine, Pcn [penicillins], and Codeine     Review of Systems:   Review of Systems   Constitutional: Negative for activity change. HENT: Negative for congestion. Eyes: Negative for discharge. Respiratory: Negative for apnea. Cardiovascular: Negative for chest pain. Gastrointestinal: Negative for abdominal distention. Endocrine: Negative for heat intolerance. Genitourinary: Negative for dysuria. Musculoskeletal: Positive for arthralgias and back pain. Skin: Negative for color change.    Allergic/Immunologic: Negative for immunocompromised state. Neurological: Negative for dizziness and numbness. Hematological: Negative for adenopathy. Psychiatric/Behavioral: Negative for agitation. Physical Examination :   Blood pressure 130/83, pulse 81, resp. rate 18, height 5' 9\" (1.753 m), weight 193 lb 6.4 oz (87.7 kg), SpO2 98 %. Physical Exam  Constitutional:       General: He is not in acute distress. Appearance: Normal appearance. He is not ill-appearing. HENT:      Head: Normocephalic and atraumatic. Nose: Nose normal.      Mouth/Throat:      Mouth: Mucous membranes are moist.   Eyes:      General: No scleral icterus. Conjunctiva/sclera: Conjunctivae normal.      Pupils: Pupils are equal, round, and reactive to light. Cardiovascular:      Rate and Rhythm: Normal rate and regular rhythm. Heart sounds: Normal heart sounds. No murmur heard. Pulmonary:      Effort: No respiratory distress. Breath sounds: Normal breath sounds. Abdominal:      General: There is no distension. Palpations: Abdomen is soft. Genitourinary:     Comments: No trevino  Musculoskeletal:         General: No swelling or deformity. Cervical back: Neck supple. No rigidity. Skin:     General: Skin is dry. Coloration: Skin is not jaundiced. Neurological:      General: No focal deficit present. Mental Status: He is alert and oriented to person, place, and time. Sensory: No sensory deficit. Psychiatric:         Mood and Affect: Mood normal.         Thought Content:  Thought content normal.           Medical Decision Making:   I have independently reviewed/ordered the following labs:    CBCwith Differential:   Lab Results   Component Value Date    WBC 7.0 10/16/2021    WBC 13.7 10/15/2021    HGB 15.5 10/16/2021    HGB 16.7 10/15/2021    HCT 48.0 10/16/2021    HCT 53.5 10/15/2021     10/16/2021     10/15/2021    LYMPHOPCT 46 10/16/2021    LYMPHOPCT 24 10/15/2021    MONOPCT 12 10/16/2021 MONOPCT 7 10/15/2021     BMP:  Lab Results   Component Value Date     10/16/2021     10/15/2021    K 4.2 10/16/2021    K 4.7 10/15/2021     10/16/2021     10/15/2021    CO2 22 10/16/2021    CO2 19 10/15/2021    BUN 18 10/16/2021    BUN 20 10/15/2021    CREATININE 0.50 10/16/2021    CREATININE 0.76 10/15/2021    MG 2.4 08/23/2018     Hepatic Function Panel:   Lab Results   Component Value Date    PROT 6.3 10/16/2021    PROT 7.2 10/15/2021    LABALBU 3.3 10/16/2021    LABALBU 3.7 10/15/2021    BILIDIR 0.13 10/16/2021    BILIDIR 0.10 10/15/2021    IBILI 0.25 10/16/2021    IBILI 0.18 10/15/2021    BILITOT 0.38 10/16/2021    BILITOT 0.28 10/15/2021    ALKPHOS 76 10/16/2021    ALKPHOS 87 10/15/2021    ALT 21 10/16/2021    ALT 23 10/15/2021    AST 28 10/16/2021    AST 31 10/15/2021     No results found for: RPR  No results found for: HIV  No results found for: [facility]  Lab Results   Component Value Date    MUCUS 2+ 05/12/2014    RBC 4.95 10/16/2021    TRICHOMONAS NOT REPORTED 05/12/2014    WBC 7.0 10/16/2021    YEAST NOT REPORTED 05/12/2014    TURBIDITY Clear 10/13/2021     Lab Results   Component Value Date    CREATININE 0.50 10/16/2021    GLUCOSE 98 10/16/2021   you for allowing us to participate in the care of this patient. Please call with questions. Nati Giordano MD  - Office: [phone]    Please note that this chart was generated using voice recognition Dragon dictation software. Although every effort was made to ensure the accuracy of this automated transcription, some errors in transcription mayhave occurred.

## 2021-11-12 ENCOUNTER — CARE COORDINATION (OUTPATIENT)
Dept: CASE MANAGEMENT | Age: 71
End: 2021-11-12

## 2021-11-12 NOTE — CARE COORDINATION
[facility] Transitions Follow Up Call- final COVID risk follow up call       2021    Patient: Joey Paul  Patient : 1950   MRN: [id number]  Reason for Admission: COVID-10 pneumonia with acute respiratory failure  Discharge Date: 10/16/21 RARS: Readmission Risk Score: 12         Spoke with: pt girlfriend Hannah     Call to pt girlfriend who states pt is doing good  States breathing is better, still with his chronic cough but improved from when he came home from hospital   States eating is great   States now that he has had follow up with Dr Nico Meyer (ID) they will scheduled with PCP and pulm  Denies needs  Writer informs this is final (CTC) phone call- v/u. Encouraged call writer/ CTC or providers if questions or concerns- v/u. Episode closed      You Patient resolved from the Care Transitions episode on 2021  Discussed COVID-19 related testing which was available at this time. Test results were positive. Patient informed of results, if available? Yes    Patient/family has been provided the following resources and education related to COVID-19:                         Signs, symptoms and red flags related to COVID-19            CDC exposure and quarantine guidelines            Conduit exposure contact - [phone]            Contact for their local Department of Health                 Patient currently reports that the following symptoms have improved:  fatigue, cough, shortness of breath and no new/worsening symptoms     No further outreach scheduled with this CTN/ACM. Episode of Care resolved. Patient has this CTN/ACM contact information if future needs arise.       Care Transitions Subsequent and Final Call    Subsequent and Final Calls  Do you have any ongoing symptoms?: No  Have your medications changed?: No  Do you have any questions related to your medications?: No  Do you currently have any active services?: No  Identified Barriers: Lack of Education  Care Transitions Interventions  Other Interventions:            Follow Up  Future Appointments   Date Time Provider Mj Brandt   2/28/2022  2:30 PM Toby Freeman MD [facility] ROSALIND Carey

## 2021-12-23 ENCOUNTER — HOSPITAL ENCOUNTER (OUTPATIENT)
Age: 71
Discharge: HOME | End: 2021-12-23
Payer: MEDICARE

## 2021-12-23 LAB
ALBUMIN SERPL-MCNC: 4.4 G/DL (ref 3.5–5.2)
ALBUMIN/GLOBULIN RATIO: ABNORMAL (ref 1–2.5)
ALP BLD-CCNC: 101 U/L (ref 40–129)
ALT SERPL-CCNC: 19 U/L (ref 5–41)
ANION GAP SERPL CALCULATED.3IONS-SCNC: 9 MMOL/L (ref 9–17)
AST SERPL-CCNC: 22 U/L
BILIRUB SERPL-MCNC: 0.27 MG/DL (ref 0.3–1.2)
BUN BLDV-MCNC: 12 MG/DL (ref 8–23)
BUN/CREAT BLD: ABNORMAL (ref 9–20)
CALCIUM SERPL-MCNC: 9.7 MG/DL (ref 8.6–10.4)
CHLORIDE BLD-SCNC: 104 MMOL/L (ref 98–107)
CHOLESTEROL, FASTING: 162 MG/DL
CHOLESTEROL/HDL RATIO: 4
CO2: 27 MMOL/L (ref 20–31)
CREAT SERPL-MCNC: 0.7 MG/DL (ref 0.7–1.2)
GFR AFRICAN AMERICAN: >60 ML/MIN
GFR NON-AFRICAN AMERICAN: >60 ML/MIN
GFR SERPL CREATININE-BSD FRML MDRD: ABNORMAL ML/MIN/{1.73_M2}
GFR SERPL CREATININE-BSD FRML MDRD: ABNORMAL ML/MIN/{1.73_M2}
GLUCOSE BLD-MCNC: 125 MG/DL (ref 70–99)
HDLC SERPL-MCNC: 41 MG/DL
LDL CHOLESTEROL: 52 MG/DL (ref 0–130)
POTASSIUM SERPL-SCNC: 4.4 MMOL/L (ref 3.7–5.3)
SODIUM BLD-SCNC: 140 MMOL/L (ref 135–144)
TOTAL PROTEIN: 7.7 G/DL (ref 6.4–8.3)
TRIGLYCERIDE, FASTING: 346 MG/DL
VLDLC SERPL CALC-MCNC: ABNORMAL MG/DL (ref 1–30)

## 2021-12-23 PROCEDURE — 80053 COMPREHEN METABOLIC PANEL: CPT

## 2021-12-23 PROCEDURE — 80061 LIPID PANEL: CPT

## 2021-12-23 PROCEDURE — 36415 COLL VENOUS BLD VENIPUNCTURE: CPT

## 2022-02-23 ENCOUNTER — TELEPHONE (OUTPATIENT)
Dept: UROLOGY | Age: 72
End: 2022-02-23

## 2022-08-04 ENCOUNTER — HOSPITAL ENCOUNTER (OUTPATIENT)
Age: 72
Setting detail: SPECIMEN
Discharge: HOME | End: 2022-08-04

## 2022-10-18 ENCOUNTER — HOSPITAL ENCOUNTER (OUTPATIENT)
Age: 72
Discharge: HOME | End: 2022-10-20
Payer: COMMERCIAL

## 2022-10-18 ENCOUNTER — HOSPITAL ENCOUNTER (OUTPATIENT)
Dept: GENERAL RADIOLOGY | Age: 72
Discharge: HOME | End: 2022-10-20
Payer: COMMERCIAL

## 2022-10-18 DIAGNOSIS — J44.9 CHRONIC OBSTRUCTIVE PULMONARY DISEASE, UNSPECIFIED COPD TYPE (HCC): ICD-10-CM

## 2022-10-18 PROCEDURE — 71046 X-RAY EXAM CHEST 2 VIEWS: CPT

## 2022-10-21 ENCOUNTER — TRANSCRIBE ORDERS (OUTPATIENT)
Dept: ADMINISTRATIVE | Age: 72
End: 2022-10-21

## 2022-10-21 DIAGNOSIS — H70.92 UNSPECIFIED MASTOIDITIS, LEFT EAR: Primary | ICD-10-CM

## 2022-11-04 ENCOUNTER — TRANSCRIBE ORDERS (OUTPATIENT)
Dept: ADMINISTRATIVE | Age: 72
End: 2022-11-04

## 2022-11-04 DIAGNOSIS — D33.3 BENIGN NEOPLASM OF CRANIAL NERVES (HCC): Primary | ICD-10-CM

## 2022-11-09 ENCOUNTER — HOSPITAL ENCOUNTER (OUTPATIENT)
Dept: CT IMAGING | Age: 72
Discharge: HOME | End: 2022-11-11
Payer: COMMERCIAL

## 2022-11-09 ENCOUNTER — HOSPITAL ENCOUNTER (OUTPATIENT)
Age: 72
Discharge: HOME | End: 2022-11-09
Payer: COMMERCIAL

## 2022-11-09 DIAGNOSIS — D33.3 BENIGN NEOPLASM OF CRANIAL NERVES (HCC): ICD-10-CM

## 2022-11-09 LAB
ABSOLUTE EOS #: 0.1 K/UL (ref 0–0.4)
ABSOLUTE LYMPH #: 3 K/UL (ref 1–4.8)
ABSOLUTE MONO #: 0.7 K/UL (ref 0.1–1.3)
ALBUMIN SERPL-MCNC: 4.5 G/DL (ref 3.5–5.2)
ALP BLD-CCNC: 104 U/L (ref 40–129)
ALT SERPL-CCNC: 17 U/L (ref 5–41)
ANION GAP SERPL CALCULATED.3IONS-SCNC: 9 MMOL/L (ref 9–17)
AST SERPL-CCNC: 20 U/L
BASOPHILS # BLD: 0 % (ref 0–2)
BASOPHILS ABSOLUTE: 0 K/UL (ref 0–0.2)
BILIRUB SERPL-MCNC: 0.5 MG/DL (ref 0.3–1.2)
BILIRUBIN URINE: NEGATIVE
BUN BLDV-MCNC: 9 MG/DL (ref 8–23)
CALCIUM SERPL-MCNC: 9.4 MG/DL (ref 8.6–10.4)
CHLORIDE BLD-SCNC: 103 MMOL/L (ref 98–107)
CHOLESTEROL/HDL RATIO: 4.6
CHOLESTEROL: 175 MG/DL
CO2: 29 MMOL/L (ref 20–31)
COLOR: YELLOW
COMMENT UA: NORMAL
CREAT SERPL-MCNC: 0.82 MG/DL (ref 0.7–1.2)
EOSINOPHILS RELATIVE PERCENT: 1 % (ref 0–4)
GFR SERPL CREATININE-BSD FRML MDRD: >60 ML/MIN/1.73M2
GLUCOSE BLD-MCNC: 120 MG/DL (ref 70–99)
GLUCOSE URINE: NEGATIVE
HCT VFR BLD CALC: 47.7 % (ref 41–53)
HDLC SERPL-MCNC: 38 MG/DL
HEMOGLOBIN: 16 G/DL (ref 13.5–17.5)
KETONES, URINE: NEGATIVE
LDL CHOLESTEROL: 75 MG/DL (ref 0–130)
LEUKOCYTE ESTERASE, URINE: NEGATIVE
LYMPHOCYTES # BLD: 26 % (ref 24–44)
MCH RBC QN AUTO: 31.6 PG (ref 26–34)
MCHC RBC AUTO-ENTMCNC: 33.6 G/DL (ref 31–37)
MCV RBC AUTO: 94.1 FL (ref 80–100)
MONOCYTES # BLD: 6 % (ref 1–7)
NITRITE, URINE: NEGATIVE
PDW BLD-RTO: 13 % (ref 11.5–14.9)
PH UA: 5.5 (ref 5–8)
PLATELET # BLD: 206 K/UL (ref 150–450)
PMV BLD AUTO: 8.3 FL (ref 6–12)
POTASSIUM SERPL-SCNC: 3.8 MMOL/L (ref 3.7–5.3)
PROTEIN UA: NEGATIVE
RBC # BLD: 5.07 M/UL (ref 4.5–5.9)
SEG NEUTROPHILS: 67 % (ref 36–66)
SEGMENTED NEUTROPHILS ABSOLUTE COUNT: 7.7 K/UL (ref 1.3–9.1)
SODIUM BLD-SCNC: 141 MMOL/L (ref 135–144)
SPECIFIC GRAVITY UA: 1.01 (ref 1–1.03)
TOTAL PROTEIN: 7.6 G/DL (ref 6.4–8.3)
TRIGL SERPL-MCNC: 310 MG/DL
TSH SERPL DL<=0.05 MIU/L-ACNC: 1.94 UIU/ML (ref 0.3–5)
TURBIDITY: CLEAR
URINE HGB: NEGATIVE
UROBILINOGEN, URINE: NORMAL
VITAMIN D 25-HYDROXY: 39.1 NG/ML
WBC # BLD: 11.5 K/UL (ref 3.5–11)

## 2022-11-09 PROCEDURE — 80053 COMPREHEN METABOLIC PANEL: CPT

## 2022-11-09 PROCEDURE — 84443 ASSAY THYROID STIM HORMONE: CPT

## 2022-11-09 PROCEDURE — 36415 COLL VENOUS BLD VENIPUNCTURE: CPT

## 2022-11-09 PROCEDURE — 70450 CT HEAD/BRAIN W/O DYE: CPT

## 2022-11-09 PROCEDURE — 80061 LIPID PANEL: CPT

## 2022-11-09 PROCEDURE — 81003 URINALYSIS AUTO W/O SCOPE: CPT

## 2022-11-09 PROCEDURE — 85025 COMPLETE CBC W/AUTO DIFF WBC: CPT

## 2022-11-09 PROCEDURE — 82306 VITAMIN D 25 HYDROXY: CPT

## 2023-01-17 DIAGNOSIS — C61 PROSTATE CANCER (HCC): Primary | ICD-10-CM

## 2023-01-18 ENCOUNTER — HOSPITAL ENCOUNTER (OUTPATIENT)
Age: 73
Setting detail: SPECIMEN
Discharge: HOME | End: 2023-01-18

## 2023-01-19 LAB
SOURCE: NORMAL
TRICHOMONAS VAGINALI, MOLECULAR: NEGATIVE

## 2023-01-20 LAB
C. TRACHOMATIS DNA ,URINE: NEGATIVE
N. GONORRHOEAE DNA, URINE: NEGATIVE
SPECIMEN DESCRIPTION: NORMAL

## 2023-02-10 ENCOUNTER — TELEPHONE (OUTPATIENT)
Dept: UROLOGY | Age: 73
End: 2023-02-10

## 2023-02-10 NOTE — TELEPHONE ENCOUNTER
Writer spoke with Hannah in regards to Alvino's appt that is scheduled for Monday. PSA is needed prior. Agreeable to get done.

## 2023-02-11 ENCOUNTER — HOSPITAL ENCOUNTER (OUTPATIENT)
Age: 73
Discharge: HOME | End: 2023-02-11
Payer: COMMERCIAL

## 2023-02-11 DIAGNOSIS — C61 PROSTATE CANCER (HCC): ICD-10-CM

## 2023-02-11 LAB — PROSTATE SPECIFIC ANTIGEN: <0.02 NG/ML

## 2023-02-11 PROCEDURE — 36415 COLL VENOUS BLD VENIPUNCTURE: CPT

## 2023-02-11 PROCEDURE — 84153 ASSAY OF PSA TOTAL: CPT

## 2023-02-13 ENCOUNTER — OFFICE VISIT (OUTPATIENT)
Dept: UROLOGY | Age: 73
End: 2023-02-13
Payer: COMMERCIAL

## 2023-02-13 VITALS — TEMPERATURE: 98.5 F | DIASTOLIC BLOOD PRESSURE: 96 MMHG | HEART RATE: 81 BPM | SYSTOLIC BLOOD PRESSURE: 167 MMHG

## 2023-02-13 DIAGNOSIS — Z96.0 S/P INSERTION OF PENILE IMPLANT: ICD-10-CM

## 2023-02-13 DIAGNOSIS — N39.3 STRESS INCONTINENCE, MALE: ICD-10-CM

## 2023-02-13 DIAGNOSIS — C61 PROSTATE CANCER (HCC): Primary | ICD-10-CM

## 2023-02-13 PROCEDURE — G8484 FLU IMMUNIZE NO ADMIN: HCPCS | Performed by: UROLOGY

## 2023-02-13 PROCEDURE — 99213 OFFICE O/P EST LOW 20 MIN: CPT | Performed by: UROLOGY

## 2023-02-13 PROCEDURE — 3077F SYST BP >= 140 MM HG: CPT | Performed by: UROLOGY

## 2023-02-13 PROCEDURE — 3017F COLORECTAL CA SCREEN DOC REV: CPT | Performed by: UROLOGY

## 2023-02-13 PROCEDURE — 4004F PT TOBACCO SCREEN RCVD TLK: CPT | Performed by: UROLOGY

## 2023-02-13 PROCEDURE — G8421 BMI NOT CALCULATED: HCPCS | Performed by: UROLOGY

## 2023-02-13 PROCEDURE — 3080F DIAST BP >= 90 MM HG: CPT | Performed by: UROLOGY

## 2023-02-13 PROCEDURE — G8427 DOCREV CUR MEDS BY ELIG CLIN: HCPCS | Performed by: UROLOGY

## 2023-02-13 PROCEDURE — 1123F ACP DISCUSS/DSCN MKR DOCD: CPT | Performed by: UROLOGY

## 2023-02-13 ASSESSMENT — ENCOUNTER SYMPTOMS
WHEEZING: 0
SHORTNESS OF BREATH: 0
COUGH: 0
BACK PAIN: 0
EYE PAIN: 0
VOMITING: 0
DIARRHEA: 0
CONSTIPATION: 0
ABDOMINAL PAIN: 0
EYE REDNESS: 0
NAUSEA: 0

## 2023-02-13 NOTE — PROGRESS NOTES
[address] [address]  Dept:  Sandra Faust [facility] Urology Office Note - Established    Patient:  Avila Ceron  YOB: 1950  Date: 2/13/2023    The patient is a 71 y.o. male who presents todayfor evaluation of the following problems:   Chief Complaint   Patient presents with    Other     6 month with PSA        HPI  This is a 41-year-old gentleman who had a prostatectomy 3-1/2 years ago for prostate cancer. His PSA remains undetectable. He has erectile dysfunction. He does have a penile implant in but is very frustrated with his penile length. His implant cycles perfectly but he has problems with presenting his length to prospective sexual partners. He does continue to have mild stress incontinence. He is content. Summary of old records: N/A    Additional History: N/A    Procedures Today: N/A    Urinalysis today:  No results found for this visit on 02/13/23. Last several PSA's:  Lab Results   Component Value Date    PSA <0.02 02/11/2023    PSA <0.02 08/26/2021    PSA <0.01 01/28/2021     Last total testosterone:  No results found for: TESTOSTERONE    AUA Symptom Score (2/13/2023):                                Last BUN and creatinine:  Lab Results   Component Value Date    BUN 9 11/09/2022     Lab Results   Component Value Date    CREATININE 0.82 11/09/2022       Additional Lab/Culture results: none    Imaging Reviewed during this Office Visit: none  (results were independently reviewed by physician and radiology report verified)    PAST MEDICAL, FAMILY AND SOCIAL HISTORY UPDATE:  Past Medical History:   Diagnosis Date    Anxiety state, unspecified     Asthma     inhalers per pcp    CAD (coronary artery disease)     follows with [facility]. Yon Emmanuel at [facility] [place] [place]    Carotid artery stenosis     Colon polyp     Depressive disorder, not elsewhere classified     Elevated PSA 05/06/2019 Essential hypertension, benign     meds per pcp    GERD (gastroesophageal reflux disease)     Hearing loss     bilat. Has hearing aids but does not wear    History of colon polyps     History of hepatitis C     Hyperlipidemia     on rx    Impotence of organic origin     Lipoma of unspecified site     Lumbago     No natural teeth     Prostate CA ([address] 05/2019    Secondary localized osteoarthrosis, shoulder region     Snores     no cpap or sleep apnea    Swelling of limb     Wellness examination     Mike Madrid pcp last seen June 2020     Past Surgical History:   Procedure Laterality Date    ANKLE SURGERY Right     CARPAL TUNNEL RELEASE Bilateral     CERVICAL FUSION  2009    anterior    CHOLECYSTECTOMY      approx 2000    COLONOSCOPY      EXCISION / BIOPSY SKIN LESION OF ARM Left 9/15/2017    EXCISION SKIN LESION LEFT CHEST AND LEFT BUTTOCK, EXCISION LIPOMA LEFT LOWER ABDOMEN performed by Dimitris Zhu DO at [address] [facility]  08/26/2020    INSERTION INFLATABLE PENILE PROSTHESIS      PENILE PROSTHESIS PLACEMENT N/A 8/26/2020    INSERTION INFLATABLE PENILE PROSTHESIS  (REP NOTIFIED - Donnice Frankel) performed by Daryle Poet, MD at [address] [place] [address] N/A 4/13/2017    COLONOSCOPY performed by Jesus Boykin MD at [address] [place] [address] 4/24/2019    PROSTATE BIOPSY WITH ULTRASOUND  (OFFICE TO NOTIFY U.S) performed by Daryle Poet, MD at [place] [address] N/A 6/26/2019    XI ROBOTIC LAPAROSCOPIC PROSTATECTOMY , PELVIC LYMPH NODE DISSECTION performed by Daryle Poet, MD at [facility] Right 2015    ULNAR TUNNEL RELEASE Left      Family History   Problem Relation Age of Onset    Stroke Maternal Uncle 94    Heart Surgery Maternal Uncle         Triple Bypass    Other Maternal Grandmother         Pul.  Embolism    Cancer Maternal Grandfather         Throat    No Known Problems Mother     Other Father         MVA    No Known Problems Sister     Other Brother         Electric burn at work    No Known Problems Paternal Grandmother     No Known Problems Paternal Grandfather     Diabetes Brother      No outpatient medications have been marked as taking for the 2/13/23 encounter (Office Visit) with Milli Flores MD.       Bee venom, Fentanyl, Morphine, Pcn [penicillins], and Codeine  Social History     Tobacco Use   Smoking Status Every Day    Packs/day: 0.25    Years: 54.00    Pack years: 13.50    Types: Cigarettes    Start date: [place] Never     (Ifpatient a smoker, smoking cessation counseling offered)    Social History     Substance and Sexual Activity   Alcohol Use Not Currently    Comment: 22 oz beer every 2 days       REVIEW OF SYSTEMS:  Review of Systems    Physical Exam:      Vitals:    02/13/23 1431   BP: (!) 167/96   Pulse: 81   Temp: 98.5 °F (36.9 °C)     There is no height or weight on file to calculate BMI. Patient is a 71 y.o. male in no acute distress and alert and oriented to person, place and time. Physical Exam  Constitutional: Patient in no acute distress. Neuro: Alert and oriented to person, place and time. Psych: Mood normal, affect normal      Assessment and Plan      1. Prostate cancer ([facility] [address]    2. Stress incontinence, male    3. S/P insertion of penile implant           Plan:   F/u 6 mo psa  Cont kegels  Cycle IPP regularly      Return in about 6 months (around 8/13/2023) for psa. Prescriptions Ordered:  No orders of the defined types were placed in this encounter. Orders Placed:  Orders Placed This Encounter   Procedures    PSA, Diagnostic     Standing Status:   Future     Standing Expiration Date:   2/13/2024           Milli Flores MD    Agree with the ROS entered by the MA.

## 2023-04-04 ENCOUNTER — HOSPITAL ENCOUNTER (EMERGENCY)
Age: 73
Discharge: HOME | End: 2023-04-04
Attending: EMERGENCY MEDICINE
Payer: COMMERCIAL

## 2023-04-04 ENCOUNTER — APPOINTMENT (OUTPATIENT)
Dept: CT IMAGING | Age: 73
End: 2023-04-04
Payer: COMMERCIAL

## 2023-04-04 VITALS
TEMPERATURE: 97.9 F | HEART RATE: 71 BPM | SYSTOLIC BLOOD PRESSURE: 153 MMHG | OXYGEN SATURATION: 95 % | WEIGHT: 206 LBS | BODY MASS INDEX: 30.51 KG/M2 | RESPIRATION RATE: 17 BRPM | DIASTOLIC BLOOD PRESSURE: 86 MMHG | HEIGHT: 69 IN

## 2023-04-04 DIAGNOSIS — G25.2 INTENTION TREMOR: Primary | ICD-10-CM

## 2023-04-04 PROCEDURE — 70450 CT HEAD/BRAIN W/O DYE: CPT

## 2023-04-04 PROCEDURE — 99284 EMERGENCY DEPT VISIT MOD MDM: CPT

## 2023-04-04 RX ORDER — METOPROLOL TARTRATE 50 MG/1
50 TABLET, FILM COATED ORAL 2 TIMES DAILY
Qty: 60 TABLET | Refills: 0 | Status: SHIPPED | OUTPATIENT
Start: 2023-04-04

## 2023-04-04 ASSESSMENT — LIFESTYLE VARIABLES
HOW MANY STANDARD DRINKS CONTAINING ALCOHOL DO YOU HAVE ON A TYPICAL DAY: 3 OR 4
HOW OFTEN DO YOU HAVE A DRINK CONTAINING ALCOHOL: 2-4 TIMES A MONTH

## 2023-04-04 NOTE — ED NOTES
Mode of arrival (squad #, walk in, police, etc) : Walk in         Chief complaint(s): tremors         Arrival Note (brief scenario, treatment PTA, etc). : Pt states tremors x1 month. Pt was sent in by PCP. C= \"Have you ever felt that you should Cut down on your drinking? \"  No  A= \"Have people Annoyed you by criticizing your drinking? \"  No  G= \"Have you ever felt bad or Guilty about your drinking? \"  No  E= \"Have you ever had a drink as an Eye-opener first thing in the morning to steady your nerves or to help a hangover? \"  No      Deferred []      Reason for deferring: N/A    *If yes to two or more: probable alcohol abuse. Andrea [place]  04/04/23 1816

## 2023-04-04 NOTE — DISCHARGE INSTRUCTIONS
Thank you for coming to [facility] emergency department! You were seen today for intention tremor, you had a CT of your head. You were prescribed metoprolol, please pick these medications up at the pharmacy. Increase your current dose to twice a day. Follow up with your primary care doctor. You were referred to neurology. If you have any worsening of symptoms or any other concerns, please return to the emergency department. We are always available!

## 2023-04-05 NOTE — ED PROVIDER NOTES
Cholecystectomy; Penile prosthesis placement (08/26/2020); and Penile prosthesis placement (N/A, 8/26/2020). Social History     Socioeconomic History    Marital status:      Spouse name: Not on file    Number of children: 2    Years of education: Not on file    Highest education level: Not on file   Occupational History    Not on file   Tobacco Use    Smoking status: Every Day     Packs/day: 1.00     Years: 54.00     Pack years: 54.00     Types: Cigarettes     Start date: 57    Smokeless tobacco: Never   Vaping Use    Vaping Use: Never used   Substance and Sexual Activity    Alcohol use: Not Currently     Comment: 22 oz beer every 2 days    Drug use: Not Currently     Comment: teenager    Sexual activity: Yes     Partners: Female   Other Topics Concern    Not on file   Social History Narrative    Not on file     Social Determinants of Health     Financial Resource Strain: Not on file   Food Insecurity: Not on file   Transportation Needs: Not on file   Physical Activity: Not on file   Stress: Not on file   Social Connections: Not on file   Intimate Partner Violence: Not on file   Housing Stability: Not on file       Family History   Problem Relation Age of Onset    Stroke Maternal Uncle 94    Heart Surgery Maternal Uncle         Triple Bypass    Other Maternal Grandmother         Pul. Embolism    Cancer Maternal Grandfather         Throat    No Known Problems Mother     Other Father         MVA    No Known Problems Sister     Other Brother         Electric burn at work    No Known Problems Paternal Grandmother     No Known Problems Paternal Grandfather     Diabetes Brother        Allergies:  Bee venom, Fentanyl, Morphine, Pcn [penicillins], and Codeine    Home Medications:  Prior to Admission medications    Medication Sig Start Date End Date Taking?  Authorizing Provider   metoprolol tartrate (LOPRESSOR) 50 MG tablet Take 1 tablet by mouth 2 times daily 4/4/23  Yes Nimo Ochoa, DO   magnesium 30 MG tablet

## 2023-07-18 ENCOUNTER — HOSPITAL ENCOUNTER (OUTPATIENT)
Dept: PHYSICAL THERAPY | Age: 73
Setting detail: THERAPIES SERIES
End: 2023-07-18
Payer: MEDICARE

## 2023-07-21 ENCOUNTER — HOSPITAL ENCOUNTER (OUTPATIENT)
Dept: PHYSICAL THERAPY | Age: 73
Setting detail: THERAPIES SERIES
Discharge: HOME | End: 2023-07-21
Payer: MEDICARE

## 2023-07-21 PROCEDURE — 97110 THERAPEUTIC EXERCISES: CPT

## 2023-07-21 PROCEDURE — 97162 PT EVAL MOD COMPLEX 30 MIN: CPT

## 2023-07-21 NOTE — CONSULTS
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 Primarily on the right side but has improved since having right shoulder surgery which took away 40-50%. Weakness in right arm. Numbness in right ring finger which has improved since ulnar nerve. He had subacromial decompression. All motions except cervical extension bother him. He reports he has passed out from the pain before that was over a year ago. He reports that heat will help so he can sleep at him. He is able to sleep until 3-4am. Thoughtout the day he feels that his upper trapezius will get swollen which causes him more pain. Aggravating Factors: movements of the cervical spine, gummies   Alleviating Factors: Heat.      PMHx: [] Unremarkable [] Diabetes [] HTN  [] Pacemaker   [] MI/Heart Problems [] Cancer [] Arthritis [x] Asthma                         [x] refer to full medical chart  In EPIC  [x] Other: C5-C6 anterior cervical discectomy and fusion, R shoulder surgery (subacromial decompression 23), R ulnar nerve decompression      Comorbidities:   [] Obesity [] Dialysis  [] Other:   [] Asthma/COPD [] Dementia [] Other:   [] Stroke [] Sleep apnea [] Other:   [] Vascular disease [] Rheumatic disease [] Other:     Tests: [x] X-Ray: [] MRI:  [] Other:  X-ray spine cervical 3 views or less  AP and lateral radiographs of cervical spine were obtained which show   evidence of prior fusion at C5-6 level without

## 2023-07-25 ENCOUNTER — HOSPITAL ENCOUNTER (OUTPATIENT)
Dept: PHYSICAL THERAPY | Age: 73
Setting detail: THERAPIES SERIES
Discharge: HOME | End: 2023-07-25
Payer: MEDICARE

## 2023-07-25 PROCEDURE — 97140 MANUAL THERAPY 1/> REGIONS: CPT

## 2023-07-25 PROCEDURE — 97110 THERAPEUTIC EXERCISES: CPT

## 2023-07-25 NOTE — FLOWSHEET NOTE
[]  Other     Total Treatment time 38 3     Time In:04:19pm            Time Out: 5:00pm    Electronically signed by:  Cal Rose PT

## 2023-07-25 NOTE — FLOWSHEET NOTE
tolerance to mobility. Pt was very sensitive to manual but tolerated it well. 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 Pain:5/10 neck pain to improve ability to complete daily tasks for independence at home  ? ROM: L rotation to 45 degrees or more to improve ability to work on motorcycles  ? Strength: B shoulder flexion to 4+/5 or better to imrpove ability to lift household times or light parts for motorcycles. ? Function: NDI: 60% impairment or less to improved ability to perform activities of daily living. Independent with Home Exercise Programs     LTG: (to be met in 12 treatments)  Patient will report being able to sleep throughtout the night without waking up due to pain to improve sleep habits for improvement in quality of life. Patient will report not having headache throughout the day to increase concentration and complete tasks throughout the day. Patient goals: Get rid of pain. Pt. Education:  [x] Yes  [] No  [] Reviewed Prior HEP/Ed  Method of Education: [] Verbal  [] Demo  [] Written  Access Code: Delmar Lamp: [email or web address]  Date: 07/21/2023  Prepared by: Cal Rose     Exercises  - Seated Scapular Retraction  - 1 x daily - 7 x weekly - 1-3 sets - 10 reps  - Seated Scapular Protraction  - 1 x daily - 7 x weekly - 1-3 sets - 10 reps    Comprehension of Education:  [x] Verbalizes understanding. [] Demonstrates understanding. [] Needs review. [] Demonstrates/verbalizes HEP/Ed previously given. Plan: [x] Continue per plan of care.    [] Other:      Treatment Charges: Mins Units   [x]  Modalities MHP 5 -   [x]  Ther Exercise 30 2   [x]  Manual Therapy 8 1   []  Ther Activities     []  Aquatics     []  Neuromuscular

## 2023-07-27 ENCOUNTER — HOSPITAL ENCOUNTER (OUTPATIENT)
Dept: PHYSICAL THERAPY | Age: 73
Setting detail: THERAPIES SERIES
Discharge: HOME | End: 2023-07-27
Payer: MEDICARE

## 2023-08-01 ENCOUNTER — APPOINTMENT (OUTPATIENT)
Dept: PHYSICAL THERAPY | Age: 73
End: 2023-08-01
Payer: MEDICARE

## 2023-08-02 ENCOUNTER — HOSPITAL ENCOUNTER (OUTPATIENT)
Dept: PHYSICAL THERAPY | Age: 73
Setting detail: THERAPIES SERIES
Discharge: HOME | End: 2023-08-02

## 2023-08-02 NOTE — FLOWSHEET NOTE
[x] [facility]) - [facility]  [address]  [place]: [phone]   F: [phone]     Physical Therapy Cancel/No Show note    Date: 2023  Patient: Karson Toledo  : 1950  MRN: [id number]    Visit Count: 3/12  Cancels/No Shows to date:     For today's appointment patient:    [x]  Cancelled    [] Rescheduled appointment    [] No-show     Reason given by patient:    []  Patient ill    []  Conflicting appointment    [x] No transportation- per , pt's wife called to cancel due to having car issues.      [] Conflict with work    [] No reason given    [] Weather related    [] COVID-19    [] Other:      Comments:        [] Next appointment was confirmed    Electronically signed by: Jo-Ann Boland PTA

## 2023-08-03 ENCOUNTER — APPOINTMENT (OUTPATIENT)
Dept: PHYSICAL THERAPY | Age: 73
End: 2023-08-03
Payer: MEDICARE

## 2023-08-09 ENCOUNTER — HOSPITAL ENCOUNTER (OUTPATIENT)
Dept: PHYSICAL THERAPY | Age: 73
Setting detail: THERAPIES SERIES
Discharge: HOME | End: 2023-08-09
Payer: MEDICARE

## 2023-08-09 PROCEDURE — 97110 THERAPEUTIC EXERCISES: CPT

## 2023-08-09 NOTE — PROGRESS NOTES
Zee Forbes     Exercises  - Seated Scapular Retraction  - 1 x daily - 7 x weekly - 1-3 sets - 10 reps  - Seated Scapular Protraction  - 1 x daily - 7 x weekly - 1-3 sets - 10 reps    Comprehension of Education:  [] Verbalizes understanding. [] Demonstrates understanding. [] Needs review. [x] Demonstrates/verbalizes HEP/Ed previously given. Plan: [] Continue per plan of care.    [x] Other:Discharge at this time  Treatment to Date:  [x] Therapeutic Exercise   29276  [] Iontophoresis: 4 mg/mL Dexamethasone Sodium Phosphate  mAmin  06864   [x] Therapeutic Activity  66940 [] Vasopneumatic cold with compression  69786    [] Gait Training   75097 [] Ultrasound   32918   [x] Neuromuscular Re-education  76172 [] Electrical Stimulation Unattended  77532   [x] Manual Therapy  27136 [] Electrical Stimulation Attended  64801   [x] Instruction in HEP  [] Lumbar/Cervical Traction  30222   [] Aquatic Therapy   51717 [x] Cold/hotpack    [] Massage   04754      [] Dry Needling, 1 or 2 muscles  88693   [] Biofeedback, first 15 minutes   21068  [] Biofeedback, additional 15 minutes   50297 [] Dry Needling, 3 or more muscles  88612          Treatment Charges: Mins Units   []  Modalities MHP     [x]  Ther Exercise 23 2   []  Manual Therapy     []  Ther Activities     []  Aquatics     []  Neuromuscular     [] Vasocompression     [] Gait Training     [] Dry needling        [] 1 or 2 muscles        [] 3 or more muscles     []  Other     Total Treatment time 23 2   Test and measures billed under therex  Time ZW:1111           Time Out: 1924    Electronically signed by:  Zee Forbes, PT

## 2023-08-11 ENCOUNTER — APPOINTMENT (OUTPATIENT)
Dept: PHYSICAL THERAPY | Age: 73
End: 2023-08-11
Payer: MEDICARE

## 2023-10-05 ENCOUNTER — TELEPHONE (OUTPATIENT)
Dept: NEUROLOGY | Age: 73
End: 2023-10-05

## 2023-10-05 NOTE — TELEPHONE ENCOUNTER
10 05 2023 I called the patient times 2 (09 21 2023 and 10 05 2023 at 21 [phone]) to schedule new patient appointment with one of our providers, the subscriber is not in service both time, no response. I mailed the patient a letter asking them to call the office back to schedule this appointment.   KS

## 2024-02-16 NOTE — PROGRESS NOTES
[facility]  [facility] [place]  [address]  [address]  [address] [facility] [address]  Dept: 92 Sandra Faust [facility] Urology Office Note - Established    Patient:  Claire Campbell  YOB: 1950  Date: 5/28/2019    The patient is a 80 y.o. male who presents todayfor evaluation of the following problems:   Chief Complaint   Patient presents with    Other     penile discharge and burning sensation x 3 days    Dysuria     x 3 days     Urinary Retention       HPI  Here for follow up - had a dark brownish discharge in his underwear 2 days ago with no reoccurance. He called Dr URSULA PERRY on call and was given Cipro on Saturday. He has prostate cancer and having RRP 6/26/19. He is having more burning than he did on Sunday, no fever- is having generalized abdominal pain. He last had normal BM on Sunday. Summary of old records: N/A    Additional History: N/A    Procedures Today: N/A    Urinalysis today:  No results found for this visit on 05/28/19.   Last several PSA's:  Lab Results   Component Value Date    PSA 4.54 (H) 11/29/2018    PSA 4.96 (H) 10/07/2018    PSA 2.47 10/08/2014     Last total testosterone:  No results found for: TESTOSTERONE    AUA Symptom Score (5/28/2019):  INCOMPLETE EMPTYING: How often have you had the sensation of not emptying your bladder?: About Half the time  FREQUENCY: How often do you have to urinate less than every two hours?: Not at all  INTERMITTENCY: How often have you found you stopped and started again several times when you urinated?: Not at all  URGENCY: How often have you found it difficult to postpone urination?: Not at all  WEAK STREAM: How often have you had a weak urinary stream?: Less than Half the time  STRAINING: How often have you had to strain to start  urination?: About Half the time  NOCTURIA: How many times did you typically get up at night to uriniate?: NONE  TOTAL I-PSS SCORE[de-identified] 8  How would you feel if you were to spend the rest of your life with your urinary condition?: Mostly Dissatisfied    Last BUN and creatinine:  Lab Results   Component Value Date    BUN 14 04/10/2019     Lab Results   Component Value Date    CREATININE 0.66 (L) 04/10/2019       Additional Lab/Culture results:  Imaging Reviewed during this Office Visit:(results were independently reviewed by physician and radiology report verified)    PAST MEDICAL, FAMILY AND SOCIAL HISTORY UPDATE:  Past Medical History:   Diagnosis Date    Anxiety state, unspecified     Carotid artery stenosis     Colon polyp     Depressive disorder, not elsewhere classified     Essential hypertension, benign     GERD (gastroesophageal reflux disease)     History of colon polyps     History of hepatitis C     Hyperlipidemia     Impotence of organic origin     Lipoma of unspecified site     Lumbago     No natural teeth     Secondary localized osteoarthrosis, shoulder region     Swelling of limb      Past Surgical History:   Procedure Laterality Date    ANKLE SURGERY Right     CARPAL TUNNEL RELEASE Bilateral     CERVICAL FUSION      anterior    COLONOSCOPY  04/13/2017    EXCISION / BIOPSY SKIN LESION OF ARM Left 9/15/2017    EXCISION SKIN LESION LEFT CHEST AND LEFT BUTTOCK, EXCISION LIPOMA LEFT LOWER ABDOMEN performed by Samson Tobin DO at [facility] NOT  [address] [place] [address] N/A 4/13/2017    COLONOSCOPY performed by Javier Ball MD at [address] [place] [address]  04/24/2019    PROSTATE BIOPSY WITH ULTRASOUND     PROSTATE BIOPSY N/A 4/24/2019    PROSTATE BIOPSY WITH ULTRASOUND  (OFFICE TO NOTIFY U.S) performed by Regla Patrick MD at [address] Right     ULNAR TUNNEL RELEASE Left      Family History   Problem Relation Age of Onset    High Blood Pressure Maternal Uncle     Diabetes Maternal Uncle     Diabetes Maternal Grandmother     Cancer Maternal Grandfather     No Known Problems Mother     Other Father         MVA alert and oriented to person, place and time. Physical Exam  Constitutional: Patient in no acute distress. Neuro: Alert and oriented to person, place and time. Psych: Mood normal, affect normal  Skin: warm,pink, No rash noted  HEENT: Head: Normocephalic andatraumatic  Conjunctivae and EOM are normal. Pupils are equal, round  Nose:Normal  Right External Ear: Normal; Left External Ear: Normal  Mouth: Mucosa Moist  Neck: Supple  Lungs: Respiratory effort is normal  Cardiovascular: Warm & Pink  Abdomen: Soft, non-tender, non-distended   Bladder non-tender and not distended. PVR 92 ml  Musculoskeletal: Normal gait and station      Assessment and Plan      1. Incomplete emptying of bladder    2. Dysuria    3. Penile discharge           Plan:     finish Cipro    Continue to drink water    Will start Tamsulosin at night before bed    Pyridium 1 tablet 3x per day for burning    Call with update on symptoms in 2 days. Discussed with Dr Johanny Escobar who is in agreement  No follow-ups on file. Prescriptions Ordered:  No orders of the defined types were placed in this encounter. Orders Placed:  No orders of the defined types were placed in this encounter. Dock Hamman, APRN - CNP    Reviewed and Agree with the ROS entered by the MA. Walk in Private [employer]

## 2024-03-15 ENCOUNTER — HOSPITAL ENCOUNTER (OUTPATIENT)
Dept: GENERAL RADIOLOGY | Age: 74
End: 2024-03-15
Attending: FAMILY MEDICINE
Payer: MEDICARE

## 2024-03-15 ENCOUNTER — HOSPITAL ENCOUNTER (OUTPATIENT)
Age: 74
End: 2024-03-15
Payer: MEDICARE

## 2024-03-15 DIAGNOSIS — J42 CHRONIC BRONCHITIS, UNSPECIFIED CHRONIC BRONCHITIS TYPE (HCC): ICD-10-CM

## 2024-03-15 PROCEDURE — 71046 X-RAY EXAM CHEST 2 VIEWS: CPT

## 2025-01-17 NOTE — ED TRIAGE NOTES
Mode of arrival (squad #, walk in, police, etc) : walk in        Chief complaint(s): arm and shoulder pain        Arrival Note (brief scenario, treatment PTA, etc). : Pt states he has been having arm and shoulder pain for a year. Pt states the pain is increasing to a point where he feels he is losing the use of his arm. Pt denies injuy. C= \"Have you ever felt that you should Cut down on your drinking? \"  No  A= \"Have people Annoyed you by criticizing your drinking? \"  No  G= \"Have you ever felt bad or Guilty about your drinking? \"  No  E= \"Have you ever had a drink as an Eye-opener first thing in the morning to steady your nerves or to help a hangover? \"  No      Deferred []      Reason for deferring: N/A    *If yes to two or more: probable alcohol abuse. * no

## 2025-01-20 ENCOUNTER — OFFICE VISIT (OUTPATIENT)
Dept: PODIATRY | Age: 75
End: 2025-01-20
Payer: MEDICARE

## 2025-01-20 VITALS — HEIGHT: 69 IN | WEIGHT: 206 LBS | BODY MASS INDEX: 30.51 KG/M2

## 2025-01-20 DIAGNOSIS — R60.0 EDEMA, LOWER EXTREMITY: ICD-10-CM

## 2025-01-20 DIAGNOSIS — S90.31XA CONTUSION OF RIGHT FOOT, INITIAL ENCOUNTER: ICD-10-CM

## 2025-01-20 DIAGNOSIS — M10.072 ACUTE IDIOPATHIC GOUT OF LEFT FOOT: Primary | ICD-10-CM

## 2025-01-20 DIAGNOSIS — M79.671 PAIN IN RIGHT FOOT: ICD-10-CM

## 2025-01-20 DIAGNOSIS — M20.12 HALLUX VALGUS, LEFT: ICD-10-CM

## 2025-01-20 DIAGNOSIS — M79.672 PAIN IN LEFT FOOT: ICD-10-CM

## 2025-01-20 PROCEDURE — G8427 DOCREV CUR MEDS BY ELIG CLIN: HCPCS | Performed by: PODIATRIST

## 2025-01-20 PROCEDURE — 1159F MED LIST DOCD IN RCRD: CPT | Performed by: PODIATRIST

## 2025-01-20 PROCEDURE — 4004F PT TOBACCO SCREEN RCVD TLK: CPT | Performed by: PODIATRIST

## 2025-01-20 PROCEDURE — 1123F ACP DISCUSS/DSCN MKR DOCD: CPT | Performed by: PODIATRIST

## 2025-01-20 PROCEDURE — 99203 OFFICE O/P NEW LOW 30 MIN: CPT | Performed by: PODIATRIST

## 2025-01-20 PROCEDURE — 1125F AMNT PAIN NOTED PAIN PRSNT: CPT | Performed by: PODIATRIST

## 2025-01-20 PROCEDURE — G8417 CALC BMI ABV UP PARAM F/U: HCPCS | Performed by: PODIATRIST

## 2025-01-20 PROCEDURE — 3017F COLORECTAL CA SCREEN DOC REV: CPT | Performed by: PODIATRIST

## 2025-01-20 RX ORDER — VALSARTAN AND HYDROCHLOROTHIAZIDE 160; 25 MG/1; MG/1
1 TABLET ORAL DAILY
COMMUNITY
Start: 2025-01-06

## 2025-01-20 RX ORDER — COLCHICINE 0.6 MG/1
0.6 TABLET ORAL 2 TIMES DAILY
Qty: 30 TABLET | Refills: 3 | Status: SHIPPED | OUTPATIENT
Start: 2025-01-20

## 2025-01-20 ASSESSMENT — ENCOUNTER SYMPTOMS
NAUSEA: 0
DIARRHEA: 0
COLOR CHANGE: 0
BACK PAIN: 0
SHORTNESS OF BREATH: 0

## 2025-01-20 NOTE — PROGRESS NOTES
are present to the left plantar foot and to the Achilles tendon.     Epicritic sensation is  intact to the right foot.     Epicritic sensation is  intact to the left foot.    Musculoskeletal:  Muscle strength is +5/5 to all four muscle groups of the right lower extremity and +5/5 to all four muscle groups of the left lower extremity.     Range of motion to the right ankle is  free of pain or grinding.     Range of motion to the left ankle is  free of pain or grinding.     Range of motion to the right subtalar joint is  free of pain or grinding.     Range of motion to the left subtalar joint is  free of pain or grinding.     No abnormalities, asymmetries, or misalignments are seen between the extremities.    Weightbearing evaluation does reveal rearfoot eversion, medial prominence of the talar head, loss of the medial longitudinal arch height, and too many toes sign bilaterally.    The lesser digits of the right foot are contracted.  There is pain with palpation to the fourth toe.    The lesser digits of the left foot are contracted.     There is no prominence noted to the first metatarsal head without abduction of the hallux of the right foot.     There is prominence noted to the first metatarsal head with mild abduction of the hallux of the left foot.  There is pain with palpation to the hallux IPJ and to the first MTPJ.  There is pain with range of motion to these joints.    Shoe examination was performed.    Biomechanical Exam: abnormal as the patient limps on the left lower extremity.    X-ray's taken: AP, Lateral, and Medial Oblique of the bilateral foot. Findings: There is no fracture or stress fracture noted to either foot.      Asessment: Patient is a 74 y.o. male with:    Diagnosis Orders   1. Acute idiopathic gout of left foot  Renal Function Panel    Uric Acid    colchicine (COLCRYS) 0.6 MG tablet      2. Hallux valgus, left  XR FOOT LEFT (MIN 3 VIEWS)      3. Contusion of right foot, initial encounter

## 2025-07-21 DIAGNOSIS — C61 PROSTATE CANCER (HCC): Primary | ICD-10-CM

## 2025-07-30 ENCOUNTER — TELEPHONE (OUTPATIENT)
Dept: UROLOGY | Age: 75
End: 2025-07-30

## 2025-07-30 NOTE — TELEPHONE ENCOUNTER
I have called Mr. Garland, and reminded his girlfriend of his appointment on 8/4/25 and labs that needs to be completed for this appointment.  Patient states that they will have labs done by this weekend.

## 2025-08-02 ENCOUNTER — HOSPITAL ENCOUNTER (OUTPATIENT)
Age: 75
Discharge: HOME | End: 2025-08-02
Payer: MEDICARE

## 2025-08-02 DIAGNOSIS — C61 PROSTATE CANCER (HCC): ICD-10-CM

## 2025-08-02 LAB
ALBUMIN SERPL-MCNC: 3.7 G/DL (ref 3.5–5.2)
ALP SERPL-CCNC: 95 U/L (ref 40–129)
ALT SERPL-CCNC: 15 U/L (ref 10–50)
AST SERPL-CCNC: 28 U/L (ref 10–50)
BILIRUB DIRECT SERPL-MCNC: <0.1 MG/DL (ref 0–0.3)
BILIRUB INDIRECT SERPL-MCNC: NORMAL MG/DL (ref 0–1)
BILIRUB SERPL-MCNC: 0.5 MG/DL (ref 0–1.2)
PROT SERPL-MCNC: 6.6 G/DL (ref 6.6–8.7)
PSA SERPL-MCNC: <0.03 NG/ML (ref 0–4)

## 2025-08-02 PROCEDURE — 36415 COLL VENOUS BLD VENIPUNCTURE: CPT

## 2025-08-02 PROCEDURE — 84153 ASSAY OF PSA TOTAL: CPT

## 2025-08-02 PROCEDURE — 80076 HEPATIC FUNCTION PANEL: CPT

## 2025-08-04 ENCOUNTER — OFFICE VISIT (OUTPATIENT)
Dept: UROLOGY | Age: 75
End: 2025-08-04
Payer: MEDICARE

## 2025-08-04 VITALS — HEART RATE: 63 BPM | TEMPERATURE: 97.5 F | DIASTOLIC BLOOD PRESSURE: 64 MMHG | SYSTOLIC BLOOD PRESSURE: 118 MMHG

## 2025-08-04 DIAGNOSIS — C61 PROSTATE CANCER (HCC): Primary | ICD-10-CM

## 2025-08-04 DIAGNOSIS — N52.9 ERECTILE DYSFUNCTION, UNSPECIFIED ERECTILE DYSFUNCTION TYPE: ICD-10-CM

## 2025-08-04 PROCEDURE — 1123F ACP DISCUSS/DSCN MKR DOCD: CPT | Performed by: UROLOGY

## 2025-08-04 PROCEDURE — G8417 CALC BMI ABV UP PARAM F/U: HCPCS | Performed by: UROLOGY

## 2025-08-04 PROCEDURE — 1159F MED LIST DOCD IN RCRD: CPT | Performed by: UROLOGY

## 2025-08-04 PROCEDURE — 3017F COLORECTAL CA SCREEN DOC REV: CPT | Performed by: UROLOGY

## 2025-08-04 PROCEDURE — G8427 DOCREV CUR MEDS BY ELIG CLIN: HCPCS | Performed by: UROLOGY

## 2025-08-04 PROCEDURE — 3074F SYST BP LT 130 MM HG: CPT | Performed by: UROLOGY

## 2025-08-04 PROCEDURE — 99213 OFFICE O/P EST LOW 20 MIN: CPT | Performed by: UROLOGY

## 2025-08-04 PROCEDURE — 4004F PT TOBACCO SCREEN RCVD TLK: CPT | Performed by: UROLOGY

## 2025-08-04 PROCEDURE — 3078F DIAST BP <80 MM HG: CPT | Performed by: UROLOGY

## 2025-08-04 ASSESSMENT — ENCOUNTER SYMPTOMS
ABDOMINAL PAIN: 0
SHORTNESS OF BREATH: 0
BACK PAIN: 0

## (undated) DEVICE — DRAIN SURG 10FR L1 8IN DIA32MM SIL RND HUBLESS FULL FLUT CHN

## (undated) DEVICE — SUTURE VCRL SZ 1 L27IN ABSRB UD L36MM CT-1 1/2 CIR JJ40G

## (undated) DEVICE — GLOVE ORANGE PI 7 1/2   MSG9075

## (undated) DEVICE — PROTECTOR ULN NRV PUR FOAM HK LOOP STRP ANATOMICALLY

## (undated) DEVICE — DRAIN SURG W10XL20CM SIL SMOOTH FLAT 3/4 PERF DBL WRP

## (undated) DEVICE — ST CHARLES MINOR ABDOMINAL PK: Brand: MEDLINE INDUSTRIES, INC.

## (undated) DEVICE — METER,URINE,400ML,DRAIN BAG,L/F,LL: Brand: MEDLINE

## (undated) DEVICE — TOWEL,OR,DSP,ST,NATURAL,DLX,4/PK,20PK/CS: Brand: MEDLINE

## (undated) DEVICE — DRAPE SURG LAPAROSCOPY 14X6 IN WNG

## (undated) DEVICE — Device

## (undated) DEVICE — DRAPE EQUIP STAND XL MAYO CVR

## (undated) DEVICE — GLOVE ORANGE PI 8 1/2   MSG9085

## (undated) DEVICE — GAUZE,SPONGE,4"X4",16PLY,XRAY,STRL,LF: Brand: MEDLINE

## (undated) DEVICE — GARMENT,MEDLINE,DVT,INT,CALF,MED, GEN2: Brand: MEDLINE

## (undated) DEVICE — APPLICATOR MEDICATED 26 CC SOLUTION HI LT ORNG CHLORAPREP

## (undated) DEVICE — GOWN,AURORA,NONRNF,XL,30/CS: Brand: MEDLINE

## (undated) DEVICE — SOLUTION IV IRRIG WATER 1000ML POUR BRL 2F7114

## (undated) DEVICE — GLOVE ORANGE PI 7   MSG9070

## (undated) DEVICE — SYRINGE, LUER LOCK, 10ML: Brand: MEDLINE

## (undated) DEVICE — SUTURE PDS II SZ 1 L36IN ABSRB VLT L48MM CTX 1/2 CIR Z371T

## (undated) DEVICE — MASTISOL ADHESIVE LIQ 2/3ML

## (undated) DEVICE — PAD,ABDOMINAL,5"X9",ST,LF,25/BX: Brand: MEDLINE INDUSTRIES, INC.

## (undated) DEVICE — CANNULA SEAL

## (undated) DEVICE — MEDI-VAC YANKAUER SUCTION HANDLE W/BULBOUS TIP: Brand: CARDINAL HEALTH

## (undated) DEVICE — COVER,MAYO STAND,STERILE: Brand: MEDLINE

## (undated) DEVICE — CATHETER F BLLN 5CC 20FR INF CTRL 2 W SIL ALLY AND HYDRGEL

## (undated) DEVICE — DEVICE ERECTILE RESTR FOR PENILE PROS

## (undated) DEVICE — DRAPE,REIN 53X77,STERILE: Brand: MEDLINE

## (undated) DEVICE — MONOPTY® DISPOSABLE CORE BIOPSY INSTRUMENT, 22MM PENETRATION DEPTH, 18G X 20CM: Brand: MONOPTY

## (undated) DEVICE — SUTURE MCRYL SZ 4-0 L18IN ABSRB UD L16MM PC-3 3/8 CIR PRIM Y845G

## (undated) DEVICE — NEEDLE HYPO 25GA L1.5IN BLU POLYPR HUB S STL REG BVL STR

## (undated) DEVICE — BLADE CLIPPER GEN PURP NS

## (undated) DEVICE — CONTAINER,SPECIMEN,4OZ,OR STRL: Brand: MEDLINE

## (undated) DEVICE — AGENT HEMSTAT W2XL14IN OXIDIZED REGENERATED CELOS ABSRB FOR

## (undated) DEVICE — BLADELESS OBTURATOR: Brand: WECK VISTA

## (undated) DEVICE — CHLORAPREP 26ML ORANGE

## (undated) DEVICE — SCISSOR SURG METZ CRV TIP

## (undated) DEVICE — HYPODERMIC SAFETY NEEDLE: Brand: MAGELLAN

## (undated) DEVICE — NEEDLE BX ASPIR SPNL TIPCM MRK AND NDL STP 22GAX20CM

## (undated) DEVICE — SUTURE VCRL SZ 0 L27IN ABSRB UD L36MM CT-1 1/2 CIR J260H

## (undated) DEVICE — SUTURE V-LOC 180 SZ 3-0 L6IN ABSRB GRN L17MM CV-23 1/2 CIR VLOCL0804

## (undated) DEVICE — STANDARD HYPODERMIC NEEDLE,POLYPROPYLENE HUB: Brand: MONOJECT

## (undated) DEVICE — SOLUTION ANTIFOG VIS SYS CLEARIFY LAPSCP

## (undated) DEVICE — Z INACTIVE USE 2653177 SPONGE GZ W2XL2IN NONWOVEN 4 PLY FASTER WICKING ABIL AVANT

## (undated) DEVICE — PACK SURG ABD SVMMC

## (undated) DEVICE — SYRINGE 20ML LL S/C 50

## (undated) DEVICE — MITT SURG PREP L ADH DISPOSABLE

## (undated) DEVICE — DRAIN SURG L49IN DIA1/8IN 10IN H SIL W/O TRCR END PERF

## (undated) DEVICE — STERILE POLYISOPRENE POWDER-FREE SURGICAL GLOVES: Brand: PROTEXIS

## (undated) DEVICE — ARM DRAPE

## (undated) DEVICE — GAUZE,SPONGE,FLUFF,6"X6.75",STRL,5/TRAY: Brand: MEDLINE

## (undated) DEVICE — SINGLE PORT MANIFOLD: Brand: NEPTUNE 2

## (undated) DEVICE — 40580 - THE PINK PAD - ADVANCED TRENDELENBURG POSITIONING KIT: Brand: 40580 - THE PINK PAD - ADVANCED TRENDELENBURG POSITIONING KIT

## (undated) DEVICE — 3M™ TEGADERM™ TRANSPARENT FILM DRESSING FRAME STYLE, 1624W, 2-3/8 IN X 2-3/4 IN (6 CM X 7 CM), 100/CT 4CT/CASE: Brand: 3M™ TEGADERM™

## (undated) DEVICE — TIP COVER ACCESSORY

## (undated) DEVICE — Device: Brand: MEDICAL ACTION INDUSTRIES

## (undated) DEVICE — SUTURE ABSRB BRAID COAT VLT SH 3-0 27IN VCRL J311H

## (undated) DEVICE — COVER,LIGHT HANDLE,FLX,2/PK: Brand: MEDLINE INDUSTRIES, INC.

## (undated) DEVICE — SYRINGE MED 50ML LUERLOCK TIP

## (undated) DEVICE — SUTURE V-LOC 180 SZ 0 L9IN ABSRB GRN GS-21 L37MM 1/2 CIR VLOCL0346

## (undated) DEVICE — INTENDED FOR TISSUE SEPARATION, AND OTHER PROCEDURES THAT REQUIRE A SHARP SURGICAL BLADE TO PUNCTURE OR CUT.: Brand: BARD-PARKER ® CARBON RIB-BACK BLADES

## (undated) DEVICE — SUTURE VCRL + SZ 4-0 L18IN ABSRB UD L19MM PS-2 3/8 CIR PRIM VCP496H

## (undated) DEVICE — CATHETER URETH 16FR BLLN 5CC SIL ALLY W/ SIL HYDRGEL 2 W F

## (undated) DEVICE — 1200CC GUARDIAN II: Brand: GUARDIAN

## (undated) DEVICE — STRAP,CATHETER,ELASTIC,HOOK&LOOP: Brand: MEDLINE

## (undated) DEVICE — SYRINGE MED 10ML LUERLOCK TIP W/O SFTY DISP

## (undated) DEVICE — MEDI-VAC NON-CONDUCTIVE SUCTION TUBING: Brand: CARDINAL HEALTH

## (undated) DEVICE — AIRLIFE™ NASAL OXYGEN CANNULA CURVED, FLARED TIP, WITH 7 FEET (2.1 M) CRUSH RESISTANT TUBING, OVER-THE-EAR STYLE: Brand: AIRLIFE™

## (undated) DEVICE — CATHETER URETH 20FR BLLN 30CC 2 W F INF CTRL BARDX

## (undated) DEVICE — GOWN,SURGICAL,AURORA,SLEEVE: Brand: MEDLINE

## (undated) DEVICE — AIRSEAL 12 MM ACCESS PORT AND PALM GRIP OBTURATOR WITH BLADELESS OPTICAL TIP, 120 MM LENGTH: Brand: AIRSEAL

## (undated) DEVICE — TRI-LUMEN FILTERED TUBE SET WITH ACTIVATED CHARCOAL FILTER: Brand: AIRSEAL

## (undated) DEVICE — COUNTER NDL 40 COUNT HLD 70 FOAM BLK ADH W/ MAG

## (undated) DEVICE — ELECTRO LUBE IS A SINGLE PATIENT USE DEVICE THAT IS INTENDED TO BE USED ON ELECTROSURGICAL ELECTRODES TO REDUCE STICKING.: Brand: KEY SURGICAL ELECTRO LUBE

## (undated) DEVICE — RESERVOIR,SUCTION,100CC,SILICONE: Brand: MEDLINE

## (undated) DEVICE — BAG SPEC REM 224ML W4XL6IN DIA10MM 1 HND GYN DISP ENDOPCH